# Patient Record
Sex: FEMALE | Race: WHITE | Employment: OTHER | URBAN - NONMETROPOLITAN AREA
[De-identification: names, ages, dates, MRNs, and addresses within clinical notes are randomized per-mention and may not be internally consistent; named-entity substitution may affect disease eponyms.]

---

## 2019-10-22 ENCOUNTER — OUTSIDE SERVICES (OUTPATIENT)
Dept: INTERNAL MEDICINE CLINIC | Age: 84
End: 2019-10-22
Payer: MEDICARE

## 2019-10-22 DIAGNOSIS — F03.90 DEMENTIA WITHOUT BEHAVIORAL DISTURBANCE, UNSPECIFIED DEMENTIA TYPE: ICD-10-CM

## 2019-10-22 DIAGNOSIS — R53.1 GENERALIZED WEAKNESS: ICD-10-CM

## 2019-10-22 DIAGNOSIS — I48.19 PERSISTENT ATRIAL FIBRILLATION (HCC): ICD-10-CM

## 2019-10-22 DIAGNOSIS — J18.9 PNEUMONIA OF LEFT LOWER LOBE DUE TO INFECTIOUS ORGANISM: ICD-10-CM

## 2019-10-22 DIAGNOSIS — R26.9 GAIT DISTURBANCE: ICD-10-CM

## 2019-10-22 PROCEDURE — 99306 1ST NF CARE HIGH MDM 50: CPT | Performed by: INTERNAL MEDICINE

## 2019-10-29 ENCOUNTER — OUTSIDE SERVICES (OUTPATIENT)
Dept: INTERNAL MEDICINE CLINIC | Age: 84
End: 2019-10-29
Payer: MEDICARE

## 2019-10-29 ENCOUNTER — HOSPITAL ENCOUNTER (OUTPATIENT)
Age: 84
Setting detail: SPECIMEN
Discharge: HOME | End: 2019-10-29
Payer: MEDICARE

## 2019-10-29 DIAGNOSIS — F03.90 DEMENTIA WITHOUT BEHAVIORAL DISTURBANCE, UNSPECIFIED DEMENTIA TYPE: ICD-10-CM

## 2019-10-29 DIAGNOSIS — I48.19 PERSISTENT ATRIAL FIBRILLATION (HCC): ICD-10-CM

## 2019-10-29 DIAGNOSIS — R26.9 GAIT DISTURBANCE: Primary | ICD-10-CM

## 2019-10-29 DIAGNOSIS — R53.1 GENERALIZED WEAKNESS: ICD-10-CM

## 2019-10-29 LAB
ALBUMIN SERPL-MCNC: 3.1 GM/DL (ref 3.4–5)
ALP BLD-CCNC: 86 IU/L (ref 40–129)
ALT SERPL-CCNC: 19 U/L (ref 10–40)
ANION GAP SERPL CALCULATED.3IONS-SCNC: 13 MMOL/L (ref 4–16)
AST SERPL-CCNC: 17 IU/L (ref 15–37)
BILIRUB SERPL-MCNC: 0.2 MG/DL (ref 0–1)
BUN BLDV-MCNC: 16 MG/DL (ref 6–23)
CALCIUM SERPL-MCNC: 9 MG/DL (ref 8.3–10.6)
CHLORIDE BLD-SCNC: 105 MMOL/L (ref 99–110)
CO2: 26 MMOL/L (ref 21–32)
CREAT SERPL-MCNC: 1.1 MG/DL (ref 0.6–1.1)
GFR AFRICAN AMERICAN: 58 ML/MIN/1.73M2
GFR NON-AFRICAN AMERICAN: 48 ML/MIN/1.73M2
GLUCOSE BLD-MCNC: 90 MG/DL (ref 70–99)
HCT VFR BLD CALC: 40.6 % (ref 37–47)
HEMOGLOBIN: 13 GM/DL (ref 12.5–16)
MCH RBC QN AUTO: 29.9 PG (ref 27–31)
MCHC RBC AUTO-ENTMCNC: 32 % (ref 32–36)
MCV RBC AUTO: 93.3 FL (ref 78–100)
PDW BLD-RTO: 13 % (ref 11.7–14.9)
PLATELET # BLD: 471 K/CU MM (ref 140–440)
PMV BLD AUTO: 9 FL (ref 7.5–11.1)
POTASSIUM SERPL-SCNC: 4.7 MMOL/L (ref 3.5–5.1)
RBC # BLD: 4.35 M/CU MM (ref 4.2–5.4)
SODIUM BLD-SCNC: 144 MMOL/L (ref 135–145)
TOTAL PROTEIN: 6.2 GM/DL (ref 6.4–8.2)
WBC # BLD: 9.3 K/CU MM (ref 4–10.5)

## 2019-10-29 PROCEDURE — 85027 COMPLETE CBC AUTOMATED: CPT

## 2019-10-29 PROCEDURE — 99308 SBSQ NF CARE LOW MDM 20: CPT | Performed by: INTERNAL MEDICINE

## 2019-10-29 PROCEDURE — 80053 COMPREHEN METABOLIC PANEL: CPT

## 2019-10-29 PROCEDURE — 36415 COLL VENOUS BLD VENIPUNCTURE: CPT

## 2019-11-07 ENCOUNTER — OUTSIDE SERVICES (OUTPATIENT)
Dept: INTERNAL MEDICINE CLINIC | Age: 84
End: 2019-11-07
Payer: MEDICARE

## 2019-11-07 DIAGNOSIS — J18.9 PNEUMONIA OF LEFT LOWER LOBE DUE TO INFECTIOUS ORGANISM: ICD-10-CM

## 2019-11-07 DIAGNOSIS — R53.1 GENERALIZED WEAKNESS: Primary | ICD-10-CM

## 2019-11-07 DIAGNOSIS — I48.19 PERSISTENT ATRIAL FIBRILLATION (HCC): ICD-10-CM

## 2019-11-07 DIAGNOSIS — R26.9 GAIT DISTURBANCE: ICD-10-CM

## 2019-11-07 DIAGNOSIS — F03.90 DEMENTIA WITHOUT BEHAVIORAL DISTURBANCE, UNSPECIFIED DEMENTIA TYPE: ICD-10-CM

## 2019-11-07 PROCEDURE — 99308 SBSQ NF CARE LOW MDM 20: CPT | Performed by: INTERNAL MEDICINE

## 2019-11-11 ENCOUNTER — HOSPITAL ENCOUNTER (OUTPATIENT)
Age: 84
Discharge: HOME | End: 2019-11-11
Payer: MEDICARE

## 2019-11-11 ENCOUNTER — HOSPITAL ENCOUNTER (OUTPATIENT)
Dept: GENERAL RADIOLOGY | Age: 84
Discharge: HOME | End: 2019-11-11
Payer: MEDICARE

## 2019-11-11 DIAGNOSIS — J90 PLEURAL EFFUSION: ICD-10-CM

## 2019-11-11 DIAGNOSIS — J18.9 PNEUMONIA DUE TO INFECTIOUS ORGANISM, UNSPECIFIED LATERALITY, UNSPECIFIED PART OF LUNG: ICD-10-CM

## 2019-11-11 PROCEDURE — 71046 X-RAY EXAM CHEST 2 VIEWS: CPT

## 2019-11-14 ENCOUNTER — TELEPHONE (OUTPATIENT)
Dept: INTERNAL MEDICINE CLINIC | Age: 84
End: 2019-11-14

## 2019-11-20 ENCOUNTER — OFFICE VISIT (OUTPATIENT)
Dept: INTERNAL MEDICINE CLINIC | Age: 84
End: 2019-11-20
Payer: MEDICARE

## 2019-11-20 VITALS
HEIGHT: 66 IN | WEIGHT: 193.6 LBS | RESPIRATION RATE: 16 BRPM | SYSTOLIC BLOOD PRESSURE: 112 MMHG | OXYGEN SATURATION: 95 % | BODY MASS INDEX: 31.12 KG/M2 | HEART RATE: 84 BPM | TEMPERATURE: 97.6 F | DIASTOLIC BLOOD PRESSURE: 72 MMHG

## 2019-11-20 DIAGNOSIS — F03.90 DEMENTIA WITHOUT BEHAVIORAL DISTURBANCE, UNSPECIFIED DEMENTIA TYPE: ICD-10-CM

## 2019-11-20 DIAGNOSIS — I48.19 PERSISTENT ATRIAL FIBRILLATION (HCC): ICD-10-CM

## 2019-11-20 DIAGNOSIS — J18.9 PNEUMONIA OF LEFT LOWER LOBE DUE TO INFECTIOUS ORGANISM: Primary | ICD-10-CM

## 2019-11-20 DIAGNOSIS — E78.2 MIXED HYPERLIPIDEMIA: ICD-10-CM

## 2019-11-20 DIAGNOSIS — F32.5 MAJOR DEPRESSIVE DISORDER WITH SINGLE EPISODE, IN FULL REMISSION (HCC): ICD-10-CM

## 2019-11-20 DIAGNOSIS — Q21.12 PFO (PATENT FORAMEN OVALE): ICD-10-CM

## 2019-11-20 DIAGNOSIS — M54.50 CHRONIC MIDLINE LOW BACK PAIN WITHOUT SCIATICA: ICD-10-CM

## 2019-11-20 DIAGNOSIS — R26.9 GAIT DISTURBANCE: ICD-10-CM

## 2019-11-20 DIAGNOSIS — Z86.73 HISTORY OF TIA (TRANSIENT ISCHEMIC ATTACK): ICD-10-CM

## 2019-11-20 DIAGNOSIS — E03.9 ACQUIRED HYPOTHYROIDISM: ICD-10-CM

## 2019-11-20 DIAGNOSIS — G89.29 CHRONIC MIDLINE LOW BACK PAIN WITHOUT SCIATICA: ICD-10-CM

## 2019-11-20 PROCEDURE — 1111F DSCHRG MED/CURRENT MED MERGE: CPT | Performed by: INTERNAL MEDICINE

## 2019-11-20 PROCEDURE — G0444 DEPRESSION SCREEN ANNUAL: HCPCS | Performed by: INTERNAL MEDICINE

## 2019-11-20 PROCEDURE — 99495 TRANSJ CARE MGMT MOD F2F 14D: CPT | Performed by: INTERNAL MEDICINE

## 2019-11-20 RX ORDER — SIMVASTATIN 40 MG
40 TABLET ORAL NIGHTLY
Qty: 30 TABLET | Refills: 3 | Status: SHIPPED | OUTPATIENT
Start: 2019-11-20 | End: 2019-12-23 | Stop reason: SDUPTHER

## 2019-11-20 RX ORDER — MEMANTINE HYDROCHLORIDE 10 MG/1
10 TABLET ORAL 2 TIMES DAILY
COMMUNITY
End: 2021-03-31 | Stop reason: SDUPTHER

## 2019-11-20 RX ORDER — MULTIVIT-MIN/IRON/FOLIC ACID/K 18-600-40
2000 CAPSULE ORAL DAILY
COMMUNITY
End: 2020-07-14 | Stop reason: SDUPTHER

## 2019-11-20 RX ORDER — LEVOTHYROXINE SODIUM 0.15 MG/1
150 TABLET ORAL DAILY
Qty: 30 TABLET | Refills: 3 | Status: SHIPPED | OUTPATIENT
Start: 2019-11-20 | End: 2019-12-23 | Stop reason: SDUPTHER

## 2019-11-20 RX ORDER — MECLIZINE HYDROCHLORIDE CHEWABLE TABLETS 25 MG/1
25 TABLET, CHEWABLE ORAL PRN
COMMUNITY
End: 2020-07-14 | Stop reason: SDUPTHER

## 2019-11-20 RX ORDER — ASPIRIN 81 MG/1
81 TABLET ORAL DAILY
Qty: 30 TABLET | Refills: 3 | Status: SHIPPED | OUTPATIENT
Start: 2019-11-20 | End: 2019-12-23 | Stop reason: SDUPTHER

## 2019-11-20 ASSESSMENT — PATIENT HEALTH QUESTIONNAIRE - PHQ9
1. LITTLE INTEREST OR PLEASURE IN DOING THINGS: 2
6. FEELING BAD ABOUT YOURSELF - OR THAT YOU ARE A FAILURE OR HAVE LET YOURSELF OR YOUR FAMILY DOWN: 1
7. TROUBLE CONCENTRATING ON THINGS, SUCH AS READING THE NEWSPAPER OR WATCHING TELEVISION: 1
10. IF YOU CHECKED OFF ANY PROBLEMS, HOW DIFFICULT HAVE THESE PROBLEMS MADE IT FOR YOU TO DO YOUR WORK, TAKE CARE OF THINGS AT HOME, OR GET ALONG WITH OTHER PEOPLE: 2
8. MOVING OR SPEAKING SO SLOWLY THAT OTHER PEOPLE COULD HAVE NOTICED. OR THE OPPOSITE, BEING SO FIGETY OR RESTLESS THAT YOU HAVE BEEN MOVING AROUND A LOT MORE THAN USUAL: 1
2. FEELING DOWN, DEPRESSED OR HOPELESS: 0
SUM OF ALL RESPONSES TO PHQ QUESTIONS 1-9: 9
4. FEELING TIRED OR HAVING LITTLE ENERGY: 3
9. THOUGHTS THAT YOU WOULD BE BETTER OFF DEAD, OR OF HURTING YOURSELF: 0
SUM OF ALL RESPONSES TO PHQ QUESTIONS 1-9: 9
3. TROUBLE FALLING OR STAYING ASLEEP: 0
SUM OF ALL RESPONSES TO PHQ9 QUESTIONS 1 & 2: 2
5. POOR APPETITE OR OVEREATING: 1

## 2019-11-22 ENCOUNTER — HOSPITAL ENCOUNTER (OUTPATIENT)
Dept: CT IMAGING | Age: 84
Discharge: HOME | End: 2019-11-22
Payer: MEDICARE

## 2019-11-22 DIAGNOSIS — J18.9 PNEUMONIA OF LEFT LOWER LOBE DUE TO INFECTIOUS ORGANISM: ICD-10-CM

## 2019-11-22 PROCEDURE — 71250 CT THORAX DX C-: CPT

## 2019-12-05 ENCOUNTER — HOSPITAL ENCOUNTER (OUTPATIENT)
Age: 84
Discharge: HOME | End: 2019-12-05
Payer: MEDICARE

## 2019-12-05 DIAGNOSIS — J18.9 PNEUMONIA OF LEFT LOWER LOBE DUE TO INFECTIOUS ORGANISM: ICD-10-CM

## 2019-12-05 LAB — PRO-BNP: 2809 PG/ML

## 2019-12-05 PROCEDURE — 83880 ASSAY OF NATRIURETIC PEPTIDE: CPT

## 2019-12-05 PROCEDURE — 36415 COLL VENOUS BLD VENIPUNCTURE: CPT

## 2019-12-09 PROBLEM — F32.5 MAJOR DEPRESSIVE DISORDER WITH SINGLE EPISODE, IN FULL REMISSION (HCC): Status: ACTIVE | Noted: 2019-12-09

## 2019-12-09 PROBLEM — Q21.12 PFO (PATENT FORAMEN OVALE): Status: ACTIVE | Noted: 2019-12-09

## 2019-12-09 PROBLEM — E03.9 ACQUIRED HYPOTHYROIDISM: Status: ACTIVE | Noted: 2019-12-09

## 2019-12-09 PROBLEM — E78.2 MIXED HYPERLIPIDEMIA: Status: ACTIVE | Noted: 2019-12-09

## 2019-12-09 PROBLEM — Z86.73 HISTORY OF TIA (TRANSIENT ISCHEMIC ATTACK): Status: ACTIVE | Noted: 2019-12-09

## 2019-12-13 ENCOUNTER — HOSPITAL ENCOUNTER (OUTPATIENT)
Dept: MRI IMAGING | Age: 84
Discharge: HOME | End: 2019-12-13
Payer: MEDICARE

## 2019-12-13 DIAGNOSIS — M54.17 L-S RADICULOPATHY: ICD-10-CM

## 2019-12-13 PROCEDURE — 72148 MRI LUMBAR SPINE W/O DYE: CPT

## 2019-12-23 ENCOUNTER — OFFICE VISIT (OUTPATIENT)
Dept: INTERNAL MEDICINE CLINIC | Age: 84
End: 2019-12-23
Payer: MEDICARE

## 2019-12-23 VITALS
DIASTOLIC BLOOD PRESSURE: 72 MMHG | WEIGHT: 193.2 LBS | HEART RATE: 76 BPM | TEMPERATURE: 97.8 F | RESPIRATION RATE: 20 BRPM | OXYGEN SATURATION: 98 % | BODY MASS INDEX: 31.18 KG/M2 | SYSTOLIC BLOOD PRESSURE: 104 MMHG

## 2019-12-23 DIAGNOSIS — F32.5 MAJOR DEPRESSIVE DISORDER WITH SINGLE EPISODE, IN FULL REMISSION (HCC): ICD-10-CM

## 2019-12-23 DIAGNOSIS — E78.2 MIXED HYPERLIPIDEMIA: ICD-10-CM

## 2019-12-23 DIAGNOSIS — R91.8 PULMONARY NODULES: ICD-10-CM

## 2019-12-23 DIAGNOSIS — G89.29 CHRONIC MIDLINE LOW BACK PAIN WITHOUT SCIATICA: ICD-10-CM

## 2019-12-23 DIAGNOSIS — I48.19 PERSISTENT ATRIAL FIBRILLATION (HCC): ICD-10-CM

## 2019-12-23 DIAGNOSIS — J18.9 PNEUMONIA OF LEFT LOWER LOBE DUE TO INFECTIOUS ORGANISM: ICD-10-CM

## 2019-12-23 DIAGNOSIS — F03.90 DEMENTIA WITHOUT BEHAVIORAL DISTURBANCE, UNSPECIFIED DEMENTIA TYPE: ICD-10-CM

## 2019-12-23 DIAGNOSIS — R26.9 GAIT DISTURBANCE: ICD-10-CM

## 2019-12-23 DIAGNOSIS — E03.9 ACQUIRED HYPOTHYROIDISM: ICD-10-CM

## 2019-12-23 DIAGNOSIS — M54.50 CHRONIC MIDLINE LOW BACK PAIN WITHOUT SCIATICA: ICD-10-CM

## 2019-12-23 DIAGNOSIS — Z86.73 HISTORY OF TIA (TRANSIENT ISCHEMIC ATTACK): ICD-10-CM

## 2019-12-23 PROCEDURE — 1036F TOBACCO NON-USER: CPT | Performed by: INTERNAL MEDICINE

## 2019-12-23 PROCEDURE — 1090F PRES/ABSN URINE INCON ASSESS: CPT | Performed by: INTERNAL MEDICINE

## 2019-12-23 PROCEDURE — G8484 FLU IMMUNIZE NO ADMIN: HCPCS | Performed by: INTERNAL MEDICINE

## 2019-12-23 PROCEDURE — G8427 DOCREV CUR MEDS BY ELIG CLIN: HCPCS | Performed by: INTERNAL MEDICINE

## 2019-12-23 PROCEDURE — 99214 OFFICE O/P EST MOD 30 MIN: CPT | Performed by: INTERNAL MEDICINE

## 2019-12-23 PROCEDURE — G8400 PT W/DXA NO RESULTS DOC: HCPCS | Performed by: INTERNAL MEDICINE

## 2019-12-23 PROCEDURE — G8417 CALC BMI ABV UP PARAM F/U: HCPCS | Performed by: INTERNAL MEDICINE

## 2019-12-23 PROCEDURE — 4040F PNEUMOC VAC/ADMIN/RCVD: CPT | Performed by: INTERNAL MEDICINE

## 2019-12-23 PROCEDURE — 1123F ACP DISCUSS/DSCN MKR DOCD: CPT | Performed by: INTERNAL MEDICINE

## 2019-12-23 RX ORDER — ACETAMINOPHEN 500 MG
500 TABLET ORAL 3 TIMES DAILY
Status: ON HOLD | COMMUNITY
End: 2022-04-12

## 2019-12-23 RX ORDER — LEVOTHYROXINE SODIUM 0.15 MG/1
150 TABLET ORAL DAILY
Qty: 90 TABLET | Refills: 1 | Status: SHIPPED | OUTPATIENT
Start: 2019-12-23 | End: 2020-05-05 | Stop reason: SDUPTHER

## 2019-12-23 RX ORDER — SIMVASTATIN 40 MG
40 TABLET ORAL NIGHTLY
Qty: 90 TABLET | Refills: 1 | Status: SHIPPED | OUTPATIENT
Start: 2019-12-23 | End: 2020-04-28 | Stop reason: SDUPTHER

## 2019-12-23 RX ORDER — LIDOCAINE 50 MG/G
1 PATCH TOPICAL DAILY
Qty: 30 PATCH | Refills: 0 | Status: SHIPPED | OUTPATIENT
Start: 2019-12-23 | End: 2020-01-22

## 2019-12-23 RX ORDER — CHOLESTYRAMINE LIGHT 4 G/5.7G
4 POWDER, FOR SUSPENSION ORAL DAILY PRN
COMMUNITY
End: 2020-07-14 | Stop reason: ALTCHOICE

## 2019-12-23 RX ORDER — ASPIRIN 81 MG/1
81 TABLET ORAL DAILY
Qty: 30 TABLET | Refills: 5 | Status: SHIPPED | OUTPATIENT
Start: 2019-12-23 | End: 2020-07-08 | Stop reason: SDUPTHER

## 2019-12-23 ASSESSMENT — ENCOUNTER SYMPTOMS
EYES NEGATIVE: 1
WHEEZING: 0
SHORTNESS OF BREATH: 0
GASTROINTESTINAL NEGATIVE: 1
ALLERGIC/IMMUNOLOGIC NEGATIVE: 1
RESPIRATORY NEGATIVE: 1
COUGH: 0

## 2020-01-03 ENCOUNTER — HOSPITAL ENCOUNTER (OUTPATIENT)
Age: 85
Discharge: HOME | End: 2020-01-03
Payer: MEDICARE

## 2020-01-03 LAB
CHOLESTEROL, FASTING: 161 MG/DL
HDLC SERPL-MCNC: 42 MG/DL
LDL CHOLESTEROL DIRECT: 100 MG/DL
TRIGLYCERIDE, FASTING: 162 MG/DL
TSH HIGH SENSITIVITY: 5.15 UIU/ML (ref 0.27–4.2)

## 2020-01-03 PROCEDURE — 84443 ASSAY THYROID STIM HORMONE: CPT

## 2020-01-03 PROCEDURE — 80061 LIPID PANEL: CPT

## 2020-01-03 PROCEDURE — 36415 COLL VENOUS BLD VENIPUNCTURE: CPT

## 2020-01-08 ENCOUNTER — TELEPHONE (OUTPATIENT)
Dept: INTERNAL MEDICINE CLINIC | Age: 85
End: 2020-01-08

## 2020-01-08 NOTE — TELEPHONE ENCOUNTER
Marco Antonio Nurse called in stating she would like a prescription for Ensure Plus for nutrition. Please advise.  She would like script faxed to [phone]

## 2020-01-10 ENCOUNTER — TELEPHONE (OUTPATIENT)
Dept: INTERNAL MEDICINE CLINIC | Age: 85
End: 2020-01-10

## 2020-01-14 NOTE — TELEPHONE ENCOUNTER
Spoke with Kirstie Romero the [employer] nurse she advised that the patient is requesting them for a snack 1 daily patient buys them and they were trying to offset the cost as well have patient using those as snacks instead of chips or junk food. Please advise.  Call back # [phone]

## 2020-03-10 ENCOUNTER — OFFICE VISIT (OUTPATIENT)
Dept: INTERNAL MEDICINE CLINIC | Age: 85
End: 2020-03-10
Payer: MEDICARE

## 2020-03-10 VITALS
OXYGEN SATURATION: 95 % | SYSTOLIC BLOOD PRESSURE: 110 MMHG | BODY MASS INDEX: 30.47 KG/M2 | TEMPERATURE: 97.9 F | WEIGHT: 188.8 LBS | HEART RATE: 76 BPM | DIASTOLIC BLOOD PRESSURE: 64 MMHG | RESPIRATION RATE: 16 BRPM

## 2020-03-10 PROBLEM — R53.1 GENERALIZED WEAKNESS: Status: RESOLVED | Noted: 2019-10-22 | Resolved: 2020-03-10

## 2020-03-10 PROBLEM — J84.9 INTERSTITIAL LUNG DISEASE (HCC): Status: ACTIVE | Noted: 2020-03-10

## 2020-03-10 PROCEDURE — 1123F ACP DISCUSS/DSCN MKR DOCD: CPT | Performed by: INTERNAL MEDICINE

## 2020-03-10 PROCEDURE — 1036F TOBACCO NON-USER: CPT | Performed by: INTERNAL MEDICINE

## 2020-03-10 PROCEDURE — G8484 FLU IMMUNIZE NO ADMIN: HCPCS | Performed by: INTERNAL MEDICINE

## 2020-03-10 PROCEDURE — 1090F PRES/ABSN URINE INCON ASSESS: CPT | Performed by: INTERNAL MEDICINE

## 2020-03-10 PROCEDURE — 4040F PNEUMOC VAC/ADMIN/RCVD: CPT | Performed by: INTERNAL MEDICINE

## 2020-03-10 PROCEDURE — G8427 DOCREV CUR MEDS BY ELIG CLIN: HCPCS | Performed by: INTERNAL MEDICINE

## 2020-03-10 PROCEDURE — G8400 PT W/DXA NO RESULTS DOC: HCPCS | Performed by: INTERNAL MEDICINE

## 2020-03-10 PROCEDURE — 99214 OFFICE O/P EST MOD 30 MIN: CPT | Performed by: INTERNAL MEDICINE

## 2020-03-10 PROCEDURE — G8417 CALC BMI ABV UP PARAM F/U: HCPCS | Performed by: INTERNAL MEDICINE

## 2020-03-10 RX ORDER — METOPROLOL TARTRATE 100 MG/1
100 TABLET ORAL 2 TIMES DAILY
Status: ON HOLD | COMMUNITY
Start: 2020-02-10 | End: 2020-05-16 | Stop reason: HOSPADM

## 2020-03-10 RX ORDER — HYDROCODONE BITARTRATE AND ACETAMINOPHEN 5; 325 MG/1; MG/1
1 TABLET ORAL 2 TIMES DAILY PRN
COMMUNITY
Start: 2020-02-12 | End: 2020-10-20 | Stop reason: ALTCHOICE

## 2020-03-10 RX ORDER — VITAMIN E 268 MG
400 CAPSULE ORAL DAILY
COMMUNITY
Start: 2020-02-10 | End: 2021-03-31 | Stop reason: SDUPTHER

## 2020-03-10 RX ORDER — LOPERAMIDE HYDROCHLORIDE 2 MG/1
2 CAPSULE ORAL 4 TIMES DAILY PRN
COMMUNITY
End: 2020-07-14 | Stop reason: ALTCHOICE

## 2020-03-10 NOTE — PROGRESS NOTES
has no neurological deficit. Interstitial lung disease ([facility] [address]  Pt sees Dr Cathy Calvin pulmonologist at [facility]. Patient states she does get short of breath on exertion. Major depressive disorder with single episode, in full remission ([facility] [address]  History of depression that is controlled with Zoloft. Continue the same    Mixed hyperlipidemia  Lipid profile in January 2020 was good. LDL was 100. Continue simvastatin. Follow low-cholesterol diet    Patient has hypothyroidism and is on medications    Leo Mahoney was evaluated today and a DME order was entered for a semi-electric hospital bed because she requires assistance for positioning needs not possible in an ordinary bed, requirement of elevation of head of bed more than 30 degrees for the diagnosis of back pain, spinal stenosis and interstial lung disease . Patient requires frequent and immediate positioning changes for relief of pain and care needs related to medical diagnoses. Patient needs variability of bed height requirements to perform patient transfers and for eating, bathing, toileting, personal cares, ambulating, grooming and hygiene. Current body Weight: 188 lb 12.8 oz (85.6 kg). The need for this equipment was discussed with the patient and she understands and is in agreement. Mediations reviewed with the patient. Continue current medications. Appropriate prescriptions are addressed. After visit summeryprovided. Follow up as directed sooner if needed. Questions answered and patient verbalizes understanding. Call for any problems, questions, or concerns.        No Known Allergies  Current Outpatient Medications   Medication Sig Dispense Refill    HYDROcodone-acetaminophen (NORCO) 5-325 MG per tablet       vitamin E 400 UNIT capsule       metoprolol (LOPRESSOR) 100 MG tablet Take 100 mg by mouth 2 times daily       loperamide (IMODIUM) 2 MG capsule Take 2 mg by mouth 4 times daily as needed for Diarrhea      acetaminophen (TYLENOL) 500 MG tablet Take 500 mg by mouth 3 times daily PRN      apixaban (ELIQUIS) 5 MG TABS tablet Take by mouth 2 times daily      cholestyramine light 4 g packet Take 4 g by mouth      simvastatin (ZOCOR) 40 MG tablet Take 1 tablet by mouth nightly 90 tablet 1    levothyroxine (SYNTHROID) 150 MCG tablet Take 1 tablet by mouth daily 90 tablet 1    aspirin EC 81 MG EC tablet Take 1 tablet by mouth daily 30 tablet 5    meclizine (TRAVEL SICKNESS) 25 MG CHEW Take 25 mg by mouth as needed      memantine (NAMENDA) 10 MG tablet Take 10 mg by mouth 2 times daily      sertraline (ZOLOFT) 50 MG tablet Take 1 tablet by mouth daily 30 tablet 5    Cholecalciferol (VITAMIN D) 50 MCG (2000 UT) CAPS capsule Take by mouth daily       No current facility-administered medications for this visit. Past Medical History:   Diagnosis Date    Acquired hypothyroidism 12/9/2019    Patient is taking Synthroid    Chronic midline low back pain without sciatica 11/20/2019    Dementia without behavioral disturbance ([facility] [address] 10/22/2019    Patient has dementia. She is taking Namenda Patient is seeing urologist Dr. Artur Lamb Gait disturbance 10/22/2019    Patient uses walker to ambulate    History of TIA (transient ischemic attack) 12/9/2019    Patient has a history of TIA and also recently may have had TIA. Patient is on Eliquis. Sees neurologist Dr. Artur Lamb Major depressive disorder with single episode, in full remission ([facility] [address] 12/9/2019    Patient is taking Zoloft and that is helping.  Mixed hyperlipidemia 12/9/2019    Patient is on simvastatin    Persistent atrial fibrillation 10/22/2019    A. fib on recently in October 2019 . Patient is on metoprolol 25 mg twice a day and Eliquis    PFO (patent foramen ovale) 12/9/2019    PFO seen on echocardiogram in 2016    Pneumonia due to infectious organism 10/22/2019    Patient had pneumonia and pleural effusion during admission in October 2019.  A repeat CT scan of the chest will be done Patient is also seen pulmonologist     Past Surgical History:   Procedure Laterality Date    HYSTERECTOMY       Social History     Tobacco Use    Smoking status: Never Smoker    Smokeless tobacco: Never Used   Substance Use Topics    Alcohol use: No       LAB REVIEW:  CBC:   Lab Results   Component Value Date    WBC 9.3 10/29/2019    HGB 13.0 10/29/2019    HCT 40.6 10/29/2019     10/29/2019     Lipids:   Lab Results   Component Value Date    CHOL 171 07/04/2014    TRIG 181 (H) 07/04/2014    HDL 42 01/03/2020    LDLCALC 91 10/04/2011    LDLDIRECT 100 (H) 01/03/2020    TRIGLYCFAST 162 (H) 01/03/2020     Renal:   Lab Results   Component Value Date    BUN 16 10/29/2019    CREATININE 1.1 10/29/2019     10/29/2019    K 4.7 10/29/2019    ALT 19 10/29/2019    AST 17 10/29/2019    GLUCOSE 90 10/29/2019     PT/INR:   Lab Results   Component Value Date    INR 1.02 07/04/2014     A1C: No results found for: Robert Davies MD, 3/10/2020 , 2:22 PM

## 2020-03-11 NOTE — ASSESSMENT & PLAN NOTE
Lipid profile in January 2020 was good. LDL was 100. Continue simvastatin.   Follow low-cholesterol diet

## 2020-03-11 NOTE — ASSESSMENT & PLAN NOTE
Patient has a history of TIA and also recently may have had TIA. Patient is on Eliquis. Sees neurologist Dr. Shantal Pugh. Patient has no neurological deficit.

## 2020-03-17 RX ORDER — APIXABAN 5 MG/1
TABLET, FILM COATED ORAL
Qty: 60 TABLET | Refills: 5 | Status: SHIPPED | OUTPATIENT
Start: 2020-03-17 | End: 2020-07-14 | Stop reason: SDUPTHER

## 2020-04-28 RX ORDER — SIMVASTATIN 40 MG
40 TABLET ORAL NIGHTLY
Qty: 90 TABLET | Refills: 1 | Status: SHIPPED | OUTPATIENT
Start: 2020-04-28 | End: 2020-07-14 | Stop reason: SDUPTHER

## 2020-04-28 NOTE — TELEPHONE ENCOUNTER
Patient is needing a refill of her Simvastatin. Confirmed appointment for 5/12/2020 but patient will be out of this medication by Friday. They are requesting that medication be sent to [facility] in [place].

## 2020-05-04 ENCOUNTER — TELEPHONE (OUTPATIENT)
Dept: INTERNAL MEDICINE CLINIC | Age: 85
End: 2020-05-04

## 2020-05-05 ENCOUNTER — VIRTUAL VISIT (OUTPATIENT)
Dept: INTERNAL MEDICINE CLINIC | Age: 85
End: 2020-05-05
Payer: MEDICARE

## 2020-05-05 PROBLEM — B37.2 CANDIDAL INTERTRIGO: Status: ACTIVE | Noted: 2020-05-05

## 2020-05-05 PROCEDURE — G8400 PT W/DXA NO RESULTS DOC: HCPCS | Performed by: INTERNAL MEDICINE

## 2020-05-05 PROCEDURE — 99214 OFFICE O/P EST MOD 30 MIN: CPT | Performed by: INTERNAL MEDICINE

## 2020-05-05 PROCEDURE — 1090F PRES/ABSN URINE INCON ASSESS: CPT | Performed by: INTERNAL MEDICINE

## 2020-05-05 PROCEDURE — 4040F PNEUMOC VAC/ADMIN/RCVD: CPT | Performed by: INTERNAL MEDICINE

## 2020-05-05 PROCEDURE — G8427 DOCREV CUR MEDS BY ELIG CLIN: HCPCS | Performed by: INTERNAL MEDICINE

## 2020-05-05 PROCEDURE — 1123F ACP DISCUSS/DSCN MKR DOCD: CPT | Performed by: INTERNAL MEDICINE

## 2020-05-05 RX ORDER — NYSTATIN 100000 U/G
CREAM TOPICAL
Qty: 30 G | Refills: 2 | Status: SHIPPED | OUTPATIENT
Start: 2020-05-05 | End: 2020-07-14

## 2020-05-05 RX ORDER — FLUCONAZOLE 150 MG/1
150 TABLET ORAL ONCE
Qty: 1 TABLET | Refills: 0 | Status: SHIPPED | OUTPATIENT
Start: 2020-05-05 | End: 2020-05-05

## 2020-05-05 RX ORDER — LEVOTHYROXINE SODIUM 0.15 MG/1
150 TABLET ORAL DAILY
Qty: 90 TABLET | Refills: 1 | Status: SHIPPED | OUTPATIENT
Start: 2020-05-05 | End: 2020-07-08 | Stop reason: SDUPTHER

## 2020-05-05 RX ORDER — NITROFURANTOIN 25; 75 MG/1; MG/1
100 CAPSULE ORAL 2 TIMES DAILY
Qty: 14 CAPSULE | Refills: 0 | Status: SHIPPED | OUTPATIENT
Start: 2020-05-05 | End: 2020-05-12

## 2020-05-05 NOTE — PROGRESS NOTES
2020    TELEHEALTH EVALUATION -- Audio/Visual (During VEDVR-34 public health emergency)    HPI:    Trell Manley (:  1935) has requested an audio/video evaluation for the following concern(s):    Rash in the groin and skin folds  Generalized weakness  Patient has fallen x2    Virtual visit was done by [email or web address]  Patient's 2 daughters and home health care nurse was also present    Patient is in bed  Patient's family states patient has been weak and has difficulty walking. Her knee is giving out when she tries to walk and she had fallen 2 times on the knees. No hard fall. One time they are to call the squad to pick her up. Patient has developed a rash under the breast, and groin and on the skin fold    Patient is daughter check patient's urine from over-the-counter dipsticks and it was positive for nitrites and leukoesterase    BP at home was running 96/58 pulse was 93 today. Patient is on metoprolol 100 mg twice a day  As per daughter patient's blood pressure was running low for the last few days. It was as low as 80 systolic    I talked to the patient. She denies any chest pain. No shortness of breath at rest.  No cough or sputum production  No nausea, vomiting or diarrhea    Review of Systems    Review of system normal except as in HPI  Prior to Visit Medications    Medication Sig Taking? Authorizing Provider   levothyroxine (SYNTHROID) 150 MCG tablet Take 1 tablet by mouth daily Yes Abdirashid Ureña MD   sertraline (ZOLOFT) 50 MG tablet Take 1 tablet by mouth daily Yes Abdirashid Ureña MD   nystatin (MYCOSTATIN) 219239 UNIT/GM cream Apply topically 2 times daily.  Yes Abdirashid Ureña MD   fluconazole (DIFLUCAN) 150 MG tablet Take 1 tablet by mouth once for 1 dose Yes Abdirashid Ureña MD   nitrofurantoin, macrocrystal-monohydrate, (MACROBID) 100 MG capsule Take 1 capsule by mouth 2 times daily for 7 days Yes Abdirashid Ureña MD   simvastatin (ZOCOR) 40 MG tablet Take 1 tablet by mouth nightly Yes Abdirashid Ureña MD

## 2020-05-06 ENCOUNTER — TELEPHONE (OUTPATIENT)
Dept: INTERNAL MEDICINE CLINIC | Age: 85
End: 2020-05-06

## 2020-05-11 ENCOUNTER — TELEPHONE (OUTPATIENT)
Dept: INTERNAL MEDICINE CLINIC | Age: 85
End: 2020-05-11

## 2020-05-14 ENCOUNTER — TELEPHONE (OUTPATIENT)
Dept: INTERNAL MEDICINE CLINIC | Age: 85
End: 2020-05-14

## 2020-05-14 NOTE — TELEPHONE ENCOUNTER
Patient called to report her BP reading as requested from her last visit since the Metoprolol was changed from 100mg twice daily to 50mg twice daily. This AM was she is reporting that her BP was 74/56 with a pulse of 90 and this afternoon her BP was 87/57. Please advise.

## 2020-05-15 ENCOUNTER — TELEPHONE (OUTPATIENT)
Dept: INTERNAL MEDICINE CLINIC | Age: 85
End: 2020-05-15

## 2020-05-15 ENCOUNTER — HOSPITAL ENCOUNTER (OUTPATIENT)
Age: 85
Setting detail: OBSERVATION
Discharge: HOME HEALTH | End: 2020-05-16
Attending: EMERGENCY MEDICINE | Admitting: INTERNAL MEDICINE
Payer: MEDICARE

## 2020-05-15 PROBLEM — I95.9 HYPOTENSION (ARTERIAL): Status: ACTIVE | Noted: 2020-05-15

## 2020-05-15 LAB
ALBUMIN SERPL-MCNC: 3.5 GM/DL (ref 3.4–5)
ALP BLD-CCNC: 60 IU/L (ref 40–129)
ALT SERPL-CCNC: 12 U/L (ref 10–40)
ANION GAP SERPL CALCULATED.3IONS-SCNC: 6 MMOL/L (ref 4–16)
AST SERPL-CCNC: 17 IU/L (ref 15–37)
BACTERIA: ABNORMAL /HPF
BASOPHILS ABSOLUTE: 0.1 K/CU MM
BASOPHILS RELATIVE PERCENT: 0.8 % (ref 0–1)
BILIRUB SERPL-MCNC: 0.4 MG/DL (ref 0–1)
BILIRUBIN URINE: NEGATIVE MG/DL
BLOOD, URINE: NEGATIVE
BUN BLDV-MCNC: 18 MG/DL (ref 6–23)
CALCIUM SERPL-MCNC: 9 MG/DL (ref 8.3–10.6)
CAST TYPE: NEGATIVE /HPF
CHLORIDE BLD-SCNC: 103 MMOL/L (ref 99–110)
CLARITY: ABNORMAL
CO2: 31 MMOL/L (ref 21–32)
COLOR: YELLOW
CREAT SERPL-MCNC: 0.9 MG/DL (ref 0.6–1.1)
CRYSTAL TYPE: NEGATIVE /HPF
DIFFERENTIAL TYPE: ABNORMAL
EOSINOPHILS ABSOLUTE: 0.3 K/CU MM
EOSINOPHILS RELATIVE PERCENT: 3.4 % (ref 0–3)
EPITHELIAL CELLS, UA: ABNORMAL /HPF
GFR AFRICAN AMERICAN: >60 ML/MIN/1.73M2
GFR NON-AFRICAN AMERICAN: 60 ML/MIN/1.73M2
GLUCOSE BLD-MCNC: 145 MG/DL (ref 70–99)
GLUCOSE, URINE: NEGATIVE MG/DL
HCT VFR BLD CALC: 42.9 % (ref 37–47)
HEMOCCULT STL QL: NEGATIVE
HEMOGLOBIN: 13.1 GM/DL (ref 12.5–16)
IMMATURE NEUTROPHIL %: 0.3 % (ref 0–0.43)
KETONES, URINE: NEGATIVE MG/DL
LEUKOCYTE ESTERASE, URINE: NEGATIVE
LYMPHOCYTES ABSOLUTE: 2.4 K/CU MM
LYMPHOCYTES RELATIVE PERCENT: 30.9 % (ref 24–44)
MCH RBC QN AUTO: 29.9 PG (ref 27–31)
MCHC RBC AUTO-ENTMCNC: 30.5 % (ref 32–36)
MCV RBC AUTO: 97.9 FL (ref 78–100)
MONOCYTES ABSOLUTE: 0.9 K/CU MM
MONOCYTES RELATIVE PERCENT: 11.8 % (ref 0–4)
NITRITE URINE, QUANTITATIVE: NEGATIVE
PDW BLD-RTO: 15.1 % (ref 11.7–14.9)
PH, URINE: 6 (ref 5–8)
PLATELET # BLD: 248 K/CU MM (ref 140–440)
PMV BLD AUTO: 9.9 FL (ref 7.5–11.1)
POTASSIUM SERPL-SCNC: 4 MMOL/L (ref 3.5–5.1)
PRO-BNP: 3203 PG/ML
PROTEIN UA: NEGATIVE MG/DL
RBC # BLD: 4.38 M/CU MM (ref 4.2–5.4)
RBC URINE: NEGATIVE /HPF (ref 0–6)
SEGMENTED NEUTROPHILS ABSOLUTE COUNT: 4.1 K/CU MM
SEGMENTED NEUTROPHILS RELATIVE PERCENT: 52.8 % (ref 36–66)
SODIUM BLD-SCNC: 140 MMOL/L (ref 135–145)
SPECIFIC GRAVITY UA: 1.01 (ref 1–1.03)
TOTAL IMMATURE NEUTOROPHIL: 0.02 K/CU MM
TOTAL PROTEIN: 6 GM/DL (ref 6.4–8.2)
TROPONIN T: <0.01 NG/ML
TROPONIN T: <0.01 NG/ML
TSH HIGH SENSITIVITY: 1.79 UIU/ML (ref 0.27–4.2)
UROBILINOGEN, URINE: 0.2 MG/DL (ref 0.2–1)
WBC # BLD: 7.7 K/CU MM (ref 4–10.5)
WBC UA: NEGATIVE /HPF (ref 0–5)

## 2020-05-15 PROCEDURE — G0378 HOSPITAL OBSERVATION PER HR: HCPCS

## 2020-05-15 PROCEDURE — 2580000003 HC RX 258: Performed by: EMERGENCY MEDICINE

## 2020-05-15 PROCEDURE — 87633 RESP VIRUS 12-25 TARGETS: CPT

## 2020-05-15 PROCEDURE — 2580000003 HC RX 258: Performed by: INTERNAL MEDICINE

## 2020-05-15 PROCEDURE — 82607 VITAMIN B-12: CPT

## 2020-05-15 PROCEDURE — 82746 ASSAY OF FOLIC ACID SERUM: CPT

## 2020-05-15 PROCEDURE — 84145 PROCALCITONIN (PCT): CPT

## 2020-05-15 PROCEDURE — 87086 URINE CULTURE/COLONY COUNT: CPT

## 2020-05-15 PROCEDURE — 87798 DETECT AGENT NOS DNA AMP: CPT

## 2020-05-15 PROCEDURE — 6370000000 HC RX 637 (ALT 250 FOR IP): Performed by: INTERNAL MEDICINE

## 2020-05-15 PROCEDURE — 82272 OCCULT BLD FECES 1-3 TESTS: CPT

## 2020-05-15 PROCEDURE — 87486 CHLMYD PNEUM DNA AMP PROBE: CPT

## 2020-05-15 PROCEDURE — 87581 M.PNEUMON DNA AMP PROBE: CPT

## 2020-05-15 PROCEDURE — 83036 HEMOGLOBIN GLYCOSYLATED A1C: CPT

## 2020-05-15 PROCEDURE — 85025 COMPLETE CBC W/AUTO DIFF WBC: CPT

## 2020-05-15 PROCEDURE — 83880 ASSAY OF NATRIURETIC PEPTIDE: CPT

## 2020-05-15 PROCEDURE — 84484 ASSAY OF TROPONIN QUANT: CPT

## 2020-05-15 PROCEDURE — 84443 ASSAY THYROID STIM HORMONE: CPT

## 2020-05-15 PROCEDURE — 81001 URINALYSIS AUTO W/SCOPE: CPT

## 2020-05-15 PROCEDURE — 93010 ELECTROCARDIOGRAM REPORT: CPT | Performed by: INTERNAL MEDICINE

## 2020-05-15 PROCEDURE — 36415 COLL VENOUS BLD VENIPUNCTURE: CPT

## 2020-05-15 PROCEDURE — 80053 COMPREHEN METABOLIC PANEL: CPT

## 2020-05-15 PROCEDURE — 93005 ELECTROCARDIOGRAM TRACING: CPT | Performed by: EMERGENCY MEDICINE

## 2020-05-15 PROCEDURE — 99285 EMERGENCY DEPT VISIT HI MDM: CPT

## 2020-05-15 RX ORDER — ONDANSETRON 2 MG/ML
4 INJECTION INTRAMUSCULAR; INTRAVENOUS EVERY 6 HOURS PRN
Status: DISCONTINUED | OUTPATIENT
Start: 2020-05-15 | End: 2020-05-15

## 2020-05-15 RX ORDER — LEVOTHYROXINE SODIUM 0.15 MG/1
150 TABLET ORAL DAILY
Status: DISCONTINUED | OUTPATIENT
Start: 2020-05-16 | End: 2020-05-16 | Stop reason: HOSPADM

## 2020-05-15 RX ORDER — ATORVASTATIN CALCIUM 20 MG/1
20 TABLET, FILM COATED ORAL DAILY
Status: DISCONTINUED | OUTPATIENT
Start: 2020-05-15 | End: 2020-05-16 | Stop reason: HOSPADM

## 2020-05-15 RX ORDER — SODIUM CHLORIDE 0.9 % (FLUSH) 0.9 %
10 SYRINGE (ML) INJECTION EVERY 12 HOURS SCHEDULED
Status: DISCONTINUED | OUTPATIENT
Start: 2020-05-15 | End: 2020-05-16 | Stop reason: HOSPADM

## 2020-05-15 RX ORDER — ACETAMINOPHEN 500 MG
500 TABLET ORAL 3 TIMES DAILY
Status: DISCONTINUED | OUTPATIENT
Start: 2020-05-15 | End: 2020-05-16 | Stop reason: HOSPADM

## 2020-05-15 RX ORDER — POLYETHYLENE GLYCOL 3350 17 G/17G
17 POWDER, FOR SOLUTION ORAL DAILY PRN
Status: DISCONTINUED | OUTPATIENT
Start: 2020-05-15 | End: 2020-05-16 | Stop reason: HOSPADM

## 2020-05-15 RX ORDER — ACETAMINOPHEN 325 MG/1
650 TABLET ORAL EVERY 6 HOURS PRN
Status: DISCONTINUED | OUTPATIENT
Start: 2020-05-15 | End: 2020-05-16 | Stop reason: HOSPADM

## 2020-05-15 RX ORDER — ACETAMINOPHEN 650 MG/1
650 SUPPOSITORY RECTAL EVERY 6 HOURS PRN
Status: DISCONTINUED | OUTPATIENT
Start: 2020-05-15 | End: 2020-05-16 | Stop reason: HOSPADM

## 2020-05-15 RX ORDER — 0.9 % SODIUM CHLORIDE 0.9 %
1000 INTRAVENOUS SOLUTION INTRAVENOUS ONCE
Status: COMPLETED | OUTPATIENT
Start: 2020-05-15 | End: 2020-05-15

## 2020-05-15 RX ORDER — MEMANTINE HYDROCHLORIDE 10 MG/1
10 TABLET ORAL 2 TIMES DAILY
Status: DISCONTINUED | OUTPATIENT
Start: 2020-05-15 | End: 2020-05-16 | Stop reason: HOSPADM

## 2020-05-15 RX ORDER — PROMETHAZINE HYDROCHLORIDE 12.5 MG/1
12.5 TABLET ORAL EVERY 6 HOURS PRN
Status: DISCONTINUED | OUTPATIENT
Start: 2020-05-15 | End: 2020-05-16 | Stop reason: HOSPADM

## 2020-05-15 RX ORDER — LEVOFLOXACIN 500 MG/1
500 TABLET, FILM COATED ORAL DAILY
Status: DISCONTINUED | OUTPATIENT
Start: 2020-05-15 | End: 2020-05-16 | Stop reason: HOSPADM

## 2020-05-15 RX ORDER — ASPIRIN 81 MG/1
81 TABLET ORAL DAILY
Status: DISCONTINUED | OUTPATIENT
Start: 2020-05-16 | End: 2020-05-16 | Stop reason: HOSPADM

## 2020-05-15 RX ORDER — SODIUM CHLORIDE 0.9 % (FLUSH) 0.9 %
10 SYRINGE (ML) INJECTION PRN
Status: DISCONTINUED | OUTPATIENT
Start: 2020-05-15 | End: 2020-05-16 | Stop reason: HOSPADM

## 2020-05-15 RX ADMIN — SODIUM CHLORIDE 1000 ML: 9 INJECTION, SOLUTION INTRAVENOUS at 14:02

## 2020-05-15 RX ADMIN — LEVOFLOXACIN 500 MG: 500 TABLET, FILM COATED ORAL at 19:33

## 2020-05-15 RX ADMIN — SODIUM CHLORIDE, PRESERVATIVE FREE 10 ML: 5 INJECTION INTRAVENOUS at 20:53

## 2020-05-15 RX ADMIN — ACETAMINOPHEN 500 MG: 500 TABLET ORAL at 20:53

## 2020-05-15 RX ADMIN — MEMANTINE 10 MG: 10 TABLET ORAL at 20:53

## 2020-05-15 RX ADMIN — APIXABAN 5 MG: 5 TABLET, FILM COATED ORAL at 20:53

## 2020-05-15 RX ADMIN — ATORVASTATIN CALCIUM 20 MG: 20 TABLET, FILM COATED ORAL at 20:53

## 2020-05-15 ASSESSMENT — PAIN SCALES - GENERAL
PAINLEVEL_OUTOF10: 1
PAINLEVEL_OUTOF10: 0
PAINLEVEL_OUTOF10: 0

## 2020-05-15 ASSESSMENT — ENCOUNTER SYMPTOMS
GASTROINTESTINAL NEGATIVE: 1
RESPIRATORY NEGATIVE: 1

## 2020-05-15 NOTE — ED PROVIDER NOTES
 [START ON 5/16/2020] levothyroxine (SYNTHROID) tablet 150 mcg  150 mcg Oral Daily Simone Muñoz MD        [facility]) tablet 10 mg  10 mg Oral BID Simone Muñoz MD       [facility] [START ON 5/16/2020] sertraline (ZOLOFT) tablet 50 mg  50 mg Oral Daily Simone Muñoz MD        atorvastatin (LIPITOR) tablet 20 mg  20 mg Oral Daily Simone Muñoz MD        sodium chloride flush 0.9 % injection 10 mL  10 mL Intravenous 2 times per day Simone Muñoz MD        sodium chloride flush 0.9 % injection 10 mL  10 mL Intravenous PRN Simone Muñoz MD        acetaminophen (TYLENOL) tablet 650 mg  650 mg Oral Q6H PRN Simoen Muñoz MD        Or    acetaminophen (TYLENOL) suppository 650 mg  650 mg Rectal Q6H PRN Simone Muñoz MD        polyethylene glycol (GLYCOLAX) packet 17 g  17 g Oral Daily PRN Simone Muñoz MD        promethazine (PHENERGAN) tablet 12.5 mg  12.5 mg Oral Q6H PRN Simone Muñoz MD        levoFLOXacin (LEVAQUIN) tablet 500 mg  500 mg Oral Daily Simone Muñoz MD         No Known Allergies      ROS:    Review of Systems   Constitutional: Positive for activity change. HENT: Negative. Respiratory: Negative. Cardiovascular: Negative. Gastrointestinal: Negative. Genitourinary: Negative. Neurological: Positive for dizziness. Negative for tremors, seizures, syncope, facial asymmetry, speech difficulty, weakness, light-headedness, numbness and headaches. All other systems reviewed and are negative. Nursing Notes Reviewed    Physical Exam:  ED Triage Vitals   Enc Vitals Group      BP       Pulse       Resp       Temp       Temp src       SpO2       Weight       Height       Head Circumference       Peak Flow       Pain Score       Pain Loc       Pain Edu? Excl. in [address]? Physical Exam  Vitals signs and nursing note reviewed. Exam conducted with a chaperone present. Constitutional:       Appearance: She is well-developed. She is obese.    HENT: Head: Normocephalic and atraumatic. Right Ear: External ear normal.      Left Ear: External ear normal.      Mouth/Throat:      Mouth: Mucous membranes are moist.   Eyes:      General: No scleral icterus. Right eye: No discharge. Left eye: No discharge. Conjunctiva/sclera: Conjunctivae normal.      Pupils: Pupils are equal, round, and reactive to light. Neck:      Musculoskeletal: Normal range of motion and neck supple. Thyroid: No thyromegaly. Vascular: No JVD. Trachea: No tracheal deviation. Cardiovascular:      Rate and Rhythm: Normal rate. Rhythm irregular. Heart sounds: Normal heart sounds. No murmur. No friction rub. No gallop. Pulmonary:      Effort: Pulmonary effort is normal. No respiratory distress. Breath sounds: Normal breath sounds. No stridor. No wheezing or rales. Chest:      Chest wall: No tenderness. Abdominal:      General: Bowel sounds are normal. There is no distension. Palpations: Abdomen is soft. There is no mass. Tenderness: There is no abdominal tenderness. There is no guarding or rebound. Hernia: No hernia is present. Genitourinary:     Pubic Area: No rash. Rectum: Normal. No tenderness or anal fissure. Musculoskeletal: Normal range of motion. General: No tenderness or deformity. Lymphadenopathy:      Cervical: No cervical adenopathy. Skin:     General: Skin is warm and dry. Capillary Refill: Capillary refill takes less than 2 seconds. Coloration: Skin is pale. Findings: No erythema or rash. Neurological:      General: No focal deficit present. Mental Status: She is alert. She is disoriented. Cranial Nerves: No cranial nerve deficit. Sensory: No sensory deficit. Motor: No weakness. Coordination: Coordination normal.      Deep Tendon Reflexes: Reflexes are normal and symmetric.  Reflexes normal.   Psychiatric:         Speech: Speech normal. Behavior: Behavior normal.         Thought Content:  Thought content normal.         Judgment: Judgment normal.         I have reviewed and interpreted all of the currently available lab results from this visit (ifapplicable):  Results for orders placed or performed during the hospital encounter of 05/15/20   CBC Auto Differential   Result Value Ref Range    WBC 7.7 4.0 - 10.5 K/CU MM    RBC 4.38 4.2 - 5.4 M/CU MM    Hemoglobin 13.1 12.5 - 16.0 GM/DL    Hematocrit 42.9 37 - 47 %    MCV 97.9 78 - 100 FL    MCH 29.9 27 - 31 PG    MCHC 30.5 (L) 32.0 - 36.0 %    RDW 15.1 (H) 11.7 - 14.9 %    Platelets 433 130 - 676 K/CU MM    MPV 9.9 7.5 - 11.1 FL    Differential Type AUTOMATED DIFFERENTIAL     Segs Relative 52.8 36 - 66 %    Lymphocytes % 30.9 24 - 44 %    Monocytes % 11.8 (H) 0 - 4 %    Eosinophils % 3.4 (H) 0 - 3 %    Basophils % 0.8 0 - 1 %    Segs Absolute 4.1 K/CU MM    Lymphocytes Absolute 2.4 K/CU MM    Monocytes Absolute 0.9 K/CU MM    Eosinophils Absolute 0.3 K/CU MM    Basophils Absolute 0.1 K/CU MM    Immature Neutrophil % 0.3 0 - 0.43 %    Total Immature Neutrophil 0.02 K/CU MM   Comprehensive Metabolic Panel w/ Reflex to MG   Result Value Ref Range    Sodium 140 135 - 145 MMOL/L    Potassium 4.0 3.5 - 5.1 MMOL/L    Chloride 103 99 - 110 mMol/L    CO2 31 21 - 32 MMOL/L    BUN 18 6 - 23 MG/DL    CREATININE 0.9 0.6 - 1.1 MG/DL    Glucose 145 (H) 70 - 99 MG/DL    Calcium 9.0 8.3 - 10.6 MG/DL    Alb 3.5 3.4 - 5.0 GM/DL    Total Protein 6.0 (L) 6.4 - 8.2 GM/DL    Total Bilirubin 0.4 0.0 - 1.0 MG/DL    ALT 12 10 - 40 U/L    AST 17 15 - 37 IU/L    Alkaline Phosphatase 60 40 - 129 IU/L    GFR Non-African American 60 (L) >60 mL/min/1.73m2    GFR African American >60 >60 mL/min/1.73m2    Anion Gap 6 4 - 16   Urinalysis, reflex to microscopic   Result Value Ref Range    Color, UA YELLOW YELLOW    Clarity, UA SL HAZY CLEAR    Glucose, Urine NEGATIVE NEGATIVE MG/DL    Bilirubin Urine NEGATIVE NEGATIVE MG/DL    Ketones, Urine NEGATIVE NEGATIVE MG/DL    Specific Gravity, UA 1.015 1.001 - 1.035    Blood, Urine NEGATIVE NEGATIVE    pH, Urine 6.0 5.0 - 8.0    Protein, UA NEGATIVE NEGATIVE MG/DL    Urobilinogen, Urine 0.2 0.2 - 1.0 MG/DL    Nitrite Urine, Quantitative NEGATIVE NEGATIVE    Leukocyte Esterase, Urine NEGATIVE NEGATIVE    RBC, UA NEGATIVE 0 - 6 /HPF    WBC, UA NEGATIVE 0 - 5 /HPF    Epithelial Cells, UA 0 TO 1 /HPF    Cast Type NEGATIVE (A) NO CAST FORMS SEEN /HPF    Bacteria, UA FEW NEGATIVE /HPF    Crystal Type NEGATIVE NEGATIVE /HPF   Blood occult stool #1   Result Value Ref Range    OCCULT BLOOD FECAL NEGATIVE NEGATIVE   Troponin   Result Value Ref Range    Troponin T <0.010 <0.01 NG/ML   EKG 12 Lead   Result Value Ref Range    Ventricular Rate 90 BPM    Atrial Rate 153 BPM    QRS Duration 124 ms    Q-T Interval 412 ms    QTc Calculation (Bazett) 504 ms    R Axis -5 degrees    T Axis -19 degrees    Diagnosis       sinus with pac  Right bundle branch block  Abnormal ECG  No previous ECGs available  Confirmed by MIR Stanton ([id number]) on 5/15/2020 5:56:08 PM        Radiographs (if obtained):  [] The following radiograph wasinterpreted by myself in the absence of a radiologist:   [] Radiologist's Report Reviewed:  No orders to display         EKG (if obtained): (All EKG's are interpreted by myself in the absence of a cardiologist)\    The 12 lead EKG was interpreted by me, and the interpretation is as follows:  atrial fibrillation, rate = 90. Intervals are not within the normal range. RBBB  QTc is not prolonged. ST elevations are not present. T wave inversions are not present. Non-specific T wave changes are not present. Delta waves, Brugada Syndrome, and Short CA are not present. There is no acute ischemia. Chart review shows recent radiographs:  No results found. MDM:      Patient presents ED with an episode of hypotension yesterday then again today on a well check by the medics.   Patient is hard of hearing awake alert lives by herself. Hemodynamically she had a pressure about 110 upon arrival however during observation in the ED on recheck she dropped her pressure back in the 80s. Rectal exam reveals yellowish-brown stool fortunately Hemoccult negative. She has no acid-base disturbance. The patient is on blood pressure meds which will need further adjustment and probably withdrawal.  I do not see any signs of sepsis. EKG is stable I did add a troponin but she does warrant acute hospitalization           Clinical Impression:  1. Hypotension, unspecified hypotension type      Disposition referral (if applicable):  Ely Bradford MD  [facility] [id number]  [phone]          Disposition medications (if applicable):  Current Discharge Medication List              Teja Drake DO, FACEP      Comment: Please note this report has been produced using speech recognition software and maycontain errors related to that system including errors in grammar, punctuation, and spelling, as well as words and phrases that may be inappropriate. If there are any questions or concerns please feel free to contact thedictating provider for clarification.         Vashti Goodpasture, DO  05/15/20 2235

## 2020-05-15 NOTE — H&P
History and Physical  Nona [place], MD    5/15/2020  Jim Marc  1935    PCP: Zamzam Cerna MD    ASSESSMENT AND PLAN:      1. Hypotension  2. afib with pfo on oac  3. Falls at home in setting of weakness  4. Protein malnutrition, mild  5. Elevated blood glucose on admission without diagnosis of dm2  6. Dementia  7. Hypothyroidism  8. Depression  9. Hyperlipidemia  10. Multifocal pna? Vs developing chf on cxr    - hold bb  - tsh  - b12 and folate  - ua reviewed: cxr reviwed, - possible infection on cxr, starting on levaquin; send for respiratory disease panel, procalcitonin  -bnp  - troponin  - check a1c  - home meds resumed    DVT prophy -  Is on oac    Expected LOS 24-48 hours      Cc: weakness, low blood pressure  HPI: Jim Marc is a 81 y.o.  female presented with weakness and low blood pressure. Was on a bb metoprolol for afib, was adjusted from 100 mg to 50 by PCP or cardiologist. Now still has low bp. Per chart review was evaluated by PCP fo this as well. Has not had fever, chills,cough, nausea or vomiting, no rash and no sob. But has been having falls at home. Presently denied chest pain. Er course  cxr in er showed opacities  ekg showing afib with rate <110        PMHX:   Past Medical History:   Diagnosis Date    Acquired hypothyroidism 12/9/2019    Patient is taking Synthroid    Chronic midline low back pain without sciatica 11/20/2019    Dementia without behavioral disturbance ([facility] [address] 10/22/2019    Patient has dementia. She is taking Namenda Patient is seeing urologist Dr. Sosa Aguirre Gait disturbance 10/22/2019    Patient uses walker to ambulate    History of TIA (transient ischemic attack) 12/9/2019    Patient has a history of TIA and also recently may have had TIA. Patient is on Eliquis. Sees neurologist Dr. Sosa Aguirre Major depressive disorder with single episode, in full remission ([facility] [address] 12/9/2019    Patient is taking Zoloft and that is helping.     Mixed

## 2020-05-16 VITALS
BODY MASS INDEX: 30.41 KG/M2 | RESPIRATION RATE: 24 BRPM | TEMPERATURE: 97.1 F | OXYGEN SATURATION: 96 % | WEIGHT: 189.2 LBS | DIASTOLIC BLOOD PRESSURE: 78 MMHG | HEART RATE: 114 BPM | SYSTOLIC BLOOD PRESSURE: 110 MMHG | HEIGHT: 66 IN

## 2020-05-16 LAB
ADENOVIRUS DETECTION BY PCR: NOT DETECTED
ANION GAP SERPL CALCULATED.3IONS-SCNC: 10 MMOL/L (ref 4–16)
BORDETELLA PARAPERTUSSIS BY PCR: NOT DETECTED
BORDETELLA PERTUSSIS PCR: NOT DETECTED
BUN BLDV-MCNC: 16 MG/DL (ref 6–23)
CALCIUM SERPL-MCNC: 8.7 MG/DL (ref 8.3–10.6)
CHLAMYDOPHILA PNEUMONIA PCR: NOT DETECTED
CHLORIDE BLD-SCNC: 110 MMOL/L (ref 99–110)
CO2: 25 MMOL/L (ref 21–32)
CORONAVIRUS 229E PCR: NOT DETECTED
CORONAVIRUS HKU1 PCR: NOT DETECTED
CORONAVIRUS NL63 PCR: NOT DETECTED
CORONAVIRUS OC43 PCR: NOT DETECTED
CREAT SERPL-MCNC: 1 MG/DL (ref 0.6–1.1)
ESTIMATED AVERAGE GLUCOSE: 117 MG/DL
FOLATE: 14.3 NG/ML (ref 3.1–17.5)
GFR AFRICAN AMERICAN: >60 ML/MIN/1.73M2
GFR NON-AFRICAN AMERICAN: 53 ML/MIN/1.73M2
GLUCOSE BLD-MCNC: 92 MG/DL (ref 70–99)
HBA1C MFR BLD: 5.7 % (ref 4.2–6.3)
HCT VFR BLD CALC: 40.5 % (ref 37–47)
HEMOGLOBIN: 12.2 GM/DL (ref 12.5–16)
HUMAN METAPNEUMOVIRUS PCR: NOT DETECTED
INFLUENZA A BY PCR: NOT DETECTED
INFLUENZA A H1 (2009) PCR: NOT DETECTED
INFLUENZA A H1 PANDEMIC PCR: NOT DETECTED
INFLUENZA A H3 PCR: NOT DETECTED
INFLUENZA B BY PCR: NOT DETECTED
MCH RBC QN AUTO: 30 PG (ref 27–31)
MCHC RBC AUTO-ENTMCNC: 30.1 % (ref 32–36)
MCV RBC AUTO: 99.5 FL (ref 78–100)
MYCOPLASMA PNEUMONIAE PCR: NOT DETECTED
PARAINFLUENZA 1 PCR: NOT DETECTED
PARAINFLUENZA 2 PCR: NOT DETECTED
PARAINFLUENZA 3 PCR: NOT DETECTED
PARAINFLUENZA 4 PCR: NOT DETECTED
PDW BLD-RTO: 15.4 % (ref 11.7–14.9)
PLATELET # BLD: 228 K/CU MM (ref 140–440)
PMV BLD AUTO: 9.6 FL (ref 7.5–11.1)
POTASSIUM SERPL-SCNC: 4.4 MMOL/L (ref 3.5–5.1)
PROCALCITONIN: 0.04
RBC # BLD: 4.07 M/CU MM (ref 4.2–5.4)
RHINOVIRUS ENTEROVIRUS PCR: NOT DETECTED
RSV PCR: NOT DETECTED
SODIUM BLD-SCNC: 145 MMOL/L (ref 135–145)
VITAMIN B-12: 344.8 PG/ML (ref 211–911)
WBC # BLD: 6.7 K/CU MM (ref 4–10.5)

## 2020-05-16 PROCEDURE — 84145 PROCALCITONIN (PCT): CPT

## 2020-05-16 PROCEDURE — 85027 COMPLETE CBC AUTOMATED: CPT

## 2020-05-16 PROCEDURE — 6370000000 HC RX 637 (ALT 250 FOR IP): Performed by: INTERNAL MEDICINE

## 2020-05-16 PROCEDURE — 2580000003 HC RX 258: Performed by: INTERNAL MEDICINE

## 2020-05-16 PROCEDURE — 36415 COLL VENOUS BLD VENIPUNCTURE: CPT

## 2020-05-16 PROCEDURE — G0378 HOSPITAL OBSERVATION PER HR: HCPCS

## 2020-05-16 PROCEDURE — 80048 BASIC METABOLIC PNL TOTAL CA: CPT

## 2020-05-16 RX ORDER — LEVOFLOXACIN 500 MG/1
500 TABLET, FILM COATED ORAL DAILY
Qty: 5 TABLET | Refills: 0 | Status: SHIPPED | OUTPATIENT
Start: 2020-05-17 | End: 2020-05-22

## 2020-05-16 RX ADMIN — MEMANTINE 10 MG: 10 TABLET ORAL at 09:04

## 2020-05-16 RX ADMIN — ASPIRIN 81 MG: 81 TABLET, COATED ORAL at 09:04

## 2020-05-16 RX ADMIN — SODIUM CHLORIDE, PRESERVATIVE FREE 10 ML: 5 INJECTION INTRAVENOUS at 09:04

## 2020-05-16 RX ADMIN — APIXABAN 5 MG: 5 TABLET, FILM COATED ORAL at 09:04

## 2020-05-16 RX ADMIN — ACETAMINOPHEN 500 MG: 500 TABLET ORAL at 09:03

## 2020-05-16 RX ADMIN — LEVOTHYROXINE SODIUM 150 MCG: 0.15 TABLET ORAL at 09:04

## 2020-05-16 RX ADMIN — METOPROLOL TARTRATE 12.5 MG: 25 TABLET, FILM COATED ORAL at 09:03

## 2020-05-16 RX ADMIN — LEVOFLOXACIN 500 MG: 500 TABLET, FILM COATED ORAL at 09:03

## 2020-05-16 RX ADMIN — SERTRALINE HYDROCHLORIDE 50 MG: 50 TABLET ORAL at 09:04

## 2020-05-16 RX ADMIN — ATORVASTATIN CALCIUM 20 MG: 20 TABLET, FILM COATED ORAL at 09:04

## 2020-05-16 ASSESSMENT — PAIN SCALES - GENERAL
PAINLEVEL_OUTOF10: 0

## 2020-05-16 NOTE — DISCHARGE SUMMARY
on: 05/16/20 1157   Medication Information                      acetaminophen (TYLENOL) 500 MG tablet  Take 500 mg by mouth 3 times daily PRN             aspirin EC 81 MG EC tablet  Take 1 tablet by mouth daily             Cholecalciferol (VITAMIN D) 50 MCG (2000 UT) CAPS capsule  Take 2,000 Units by mouth Daily              cholestyramine light 4 g packet  Take 4 g by mouth daily as needed              ELIQUIS 5 MG TABS tablet  TAKE 1 TABLET BY MOUTH 2 TIMES DAILY             HYDROcodone-acetaminophen (NORCO) 5-325 MG per tablet  Take 1 tablet by mouth 2 times daily as needed for Pain.              levoFLOXacin (LEVAQUIN) 500 MG tablet  Take 1 tablet by mouth daily for 5 days             levothyroxine (SYNTHROID) 150 MCG tablet  Take 1 tablet by mouth daily             loperamide (IMODIUM) 2 MG capsule  Take 2 mg by mouth 4 times daily as needed for Diarrhea             meclizine (TRAVEL SICKNESS) 25 MG CHEW  Take 25 mg by mouth as needed             memantine (NAMENDA) 10 MG tablet  Take 10 mg by mouth 2 times daily             metoprolol tartrate (LOPRESSOR) 25 MG tablet  Take 0.5 tablets by mouth 2 times daily             nystatin (MYCOSTATIN) 500398 UNIT/GM cream  Apply topically 2 times daily. sertraline (ZOLOFT) 50 MG tablet  Take 1 tablet by mouth daily             simvastatin (ZOCOR) 40 MG tablet  Take 1 tablet by mouth nightly             vitamin E 400 UNIT capsule  Take 400 Units by mouth daily                   Code Status: DNR-CCA     Consults: none  None    Diet: cardiac diet    Activity: activity as tolerated   Work:    Discharged Condition: fair    Prognosis: Fair    Disposition: home      Follow-up with   1. PCP within   5-7    Days           Discharge Physician Signed: Maximo Mena M.D. The patient was seen and examined on day of discharge and this discharge summary is in conjunction with any daily progress note from day of discharge.   Time spent on discharge in the examination,

## 2020-05-17 LAB
CULTURE: NORMAL
Lab: NORMAL
SPECIMEN: NORMAL

## 2020-05-18 LAB
EKG ATRIAL RATE: 153 BPM
EKG DIAGNOSIS: NORMAL
EKG Q-T INTERVAL: 412 MS
EKG QRS DURATION: 124 MS
EKG QTC CALCULATION (BAZETT): 504 MS
EKG R AXIS: -5 DEGREES
EKG T AXIS: -19 DEGREES
EKG VENTRICULAR RATE: 90 BPM

## 2020-05-26 ENCOUNTER — TELEPHONE (OUTPATIENT)
Dept: INTERNAL MEDICINE CLINIC | Age: 85
End: 2020-05-26

## 2020-05-26 NOTE — TELEPHONE ENCOUNTER
Pt fell on Saturday. Pt has bruse on back. Pt denies any pain. Pt is having trouble using rt knee. Home health would like to get xray of knee, Please review and advise.       Call Mikey Tolentino at [phone]

## 2020-06-04 ENCOUNTER — TELEPHONE (OUTPATIENT)
Dept: INTERNAL MEDICINE CLINIC | Age: 85
End: 2020-06-04

## 2020-06-04 ENCOUNTER — VIRTUAL VISIT (OUTPATIENT)
Dept: INTERNAL MEDICINE CLINIC | Age: 85
End: 2020-06-04
Payer: MEDICARE

## 2020-06-04 PROCEDURE — 4040F PNEUMOC VAC/ADMIN/RCVD: CPT | Performed by: INTERNAL MEDICINE

## 2020-06-04 PROCEDURE — G8427 DOCREV CUR MEDS BY ELIG CLIN: HCPCS | Performed by: INTERNAL MEDICINE

## 2020-06-04 PROCEDURE — 1123F ACP DISCUSS/DSCN MKR DOCD: CPT | Performed by: INTERNAL MEDICINE

## 2020-06-04 PROCEDURE — 99214 OFFICE O/P EST MOD 30 MIN: CPT | Performed by: INTERNAL MEDICINE

## 2020-06-04 PROCEDURE — 1090F PRES/ABSN URINE INCON ASSESS: CPT | Performed by: INTERNAL MEDICINE

## 2020-06-04 PROCEDURE — G8400 PT W/DXA NO RESULTS DOC: HCPCS | Performed by: INTERNAL MEDICINE

## 2020-06-04 RX ORDER — DIGOXIN 125 MCG
125 TABLET ORAL DAILY
COMMUNITY
Start: 2020-06-04 | End: 2021-03-03 | Stop reason: SDUPTHER

## 2020-06-04 ASSESSMENT — ENCOUNTER SYMPTOMS
COUGH: 0
SHORTNESS OF BREATH: 1
WHEEZING: 0
GASTROINTESTINAL NEGATIVE: 1
EYES NEGATIVE: 1
ALLERGIC/IMMUNOLOGIC NEGATIVE: 1

## 2020-06-04 NOTE — PROGRESS NOTES
Medication Sig Taking? Authorizing Provider   digoxin (LANOXIN) 125 MCG tablet Take 125 mcg by mouth daily Yes Historical Provider, MD   metoprolol tartrate (LOPRESSOR) 25 MG tablet Take 0.5 tablets by mouth 2 times daily Yes Halley Donis MD   levothyroxine (SYNTHROID) 150 MCG tablet Take 1 tablet by mouth daily Yes Jessica Wilkes MD   sertraline (ZOLOFT) 50 MG tablet Take 1 tablet by mouth daily Yes Jessica Wilkes MD   nystatin (MYCOSTATIN) 051738 UNIT/GM cream Apply topically 2 times daily. Patient taking differently: Apply topically 2 times daily Indications: GROIN AND ABDOMEN Apply topically 2 times daily. Yes Jessica Wilkes MD   simvastatin (ZOCOR) 40 MG tablet Take 1 tablet by mouth nightly Yes Jessica Wilkes MD   ELIQUIS 5 MG TABS tablet TAKE 1 TABLET BY MOUTH 2 TIMES DAILY Yes Jessica Wilkes MD   HYDROcodone-acetaminophen (NORCO) 5-325 MG per tablet Take 1 tablet by mouth 2 times daily as needed for Pain.   Yes Historical Provider, MD   vitamin E 400 UNIT capsule Take 400 Units by mouth daily  Yes Historical Provider, MD   loperamide (IMODIUM) 2 MG capsule Take 2 mg by mouth 4 times daily as needed for Diarrhea Yes Historical Provider, MD   acetaminophen (TYLENOL) 500 MG tablet Take 500 mg by mouth 3 times daily PRN Yes Historical Provider, MD   cholestyramine light 4 g packet Take 4 g by mouth daily as needed  Yes Historical Provider, MD   aspirin EC 81 MG EC tablet Take 1 tablet by mouth daily Yes Jessica Wilkes MD   meclizine (TRAVEL SICKNESS) 25 MG CHEW Take 25 mg by mouth as needed Yes Historical Provider, MD   memantine (NAMENDA) 10 MG tablet Take 10 mg by mouth 2 times daily Yes Historical Provider, MD   Cholecalciferol (VITAMIN D) 50 MCG (2000 UT) CAPS capsule Take 2,000 Units by mouth Daily  Yes Historical Provider, MD       Social History     Tobacco Use    Smoking status: Never Smoker    Smokeless tobacco: Never Used   Substance Use Topics    Alcohol use: No    Drug use: No        No Known Allergies,   Past Medical History:   Diagnosis Date    Acquired hypothyroidism 12/9/2019    Patient is taking Synthroid    Chronic midline low back pain without sciatica 11/20/2019    Dementia without behavioral disturbance ([facility] [address] 10/22/2019    Patient has dementia. She is taking Namenda Patient is seeing urologist Dr. Moira Crump Gait disturbance 10/22/2019    Patient uses walker to ambulate    History of TIA (transient ischemic attack) 12/9/2019    Patient has a history of TIA and also recently may have had TIA. Patient is on Eliquis. Sees neurologist Dr. Moira Crump Major depressive disorder with single episode, in full remission ([facility] [address] 12/9/2019    Patient is taking Zoloft and that is helping.  Mixed hyperlipidemia 12/9/2019    Patient is on simvastatin    Persistent atrial fibrillation 10/22/2019    A. fib on recently in October 2019 . Patient is on metoprolol 25 mg twice a day and Eliquis    PFO (patent foramen ovale) 12/9/2019    PFO seen on echocardiogram in 2016    Pneumonia due to infectious organism 10/22/2019    Patient had pneumonia and pleural effusion during admission in October 2019.  A repeat CT scan of the chest will be done Patient is also seen pulmonologist   ,   Past Surgical History:   Procedure Laterality Date    HYSTERECTOMY     ,   Social History     Tobacco Use    Smoking status: Never Smoker    Smokeless tobacco: Never Used   Substance Use Topics    Alcohol use: No    Drug use: No       PHYSICAL EXAMINATION:  [ INSTRUCTIONS:  \"[x]\" Indicates a positive item  \"[]\" Indicates a negative item  -- DELETE ALL ITEMS NOT EXAMINED]  Vital Signs: (As obtained by patient/caregiver or practitioner observation)    Blood pressure-  Heart rate-100   respiratory rate-    Temperature-  Pulse oximetry-     Constitutional: [x] Appears well-developed and well-nourished [x] No apparent distress      [] Abnormal-   Mental status  [x] Alert and awake  [x] Oriented to person/place/time []Able to follow commands      Eyes:  EOM    [x]  Normal  [] Abnormal-  Sclera  [x]  Normal  [] Abnormal -         Discharge []  None visible  [] Abnormal -    HENT:   [x] Normocephalic, atraumatic. [] Abnormal   [] Mouth/Throat: Mucous membranes are moist.     External Ears [x] Normal  [] Abnormal-     Neck: [x] No visualized mass     Pulmonary/Chest: [x] Respiratory effort normal.  [x] No visualized signs of difficulty breathing or respiratory distress        [] Abnormal-      Musculoskeletal:   [] Normal gait with no signs of ataxia         [x] Normal range of motion of neck        [] Abnormal-       Neurological:        [x] No Facial Asymmetry (Cranial nerve 7 motor function) (limited exam to video visit)          [x] No gaze palsy        [] Abnormal-         Skin:        [x] No significant exanthematous lesions or discoloration noted on facial skin         [] Abnormal-            Psychiatric:       [x] Normal Affect [x] No Hallucinations        [] Abnormal-     Other pertinent observable physical exam findings-     ASSESSMENT/PLAN:  Acquired hypothyroidism  Patient has hypothyroidism and is taking Synthroid 150 mcg daily. Last TSH was normal in May 2020    Candidal intertrigo  Rash under the breast has improved. Chronic midline low back pain without sciatica  Patient has chronic lower back pain. Having difficulty walking also. Does not take any pain medicines now. Dementia without behavioral disturbance [facility])  Patient has dementia and is on Namenda. She sees neurologist Dr. Calhoun Kussmaul    Gait disturbance  Patient uses walker to ambulate. Patient is able to walk only 10-12 steps has to sit down    History of TIA (transient ischemic attack)  Patient has a history of TIA and also recently may have had TIA. Patient is on Eliquis. Sees neurologist Dr. Calhoun Kussmaul    Interstitial lung disease [facility])  Pt sees Dr Mone Al pulmonologist at [facility]. Gets short of breath sometimes.   O2 saturation remains in the normal Cole Jay MD on 6/4/2020 at 4:12 PM    An electronic signature was used to authenticate this note.

## 2020-06-04 NOTE — ASSESSMENT & PLAN NOTE
Patient has chronic lower back pain. Having difficulty walking also. Does not take any pain medicines now.

## 2020-06-05 ENCOUNTER — HOSPITAL ENCOUNTER (OUTPATIENT)
Age: 85
Discharge: HOME | End: 2020-06-05
Payer: MEDICARE

## 2020-06-05 ENCOUNTER — HOSPITAL ENCOUNTER (OUTPATIENT)
Dept: GENERAL RADIOLOGY | Age: 85
Discharge: HOME | End: 2020-06-05
Payer: MEDICARE

## 2020-06-05 PROCEDURE — 73562 X-RAY EXAM OF KNEE 3: CPT

## 2020-06-15 ENCOUNTER — TELEPHONE (OUTPATIENT)
Dept: INTERNAL MEDICINE CLINIC | Age: 85
End: 2020-06-15

## 2020-06-17 NOTE — TELEPHONE ENCOUNTER
Informed daughter Clara Salamanca of Maru Lopez results and she states that patient is still c/o pain off and on and is wondering what to do and if an MRI is warrented?

## 2020-06-18 ENCOUNTER — TELEPHONE (OUTPATIENT)
Dept: INTERNAL MEDICINE CLINIC | Age: 85
End: 2020-06-18

## 2020-07-08 ENCOUNTER — OFFICE VISIT (OUTPATIENT)
Dept: ORTHOPEDIC SURGERY | Age: 85
End: 2020-07-08
Payer: MEDICARE

## 2020-07-08 VITALS — HEIGHT: 66 IN | RESPIRATION RATE: 16 BRPM | WEIGHT: 185 LBS | BODY MASS INDEX: 29.73 KG/M2

## 2020-07-08 PROCEDURE — 1123F ACP DISCUSS/DSCN MKR DOCD: CPT | Performed by: ORTHOPAEDIC SURGERY

## 2020-07-08 PROCEDURE — 1090F PRES/ABSN URINE INCON ASSESS: CPT | Performed by: ORTHOPAEDIC SURGERY

## 2020-07-08 PROCEDURE — G8417 CALC BMI ABV UP PARAM F/U: HCPCS | Performed by: ORTHOPAEDIC SURGERY

## 2020-07-08 PROCEDURE — 4040F PNEUMOC VAC/ADMIN/RCVD: CPT | Performed by: ORTHOPAEDIC SURGERY

## 2020-07-08 PROCEDURE — 20610 DRAIN/INJ JOINT/BURSA W/O US: CPT | Performed by: ORTHOPAEDIC SURGERY

## 2020-07-08 PROCEDURE — G8400 PT W/DXA NO RESULTS DOC: HCPCS | Performed by: ORTHOPAEDIC SURGERY

## 2020-07-08 PROCEDURE — G8427 DOCREV CUR MEDS BY ELIG CLIN: HCPCS | Performed by: ORTHOPAEDIC SURGERY

## 2020-07-08 PROCEDURE — 1036F TOBACCO NON-USER: CPT | Performed by: ORTHOPAEDIC SURGERY

## 2020-07-08 PROCEDURE — 99203 OFFICE O/P NEW LOW 30 MIN: CPT | Performed by: ORTHOPAEDIC SURGERY

## 2020-07-08 RX ORDER — LEVOTHYROXINE SODIUM 0.15 MG/1
150 TABLET ORAL DAILY
Qty: 90 TABLET | Refills: 1 | Status: SHIPPED | OUTPATIENT
Start: 2020-07-08 | End: 2020-10-20 | Stop reason: SDUPTHER

## 2020-07-08 RX ORDER — ASPIRIN 81 MG/1
81 TABLET ORAL DAILY
Qty: 30 TABLET | Refills: 5 | Status: SHIPPED | OUTPATIENT
Start: 2020-07-08 | End: 2020-10-05

## 2020-07-08 ASSESSMENT — ENCOUNTER SYMPTOMS
BACK PAIN: 0
CHEST TIGHTNESS: 0
COLOR CHANGE: 0

## 2020-07-08 NOTE — PROGRESS NOTES
Subjective:      Patient ID: Beny Arriola is a 81 y.o. female. Patient here for a new patient consult Siobhan June MD visit for evaluation of right knee pain. She states onset was about 1-2 months ago after a fall. She has had pain and instability since. She rates her pain a 6/10 but is intermittently. She is wearing an OTC brace which does provide some relief. She reports no prior history of surgeries or injuries to this knee. Patient's daughter is present helping give HPI. Provider needs to conduct a hands on physical today due to patients injury    RIGHT KNEE XR 6/5/2020  Impression  Small joint effusion without fracture or other acute abnormality.       She comes in today for her first visit with me in regards to her right knee pain. She states that since she fell she has continued to have a dull, aching pain globally in her right knee. She states that the pain is worse primarily along the medial aspect of her right knee. Patient denies any new injury to the involved extremity/ joint, denies numbness or tingling in the involved extremity and denies fever or chills. She is seen today with her daughter present. Review of Systems   Constitutional: Negative for activity change, chills and fever. Respiratory: Negative for chest tightness. Cardiovascular: Negative for chest pain. Musculoskeletal: Positive for arthralgias, gait problem, joint swelling and myalgias. Negative for back pain. Skin: Negative for color change, pallor, rash and wound. Neurological: Negative for weakness and numbness. Past Medical History:   Diagnosis Date    Acquired hypothyroidism 12/9/2019    Patient is taking Synthroid    Chronic midline low back pain without sciatica 11/20/2019    Dementia without behavioral disturbance ([facility] [address] 10/22/2019    Patient has dementia.   She is taking Namenda Patient is seeing urologist Dr. Jay Silva Gait disturbance 10/22/2019    Patient uses walker to ambulate    History of TIA (transient ischemic attack) 12/9/2019    Patient has a history of TIA and also recently may have had TIA. Patient is on Eliquis. Sees neurologist Dr. Tonny Oconnor Major depressive disorder with single episode, in full remission ([facility] [address] 12/9/2019    Patient is taking Zoloft and that is helping.  Mixed hyperlipidemia 12/9/2019    Patient is on simvastatin    Persistent atrial fibrillation 10/22/2019    A. fib on recently in October 2019 . Patient is on metoprolol 25 mg twice a day and Eliquis    PFO (patent foramen ovale) 12/9/2019    PFO seen on echocardiogram in 2016    Pneumonia due to infectious organism 10/22/2019    Patient had pneumonia and pleural effusion during admission in October 2019. A repeat CT scan of the chest will be done Patient is also seen pulmonologist       Objective:   Physical Exam  Constitutional:       Appearance: She is well-developed. HENT:      Head: Normocephalic. Eyes:      Pupils: Pupils are equal, round, and reactive to light. Neck:      Musculoskeletal: Normal range of motion. Pulmonary:      Effort: Pulmonary effort is normal.   Musculoskeletal: Normal range of motion. General: Swelling and tenderness present. No deformity. Right hip: Normal.      Left hip: Normal.      Right knee: She exhibits swelling and bony tenderness. She exhibits normal range of motion, no effusion, no ecchymosis, no deformity, no laceration, no erythema, normal alignment, no LCL laxity, normal patellar mobility and no MCL laxity. Tenderness found. Medial joint line tenderness noted. No lateral joint line, no MCL, no LCL and no patellar tendon tenderness noted. Left knee: Normal. She exhibits normal range of motion, no swelling, no effusion, no ecchymosis, no deformity, no laceration, no erythema, normal alignment, no LCL laxity, normal patellar mobility, no bony tenderness and no MCL laxity. No tenderness found.  No medial joint line, no lateral joint line, no MCL, no LCL and no patellar tendon tenderness noted. Skin:     General: Skin is warm and dry. Capillary Refill: Capillary refill takes less than 2 seconds. Coloration: Skin is not pale. Findings: No erythema or rash. Neurological:      Mental Status: She is alert and oriented to person, place, and time. Right knee-Skin intact with no erythema, ecchymosis or lacerations present. 0-130    XRAY  X-ray 3 views of the right knee from June 5, 2020 reviewed by me today in the office demonstrates age appropriate bone density throughout with moderate medial joint space narrowing, mild lateral patellofemoral joint space narrowing, moderate diffuse osteopenia, normal tracking of the patella, no acute osseous abnormalities. Assessment:      Right knee DJD, moderate      Plan:      I discussed with her and her daughter today her x-ray findings. I explained to her that she does have moderate arthritis in the right knee which she has exacerbated during her fall. At this point I recommend that we begin with conservative treatment. I discussed performing a cortisone injection with the patient today. The patient agrees and would like to proceed with the cortisone injection. I explained to them that we can repeat these injections every 3 months if needed. Continue weight-bearing as tolerated. Continue range of motion exercises as instructed. Ice and elevate as needed. Tylenol or Motrin for pain. Follow up in 6 weeks for recheck and if no better will consider Orthovisc injections. Knee Injection Procedure Note    Pre-operative Diagnosis: Right knee DJD    Post-operative Diagnosis: same    Indications: Symptom relief from osteoarthritis    Anesthesia: Lidocaine 1% and marcaine 0.5% without epinephrine without added sodium bicarbonate    Procedure Details     Verbal consent was obtained for the procedure. The joint was prepped with alcohol.  A 22 gauge needle was inserted into the superior aspect of the joint from a lateral approach. 1 ml 1% lidocaine and 1 ml 0.5% marcaine and 1 ml of triamcinolone (KENALOG) 40mg/ml was then injected into the joint through the same needle. The needle was removed and the area cleansed and dressed. Complications:  None; patient tolerated the procedure well. Pain improved immediately after injection.           [address], DO

## 2020-07-14 ENCOUNTER — OFFICE VISIT (OUTPATIENT)
Dept: INTERNAL MEDICINE CLINIC | Age: 85
End: 2020-07-14
Payer: MEDICARE

## 2020-07-14 VITALS
OXYGEN SATURATION: 98 % | WEIGHT: 198 LBS | BODY MASS INDEX: 31.96 KG/M2 | TEMPERATURE: 97.3 F | SYSTOLIC BLOOD PRESSURE: 110 MMHG | DIASTOLIC BLOOD PRESSURE: 58 MMHG | HEART RATE: 86 BPM

## 2020-07-14 PROCEDURE — 99214 OFFICE O/P EST MOD 30 MIN: CPT | Performed by: INTERNAL MEDICINE

## 2020-07-14 PROCEDURE — 1123F ACP DISCUSS/DSCN MKR DOCD: CPT | Performed by: INTERNAL MEDICINE

## 2020-07-14 PROCEDURE — G8417 CALC BMI ABV UP PARAM F/U: HCPCS | Performed by: INTERNAL MEDICINE

## 2020-07-14 PROCEDURE — G8427 DOCREV CUR MEDS BY ELIG CLIN: HCPCS | Performed by: INTERNAL MEDICINE

## 2020-07-14 PROCEDURE — G8400 PT W/DXA NO RESULTS DOC: HCPCS | Performed by: INTERNAL MEDICINE

## 2020-07-14 PROCEDURE — 4040F PNEUMOC VAC/ADMIN/RCVD: CPT | Performed by: INTERNAL MEDICINE

## 2020-07-14 PROCEDURE — 1036F TOBACCO NON-USER: CPT | Performed by: INTERNAL MEDICINE

## 2020-07-14 PROCEDURE — 1090F PRES/ABSN URINE INCON ASSESS: CPT | Performed by: INTERNAL MEDICINE

## 2020-07-14 RX ORDER — MECLIZINE HYDROCHLORIDE CHEWABLE TABLETS 25 MG/1
25 TABLET, CHEWABLE ORAL PRN
Qty: 30 TABLET | Refills: 0 | Status: SHIPPED | OUTPATIENT
Start: 2020-07-14 | End: 2020-10-20 | Stop reason: SDUPTHER

## 2020-07-14 RX ORDER — SIMVASTATIN 40 MG
40 TABLET ORAL NIGHTLY
Qty: 90 TABLET | Refills: 1 | Status: SHIPPED | OUTPATIENT
Start: 2020-07-14 | End: 2020-10-20 | Stop reason: SDUPTHER

## 2020-07-14 RX ORDER — MULTIVIT-MIN/IRON/FOLIC ACID/K 18-600-40
2000 CAPSULE ORAL DAILY
Qty: 30 CAPSULE | Refills: 3 | Status: SHIPPED | OUTPATIENT
Start: 2020-07-14 | End: 2020-10-20 | Stop reason: SDUPTHER

## 2020-07-14 RX ORDER — NYSTATIN 100000 [USP'U]/G
POWDER TOPICAL
Qty: 1 BOTTLE | Refills: 2 | Status: SHIPPED | OUTPATIENT
Start: 2020-07-14 | End: 2021-05-27 | Stop reason: SDUPTHER

## 2020-07-14 ASSESSMENT — ENCOUNTER SYMPTOMS
BACK PAIN: 1
COUGH: 0
RESPIRATORY NEGATIVE: 1
EYES NEGATIVE: 1
WHEEZING: 0
GASTROINTESTINAL NEGATIVE: 1
SHORTNESS OF BREATH: 0
ALLERGIC/IMMUNOLOGIC NEGATIVE: 1

## 2020-07-14 NOTE — ASSESSMENT & PLAN NOTE
Patient has a history of TIA and also recently may have had TIA. Patient is on Eliquis.   Sees neurologist Dr. Gisel Gilmore

## 2020-07-14 NOTE — ASSESSMENT & PLAN NOTE
Hyperlipidemia is stable. Follow low cholesterol diet. Continue current treatment. Continue simvastatin.

## 2020-07-14 NOTE — ASSESSMENT & PLAN NOTE
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    Patient has norco from Dr Deidre Collazo

## 2020-07-14 NOTE — PROGRESS NOTES
sees Dr Qing Aleman pulmonologist at [address] [place] [address]    Major depressive disorder with single episode, in full remission [facility])  Patient is taking Zoloft and that is helping. Mixed hyperlipidemia  Hyperlipidemia is stable. Follow low cholesterol diet. Continue current treatment. Continue simvastatin. Persistent atrial fibrillation  A. fib  inxin was added by Dr. Young Poster. October 2019 . Patient is on metoprolol 12.5 mg twice a day and Eliquis, patient is on digoxin also. Pneumonia due to infectious organism  Patient had pneumonia and pleural effusion during admission in October 2019. A repeat CT scan of the chest will be done  Patient is also seen pulmonologist   Dr Qing Aleman : f/u CT chest in 1/2020 had improved. Pulmonary nodules  CT chest : pulmonary nodules  Seen DR Qing Aleman. Has f/u for High resolution CT chest in 6-8 weeks   Repeat CT chest in 1/2020    Return to office in 3 months. Mediations reviewed with the patient. Continue current medications. Appropriate prescriptions are addressed. After visit summeryprovided. Follow up as directed sooner if needed. Questions answered and patient verbalizes understanding. Call for any problems, questions, or concerns. No Known Allergies  Current Outpatient Medications   Medication Sig Dispense Refill    levothyroxine (SYNTHROID) 150 MCG tablet Take 1 tablet by mouth daily 90 tablet 1    aspirin EC 81 MG EC tablet Take 1 tablet by mouth daily 30 tablet 5    digoxin (LANOXIN) 125 MCG tablet Take 125 mcg by mouth daily      metoprolol tartrate (LOPRESSOR) 25 MG tablet Take 0.5 tablets by mouth 2 times daily 60 tablet 3    sertraline (ZOLOFT) 50 MG tablet Take 1 tablet by mouth daily 90 tablet 1    nystatin (MYCOSTATIN) 602140 UNIT/GM cream Apply topically 2 times daily.  (Patient taking differently: Apply topically 2 times daily Indications: GROIN AND ABDOMEN Apply topically 2 times daily.) 30 g 2    simvastatin (ZOCOR) 40 MG tablet Take 1 tablet by mouth nightly 90 tablet 1    ELIQUIS 5 MG TABS tablet TAKE 1 TABLET BY MOUTH 2 TIMES DAILY 60 tablet 5    HYDROcodone-acetaminophen (NORCO) 5-325 MG per tablet Take 1 tablet by mouth 2 times daily as needed for Pain.  vitamin E 400 UNIT capsule Take 400 Units by mouth daily       loperamide (IMODIUM) 2 MG capsule Take 2 mg by mouth 4 times daily as needed for Diarrhea      acetaminophen (TYLENOL) 500 MG tablet Take 500 mg by mouth 3 times daily PRN      cholestyramine light 4 g packet Take 4 g by mouth daily as needed       meclizine (TRAVEL SICKNESS) 25 MG CHEW Take 25 mg by mouth as needed      memantine (NAMENDA) 10 MG tablet Take 10 mg by mouth 2 times daily      Cholecalciferol (VITAMIN D) 50 MCG (2000 UT) CAPS capsule Take 2,000 Units by mouth Daily        No current facility-administered medications for this visit. Past Medical History:   Diagnosis Date    Acquired hypothyroidism 12/9/2019    Patient is taking Synthroid    Chronic midline low back pain without sciatica 11/20/2019    Dementia without behavioral disturbance ([facility] [address] 10/22/2019    Patient has dementia. She is taking Namenda Patient is seeing urologist Dr. Nicky Small Gait disturbance 10/22/2019    Patient uses walker to ambulate    History of TIA (transient ischemic attack) 12/9/2019    Patient has a history of TIA and also recently may have had TIA. Patient is on Eliquis. Sees neurologist Dr. Nicky Small Major depressive disorder with single episode, in full remission ([facility] [address] 12/9/2019    Patient is taking Zoloft and that is helping.  Mixed hyperlipidemia 12/9/2019    Patient is on simvastatin    Persistent atrial fibrillation 10/22/2019    A. fib on recently in October 2019 .   Patient is on metoprolol 25 mg twice a day and Eliquis    PFO (patent foramen ovale) 12/9/2019    PFO seen on echocardiogram in 2016    Pneumonia due to infectious organism 10/22/2019    Patient had pneumonia and pleural effusion during admission in October 2019.  A repeat CT scan of the chest will be done Patient is also seen pulmonologist     Past Surgical History:   Procedure Laterality Date    HYSTERECTOMY       Social History     Tobacco Use    Smoking status: Never Smoker    Smokeless tobacco: Never Used   Substance Use Topics    Alcohol use: No       LAB REVIEW:  CBC:   Lab Results   Component Value Date    WBC 6.7 05/16/2020    HGB 12.2 05/16/2020    HCT 40.5 05/16/2020     05/16/2020     Lipids:   Lab Results   Component Value Date    CHOL 171 07/04/2014    TRIG 181 (H) 07/04/2014    HDL 42 01/03/2020    LDLCALC 91 10/04/2011    LDLDIRECT 100 (H) 01/03/2020    TRIGLYCFAST 162 (H) 01/03/2020     Renal:   Lab Results   Component Value Date    BUN 16 05/16/2020    CREATININE 1.0 05/16/2020     05/16/2020    K 4.4 05/16/2020    ALT 12 05/15/2020    AST 17 05/15/2020    GLUCOSE 92 05/16/2020     PT/INR:   Lab Results   Component Value Date    INR 1.02 07/04/2014     A1C:   Lab Results   Component Value Date    LABA1C 5.7 05/15/2020           Forrest Tello MD, 7/14/2020 , 1:25 PM

## 2020-07-14 NOTE — ASSESSMENT & PLAN NOTE
CT chest : pulmonary nodules  Seen DR Kim Calvillo.  Has f/u for High resolution CT chest in 6-8 weeks   Repeat CT chest in 1/2020

## 2020-07-14 NOTE — ASSESSMENT & PLAN NOTE
Patient had pneumonia and pleural effusion during admission in October 2019. A repeat CT scan of the chest will be done  Patient is also seen pulmonologist   Dr Manny Mendiola : f/u CT chest in 1/2020 had improved.

## 2020-07-27 ENCOUNTER — TELEPHONE (OUTPATIENT)
Dept: INTERNAL MEDICINE CLINIC | Age: 85
End: 2020-07-27

## 2020-10-01 ENCOUNTER — TELEPHONE (OUTPATIENT)
Dept: INTERNAL MEDICINE CLINIC | Age: 85
End: 2020-10-01

## 2020-10-01 NOTE — TELEPHONE ENCOUNTER
Sophie with [facility] called in to requested skilled care for patient received verbal ok from Dr. Thom Chappell advised Connor Antunez she stated understanding. Skilled care is for medication distribute to patient as she is not taking it daily and is forgetful.

## 2020-10-05 RX ORDER — ASPIRIN 81 MG/1
TABLET, COATED ORAL
Qty: 30 TABLET | Refills: 3 | Status: SHIPPED | OUTPATIENT
Start: 2020-10-05 | End: 2020-10-20 | Stop reason: SDUPTHER

## 2020-10-16 ENCOUNTER — TELEPHONE (OUTPATIENT)
Dept: INTERNAL MEDICINE CLINIC | Age: 85
End: 2020-10-16

## 2020-10-19 ENCOUNTER — TELEPHONE (OUTPATIENT)
Dept: INTERNAL MEDICINE CLINIC | Age: 85
End: 2020-10-19

## 2020-10-20 ENCOUNTER — VIRTUAL VISIT (OUTPATIENT)
Dept: INTERNAL MEDICINE CLINIC | Age: 85
End: 2020-10-20
Payer: MEDICARE

## 2020-10-20 PROBLEM — I95.9 HYPOTENSION (ARTERIAL): Status: RESOLVED | Noted: 2020-05-15 | Resolved: 2020-10-20

## 2020-10-20 PROBLEM — B37.2 CANDIDAL INTERTRIGO: Status: RESOLVED | Noted: 2020-05-05 | Resolved: 2020-10-20

## 2020-10-20 PROCEDURE — 1123F ACP DISCUSS/DSCN MKR DOCD: CPT | Performed by: INTERNAL MEDICINE

## 2020-10-20 PROCEDURE — G8417 CALC BMI ABV UP PARAM F/U: HCPCS | Performed by: INTERNAL MEDICINE

## 2020-10-20 PROCEDURE — 99214 OFFICE O/P EST MOD 30 MIN: CPT | Performed by: INTERNAL MEDICINE

## 2020-10-20 PROCEDURE — 4040F PNEUMOC VAC/ADMIN/RCVD: CPT | Performed by: INTERNAL MEDICINE

## 2020-10-20 PROCEDURE — G8400 PT W/DXA NO RESULTS DOC: HCPCS | Performed by: INTERNAL MEDICINE

## 2020-10-20 PROCEDURE — G8484 FLU IMMUNIZE NO ADMIN: HCPCS | Performed by: INTERNAL MEDICINE

## 2020-10-20 PROCEDURE — 1090F PRES/ABSN URINE INCON ASSESS: CPT | Performed by: INTERNAL MEDICINE

## 2020-10-20 PROCEDURE — G8427 DOCREV CUR MEDS BY ELIG CLIN: HCPCS | Performed by: INTERNAL MEDICINE

## 2020-10-20 PROCEDURE — 1036F TOBACCO NON-USER: CPT | Performed by: INTERNAL MEDICINE

## 2020-10-20 RX ORDER — LEVOTHYROXINE SODIUM 0.15 MG/1
150 TABLET ORAL DAILY
Qty: 90 TABLET | Refills: 1 | Status: SHIPPED | OUTPATIENT
Start: 2020-10-20 | End: 2021-03-03 | Stop reason: SDUPTHER

## 2020-10-20 RX ORDER — MECLIZINE HYDROCHLORIDE CHEWABLE TABLETS 25 MG/1
25 TABLET, CHEWABLE ORAL PRN
Qty: 30 TABLET | Refills: 0 | Status: ON HOLD | OUTPATIENT
Start: 2020-10-20 | End: 2022-04-12

## 2020-10-20 RX ORDER — MULTIVIT-MIN/IRON/FOLIC ACID/K 18-600-40
2000 CAPSULE ORAL DAILY
Qty: 30 CAPSULE | Refills: 3 | Status: SHIPPED | OUTPATIENT
Start: 2020-10-20 | End: 2021-02-10 | Stop reason: SDUPTHER

## 2020-10-20 RX ORDER — ASPIRIN 81 MG/1
TABLET ORAL
Qty: 30 TABLET | Refills: 3 | Status: SHIPPED | OUTPATIENT
Start: 2020-10-20 | End: 2021-02-10 | Stop reason: SDUPTHER

## 2020-10-20 RX ORDER — SIMVASTATIN 40 MG
40 TABLET ORAL NIGHTLY
Qty: 90 TABLET | Refills: 1 | Status: SHIPPED | OUTPATIENT
Start: 2020-10-20 | End: 2021-03-03 | Stop reason: SDUPTHER

## 2020-10-20 ASSESSMENT — ENCOUNTER SYMPTOMS
COUGH: 0
SHORTNESS OF BREATH: 0
EYES NEGATIVE: 1
BACK PAIN: 1
RESPIRATORY NEGATIVE: 1
GASTROINTESTINAL NEGATIVE: 1
WHEEZING: 0

## 2020-10-20 NOTE — ASSESSMENT & PLAN NOTE
CT chest : pulmonary nodules  Seen DR Rcik Vergraa. Has f/u for High resolution CT chest in 6-8 weeks   Repeat CT chest in 1/2020: Resolution.   No nodules

## 2020-10-20 NOTE — ASSESSMENT & PLAN NOTE
Patient has lower back pain for some time. She has severe neural foraminal stenosis. She is not taking any pain medications.   She has not seen Dr. Dee Dee Mendoza

## 2020-10-20 NOTE — ASSESSMENT & PLAN NOTE
Patient states she is still having difficulty ambulating. Uses walker to ambulate. She is requesting some more physical therapy through home health care. She is homebound  She has chronic back pain.

## 2020-10-20 NOTE — PROGRESS NOTES
10/20/2020    TELEHEALTH EVALUATION -- Audio/Visual (During UZDOZ-90 public health emergency)    HPI:    Hortensia Jiang (:  1935) has requested an audio/video evaluation for the following concern(s):    Hyperlipidemia, depression, hypothyroidism gait disturbance and A. Fib  Patient had virtual video visit through [employer]. me  Patient's daughter Faheem Cleveland help the patient  Chief Complaint   Patient presents with    Follow-up     [facility] visit    Other     face to face for [place] [address] services     Patient states she is feeling fairly well. She still has difficulty ambulating and uses walker to ambulate short distances. She denies any falls or injuries. No headaches. No dizziness. No syncope or near syncope. She has not seen any subspecialist recently. Patient has hyperlipidemia and is taking medication statin. Denies any myalgias. Patient has hypothyroidism and is on Synthroid. Patient has atrial fibrillation and is on Eliquis digoxin and metoprolol. Patient denies any headaches. No blurred vision or double vision. She has a history of TIA  Patient has dementia and is on Namenda. Review of Systems   Constitutional: Negative. Negative for fatigue and fever. HENT: Negative. Eyes: Negative. Respiratory: Negative. Negative for cough, shortness of breath and wheezing. Cardiovascular: Negative. Negative for chest pain, palpitations and leg swelling. Gastrointestinal: Negative. Endocrine: Negative. Genitourinary: Negative. Musculoskeletal: Positive for back pain and gait problem. Skin: Negative. Psychiatric/Behavioral: Negative. Prior to Visit Medications    Medication Sig Taking?  Authorizing Provider   ASPIRIN LOW DOSE 81 MG EC tablet TAKE 1 TABLET BY MOUTH DAILY Yes Colin Driscoll MD   metoprolol tartrate (LOPRESSOR) 25 MG tablet Take 0.5 tablets by mouth 2 times daily Yes Colin Driscoll MD   simvastatin (ZOCOR) 40 MG tablet Take 1 tablet by mouth nightly Yes Colin Driscoll MD apixaban (ELIQUIS) 5 MG TABS tablet TAKE 1 TABLET BY MOUTH 2 TIMES DAILY Yes Abdirashid Nation MD   Cholecalciferol (VITAMIN D) 50 MCG (2000 UT) CAPS capsule Take 2,000 Units by mouth Daily Yes Abdirashid Nation MD   meclizine (TRAVEL SICKNESS) 25 MG CHEW Take 1 tablet by mouth as needed (dizziness) Yes Abdirashid Nation MD   nystatin (MYCOSTATIN) 805690 UNIT/GM powder Apply 3 times daily. Yes Abdirashid Nation MD   levothyroxine (SYNTHROID) 150 MCG tablet Take 1 tablet by mouth daily Yes Abdirashid Nation MD   digoxin (LANOXIN) 125 MCG tablet Take 125 mcg by mouth daily Yes Historical Provider, MD   sertraline (ZOLOFT) 50 MG tablet Take 1 tablet by mouth daily Yes Abdirashid Nation MD   vitamin E 400 UNIT capsule Take 400 Units by mouth daily  Yes Historical Provider, MD   acetaminophen (TYLENOL) 500 MG tablet Take 500 mg by mouth 3 times daily PRN Yes Historical Provider, MD   memantine (NAMENDA) 10 MG tablet Take 10 mg by mouth 2 times daily Yes Historical Provider, MD   HYDROcodone-acetaminophen (NORCO) 5-325 MG per tablet Take 1 tablet by mouth 2 times daily as needed for Pain. Historical Provider, MD       Social History     Tobacco Use    Smoking status: Never Smoker    Smokeless tobacco: Never Used   Substance Use Topics    Alcohol use: No    Drug use: No        No Known Allergies,   Past Medical History:   Diagnosis Date    Acquired hypothyroidism 12/9/2019    Patient is taking Synthroid    Chronic midline low back pain without sciatica 11/20/2019    Dementia without behavioral disturbance ([facility] 10/22/2019    Patient has dementia. She is taking Namenda Patient is seeing urologist Dr. Sumi Reynoso Gait disturbance 10/22/2019    Patient uses walker to ambulate    History of TIA (transient ischemic attack) 12/9/2019    Patient has a history of TIA and also recently may have had TIA. Patient is on Eliquis.  Sees neurologist Dr. Sumi Reynoso Major depressive disorder with single episode, in full remission ([facility] 12/9/2019 Patient is taking Zoloft and that is helping.  Mixed hyperlipidemia 12/9/2019    Patient is on simvastatin    Persistent atrial fibrillation 10/22/2019    A. fib on recently in October 2019 . Patient is on metoprolol 25 mg twice a day and Eliquis    PFO (patent foramen ovale) 12/9/2019    PFO seen on echocardiogram in 2016    Pneumonia due to infectious organism 10/22/2019    Patient had pneumonia and pleural effusion during admission in October 2019. A repeat CT scan of the chest will be done Patient is also seen pulmonologist   ,   Past Surgical History:   Procedure Laterality Date    HYSTERECTOMY     ,   Social History     Tobacco Use    Smoking status: Never Smoker    Smokeless tobacco: Never Used   Substance Use Topics    Alcohol use: No    Drug use: No   , No family history on file. PHYSICAL EXAMINATION:  [ INSTRUCTIONS:  \"[x]\" Indicates a positive item  \"[]\" Indicates a negative item  -- DELETE ALL ITEMS NOT EXAMINED]  Vital Signs: (As obtained by patient/caregiver or practitioner observation)    Blood pressure-116/66 heart rate-74   respiratory rate  temperature-97.3 pulse oximetry-O2 sat 96%    Constitutional: [x] Appears well-developed and well-nourished [x] No apparent distress      [] Abnormal-   Mental status  [x] Alert and awake  [x] Oriented to person/place/time []Able to follow commands      Eyes:  EOM    [x]  Normal  [] Abnormal-  Sclera  [x]  Normal  [] Abnormal -         Discharge []  None visible  [] Abnormal -    HENT:   [x] Normocephalic, atraumatic.   [] Abnormal   [x] Mouth/Throat: Mucous membranes are moist.     External Ears [x] Normal  [] Abnormal-     Neck: [x] No visualized mass     Pulmonary/Chest: [x] Respiratory effort normal.  [x] No visualized signs of difficulty breathing or respiratory distress        [] Abnormal-      Musculoskeletal:   [] Normal gait with no signs of ataxia         [x] Normal range of motion of neck        [] Abnormal-       Neurological: [x] No Facial Asymmetry (Cranial nerve 7 motor function) (limited exam to video visit)          [x] No gaze palsy        [] Abnormal-         Skin:        [] No significant exanthematous lesions or discoloration noted on facial skin         [] Abnormal-            Psychiatric:       [x] Normal Affect [x] No Hallucinations        [] Abnormal-     Other pertinent observable physical exam findings-     ASSESSMENT/PLAN:  Gait disturbance  Patient states she is still having difficulty ambulating. Uses walker to ambulate. She is requesting some more physical therapy through home health care. She is homebound  She has chronic back pain. Persistent atrial fibrillation  Patient has atrial fibrillation and is on medications including Eliquis. Dementia without behavioral disturbance [facility])  Patient has mild dementia and is taking Namenda. She was seeing neurologist Dr. Martinez Said    Chronic midline low back pain without sciatica  Patient has lower back pain for some time. She has severe neural foraminal stenosis. She is not taking any pain medications. She has not seen Dr. Redd Ke    Acquired hypothyroidism  She has hypothyroidism and is on Synthroid. Mixed hyperlipidemia  Patient has hyperlipidemia and is on simvastatin. Recheck lipid profile    History of TIA (transient ischemic attack)  History of TIA. Patient is on Eliquis. Major depressive disorder with single episode, in full remission ([address]  History of depression and is on Zoloft and that is helping her. No suicidal ideations. Pulmonary nodules  CT chest : pulmonary nodules  Seen DR Mallika Centeno. Has f/u for High resolution CT chest in 6-8 weeks   Repeat CT chest in 1/2020: Resolution. No nodules    Interstitial lung disease ([address]  Pt sees Dr Mallika Centeno pulmonologist at [facility]    Return to office in 3 months    No follow-ups on file.     Gay Howard is a 81 y.o. female being evaluated by a Virtual Visit (video visit) encounter to address concerns as mentioned above. A caregiver was present when appropriate. Due to this being a TeleHealth encounter (During LTFLK-03 public health emergency), evaluation of the following organ systems was limited: Vitals/Constitutional/EENT/Resp/CV/GI//MS/Neuro/Skin/Heme-Lymph-Imm. Pursuant to the emergency declaration under the [address] [place] [address], [address] [place] [address] authority and the [employer] Act, this Virtual Visit was conducted with patient's (and/or legal guardian's) consent, to reduce the patient's risk of exposure to COVID-19 and provide necessary medical care. The patient (and/or legal guardian) has also been advised to contact this office for worsening conditions or problems, and seek emergency medical treatment and/or call 911 if deemed necessary. Patient identification was verified at the start of the visit: Yes    Total time spent on this encounter: Not billed by time    Services were provided through a video synchronous discussion virtually to substitute for in-person clinic visit. Patient and provider were located at their individual homes. --Alvaro Browning MD on 10/20/2020 at 1:17 PM    An electronic signature was used to authenticate this note.

## 2020-10-29 ENCOUNTER — VIRTUAL VISIT (OUTPATIENT)
Dept: INTERNAL MEDICINE CLINIC | Age: 85
End: 2020-10-29
Payer: MEDICARE

## 2020-10-29 VITALS
WEIGHT: 198 LBS | SYSTOLIC BLOOD PRESSURE: 146 MMHG | HEART RATE: 81 BPM | HEIGHT: 66 IN | BODY MASS INDEX: 31.82 KG/M2 | DIASTOLIC BLOOD PRESSURE: 81 MMHG

## 2020-10-29 PROCEDURE — 1123F ACP DISCUSS/DSCN MKR DOCD: CPT | Performed by: INTERNAL MEDICINE

## 2020-10-29 PROCEDURE — G0438 PPPS, INITIAL VISIT: HCPCS | Performed by: INTERNAL MEDICINE

## 2020-10-29 PROCEDURE — 4040F PNEUMOC VAC/ADMIN/RCVD: CPT | Performed by: INTERNAL MEDICINE

## 2020-10-29 ASSESSMENT — PATIENT HEALTH QUESTIONNAIRE - PHQ9
SUM OF ALL RESPONSES TO PHQ QUESTIONS 1-9: 0
SUM OF ALL RESPONSES TO PHQ9 QUESTIONS 1 & 2: 0
1. LITTLE INTEREST OR PLEASURE IN DOING THINGS: 0
SUM OF ALL RESPONSES TO PHQ QUESTIONS 1-9: 0
2. FEELING DOWN, DEPRESSED OR HOPELESS: 0
SUM OF ALL RESPONSES TO PHQ QUESTIONS 1-9: 0

## 2020-10-29 ASSESSMENT — LIFESTYLE VARIABLES: HOW OFTEN DO YOU HAVE A DRINK CONTAINING ALCOHOL: 0

## 2020-10-29 NOTE — PROGRESS NOTES
Medicare Annual Wellness Visit  Name: Paty Khan Date: 10/29/2020   MRN: [id number] Sex: Female   Age: 81 y.o. Ethnicity: Non-/Non    : 1935 Race: Jessica Cohn is here for Medicare AWV    Screenings for behavioral, psychosocial and functional/safety risks, and cognitive dysfunction are all negative except as indicated below. These results, as well as other patient data from the [address] form, are documented in Flowsheets linked to this Encounter. No Known Allergies    Prior to Visit Medications    Medication Sig Taking? Authorizing Provider   apixaban (ELIQUIS) 5 MG TABS tablet TAKE 1 TABLET BY MOUTH 2 TIMES DAILY Yes Dinorah Morillo MD   aspirin (ASPIRIN LOW DOSE) 81 MG EC tablet TAKE 1 TABLET BY MOUTH DAILY Yes Dinorah Morillo MD   Cholecalciferol (VITAMIN D) 50 MCG ( UT) CAPS capsule Take 2,000 Units by mouth Daily Yes Dinorah Morillo MD   levothyroxine (SYNTHROID) 150 MCG tablet Take 1 tablet by mouth daily Yes Dinorah Morillo MD   meclizine (TRAVEL SICKNESS) 25 MG CHEW Take 1 tablet by mouth as needed (dizziness) Yes Dinorah Morillo MD   metoprolol tartrate (LOPRESSOR) 25 MG tablet Take 0.5 tablets by mouth 2 times daily Yes Dinorah Morillo MD   simvastatin (ZOCOR) 40 MG tablet Take 1 tablet by mouth nightly Yes Dinorah Morillo MD   nystatin (MYCOSTATIN) 819233 UNIT/GM powder Apply 3 times daily.  Yes Dinorah Morillo MD   digoxin (LANOXIN) 125 MCG tablet Take 125 mcg by mouth daily Yes Historical Provider, MD   sertraline (ZOLOFT) 50 MG tablet Take 1 tablet by mouth daily Yes Dinorah Morillo MD   vitamin E 400 UNIT capsule Take 400 Units by mouth daily  Yes Historical Provider, MD   acetaminophen (TYLENOL) 500 MG tablet Take 500 mg by mouth 3 times daily PRN Yes Historical Provider, MD   memantine (NAMENDA) 10 MG tablet Take 10 mg by mouth 2 times daily Yes Historical Provider, MD       Past Medical History:   Diagnosis Date    Acquired hypothyroidism 2019 Patient is taking Synthroid    Chronic midline low back pain without sciatica 11/20/2019    Dementia without behavioral disturbance ([facility] 10/22/2019    Patient has dementia. She is taking Namenda Patient is seeing urologist Dr. Kaelyn Christiansen Gait disturbance 10/22/2019    Patient uses walker to ambulate    History of TIA (transient ischemic attack) 12/9/2019    Patient has a history of TIA and also recently may have had TIA. Patient is on Eliquis. Sees neurologist Dr. Kaelyn Christiansen Major depressive disorder with single episode, in full remission ([facility] 12/9/2019    Patient is taking Zoloft and that is helping.  Mixed hyperlipidemia 12/9/2019    Patient is on simvastatin    Persistent atrial fibrillation ([facility] 10/22/2019    A. fib on recently in October 2019 . Patient is on metoprolol 25 mg twice a day and Eliquis    PFO (patent foramen ovale) 12/9/2019    PFO seen on echocardiogram in 2016    Pneumonia due to infectious organism 10/22/2019    Patient had pneumonia and pleural effusion during admission in October 2019. A repeat CT scan of the chest will be done Patient is also seen pulmonologist       Past Surgical History:   Procedure Laterality Date    HYSTERECTOMY         No family history on file. CareTeam (Including outside providers/suppliers regularly involved in providing care):   Patient Care Team:  Maribel Chiu MD as PCP - General (Internal Medicine)  Maribel Chiu MD as PCP - REHABILITATION [facility] Provider    Wt Readings from Last 3 Encounters:   10/29/20 198 lb (89.8 kg)   07/14/20 198 lb (89.8 kg)   07/08/20 185 lb (83.9 kg)     Vitals:    10/29/20 1036   BP: (!) 146/81   Pulse: 81   Weight: 198 lb (89.8 kg)   Height: 5' 6\" (1.676 m)     Body mass index is 31.96 kg/m². Based upon direct observation of the patient, evaluation of cognition reveals recent and remote memory intact. Patient's complete Health Risk Assessment and screening values have been reviewed and are found in Flowsheets.  The following problems were reviewed today and where indicated follow up appointments were made and/or referrals ordered. Positive Risk Factor Screenings with Interventions:     Fall Risk:  2 or more falls in past year?: (!) yes  Fall with injury in past year?: no  Fall Risk Interventions:    · Patient declines any further evaluation/treatment for this issue    General Health and ACP:  General  In general, how would you say your health is?: Fair  In the past 7 days, have you experienced any of the following?  New or Increased Pain, New or Increased Fatigue, Loneliness, Social Isolation, Stress or Anger?: None of These  Do you get the social and emotional support that you need?: Yes  Do you have a Living Will?: Yes  Advance Directives     Power of [facility] Will ACP-Advance Directive ACP-Power of     Not on File Not on File Filed [address] [facility] Risk Interventions:  · No Living Will: ACP documents already completed- patient asked to provide copy to the office    Health Habits/Nutrition:  Health Habits/Nutrition  Do you exercise for at least 20 minutes 2-3 times per week?: Yes  Have you lost any weight without trying in the past 3 months?: No  Do you eat fewer than 2 meals per day?: No  Have you seen a dentist within the past year?: (!) No  Body mass index: (!) 31.95  Health Habits/Nutrition Interventions:  · Nutritional issues:  patient is not ready to address his/her nutritional/weight issues at this time  · Dental exam overdue:  patient encouraged to make appointment with his/her dentist     Hearing/Vision:  No exam data present  Hearing/Vision  Do you or your family notice any trouble with your hearing?: (!) Yes  Do you have difficulty driving, watching TV, or doing any of your daily activities because of your eyesight?: No  Have you had an eye exam within the past year?: (!) No  Hearing/Vision Interventions:  · Hearing concerns:  patient declines any further evaluation/treatment for hearing

## 2020-10-29 NOTE — PATIENT INSTRUCTIONS
Personalized Preventive Plan for Jenise Bill - 10/29/2020  Medicare offers a range of preventive health benefits. Some of the tests and screenings are paid in full while other may be subject to a deductible, co-insurance, and/or copay. Some of these benefits include a comprehensive review of your medical history including lifestyle, illnesses that may run in your family, and various assessments and screenings as appropriate. After reviewing your medical record and screening and assessments performed today your provider may have ordered immunizations, labs, imaging, and/or referrals for you. A list of these orders (if applicable) as well as your Preventive Care list are included within your After Visit Summary for your review. Other Preventive Recommendations:    · A preventive eye exam performed by an eye specialist is recommended every 1-2 years to screen for glaucoma; cataracts, macular degeneration, and other eye disorders. · A preventive dental visit is recommended every 6 months. · Try to get at least 150 minutes of exercise per week or 10,000 steps per day on a pedometer . · Order or download the FREE \"Exercise & Physical Activity: Your Everyday Guide\" from [employer]. Call 1-981.963.9149 or search [employer] online. · You need 6622-9565 mg of calcium and 9069-8897 IU of vitamin D per day. It is possible to meet your calcium requirement with diet alone, but a vitamin D supplement is usually necessary to meet this goal.  · When exposed to the sun, use a sunscreen that protects against both UVA and UVB radiation with an SPF of 30 or greater. Reapply every 2 to 3 hours or after sweating, drying off with a towel, or swimming. · Always wear a seat belt when traveling in a car. Always wear a helmet when riding a bicycle or motorcycle.

## 2021-01-27 ENCOUNTER — VIRTUAL VISIT (OUTPATIENT)
Dept: INTERNAL MEDICINE CLINIC | Age: 86
End: 2021-01-27
Payer: MEDICARE

## 2021-01-27 ENCOUNTER — TELEPHONE (OUTPATIENT)
Dept: INTERNAL MEDICINE CLINIC | Age: 86
End: 2021-01-27

## 2021-01-27 DIAGNOSIS — F03.90 DEMENTIA WITHOUT BEHAVIORAL DISTURBANCE, UNSPECIFIED DEMENTIA TYPE: ICD-10-CM

## 2021-01-27 DIAGNOSIS — Z91.81 AT HIGH RISK FOR FALLS: Primary | ICD-10-CM

## 2021-01-27 DIAGNOSIS — E03.9 ACQUIRED HYPOTHYROIDISM: ICD-10-CM

## 2021-01-27 DIAGNOSIS — F32.5 MAJOR DEPRESSIVE DISORDER WITH SINGLE EPISODE, IN FULL REMISSION (HCC): ICD-10-CM

## 2021-01-27 DIAGNOSIS — I48.19 PERSISTENT ATRIAL FIBRILLATION (HCC): ICD-10-CM

## 2021-01-27 DIAGNOSIS — G89.29 CHRONIC MIDLINE LOW BACK PAIN WITHOUT SCIATICA: ICD-10-CM

## 2021-01-27 DIAGNOSIS — M54.50 CHRONIC MIDLINE LOW BACK PAIN WITHOUT SCIATICA: ICD-10-CM

## 2021-01-27 DIAGNOSIS — R91.8 PULMONARY NODULES: ICD-10-CM

## 2021-01-27 DIAGNOSIS — E78.2 MIXED HYPERLIPIDEMIA: ICD-10-CM

## 2021-01-27 DIAGNOSIS — Z86.73 HISTORY OF TIA (TRANSIENT ISCHEMIC ATTACK): ICD-10-CM

## 2021-01-27 DIAGNOSIS — J84.9 INTERSTITIAL LUNG DISEASE (HCC): ICD-10-CM

## 2021-01-27 DIAGNOSIS — J18.9 PNEUMONIA OF LEFT LOWER LOBE DUE TO INFECTIOUS ORGANISM: ICD-10-CM

## 2021-01-27 DIAGNOSIS — R26.9 GAIT DISTURBANCE: ICD-10-CM

## 2021-01-27 PROCEDURE — 1123F ACP DISCUSS/DSCN MKR DOCD: CPT | Performed by: INTERNAL MEDICINE

## 2021-01-27 PROCEDURE — 4040F PNEUMOC VAC/ADMIN/RCVD: CPT | Performed by: INTERNAL MEDICINE

## 2021-01-27 PROCEDURE — G8400 PT W/DXA NO RESULTS DOC: HCPCS | Performed by: INTERNAL MEDICINE

## 2021-01-27 PROCEDURE — 1090F PRES/ABSN URINE INCON ASSESS: CPT | Performed by: INTERNAL MEDICINE

## 2021-01-27 PROCEDURE — 99214 OFFICE O/P EST MOD 30 MIN: CPT | Performed by: INTERNAL MEDICINE

## 2021-01-27 PROCEDURE — G8427 DOCREV CUR MEDS BY ELIG CLIN: HCPCS | Performed by: INTERNAL MEDICINE

## 2021-01-27 RX ORDER — RIVASTIGMINE TARTRATE 3 MG/1
3 CAPSULE ORAL DAILY
COMMUNITY
Start: 2020-12-30 | End: 2021-03-31 | Stop reason: SDUPTHER

## 2021-01-27 NOTE — TELEPHONE ENCOUNTER
Patient states she will need a referral to ENT, she would like to see  Dr. Luz Hanson or Dr. Ryan Rodas in [facility].

## 2021-01-27 NOTE — PROGRESS NOTES
[ INSTRUCTIONS:  \"[x]\" Indicates a positive item  \"[]\" Indicates a negative item  -- DELETE ALL ITEMS NOT EXAMINED]  Vital Signs: (As obtained by patient/caregiver or practitioner observation)    Blood pressure-  Heart rate-    Respiratory rate-    Temperature-  Pulse oximetry-     Constitutional: [x] Appears well-developed and well-nourished [x] No apparent distress      [] Abnormal-   Mental status  [x] Alert and awake  [x] Oriented to person/place/time [x]Able to follow commands      Eyes:  EOM    [x]  Normal  [] Abnormal-  Sclera  [x]  Normal  [] Abnormal -         Discharge []  None visible  [] Abnormal -    HENT:   [x] Normocephalic, atraumatic. [] Abnormal   [x] Mouth/Throat: Mucous membranes are moist.     External Ears [x] Normal  [] Abnormal-     Neck: [x] No visualized mass     Pulmonary/Chest: [x] Respiratory effort normal.  [x] No visualized signs of difficulty breathing or respiratory distress        [] Abnormal-      Musculoskeletal:   [] Normal gait with no signs of ataxia         [x] Normal range of motion of neck        [] Abnormal-       Neurological:        [x] No Facial Asymmetry (Cranial nerve 7 motor function) (limited exam to video visit)          [x] No gaze palsy        [] Abnormal-         Skin:        [x] No significant exanthematous lesions or discoloration noted on facial skin         [] Abnormal-            Psychiatric:       [x] Normal Affect [] No Hallucinations        [] Abnormal-     Other pertinent observable physical exam findings-     ASSESSMENT/PLAN:  Acquired hypothyroidism  Patient has hypothyroidism and is on Synthroid. Continue the same    Chronic midline low back pain without sciatica  Back pain is stable. Using walker to ambulate    Dementia without behavioral disturbance [facility])  Patient has mild dementia. She sees neurologist Dr. Maddie Kelly.   She is on Exelon and Namenda    Gait disturbance Patient is able to ambulate with the help of walker but is still gets short of breath when she has to walk more. History of TIA (transient ischemic attack)  Patient has a history of TIA and also recently may have had TIA. Patient is on Eliquis. Sees neurologist Dr. Dirk Miner    Interstitial lung disease [facility])  Pt sees Dr Natan Garcia pulmonologist at [facility]    Major depressive disorder with single episode, in full remission [facility])  Patient is taking Zoloft and that is helping. Mixed hyperlipidemia  Hyperlipidemia is stable. Follow low cholesterol diet. Continue current treatment. On simvastatin    Persistent atrial fibrillation (HCC)   Dr. Roberts Graft. October 2019 . Patient is on metoprolol 12.5 mg twice a day and Eliquis, patient is on digoxin also. Pneumonia due to infectious organism  Patient had pneumonia and pleural effusion during admission in October 2019. A repeat CT scan of the chest will be done  Patient is also seen pulmonologist   Dr Natan Garcia : f/u CT chest in 1/2020 had improved. Pulmonary nodules  CT chest : pulmonary nodules  Seen DR Natan Garcia. Has f/u for High resolution CT chest in 6-8 weeks   Repeat CT chest in 1/2020: Resolution. No nodules    Fall precautions  Pt has hearing loss. Thinks she has cerumen impaction  Advised patient to see ENT    Return to office in 3 months. Dejuan Nair is a 81 y.o. female being evaluated by a Virtual Visit (video visit) encounter to address concerns as mentioned above. A caregiver was present when appropriate. Due to this being a TeleHealth encounter (During EZIPT-01 public health emergency), evaluation of the following organ systems was limited: Vitals/Constitutional/EENT/Resp/CV/GI//MS/Neuro/Skin/Heme-Lymph-Imm. Pursuant to the emergency declaration under the [address] [place] [address] and the [employer] Act, this Virtual Visit was conducted with patient's (and/or legal guardian's) consent, to reduce the patient's risk of exposure to COVID-19 and provide necessary medical care. The patient (and/or legal guardian) has also been advised to contact this office for worsening conditions or problems, and seek emergency medical treatment and/or call 911 if deemed necessary. Patient identification was verified at the start of the visit: Yes    Total time spent on this encounter: Not billed by time    Services were provided through a video synchronous discussion virtually to substitute for in-person clinic visit. Patient and provider were located at their individual homes. --Julito Blankenship MD on 1/27/2021 at 12:11 PM    An electronic signature was used to authenticate this note. On the basis of positive falls risk screening, assessment and plan is as follows: home safety tips provided.

## 2021-02-02 DIAGNOSIS — H91.93 DECREASED HEARING OF BOTH EARS: Primary | ICD-10-CM

## 2021-02-03 NOTE — ASSESSMENT & PLAN NOTE
CT chest : pulmonary nodules  Seen DR Flavio Ordonez. Has f/u for High resolution CT chest in 6-8 weeks   Repeat CT chest in 1/2020: Resolution.   No nodules

## 2021-02-03 NOTE — ASSESSMENT & PLAN NOTE
Patient had pneumonia and pleural effusion during admission in October 2019. A repeat CT scan of the chest will be done  Patient is also seen pulmonologist   Dr Chanel Desai : f/u CT chest in 1/2020 had improved.

## 2021-02-03 NOTE — ASSESSMENT & PLAN NOTE
Patient has a history of TIA and also recently may have had TIA. Patient is on Eliquis.   Sees neurologist Dr. Elena Valente

## 2021-02-03 NOTE — ASSESSMENT & PLAN NOTE
Dr. Holland Gonzalez. October 2019 . Patient is on metoprolol 12.5 mg twice a day and Eliquis, patient is on digoxin also.

## 2021-02-03 NOTE — ASSESSMENT & PLAN NOTE
Patient has mild dementia. She sees neurologist Dr. Octaviano Wright.   She is on Exelon and [address]

## 2021-02-03 NOTE — ASSESSMENT & PLAN NOTE
Patient is able to ambulate with the help of walker but is still gets short of breath when she has to walk more.

## 2021-02-04 ENCOUNTER — TELEPHONE (OUTPATIENT)
Dept: INTERNAL MEDICINE CLINIC | Age: 86
End: 2021-02-04

## 2021-02-04 NOTE — TELEPHONE ENCOUNTER
Faxed referral to Dr. Louise Boswell office, they will call the patient with an appointment. Patient informed.     Confirmation of fax received

## 2021-02-08 ENCOUNTER — TELEPHONE (OUTPATIENT)
Dept: INTERNAL MEDICINE CLINIC | Age: 86
End: 2021-02-08

## 2021-02-08 NOTE — TELEPHONE ENCOUNTER
Byron Peacock called on behalf of the patient stating that [facility] was coming into the patient home and assisting the patient with medications. Per Byron Peacock they are no longer coming and she states the help is needed. Left message for patient to schedule a follow up to discuss continuation of Home Care Services.

## 2021-02-10 ENCOUNTER — VIRTUAL VISIT (OUTPATIENT)
Dept: INTERNAL MEDICINE CLINIC | Age: 86
End: 2021-02-10
Payer: MEDICARE

## 2021-02-10 ENCOUNTER — TELEPHONE (OUTPATIENT)
Dept: INTERNAL MEDICINE CLINIC | Age: 86
End: 2021-02-10

## 2021-02-10 DIAGNOSIS — E78.2 MIXED HYPERLIPIDEMIA: ICD-10-CM

## 2021-02-10 DIAGNOSIS — Z79.899 MEDICATION MANAGEMENT: ICD-10-CM

## 2021-02-10 DIAGNOSIS — R26.9 GAIT DISTURBANCE: ICD-10-CM

## 2021-02-10 DIAGNOSIS — I48.19 PERSISTENT ATRIAL FIBRILLATION (HCC): ICD-10-CM

## 2021-02-10 DIAGNOSIS — F32.5 MAJOR DEPRESSIVE DISORDER WITH SINGLE EPISODE, IN FULL REMISSION (HCC): ICD-10-CM

## 2021-02-10 DIAGNOSIS — J84.9 INTERSTITIAL LUNG DISEASE (HCC): ICD-10-CM

## 2021-02-10 DIAGNOSIS — E03.9 ACQUIRED HYPOTHYROIDISM: ICD-10-CM

## 2021-02-10 DIAGNOSIS — F03.90 DEMENTIA WITHOUT BEHAVIORAL DISTURBANCE, UNSPECIFIED DEMENTIA TYPE: Primary | ICD-10-CM

## 2021-02-10 PROCEDURE — G8427 DOCREV CUR MEDS BY ELIG CLIN: HCPCS | Performed by: INTERNAL MEDICINE

## 2021-02-10 PROCEDURE — 1123F ACP DISCUSS/DSCN MKR DOCD: CPT | Performed by: INTERNAL MEDICINE

## 2021-02-10 PROCEDURE — 1090F PRES/ABSN URINE INCON ASSESS: CPT | Performed by: INTERNAL MEDICINE

## 2021-02-10 PROCEDURE — G8400 PT W/DXA NO RESULTS DOC: HCPCS | Performed by: INTERNAL MEDICINE

## 2021-02-10 PROCEDURE — 4040F PNEUMOC VAC/ADMIN/RCVD: CPT | Performed by: INTERNAL MEDICINE

## 2021-02-10 PROCEDURE — 99213 OFFICE O/P EST LOW 20 MIN: CPT | Performed by: INTERNAL MEDICINE

## 2021-02-10 RX ORDER — MULTIVIT-MIN/IRON/FOLIC ACID/K 18-600-40
2000 CAPSULE ORAL DAILY
Qty: 90 CAPSULE | Refills: 1 | Status: SHIPPED | OUTPATIENT
Start: 2021-02-10 | End: 2021-08-18

## 2021-02-10 RX ORDER — ASPIRIN 81 MG/1
TABLET ORAL
Qty: 90 TABLET | Refills: 1 | Status: SHIPPED | OUTPATIENT
Start: 2021-02-10 | End: 2022-02-10 | Stop reason: ALTCHOICE

## 2021-02-10 NOTE — PROGRESS NOTES
2/10/2021    TELEHEALTH EVALUATION -- Audio/Visual (During IUUAP-10 public health emergency)    HPI:    Tiki Alanis (:  1935) has requested an audio/video evaluation for the following concern(s):    Chief Complaint   Patient presents with    Other     needs new referral for [facility] for medication    Medication Refill     Patient states she is having difficulty managing the medications  She has a home health care and the nurse was coming once a week but that stopped last week. She wants extension of that. Patient is 80-year-old and is requesting medication management from home health care. Patient is taking multiple medications. Denies any chest pain. No shortness of breath no cough or sputum production. Her breathing is good. Patient states her hearing is poor. The hearing aid people felt patient may need a new one but she has cerumen impaction in the ear. She was referred to ENT but has not seen them yet. Patient daughter states she is going to arrange the appointment. Taking medications regularly. No side effects noted. Review of Systems    Prior to Visit Medications    Medication Sig Taking?  Authorizing Provider   rivastigmine (EXELON) 3 MG capsule Take 3 mg by mouth daily  Yes Historical Provider, MD   sertraline (ZOLOFT) 50 MG tablet TAKE ONE TABLET BY MOUTH EVERY DAY Yes Jazlyn Loya MD   apixaban (ELIQUIS) 5 MG TABS tablet TAKE 1 TABLET BY MOUTH 2 TIMES DAILY Yes Jazlyn Loya MD   aspirin (ASPIRIN LOW DOSE) 81 MG EC tablet TAKE 1 TABLET BY MOUTH DAILY Yes Jazlyn Loya MD   Cholecalciferol (VITAMIN D) 50 MCG ( UT) CAPS capsule Take 2,000 Units by mouth Daily Yes Jazlyn Loya MD   levothyroxine (SYNTHROID) 150 MCG tablet Take 1 tablet by mouth daily Yes Jazlyn Loya MD   meclizine (TRAVEL SICKNESS) 25 MG CHEW Take 1 tablet by mouth as needed (dizziness) Yes Jazlyn Loya MD metoprolol tartrate (LOPRESSOR) 25 MG tablet Take 0.5 tablets by mouth 2 times daily Yes Nahid Guardado MD   simvastatin (ZOCOR) 40 MG tablet Take 1 tablet by mouth nightly Yes Nahid Guardado MD   nystatin (MYCOSTATIN) 466753 UNIT/GM powder Apply 3 times daily. Yes Nahid Guardado MD   digoxin (LANOXIN) 125 MCG tablet Take 125 mcg by mouth daily Yes Historical Provider, MD   vitamin E 400 UNIT capsule Take 400 Units by mouth daily  Yes Historical Provider, MD   acetaminophen (TYLENOL) 500 MG tablet Take 500 mg by mouth 3 times daily PRN Yes Historical Provider, MD   memantine (NAMENDA) 10 MG tablet Take 10 mg by mouth 2 times daily Yes Historical Provider, MD       Social History     Tobacco Use    Smoking status: Never Smoker    Smokeless tobacco: Never Used   Substance Use Topics    Alcohol use: No    Drug use: No        No Known Allergies,   Past Medical History:   Diagnosis Date    Acquired hypothyroidism 12/9/2019    Patient is taking Synthroid    Chronic midline low back pain without sciatica 11/20/2019    D[facility] without [facility] disturbance [facility] 10/22/2019    Patient has dementia. She is taking Namenda Patient is seeing urologist Dr. Castrejon Courser Gait disturbance 10/22/2019    Patient uses walker to ambulate    History of TIA (transient ischemic attack) 12/9/2019    Patient has a history of TIA and also recently may have had TIA. Patient is on Eliquis. Sees neurologist Dr. Castrejon Courser Major depressive disorder with single episode, in full remission ([facility] 12/9/2019    Patient is taking Zoloft and that is helping.  Mixed hyperlipidemia 12/9/2019    Patient is on simvastatin    Persistent atrial fibrillation ([facility] 10/22/2019    A. fib on recently in October 2019 .   Patient is on metoprolol 25 mg twice a day and Eliquis    PFO (patent foramen ovale) 12/9/2019    PFO seen on echocardiogram in 2016    Pneumonia due to infectious organism 10/22/2019 Patient had pneumonia and pleural effusion during admission in October 2019. A repeat CT scan of the chest will be done Patient is also seen pulmonologist   ,   Past Surgical History:   Procedure Laterality Date    HYSTERECTOMY     ,   Social History     Tobacco Use    Smoking status: Never Smoker    Smokeless tobacco: Never Used   Substance Use Topics    Alcohol use: No    Drug use: No       PHYSICAL EXAMINATION:  [ INSTRUCTIONS:  \"[x]\" Indicates a positive item  \"[]\" Indicates a negative item  -- DELETE ALL ITEMS NOT EXAMINED]  Vital Signs: (As obtained by patient/caregiver or practitioner observation)    Blood pressure-104/62 heart rate-79   respiratory rate-    Temperature-  Pulse oximetry-     Constitutional: [] Appears well-developed and well-nourished [x] No apparent distress      [] Abnormal-   Mental status  [x] Alert and awake  [] Oriented to person/place/time [x]Able to follow commands      Eyes:  EOM    [x]  Normal  [] Abnormal-  Sclera  [x]  Normal  [] Abnormal -         Discharge []  None visible  [] Abnormal -    HENT:   [x] Normocephalic, atraumatic.   [] Abnormal   [x] Mouth/Throat: Mucous membranes are moist.     External Ears [x] Normal  [] Abnormal-     Neck: [x] No visualized mass     Pulmonary/Chest: [x] Respiratory effort normal.  [x] No visualized signs of difficulty breathing or respiratory distress        [] Abnormal-      Musculoskeletal:   [] Normal gait with no signs of ataxia         [x] Normal range of motion of neck        [] Abnormal-       Neurological:        [x] No Facial Asymmetry (Cranial nerve 7 motor function) (limited exam to video visit)          [x] No gaze palsy        [] Abnormal-         Skin:        [x] No significant exanthematous lesions or discoloration noted on facial skin         [] Abnormal-            Psychiatric:       [x] Normal Affect [] No Hallucinations        [] Abnormal- Other pertinent observable physical exam findings-patient is sitting in the chair    ASSESSMENT/PLAN:  Encounter Diagnoses   Name Primary?  Dementia without behavioral disturbance, unspecified dementia type ([facility] Yes    Gait disturbance     Acquired hypothyroidism     Interstitial lung disease ([facility]     Major depressive disorder with single episode, in full remission ([facility]     Mixed hyperlipidemia     Persistent atrial fibrillation ([facility]      Plan patient is taking multiple medications and she cannot handle on her own  She had cognitive impairment. She is also on blood thinners Eliquis  I feel she needs home health care and the nurse to manage the medications. She also needs to check her vital signs. No follow-ups on file. Erwin Beltran is a 81 y.o. female being evaluated by a Virtual Visit (video visit) encounter to address concerns as mentioned above. A caregiver was present when appropriate. Due to this being a TeleHealth encounter (During [facility] public health emergency), evaluation of the following organ systems was limited: Vitals/Constitutional/EENT/Resp/CV/GI//MS/Neuro/Skin/Heme-Lymph-Imm. Pursuant to the emergency declaration under the [address] [place] [address] authority and the [employer] Act, this Virtual Visit was conducted with patient's (and/or legal guardian's) consent, to reduce the patient's risk of exposure to COVID-19 and provide necessary medical care. The patient (and/or legal guardian) has also been advised to contact this office for worsening conditions or problems, and seek emergency medical treatment and/or call 911 if deemed necessary.      Patient identification was verified at the start of the visit: Yes    Total time spent on this encounter: Not billed by time Services were provided through a video synchronous discussion virtually to substitute for in-person clinic visit. Patient and provider were located at their individual homes. --Nish Macedo MD on 2/10/2021 at 10:55 AM    An electronic signature was used to authenticate this note.

## 2021-02-10 NOTE — TELEPHONE ENCOUNTER
Faxed referral, H&P, medication list, progress notes and insurance information to [facility]. They will call the patient and set up appointment.     Confirmation of fax received

## 2021-03-03 NOTE — TELEPHONE ENCOUNTER
Liv nurse with [facility] called asking for 90 day supply for patient's meds since  they will be doing her med boxes for her and seeing her twice a month.

## 2021-03-04 RX ORDER — LEVOTHYROXINE SODIUM 0.15 MG/1
150 TABLET ORAL DAILY
Qty: 90 TABLET | Refills: 1 | Status: SHIPPED | OUTPATIENT
Start: 2021-03-04 | End: 2021-09-15

## 2021-03-04 RX ORDER — DIGOXIN 125 MCG
125 TABLET ORAL DAILY
Qty: 90 TABLET | Refills: 1 | Status: SHIPPED | OUTPATIENT
Start: 2021-03-04 | End: 2021-09-28 | Stop reason: SDUPTHER

## 2021-03-04 RX ORDER — SIMVASTATIN 40 MG
40 TABLET ORAL NIGHTLY
Qty: 90 TABLET | Refills: 1 | Status: SHIPPED | OUTPATIENT
Start: 2021-03-04 | End: 2021-09-28 | Stop reason: SDUPTHER

## 2021-03-10 ENCOUNTER — TELEPHONE (OUTPATIENT)
Dept: INTERNAL MEDICINE CLINIC | Age: 86
End: 2021-03-10

## 2021-03-10 DIAGNOSIS — H91.93 DECREASED HEARING OF BOTH EARS: Primary | ICD-10-CM

## 2021-03-31 RX ORDER — MEMANTINE HYDROCHLORIDE 10 MG/1
10 TABLET ORAL 2 TIMES DAILY
Qty: 180 TABLET | Refills: 1 | Status: SHIPPED | OUTPATIENT
Start: 2021-03-31 | End: 2021-09-28 | Stop reason: SDUPTHER

## 2021-03-31 RX ORDER — VITAMIN E 268 MG
400 CAPSULE ORAL DAILY
Qty: 90 CAPSULE | Refills: 1 | Status: SHIPPED | OUTPATIENT
Start: 2021-03-31 | End: 2022-02-10 | Stop reason: SDUPTHER

## 2021-03-31 RX ORDER — RIVASTIGMINE TARTRATE 3 MG/1
3 CAPSULE ORAL DAILY
Qty: 90 CAPSULE | Refills: 1 | Status: SHIPPED | OUTPATIENT
Start: 2021-03-31 | End: 2021-09-28 | Stop reason: SDUPTHER

## 2021-03-31 NOTE — TELEPHONE ENCOUNTER
Sayra Lloyd LPN with [facility] called stating that they are doing qow visits for patient for med minder and need a 90 day supply of medication. These are the only ones that they do not have a 90 day supply on.

## 2021-04-07 ENCOUNTER — TELEPHONE (OUTPATIENT)
Dept: INTERNAL MEDICINE CLINIC | Age: 86
End: 2021-04-07

## 2021-04-08 ENCOUNTER — TELEPHONE (OUTPATIENT)
Dept: INTERNAL MEDICINE CLINIC | Age: 86
End: 2021-04-08

## 2021-05-12 ENCOUNTER — HOSPITAL ENCOUNTER (OUTPATIENT)
Age: 86
Setting detail: SPECIMEN
Discharge: HOME | End: 2021-05-12
Payer: MEDICARE

## 2021-05-12 LAB
ALBUMIN SERPL-MCNC: 3.9 GM/DL (ref 3.4–5)
ALP BLD-CCNC: 67 IU/L (ref 40–129)
ALT SERPL-CCNC: 16 U/L (ref 10–40)
ANION GAP SERPL CALCULATED.3IONS-SCNC: 12 MMOL/L (ref 4–16)
AST SERPL-CCNC: 23 IU/L (ref 15–37)
BASOPHILS ABSOLUTE: 0.1 K/CU MM
BASOPHILS RELATIVE PERCENT: 0.8 % (ref 0–1)
BILIRUB SERPL-MCNC: 0.4 MG/DL (ref 0–1)
BUN BLDV-MCNC: 17 MG/DL (ref 6–23)
CALCIUM SERPL-MCNC: 9.7 MG/DL (ref 8.3–10.6)
CHLORIDE BLD-SCNC: 106 MMOL/L (ref 99–110)
CHOLESTEROL: 156 MG/DL
CO2: 25 MMOL/L (ref 21–32)
CREAT SERPL-MCNC: 1.1 MG/DL (ref 0.6–1.1)
DIFFERENTIAL TYPE: ABNORMAL
EOSINOPHILS ABSOLUTE: 0.2 K/CU MM
EOSINOPHILS RELATIVE PERCENT: 2.9 % (ref 0–3)
GFR AFRICAN AMERICAN: 57 ML/MIN/1.73M2
GFR NON-AFRICAN AMERICAN: 47 ML/MIN/1.73M2
GLUCOSE BLD-MCNC: 115 MG/DL (ref 70–99)
HCT VFR BLD CALC: 44.1 % (ref 37–47)
HDLC SERPL-MCNC: 39 MG/DL
HEMOGLOBIN: 13.5 GM/DL (ref 12.5–16)
IMMATURE NEUTROPHIL %: 0.1 % (ref 0–0.43)
LDL CHOLESTEROL CALCULATED: 74 MG/DL
LYMPHOCYTES ABSOLUTE: 3.7 K/CU MM
LYMPHOCYTES RELATIVE PERCENT: 50.2 % (ref 24–44)
MCH RBC QN AUTO: 28 PG (ref 27–31)
MCHC RBC AUTO-ENTMCNC: 30.6 % (ref 32–36)
MCV RBC AUTO: 91.3 FL (ref 78–100)
MONOCYTES ABSOLUTE: 0.9 K/CU MM
MONOCYTES RELATIVE PERCENT: 11.7 % (ref 0–4)
PDW BLD-RTO: 15 % (ref 11.7–14.9)
PLATELET # BLD: 278 K/CU MM (ref 140–440)
PMV BLD AUTO: 10 FL (ref 7.5–11.1)
POTASSIUM SERPL-SCNC: 4.4 MMOL/L (ref 3.5–5.1)
RBC # BLD: 4.83 M/CU MM (ref 4.2–5.4)
SEGMENTED NEUTROPHILS ABSOLUTE COUNT: 2.5 K/CU MM
SEGMENTED NEUTROPHILS RELATIVE PERCENT: 34.3 % (ref 36–66)
SODIUM BLD-SCNC: 143 MMOL/L (ref 135–145)
TOTAL IMMATURE NEUTOROPHIL: 0.01 K/CU MM
TOTAL PROTEIN: 6.3 GM/DL (ref 6.4–8.2)
TRIGL SERPL-MCNC: 214 MG/DL
TSH HIGH SENSITIVITY: 0.94 UIU/ML (ref 0.27–4.2)
WBC # BLD: 7.3 K/CU MM (ref 4–10.5)

## 2021-05-12 PROCEDURE — 80053 COMPREHEN METABOLIC PANEL: CPT

## 2021-05-12 PROCEDURE — 85025 COMPLETE CBC W/AUTO DIFF WBC: CPT

## 2021-05-12 PROCEDURE — 84443 ASSAY THYROID STIM HORMONE: CPT

## 2021-05-12 PROCEDURE — 80061 LIPID PANEL: CPT

## 2021-05-26 ENCOUNTER — TELEPHONE (OUTPATIENT)
Dept: INTERNAL MEDICINE CLINIC | Age: 86
End: 2021-05-26

## 2021-05-27 ENCOUNTER — VIRTUAL VISIT (OUTPATIENT)
Dept: INTERNAL MEDICINE CLINIC | Age: 86
End: 2021-05-27
Payer: MEDICARE

## 2021-05-27 DIAGNOSIS — E03.9 ACQUIRED HYPOTHYROIDISM: Primary | ICD-10-CM

## 2021-05-27 DIAGNOSIS — E78.2 MIXED HYPERLIPIDEMIA: ICD-10-CM

## 2021-05-27 DIAGNOSIS — D72.820 LYMPHOCYTOSIS: ICD-10-CM

## 2021-05-27 DIAGNOSIS — F32.5 MAJOR DEPRESSIVE DISORDER WITH SINGLE EPISODE, IN FULL REMISSION (HCC): ICD-10-CM

## 2021-05-27 DIAGNOSIS — F03.90 DEMENTIA WITHOUT BEHAVIORAL DISTURBANCE, UNSPECIFIED DEMENTIA TYPE: ICD-10-CM

## 2021-05-27 PROCEDURE — 1036F TOBACCO NON-USER: CPT | Performed by: INTERNAL MEDICINE

## 2021-05-27 PROCEDURE — 1123F ACP DISCUSS/DSCN MKR DOCD: CPT | Performed by: INTERNAL MEDICINE

## 2021-05-27 PROCEDURE — 4040F PNEUMOC VAC/ADMIN/RCVD: CPT | Performed by: INTERNAL MEDICINE

## 2021-05-27 PROCEDURE — G8417 CALC BMI ABV UP PARAM F/U: HCPCS | Performed by: INTERNAL MEDICINE

## 2021-05-27 PROCEDURE — 99214 OFFICE O/P EST MOD 30 MIN: CPT | Performed by: INTERNAL MEDICINE

## 2021-05-27 PROCEDURE — 1090F PRES/ABSN URINE INCON ASSESS: CPT | Performed by: INTERNAL MEDICINE

## 2021-05-27 PROCEDURE — G8427 DOCREV CUR MEDS BY ELIG CLIN: HCPCS | Performed by: INTERNAL MEDICINE

## 2021-05-27 PROCEDURE — G8400 PT W/DXA NO RESULTS DOC: HCPCS | Performed by: INTERNAL MEDICINE

## 2021-05-27 RX ORDER — NYSTATIN 100000 [USP'U]/G
POWDER TOPICAL
Qty: 1 BOTTLE | Refills: 2 | Status: ON HOLD | OUTPATIENT
Start: 2021-05-27 | End: 2022-04-12

## 2021-05-27 ASSESSMENT — PATIENT HEALTH QUESTIONNAIRE - PHQ9
SUM OF ALL RESPONSES TO PHQ9 QUESTIONS 1 & 2: 0
SUM OF ALL RESPONSES TO PHQ QUESTIONS 1-9: 0
SUM OF ALL RESPONSES TO PHQ QUESTIONS 1-9: 0
2. FEELING DOWN, DEPRESSED OR HOPELESS: 0
1. LITTLE INTEREST OR PLEASURE IN DOING THINGS: 0
SUM OF ALL RESPONSES TO PHQ QUESTIONS 1-9: 0

## 2021-05-27 NOTE — PROGRESS NOTES
Robert Figueroa  Patient's  is 1935  Seen in office on 2021      SUBJECTIVE:  Zuri Police arely 81 y. o.year old female presents today   Chief Complaint   Patient presents with    Other     paperwork for [facility]    Medication Refill    Other     had appt with Dr. Penaloza [place] [address]     Patient is having virtual video visit through [employer]  Patient is at home  Provider in office  Patient's daughter is assisted to call  Patient has history of atrial fibrillation, hypothyroidism, hypertension, TIA interstitial lung disease and depression  Patient states she is feeling well  She does get up briefly to go to the bathroom  Her back pain is tolerable  She is using walker to ambulate. Patient states she saw Dr. Erling Sicard and had no changes. Patient is getting home health care every 2 weeks   Patient denies any chest pain. Has generalized weakness. No cough or sputum production. No abdominal pain. No nausea vomiting or diarrhea    Patient had COVID-19 vaccine    Taking medications regularly. No side effects noted. Review of Systems  Review of system as mentioned in HPI  OBJECTIVE: There were no vitals taken for this visit. Vital signs Per patient's daughter  /68. Pulse 70. Temp 97.9    Wt Readings from Last 3 Encounters:   10/29/20 198 lb (89.8 kg)   20 198 lb (89.8 kg)   20 185 lb (83.9 kg)      Limited examination  Examination patient sitting in the chair  Patient looks comfortable  Breathing normal.  No facial droop  No edema  Patient is alert and oriented      IMPRESSION:    Encounter Diagnoses   Name Primary?  Acquired hypothyroidism Yes    Dementia without behavioral disturbance, unspecified dementia type ([address]     Major depressive disorder with single episode, in full remission ([address]     Mixed hyperlipidemia     Lymphocytosis        ASSESSMENT/PLAN:    . Hypothyroidism continue same Synthroid  . Dementia stable. Cognition fluctuates  .   Depression in control continue Zoloft  . Rash under the breast: We will give nystatin cream  .  Gait disturbance. Patient uses walker to ambulate  . Atrial fibrillation stable continue metoprolol and Eliquis plus digoxin  . Lower back pain. Patient states able to tolerate it. .  Hyperlipidemia. Continue simvastatin  . History of TIA stable patient is on Eliquis. .  Home health care to follow patient, monitor vital signs, manage medications  .  through home health. Orders Placed This Encounter   Procedures    CBC Auto Differential           Mediations reviewed with the patient. Continue current medications. Appropriate prescriptions are addressed. After visit summeryprovided. Follow up as directed sooner if needed. Questions answered and patient verbalizes understanding. Call for any problems, questions, or concerns. No Known Allergies  Current Outpatient Medications   Medication Sig Dispense Refill    nystatin (MYCOSTATIN) 680295 UNIT/GM powder Apply 3 times daily.  1 Bottle 2    vitamin E 400 UNIT capsule Take 1 capsule by mouth daily 90 capsule 1    memantine (NAMENDA) 10 MG tablet Take 1 tablet by mouth 2 times daily 180 tablet 1    rivastigmine (EXELON) 3 MG capsule Take 1 capsule by mouth daily 90 capsule 1    apixaban (ELIQUIS) 5 MG TABS tablet TAKE 1 TABLET BY MOUTH 2 TIMES DAILY 180 tablet 1    levothyroxine (SYNTHROID) 150 MCG tablet Take 1 tablet by mouth daily 90 tablet 1    simvastatin (ZOCOR) 40 MG tablet Take 1 tablet by mouth nightly 90 tablet 1    digoxin (LANOXIN) 125 MCG tablet Take 1 tablet by mouth daily 90 tablet 1    sertraline (ZOLOFT) 50 MG tablet Take 1 tablet by mouth daily 90 tablet 1    metoprolol tartrate (LOPRESSOR) 25 MG tablet Take 0.5 tablets by mouth 2 times daily 180 tablet 1    aspirin (ASPIRIN LOW DOSE) 81 MG EC tablet TAKE 1 TABLET BY MOUTH DAILY 90 tablet 1    Cholecalciferol (VITAMIN D) 50 MCG (2000 UT) CAPS capsule Take 2,000 Units by mouth Daily 90 capsule 1  acetaminophen (TYLENOL) 500 MG tablet Take 500 mg by mouth 3 times daily PRN      meclizine (TRAVEL SICKNESS) 25 MG CHEW Take 1 tablet by mouth as needed (dizziness) (Patient not taking: Reported on 5/27/2021) 30 tablet 0     No current facility-administered medications for this visit. Past Medical History:   Diagnosis Date    Acquired hypothyroidism 12/9/2019    Patient is taking Synthroid    Chronic midline low back pain without sciatica 11/20/2019    Dementia without behavioral disturbance ([address] 10/22/2019    Patient has dementia. She is taking Namenda Patient is seeing urologist Dr. Neil Haro Gait disturbance 10/22/2019    Patient uses walker to ambulate    History of TIA (transient ischemic attack) 12/9/2019    Patient has a history of TIA and also recently may have had TIA. Patient is on Eliquis. Sees neurologist Dr. Neil Haro Major depressive disorder with single episode, in full remission ([facility] 12/9/2019    Patient is taking Zoloft and that is helping.  Mixed hyperlipidemia 12/9/2019    Patient is on simvastatin    Persistent atrial fibrillation ([address] 10/22/2019    A. fib on recently in October 2019 . Patient is on metoprolol 25 mg twice a day and Eliquis    PFO (patent foramen ovale) 12/9/2019    PFO seen on echocardiogram in 2016    Pneumonia due to infectious organism 10/22/2019    Patient had pneumonia and pleural effusion during admission in October 2019.  A repeat CT scan of the chest will be done Patient is also seen pulmonologist     Past Surgical History:   Procedure Laterality Date    HYSTERECTOMY       Social History     Tobacco Use    Smoking status: Never Smoker    Smokeless tobacco: Never Used   Substance Use Topics    Alcohol use: No       LAB REVIEW:  CBC:   Lab Results   Component Value Date    WBC 7.3 05/12/2021    HGB 13.5 05/12/2021    HCT 44.1 05/12/2021     05/12/2021     Lipids:   Lab Results   Component Value Date    HDL 39 (L) 05/12/2021    [address] [employer] 74 05/12/2021    LDLDIRECT 100 (H) 01/03/2020    TRIGLYCFAST 162 (H) 01/03/2020    CHOLFAST 161 01/03/2020     Renal:   Lab Results   Component Value Date    BUN 17 05/12/2021    CREATININE 1.1 05/12/2021     05/12/2021    K 4.4 05/12/2021    ALT 16 05/12/2021    AST 23 05/12/2021    GLUCOSE 115 05/12/2021    GLUF 134 07/03/2014     PT/INR:   Lab Results   Component Value Date    INR 1.02 07/04/2014     A1C:   Lab Results   Component Value Date    LABA1C 5.7 05/15/2020           Bebe Haskins MD, 5/27/2021 , 4:47 PM

## 2021-06-02 ENCOUNTER — HOSPITAL ENCOUNTER (OUTPATIENT)
Age: 86
Setting detail: SPECIMEN
Discharge: HOME | End: 2021-06-02
Payer: MEDICARE

## 2021-06-02 LAB
BASOPHILS ABSOLUTE: 0.1 K/CU MM
BASOPHILS RELATIVE PERCENT: 0.9 % (ref 0–1)
DIFFERENTIAL TYPE: ABNORMAL
EOSINOPHILS ABSOLUTE: 0.3 K/CU MM
EOSINOPHILS RELATIVE PERCENT: 3.7 % (ref 0–3)
HCT VFR BLD CALC: 42.4 % (ref 37–47)
HEMOGLOBIN: 13.2 GM/DL (ref 12.5–16)
IMMATURE NEUTROPHIL %: 0.1 % (ref 0–0.43)
LYMPHOCYTES ABSOLUTE: 3.5 K/CU MM
LYMPHOCYTES RELATIVE PERCENT: 52.5 % (ref 24–44)
MCH RBC QN AUTO: 28.1 PG (ref 27–31)
MCHC RBC AUTO-ENTMCNC: 31.1 % (ref 32–36)
MCV RBC AUTO: 90.2 FL (ref 78–100)
MONOCYTES ABSOLUTE: 0.7 K/CU MM
MONOCYTES RELATIVE PERCENT: 10.6 % (ref 0–4)
PDW BLD-RTO: 15 % (ref 11.7–14.9)
PLATELET # BLD: 273 K/CU MM (ref 140–440)
PMV BLD AUTO: 10 FL (ref 7.5–11.1)
RBC # BLD: 4.7 M/CU MM (ref 4.2–5.4)
SEGMENTED NEUTROPHILS ABSOLUTE COUNT: 2.2 K/CU MM
SEGMENTED NEUTROPHILS RELATIVE PERCENT: 32.2 % (ref 36–66)
TOTAL IMMATURE NEUTOROPHIL: 0.01 K/CU MM
WBC # BLD: 6.7 K/CU MM (ref 4–10.5)

## 2021-06-02 PROCEDURE — 85025 COMPLETE CBC W/AUTO DIFF WBC: CPT

## 2021-06-03 PROBLEM — D72.820 LYMPHOCYTOSIS: Status: ACTIVE | Noted: 2021-06-03

## 2021-06-18 ENCOUNTER — TELEPHONE (OUTPATIENT)
Dept: INTERNAL MEDICINE CLINIC | Age: 86
End: 2021-06-18

## 2021-06-18 DIAGNOSIS — D72.820 LYMPHOCYTOSIS: Primary | ICD-10-CM

## 2021-06-23 ENCOUNTER — HOSPITAL ENCOUNTER (OUTPATIENT)
Age: 86
Setting detail: SPECIMEN
Discharge: HOME | End: 2021-06-23
Payer: MEDICARE

## 2021-06-23 LAB
BASOPHILS ABSOLUTE: 0.1 K/CU MM
BASOPHILS RELATIVE PERCENT: 0.9 % (ref 0–1)
DIFFERENTIAL TYPE: ABNORMAL
EOSINOPHILS ABSOLUTE: 0.3 K/CU MM
EOSINOPHILS RELATIVE PERCENT: 3.3 % (ref 0–3)
HCT VFR BLD CALC: 43.1 % (ref 37–47)
HEMOGLOBIN: 13.7 GM/DL (ref 12.5–16)
IMMATURE NEUTROPHIL %: 0.3 % (ref 0–0.43)
LYMPHOCYTES ABSOLUTE: 2.9 K/CU MM
LYMPHOCYTES RELATIVE PERCENT: 38.9 % (ref 24–44)
MCH RBC QN AUTO: 28.5 PG (ref 27–31)
MCHC RBC AUTO-ENTMCNC: 31.8 % (ref 32–36)
MCV RBC AUTO: 89.8 FL (ref 78–100)
MONOCYTES ABSOLUTE: 0.8 K/CU MM
MONOCYTES RELATIVE PERCENT: 10.3 % (ref 0–4)
PDW BLD-RTO: 15.1 % (ref 11.7–14.9)
PLATELET # BLD: 244 K/CU MM (ref 140–440)
PMV BLD AUTO: 9.8 FL (ref 7.5–11.1)
RBC # BLD: 4.8 M/CU MM (ref 4.2–5.4)
SEGMENTED NEUTROPHILS ABSOLUTE COUNT: 3.5 K/CU MM
SEGMENTED NEUTROPHILS RELATIVE PERCENT: 46.3 % (ref 36–66)
TOTAL IMMATURE NEUTOROPHIL: 0.02 K/CU MM
WBC # BLD: 7.5 K/CU MM (ref 4–10.5)

## 2021-06-23 PROCEDURE — 85025 COMPLETE CBC W/AUTO DIFF WBC: CPT

## 2021-07-01 ENCOUNTER — OFFICE VISIT (OUTPATIENT)
Dept: INTERNAL MEDICINE CLINIC | Age: 86
End: 2021-07-01
Payer: MEDICARE

## 2021-07-01 VITALS
TEMPERATURE: 97.6 F | HEART RATE: 64 BPM | WEIGHT: 184.2 LBS | SYSTOLIC BLOOD PRESSURE: 118 MMHG | OXYGEN SATURATION: 98 % | BODY MASS INDEX: 29.73 KG/M2 | DIASTOLIC BLOOD PRESSURE: 72 MMHG | RESPIRATION RATE: 16 BRPM

## 2021-07-01 DIAGNOSIS — E03.9 ACQUIRED HYPOTHYROIDISM: ICD-10-CM

## 2021-07-01 DIAGNOSIS — E78.2 MIXED HYPERLIPIDEMIA: ICD-10-CM

## 2021-07-01 DIAGNOSIS — D72.820 LYMPHOCYTOSIS: ICD-10-CM

## 2021-07-01 DIAGNOSIS — J84.9 INTERSTITIAL LUNG DISEASE (HCC): ICD-10-CM

## 2021-07-01 DIAGNOSIS — I48.19 PERSISTENT ATRIAL FIBRILLATION (HCC): Primary | ICD-10-CM

## 2021-07-01 DIAGNOSIS — Z86.73 HISTORY OF TIA (TRANSIENT ISCHEMIC ATTACK): ICD-10-CM

## 2021-07-01 DIAGNOSIS — R91.8 PULMONARY NODULES: ICD-10-CM

## 2021-07-01 DIAGNOSIS — G89.29 CHRONIC MIDLINE LOW BACK PAIN WITHOUT SCIATICA: ICD-10-CM

## 2021-07-01 DIAGNOSIS — R26.9 GAIT DISTURBANCE: ICD-10-CM

## 2021-07-01 DIAGNOSIS — F03.90 DEMENTIA WITHOUT BEHAVIORAL DISTURBANCE, UNSPECIFIED DEMENTIA TYPE: ICD-10-CM

## 2021-07-01 DIAGNOSIS — M54.50 CHRONIC MIDLINE LOW BACK PAIN WITHOUT SCIATICA: ICD-10-CM

## 2021-07-01 DIAGNOSIS — F32.5 MAJOR DEPRESSIVE DISORDER WITH SINGLE EPISODE, IN FULL REMISSION (HCC): ICD-10-CM

## 2021-07-01 PROCEDURE — 4040F PNEUMOC VAC/ADMIN/RCVD: CPT | Performed by: INTERNAL MEDICINE

## 2021-07-01 PROCEDURE — 1123F ACP DISCUSS/DSCN MKR DOCD: CPT | Performed by: INTERNAL MEDICINE

## 2021-07-01 PROCEDURE — G8417 CALC BMI ABV UP PARAM F/U: HCPCS | Performed by: INTERNAL MEDICINE

## 2021-07-01 PROCEDURE — 1090F PRES/ABSN URINE INCON ASSESS: CPT | Performed by: INTERNAL MEDICINE

## 2021-07-01 PROCEDURE — G8400 PT W/DXA NO RESULTS DOC: HCPCS | Performed by: INTERNAL MEDICINE

## 2021-07-01 PROCEDURE — 99214 OFFICE O/P EST MOD 30 MIN: CPT | Performed by: INTERNAL MEDICINE

## 2021-07-01 PROCEDURE — 1036F TOBACCO NON-USER: CPT | Performed by: INTERNAL MEDICINE

## 2021-07-01 PROCEDURE — G8427 DOCREV CUR MEDS BY ELIG CLIN: HCPCS | Performed by: INTERNAL MEDICINE

## 2021-07-01 ASSESSMENT — PATIENT HEALTH QUESTIONNAIRE - PHQ9
SUM OF ALL RESPONSES TO PHQ QUESTIONS 1-9: 1
1. LITTLE INTEREST OR PLEASURE IN DOING THINGS: 0
SUM OF ALL RESPONSES TO PHQ9 QUESTIONS 1 & 2: 1
SUM OF ALL RESPONSES TO PHQ QUESTIONS 1-9: 1
2. FEELING DOWN, DEPRESSED OR HOPELESS: 1
SUM OF ALL RESPONSES TO PHQ QUESTIONS 1-9: 1

## 2021-07-01 ASSESSMENT — ENCOUNTER SYMPTOMS
EYES NEGATIVE: 1
ALLERGIC/IMMUNOLOGIC NEGATIVE: 1
GASTROINTESTINAL NEGATIVE: 1
RESPIRATORY NEGATIVE: 1

## 2021-07-01 NOTE — ASSESSMENT & PLAN NOTE
Patient has a history of TIA and also recently may have had TIA. Patient is on Eliquis.   Sees neurologist Dr. Mignon Murray

## 2021-07-01 NOTE — PROGRESS NOTES
oz (83.6 kg)   10/29/20 198 lb (89.8 kg)   07/14/20 198 lb (89.8 kg)        Patient was seen taking COVID-19 precautions. Face mask, gloves were used. Patient also wore facemask. GENERAL:  Alert, oriented, pleasant, in no apparent distress. HEENT:  Conjunctiva pink, no scleral icterus. ENT clear. NECK: Supple. No jugular venous distention noted. No masses felt,  CARDIOVASCULAR:  Normal S1 and S2    PULMONARY:  No respiratory distress. No wheezes or rales. ABDOMEN:  Soft and non-tender,no masses  ororganomegaly. EXTREMITIES:  No cyanosis, clubbing, or significant edema. SKIN: Skin is warm and dry. NEUROLOGICAL:  Cranial nerves II through XII are grossly intact. IMPRESSION:    Encounter Diagnoses   Name Primary?  Persistent atrial fibrillation (HCC) Yes    Mixed hyperlipidemia     Major depressive disorder with single episode, in full remission [facility]     Interstitial lung disease ([facility]     History of TIA (transient ischemic attack)     Gait disturbance     Dementia without behavioral disturbance, unspecified dementia type ([address]     Chronic midline low back pain without sciatica     Acquired hypothyroidism        ASSESSMENT/PLAN:      Pulmonary nodules   The pulmonary nodules seem to be benign and has resolved. Pneumonia due to infectious organism       Persistent atrial fibrillation [facility])   Patient has atrial fibrillation and is on digoxin, metoprolol and Eliquis. Denies any bleeding or bruising. Mixed hyperlipidemia   Patient has a history of hyperlipidemia and is on simvastatin. Last lipid profile was acceptable    Major depressive disorder with single episode, in full remission [facility])   Patient has depression and is on Zoloft. Patient states she is doing well with that. No suicidal thoughts.     Lymphocytosis   Patient has lymphocytosis but his repeat CBC showed the white count is normal.    Interstitial lung disease ([address]   Pt sees Dr Belem Dinero pulmonologist at [facility]  Patient states he is following her and he wants another CT scan done in October 2021    History of TIA (transient ischemic attack)   Patient has a history of TIA and also recently may have had TIA. Patient is on Eliquis. Sees neurologist Dr. Pham Woodall    Gait disturbance   Patient uses walker to ambulate. She is walking more nowadays. No falls recently    Dementia without behavioral disturbance [facility])   Patient has dementia but she is able to recall most of the stuff now. She is taking Namenda and Exelon. She is neurologist Dr. Pham Woodall    Chronic midline low back pain without sciatica   Patient has chronic lower back pain for which she takes Tylenol. Acquired hypothyroidism   Will check TSH. Meantime continue Synthroid    Return to office in 3 months. Mediations reviewed with the patient. Continue current medications. Appropriate prescriptions are addressed. After visit summeryprovided. Follow up as directed sooner if needed. Questions answered and patient verbalizes understanding. Call for any problems, questions, or concerns. No Known Allergies  Current Outpatient Medications   Medication Sig Dispense Refill    nystatin (MYCOSTATIN) 835367 UNIT/GM powder Apply 3 times daily.  1 Bottle 2    vitamin E 400 UNIT capsule Take 1 capsule by mouth daily 90 capsule 1    memantine (NAMENDA) 10 MG tablet Take 1 tablet by mouth 2 times daily 180 tablet 1    apixaban (ELIQUIS) 5 MG TABS tablet TAKE 1 TABLET BY MOUTH 2 TIMES DAILY 180 tablet 1    levothyroxine (SYNTHROID) 150 MCG tablet Take 1 tablet by mouth daily 90 tablet 1    simvastatin (ZOCOR) 40 MG tablet Take 1 tablet by mouth nightly 90 tablet 1    digoxin (LANOXIN) 125 MCG tablet Take 1 tablet by mouth daily 90 tablet 1    sertraline (ZOLOFT) 50 MG tablet Take 1 tablet by mouth daily 90 tablet 1    metoprolol tartrate (LOPRESSOR) 25 MG tablet Take 0.5 tablets by mouth 2 times daily 180 tablet 1    aspirin (ASPIRIN LOW DOSE) 81 MG EC tablet TAKE 1 TABLET BY MOUTH DAILY 90 tablet 1    Cholecalciferol (VITAMIN D) 50 MCG (2000 UT) CAPS capsule Take 2,000 Units by mouth Daily 90 capsule 1    acetaminophen (TYLENOL) 500 MG tablet Take 500 mg by mouth 3 times daily PRN      rivastigmine (EXELON) 3 MG capsule Take 1 capsule by mouth daily (Patient not taking: Reported on 7/1/2021) 90 capsule 1    meclizine (TRAVEL SICKNESS) 25 MG CHEW Take 1 tablet by mouth as needed (dizziness) (Patient not taking: Reported on 5/27/2021) 30 tablet 0     No current facility-administered medications for this visit. Past Medical History:   Diagnosis Date    Acquired hypothyroidism 12/9/2019    Patient is taking Synthroid    Chronic midline low back pain without sciatica 11/20/2019    [facility] 10/22/2019    Patient has dementia. She is taking Namenda Patient is seeing urologist Dr. Elvis Pelayo Gait disturbance 10/22/2019    Patient uses walker to ambulate    History of TIA (transient ischemic attack) 12/9/2019    Patient has a history of TIA and also recently may have had TIA. Patient is on Eliquis. Sees neurologist Dr. Elvis Pelayo Major depressive disorder with single episode, in full remission [facility] 12/9/2019    Patient is taking Zoloft and that is helping.  Mixed hyperlipidemia 12/9/2019    Patient is on simvastatin    Persistent atrial fibrillation ([facility] 10/22/2019    A. fib on recently in October 2019 . Patient is on metoprolol 25 mg twice a day and Eliquis    PFO (patent foramen ovale) 12/9/2019    PFO seen on echocardiogram in 2016    Pneumonia due to infectious organism 10/22/2019    Patient had pneumonia and pleural effusion during admission in October 2019.  A repeat CT scan of the chest will be done Patient is also seen pulmonologist     Past Surgical History:   Procedure Laterality Date    HYSTERECTOMY       Social History     Tobacco Use    Smoking status: Never Smoker    Smokeless tobacco: Never Used   Substance Use Topics    Alcohol use: No       LAB REVIEW:  CBC:   Lab Results   Component Value Date    WBC 7.5 06/23/2021    HGB 13.7 06/23/2021    HCT 43.1 06/23/2021     06/23/2021     Lipids:   Lab Results   Component Value Date    HDL 39 (L) 05/12/2021    LDLCALC 74 05/12/2021    LDLDIRECT 100 (H) 01/03/2020    TRIGLYCFAST 162 (H) 01/03/2020    CHOLFAST 161 01/03/2020     Renal:   Lab Results   Component Value Date    BUN 17 05/12/2021    CREATININE 1.1 05/12/2021     05/12/2021    K 4.4 05/12/2021    ALT 16 05/12/2021    AST 23 05/12/2021    GLUCOSE 115 05/12/2021    GLUF 134 07/03/2014     PT/INR:   Lab Results   Component Value Date    INR 1.02 07/04/2014     A1C:   Lab Results   Component Value Date    LABA1C 5.7 05/15/2020           Kasis Aguila MD, 7/1/2021 , 1:54 PM

## 2021-07-01 NOTE — ASSESSMENT & PLAN NOTE
Patient has depression and is on Zoloft. Patient states she is doing well with that. No suicidal thoughts.

## 2021-07-01 NOTE — ASSESSMENT & PLAN NOTE
Patient has atrial fibrillation and is on digoxin, metoprolol and Eliquis. Denies any bleeding or bruising.

## 2021-07-01 NOTE — ASSESSMENT & PLAN NOTE
Patient has dementia but she is able to recall most of the stuff now. She is taking Namenda and Exelon.   She is neurologist Dr. Jinx Bosworth

## 2021-07-01 NOTE — ASSESSMENT & PLAN NOTE
Pt sees Dr Chele Sorensen pulmonologist at [facility]  Patient states he is following her and he wants another CT scan done in October 2021

## 2021-08-18 RX ORDER — CHOLECALCIFEROL (VITAMIN D3) 125 MCG
CAPSULE ORAL
Qty: 30 TABLET | Refills: 1 | Status: SHIPPED | OUTPATIENT
Start: 2021-08-18 | End: 2021-10-14

## 2021-09-15 RX ORDER — LEVOTHYROXINE SODIUM 0.15 MG/1
150 TABLET ORAL DAILY
Qty: 90 TABLET | Refills: 1 | Status: SHIPPED | OUTPATIENT
Start: 2021-09-15 | End: 2022-02-10 | Stop reason: SDUPTHER

## 2021-09-23 ENCOUNTER — TELEPHONE (OUTPATIENT)
Dept: INTERNAL MEDICINE CLINIC | Age: 86
End: 2021-09-23

## 2021-09-23 NOTE — TELEPHONE ENCOUNTER
Gerry Ventura ([phone])  is needing a verbal in order to continue seeing patient to assist patient with medications.   Please advise

## 2021-09-28 ENCOUNTER — VIRTUAL VISIT (OUTPATIENT)
Dept: INTERNAL MEDICINE CLINIC | Age: 86
End: 2021-09-28
Payer: MEDICARE

## 2021-09-28 DIAGNOSIS — F03.90 DEMENTIA WITHOUT BEHAVIORAL DISTURBANCE, UNSPECIFIED DEMENTIA TYPE: ICD-10-CM

## 2021-09-28 DIAGNOSIS — Z86.73 HISTORY OF TIA (TRANSIENT ISCHEMIC ATTACK): ICD-10-CM

## 2021-09-28 DIAGNOSIS — R26.9 GAIT DISTURBANCE: ICD-10-CM

## 2021-09-28 DIAGNOSIS — F32.5 MAJOR DEPRESSIVE DISORDER WITH SINGLE EPISODE, IN FULL REMISSION (HCC): ICD-10-CM

## 2021-09-28 DIAGNOSIS — M54.50 CHRONIC MIDLINE LOW BACK PAIN WITHOUT SCIATICA: ICD-10-CM

## 2021-09-28 DIAGNOSIS — E78.2 MIXED HYPERLIPIDEMIA: ICD-10-CM

## 2021-09-28 DIAGNOSIS — G89.29 CHRONIC MIDLINE LOW BACK PAIN WITHOUT SCIATICA: ICD-10-CM

## 2021-09-28 DIAGNOSIS — I48.19 PERSISTENT ATRIAL FIBRILLATION (HCC): Primary | ICD-10-CM

## 2021-09-28 DIAGNOSIS — J84.9 INTERSTITIAL LUNG DISEASE (HCC): ICD-10-CM

## 2021-09-28 DIAGNOSIS — E03.9 ACQUIRED HYPOTHYROIDISM: ICD-10-CM

## 2021-09-28 PROCEDURE — 1090F PRES/ABSN URINE INCON ASSESS: CPT | Performed by: INTERNAL MEDICINE

## 2021-09-28 PROCEDURE — 4040F PNEUMOC VAC/ADMIN/RCVD: CPT | Performed by: INTERNAL MEDICINE

## 2021-09-28 PROCEDURE — G8427 DOCREV CUR MEDS BY ELIG CLIN: HCPCS | Performed by: INTERNAL MEDICINE

## 2021-09-28 PROCEDURE — 99213 OFFICE O/P EST LOW 20 MIN: CPT | Performed by: INTERNAL MEDICINE

## 2021-09-28 PROCEDURE — G8400 PT W/DXA NO RESULTS DOC: HCPCS | Performed by: INTERNAL MEDICINE

## 2021-09-28 PROCEDURE — 1123F ACP DISCUSS/DSCN MKR DOCD: CPT | Performed by: INTERNAL MEDICINE

## 2021-09-28 RX ORDER — SIMVASTATIN 40 MG
40 TABLET ORAL NIGHTLY
Qty: 90 TABLET | Refills: 1 | Status: SHIPPED | OUTPATIENT
Start: 2021-09-28 | End: 2022-02-10 | Stop reason: SDUPTHER

## 2021-09-28 RX ORDER — DIGOXIN 125 MCG
125 TABLET ORAL DAILY
Qty: 90 TABLET | Refills: 1 | Status: SHIPPED | OUTPATIENT
Start: 2021-09-28 | End: 2022-02-10 | Stop reason: SDUPTHER

## 2021-09-28 RX ORDER — RIVASTIGMINE TARTRATE 3 MG/1
3 CAPSULE ORAL DAILY
Qty: 90 CAPSULE | Refills: 1 | Status: SHIPPED | OUTPATIENT
Start: 2021-09-28 | End: 2022-02-10 | Stop reason: SDUPTHER

## 2021-09-28 RX ORDER — MEMANTINE HYDROCHLORIDE 10 MG/1
10 TABLET ORAL 2 TIMES DAILY
Qty: 180 TABLET | Refills: 1 | Status: SHIPPED | OUTPATIENT
Start: 2021-09-28 | End: 2022-02-10 | Stop reason: SDUPTHER

## 2021-09-28 NOTE — PROGRESS NOTES
2021    TELEHEALTH EVALUATION -- Audio/Visual (During HQGPM-18 public health emergency)    HPI:    Oscar Cobian (:  1935) has requested an audio/video evaluation for the following concern(s):    Chief Complaint   Patient presents with    3 Month Follow-Up    Medication Refill     Patient has telehealth visit through virtual video for following reasons  Atrial fibrillation  Hypothyroidism  Dementia  Hyperlipidemia  Depression    Patient states she is feeling well. Had no falls. She is ambulating with the help of walker  She denies any chest pain. No shortness of breath. No cough or sputum production  No abdominal pain. No nausea, vomiting or diarrhea  No palpitations and no dizziness  Cognitive impairment stable  Denies any depression. No suicidal ideations  Patient has hyperlipidemia. Taking medications. No abdominal pain, no nausea or vomiting. No myalgias. Review of Systems    Prior to Visit Medications    Medication Sig Taking? Authorizing Provider   levothyroxine (SYNTHROID) 150 MCG tablet TAKE 1 TABLET BY MOUTH DAILY Yes Nohemy Cleveland MD   sertraline (ZOLOFT) 50 MG tablet TAKE 1 TABLET BY MOUTH DAILY Yes Nohemy Cleveland MD   Cholecalciferol (VITAMIN D3) 50 MCG ( UT) TABS TAKE ONE TABLET BY MOUTH EVERY DAY Yes Nohemy Cleveland MD   nystatin (MYCOSTATIN) 310770 UNIT/GM powder Apply 3 times daily.  Yes Nohemy Cleveland MD   vitamin E 400 UNIT capsule Take 1 capsule by mouth daily Yes  NGOC Hernandez CNP   memantine (NAMENDA) 10 MG tablet Take 1 tablet by mouth 2 times daily Yes  NGOC Hernandez CNP   rivastigmine (EXELON) 3 MG capsule Take 1 capsule by mouth daily Yes  NGOC Hernandez CNP   apixaban (ELIQUIS) 5 MG TABS tablet TAKE 1 TABLET BY MOUTH 2 TIMES DAILY Yes Nohemy Cleveland MD   simvastatin (ZOCOR) 40 MG tablet Take 1 tablet by mouth nightly Yes Nohemy Cleveland MD   digoxin (LANOXIN) 125 MCG tablet Take 1 tablet by mouth daily Yes Nohemy Cleveland MD   metoprolol tartrate (LOPRESSOR) 25 MG tablet Take 0.5 tablets by mouth 2 times daily Yes Sam Roldan MD   aspirin (ASPIRIN LOW DOSE) 81 MG EC tablet TAKE 1 TABLET BY MOUTH DAILY Yes Sam Roldan MD   meclizine (TRAVEL SICKNESS) 25 MG CHEW Take 1 tablet by mouth as needed (dizziness) Yes Sam Roldan MD   acetaminophen (TYLENOL) 500 MG tablet Take 500 mg by mouth 3 times daily PRN Yes Historical Provider, MD       Social History     Tobacco Use    Smoking status: Never Smoker    Smokeless tobacco: Never Used   Vaping Use    Vaping Use: Never used   Substance Use Topics    Alcohol use: No    Drug use: No        No Known Allergies    PHYSICAL EXAMINATION:  [ INSTRUCTIONS:  \"[x]\" Indicates a positive item  \"[]\" Indicates a negative item  -- DELETE ALL ITEMS NOT EXAMINED]  Vital Signs: (As obtained by patient/caregiver or practitioner observation)    Blood pressure-  Heart rate-    Respiratory rate-    Temperature-  Pulse oximetry-     Constitutional: [x] Appears well-developed and well-nourished [x] No apparent distress      [] Abnormal-   Mental status  [x] Alert and awake  [x] Oriented to person/place/time [x]Able to follow commands      Eyes:  EOM    [x]  Normal  [] Abnormal-  Sclera  [x]  Normal  [] Abnormal -         Discharge []  None visible  [] Abnormal -    HENT:   [x] Normocephalic, atraumatic.   [] Abnormal   [] Mouth/Throat: Mucous membranes are moist.     External Ears [x] Normal  [] Abnormal-     Neck: [x] No visualized mass     Pulmonary/Chest: [x] Respiratory effort normal.  [x] No visualized signs of difficulty breathing or respiratory distress        [] Abnormal-      Musculoskeletal:   [x] Normal gait with no signs of ataxia         [x] Normal range of motion of neck        [] Abnormal-       Neurological:        [x] No Facial Asymmetry (Cranial nerve 7 motor function) (limited exam to video visit)          [x] No gaze palsy        [] Abnormal-         Skin:        [x] No significant exanthematous lesions or discoloration noted on facial skin         [] Abnormal-            Psychiatric:       [x] Normal Affect [x] No Hallucinations        [] Abnormal-     Other pertinent observable physical exam findings-     ASSESSMENT/PLAN:  1. Persistent atrial fibrillation ([address]  Patient is on Eliquis and metoprolol. Stable    2. Gait disturbance  No falls recently. Advised patient to use assistive device    3. Dementia without behavioral disturbance, unspecified dementia type ([address]  Dementia stable continue current medications    4. Chronic midline low back pain without sciatica  Back pain is also stable. 5. Acquired hypothyroidism  Patient has hypothyroidism continue Synthroid    6. Mixed hyperlipidemia  Continue Zocor. Follow low-cholesterol diet    7. History of TIA (transient ischemic attack)  History of TIA stable    8. Major depressive disorder with single episode, in full remission ([facility] [address]  Depression in control. Continue Zoloft    9. Interstitial lung disease [facility])  Patient sees pulmonologist in [place]. Return in about 3 months (around 12/28/2021) for hypertension follow up, hypothyroidism dementia. Kategisel Wilkins, was evaluated through a synchronous (real-time) audio-video encounter. The patient (or guardian if applicable) is aware that this is a billable service. Verbal consent to proceed has been obtained within the past 12 months. The visit was conducted pursuant to the emergency declaration under the [facility], [address] [place] [address] authority and the [employer] General Act. Patient identification was verified, and a caregiver was present when appropriate. The patient was located in a state where the provider was credentialed to provide care. Total time spent on this encounter: Not billed by time    --Crow Cummings MD on 9/28/2021 at 2:07 PM    An electronic signature was used to authenticate this note.

## 2021-10-14 RX ORDER — CHOLECALCIFEROL (VITAMIN D3) 125 MCG
CAPSULE ORAL
Qty: 60 TABLET | Refills: 1 | Status: SHIPPED | OUTPATIENT
Start: 2021-10-14 | End: 2022-02-10 | Stop reason: SDUPTHER

## 2022-02-10 ENCOUNTER — HOSPITAL ENCOUNTER (OUTPATIENT)
Age: 87
Discharge: HOME | End: 2022-02-10
Payer: MEDICARE

## 2022-02-10 ENCOUNTER — OFFICE VISIT (OUTPATIENT)
Dept: INTERNAL MEDICINE CLINIC | Age: 87
End: 2022-02-10
Payer: MEDICARE

## 2022-02-10 VITALS
OXYGEN SATURATION: 97 % | HEART RATE: 74 BPM | TEMPERATURE: 97.3 F | RESPIRATION RATE: 16 BRPM | DIASTOLIC BLOOD PRESSURE: 62 MMHG | BODY MASS INDEX: 29.5 KG/M2 | SYSTOLIC BLOOD PRESSURE: 124 MMHG | WEIGHT: 182.8 LBS

## 2022-02-10 DIAGNOSIS — E03.9 ACQUIRED HYPOTHYROIDISM: ICD-10-CM

## 2022-02-10 DIAGNOSIS — I48.19 PERSISTENT ATRIAL FIBRILLATION (HCC): ICD-10-CM

## 2022-02-10 DIAGNOSIS — E78.2 MIXED HYPERLIPIDEMIA: Primary | ICD-10-CM

## 2022-02-10 DIAGNOSIS — J84.9 INTERSTITIAL LUNG DISEASE (HCC): ICD-10-CM

## 2022-02-10 DIAGNOSIS — Z86.73 HISTORY OF TIA (TRANSIENT ISCHEMIC ATTACK): ICD-10-CM

## 2022-02-10 DIAGNOSIS — F32.5 MAJOR DEPRESSIVE DISORDER WITH SINGLE EPISODE, IN FULL REMISSION (HCC): ICD-10-CM

## 2022-02-10 DIAGNOSIS — D72.820 LYMPHOCYTOSIS: ICD-10-CM

## 2022-02-10 DIAGNOSIS — F03.90 DEMENTIA WITHOUT BEHAVIORAL DISTURBANCE, UNSPECIFIED DEMENTIA TYPE: ICD-10-CM

## 2022-02-10 DIAGNOSIS — R26.9 GAIT DISTURBANCE: ICD-10-CM

## 2022-02-10 LAB
ALBUMIN SERPL-MCNC: 3.9 GM/DL (ref 3.4–5)
ALP BLD-CCNC: 61 IU/L (ref 40–129)
ALT SERPL-CCNC: 17 U/L (ref 10–40)
ANION GAP SERPL CALCULATED.3IONS-SCNC: 11 MMOL/L (ref 4–16)
AST SERPL-CCNC: 21 IU/L (ref 15–37)
BASOPHILS ABSOLUTE: 0.1 K/CU MM
BASOPHILS RELATIVE PERCENT: 0.9 % (ref 0–1)
BILIRUB SERPL-MCNC: 0.3 MG/DL (ref 0–1)
BUN BLDV-MCNC: 26 MG/DL (ref 6–23)
CALCIUM SERPL-MCNC: 8.6 MG/DL (ref 8.3–10.6)
CHLORIDE BLD-SCNC: 104 MMOL/L (ref 99–110)
CO2: 23 MMOL/L (ref 21–32)
CREAT SERPL-MCNC: 0.9 MG/DL (ref 0.6–1.1)
DIFFERENTIAL TYPE: ABNORMAL
EOSINOPHILS ABSOLUTE: 0.2 K/CU MM
EOSINOPHILS RELATIVE PERCENT: 2.7 % (ref 0–3)
GFR AFRICAN AMERICAN: >60 ML/MIN/1.73M2
GFR NON-AFRICAN AMERICAN: 60 ML/MIN/1.73M2
GLUCOSE BLD-MCNC: 149 MG/DL (ref 70–99)
HCT VFR BLD CALC: 45.9 % (ref 37–47)
HEMOGLOBIN: 15.2 GM/DL (ref 12.5–16)
IMMATURE NEUTROPHIL %: 0.3 % (ref 0–0.43)
LYMPHOCYTES ABSOLUTE: 2.8 K/CU MM
LYMPHOCYTES RELATIVE PERCENT: 43 % (ref 24–44)
MCH RBC QN AUTO: 31.8 PG (ref 27–31)
MCHC RBC AUTO-ENTMCNC: 33.1 % (ref 32–36)
MCV RBC AUTO: 96 FL (ref 78–100)
MONOCYTES ABSOLUTE: 0.8 K/CU MM
MONOCYTES RELATIVE PERCENT: 12 % (ref 0–4)
PDW BLD-RTO: 12.8 % (ref 11.7–14.9)
PLATELET # BLD: 222 K/CU MM (ref 140–440)
PMV BLD AUTO: 9.5 FL (ref 7.5–11.1)
POTASSIUM SERPL-SCNC: 4.1 MMOL/L (ref 3.5–5.1)
RBC # BLD: 4.78 M/CU MM (ref 4.2–5.4)
SEGMENTED NEUTROPHILS ABSOLUTE COUNT: 2.7 K/CU MM
SEGMENTED NEUTROPHILS RELATIVE PERCENT: 41.1 % (ref 36–66)
SODIUM BLD-SCNC: 138 MMOL/L (ref 135–145)
TOTAL IMMATURE NEUTOROPHIL: 0.02 K/CU MM
TOTAL PROTEIN: 6.8 GM/DL (ref 6.4–8.2)
TSH HIGH SENSITIVITY: 0.49 UIU/ML (ref 0.27–4.2)
WBC # BLD: 6.6 K/CU MM (ref 4–10.5)

## 2022-02-10 PROCEDURE — 84443 ASSAY THYROID STIM HORMONE: CPT

## 2022-02-10 PROCEDURE — 85025 COMPLETE CBC W/AUTO DIFF WBC: CPT

## 2022-02-10 PROCEDURE — 1036F TOBACCO NON-USER: CPT | Performed by: INTERNAL MEDICINE

## 2022-02-10 PROCEDURE — G8417 CALC BMI ABV UP PARAM F/U: HCPCS | Performed by: INTERNAL MEDICINE

## 2022-02-10 PROCEDURE — 80162 ASSAY OF DIGOXIN TOTAL: CPT

## 2022-02-10 PROCEDURE — G8400 PT W/DXA NO RESULTS DOC: HCPCS | Performed by: INTERNAL MEDICINE

## 2022-02-10 PROCEDURE — 4040F PNEUMOC VAC/ADMIN/RCVD: CPT | Performed by: INTERNAL MEDICINE

## 2022-02-10 PROCEDURE — G8427 DOCREV CUR MEDS BY ELIG CLIN: HCPCS | Performed by: INTERNAL MEDICINE

## 2022-02-10 PROCEDURE — 80053 COMPREHEN METABOLIC PANEL: CPT

## 2022-02-10 PROCEDURE — 1123F ACP DISCUSS/DSCN MKR DOCD: CPT | Performed by: INTERNAL MEDICINE

## 2022-02-10 PROCEDURE — 36415 COLL VENOUS BLD VENIPUNCTURE: CPT

## 2022-02-10 PROCEDURE — 99214 OFFICE O/P EST MOD 30 MIN: CPT | Performed by: INTERNAL MEDICINE

## 2022-02-10 PROCEDURE — G8484 FLU IMMUNIZE NO ADMIN: HCPCS | Performed by: INTERNAL MEDICINE

## 2022-02-10 PROCEDURE — 1090F PRES/ABSN URINE INCON ASSESS: CPT | Performed by: INTERNAL MEDICINE

## 2022-02-10 RX ORDER — RIVASTIGMINE TARTRATE 3 MG/1
3 CAPSULE ORAL DAILY
Qty: 90 CAPSULE | Refills: 1 | Status: SHIPPED | OUTPATIENT
Start: 2022-02-10

## 2022-02-10 RX ORDER — LEVOTHYROXINE SODIUM 0.15 MG/1
150 TABLET ORAL DAILY
Qty: 90 TABLET | Refills: 1 | Status: SHIPPED | OUTPATIENT
Start: 2022-02-10 | End: 2022-10-12

## 2022-02-10 RX ORDER — DIGOXIN 125 MCG
125 TABLET ORAL DAILY
Qty: 90 TABLET | Refills: 1 | Status: SHIPPED | OUTPATIENT
Start: 2022-02-10 | End: 2022-10-12 | Stop reason: SDUPTHER

## 2022-02-10 RX ORDER — ASPIRIN 81 MG/1
TABLET ORAL
Qty: 90 TABLET | Refills: 1 | Status: CANCELLED | OUTPATIENT
Start: 2022-02-10

## 2022-02-10 RX ORDER — SIMVASTATIN 40 MG
40 TABLET ORAL NIGHTLY
Qty: 90 TABLET | Refills: 1 | Status: SHIPPED | OUTPATIENT
Start: 2022-02-10 | End: 2022-10-26 | Stop reason: SDUPTHER

## 2022-02-10 RX ORDER — CHOLECALCIFEROL (VITAMIN D3) 125 MCG
2000 CAPSULE ORAL DAILY
Qty: 90 TABLET | Refills: 1 | Status: SHIPPED | OUTPATIENT
Start: 2022-02-10 | End: 2022-08-31

## 2022-02-10 RX ORDER — VITAMIN E 268 MG
400 CAPSULE ORAL DAILY
Qty: 90 CAPSULE | Refills: 1 | Status: SHIPPED | OUTPATIENT
Start: 2022-02-10

## 2022-02-10 RX ORDER — MEMANTINE HYDROCHLORIDE 10 MG/1
10 TABLET ORAL 2 TIMES DAILY
Qty: 180 TABLET | Refills: 1 | Status: SHIPPED | OUTPATIENT
Start: 2022-02-10 | End: 2022-10-12 | Stop reason: SDUPTHER

## 2022-02-10 ASSESSMENT — PATIENT HEALTH QUESTIONNAIRE - PHQ9
SUM OF ALL RESPONSES TO PHQ QUESTIONS 1-9: 0
SUM OF ALL RESPONSES TO PHQ9 QUESTIONS 1 & 2: 0
1. LITTLE INTEREST OR PLEASURE IN DOING THINGS: 0
SUM OF ALL RESPONSES TO PHQ QUESTIONS 1-9: 0
2. FEELING DOWN, DEPRESSED OR HOPELESS: 0

## 2022-02-10 NOTE — PROGRESS NOTES
Alan Love  Patient's  is 1935  Seen in office on 2/10/2022      SUBJECTIVE:  Jessica Johnson arely 81 y. o.year old female presents today   Chief Complaint   Patient presents with    Follow-up    Other     has [facility] coming in to do medications q 2 weeks    Medication Refill     Patient is here with her daughter  Patient states she has home health care and the nurses come every 2 weeks and advance the medications. Patient states she is feeling well and has no complaints. Patient is ambulatory. Trying to walk without the walker. Advised patient to use assistive device  Patient states A. fib is stable. Denies any palpitations. She is taking Eliquis. No falls or injuries. Patient has hypothyroidism and takes Synthroid. Patient has hyperlipidemia. Taking medications. No abdominal pain, no nausea or vomiting. No myalgias. Taking medications regularly. No side effects noted. Review of Systems   Constitutional: Negative. Negative for chills, diaphoresis and fever. HENT: Negative. Eyes: Negative. Respiratory: Negative. Cardiovascular: Negative. Negative for chest pain. Gastrointestinal: Negative. Endocrine: Negative. Genitourinary: Negative. Musculoskeletal: Negative. Allergic/Immunologic: Negative. Neurological: Negative for dizziness, facial asymmetry and headaches. Hematological: Negative. Psychiatric/Behavioral: Positive for confusion. Negative for suicidal ideas. The patient is not nervous/anxious. OBJECTIVE: /62   Pulse 74   Temp 97.3 °F (36.3 °C) (Temporal)   Resp 16   Wt 182 lb 12.8 oz (82.9 kg)   SpO2 97%   BMI 29.50 kg/m²     Wt Readings from Last 3 Encounters:   02/10/22 182 lb 12.8 oz (82.9 kg)   21 184 lb 3.2 oz (83.6 kg)   10/29/20 198 lb (89.8 kg)      Patient was seen taking COVID-19 precautions. Face mask, gloves were used. Patient also wore facemask. GENERAL: - Alert, oriented, pleasant, in no apparent distress.     HEENT: - Conjunctiva pink, no scleral icterus. ENT clear. NECK: -Supple. No jugular venous distention noted. No masses felt,  CARDIOVASCULAR: - Normal S1 and S2    PULMONARY: - No respiratory distress. No wheezes or rales. ABDOMEN: - Soft and non-tender,no masses  ororganomegaly. EXTREMITIES: - No cyanosis, clubbing, or significant edema. SKIN: Skin is warm and dry. NEUROLOGICAL: - Cranial nerves II through XII are grossly intact. Ambulate with the help of walker    IMPRESSION:    Encounter Diagnoses   Name Primary?  Mixed hyperlipidemia Yes    Dementia without behavioral disturbance, unspecified dementia type ([address]     Major depressive disorder with single episode, in full remission ([address]     Persistent atrial fibrillation [address] [address]     Interstitial lung disease ([facility] [address]     History of TIA (transient ischemic attack)     Gait disturbance     Acquired hypothyroidism     Lymphocytosis        ASSESSMENT/PLAN:    1. Hyperlipidemia continue atorvastatin 40 mg daily. Will order fasting lipid profile  2. Dementia stable. Seen neurologist.  Patient is on Namenda and Exelon  3. Depression patient is on Zoloft. Tolerating it well  4. Atrial fibrillation. Heart rate in control. Patient is on Eliquis  5. Interstitial lung disease. Patient sees pulmonologist at [facility]  6. History of TIA without any residual effect. 7.  Gait disturbance. Patient is using walker  8. Hypothyroidism. Recheck TSH. Continue Synthroid  9. Lymphocytosis. Will check CBC with differential  10. Stop ASA  Patient and daughter will discuss with Dr Maida Luna if he wants her on both ASA and eliquis     Orders Placed This Encounter   Procedures    CBC Auto Differential    Comprehensive Metabolic Panel    Lipid, Fasting    TSH without Reflex    DIGOXIN LEVEL     Return to office in 3 months  Answered patient's questions patient's daughter is also with her. Mediations reviewed with the patient. Continue current medications. Appropriate prescriptions are addressed. After visit summeryprovided. Follow up as directed sooner if needed. Questions answered and patient verbalizes understanding. Call for any problems, questions, or concerns. No Known Allergies  Current Outpatient Medications   Medication Sig Dispense Refill    Handicap Placard MISC by Does not apply route Please dispense one placard, duration 5 years      Cholecalciferol (VITAMIN D3) 50 MCG (2000 UT) TABS TAKE ONE TABLET BY MOUTH EVERY DAY 60 tablet 1    memantine (NAMENDA) 10 MG tablet Take 1 tablet by mouth 2 times daily 180 tablet 1    rivastigmine (EXELON) 3 MG capsule Take 1 capsule by mouth daily 90 capsule 1    apixaban (ELIQUIS) 5 MG TABS tablet TAKE 1 TABLET BY MOUTH 2 TIMES DAILY 180 tablet 1    simvastatin (ZOCOR) 40 MG tablet Take 1 tablet by mouth nightly 90 tablet 1    digoxin (LANOXIN) 125 MCG tablet Take 1 tablet by mouth daily 90 tablet 1    metoprolol tartrate (LOPRESSOR) 25 MG tablet Take 0.5 tablets by mouth 2 times daily 90 tablet 1    levothyroxine (SYNTHROID) 150 MCG tablet TAKE 1 TABLET BY MOUTH DAILY 90 tablet 1    sertraline (ZOLOFT) 50 MG tablet TAKE 1 TABLET BY MOUTH DAILY 90 tablet 1    nystatin (MYCOSTATIN) 701626 UNIT/GM powder Apply 3 times daily. 1 Bottle 2    vitamin E 400 UNIT capsule Take 1 capsule by mouth daily 90 capsule 1    aspirin (ASPIRIN LOW DOSE) 81 MG EC tablet TAKE 1 TABLET BY MOUTH DAILY 90 tablet 1    acetaminophen (TYLENOL) 500 MG tablet Take 500 mg by mouth 3 times daily PRN      meclizine (TRAVEL SICKNESS) 25 MG CHEW Take 1 tablet by mouth as needed (dizziness) (Patient not taking: Reported on 2/10/2022) 30 tablet 0     No current facility-administered medications for this visit.      Past Medical History:   Diagnosis Date    Acquired hypothyroidism 12/9/2019    Patient is taking Synthroid    Chronic midline low back pain without sciatica 11/20/2019    Dementia without behavioral disturbance ([facility] [address] 10/22/2019    Patient has dementia. She is taking Namenda Patient is seeing urologist Dr. Mindy Kerr Gait disturbance 10/22/2019    Patient uses walker to ambulate    History of TIA (transient ischemic attack) 12/9/2019    Patient has a history of TIA and also recently may have had TIA. Patient is on Eliquis. Sees neurologist Dr. Mindy Kerr Major depressive disorder with single episode, in full remission ([facility] [address] 12/9/2019    Patient is taking Zoloft and that is helping.  Mixed hyperlipidemia 12/9/2019    Patient is on simvastatin    Persistent atrial fibrillation ([address] 10/22/2019    A. fib on recently in October 2019 . Patient is on metoprolol 25 mg twice a day and Eliquis    PFO (patent foramen ovale) 12/9/2019    PFO seen on echocardiogram in 2016    Pneumonia due to infectious organism 10/22/2019    Patient had pneumonia and pleural effusion during admission in October 2019.  A repeat CT scan of the chest will be done Patient is also seen pulmonologist     Past Surgical History:   Procedure Laterality Date    HYSTERECTOMY       Social History     Tobacco Use    Smoking status: Never Smoker    Smokeless tobacco: Never Used   Substance Use Topics    Alcohol use: No       LAB REVIEW:  CBC:   Lab Results   Component Value Date    WBC 7.5 06/23/2021    HGB 13.7 06/23/2021    HCT 43.1 06/23/2021     06/23/2021     Lipids:   Lab Results   Component Value Date    HDL 39 (L) 05/12/2021    LDLCALC 74 05/12/2021    LDLDIRECT 100 (H) 01/03/2020    TRIGLYCFAST 162 (H) 01/03/2020    CHOLFAST 161 01/03/2020     Renal:   Lab Results   Component Value Date    BUN 17 05/12/2021    CREATININE 1.1 05/12/2021     05/12/2021    K 4.4 05/12/2021    ALT 16 05/12/2021    AST 23 05/12/2021    GLUCOSE 115 05/12/2021    GLUF 134 07/03/2014     PT/INR:   Lab Results   Component Value Date    INR 1.02 07/04/2014     A1C:   Lab Results   Component Value Date    LABA1C 5.7 05/15/2020           Junior Punetes MD, 2/10/2022 , 5:19 PM

## 2022-02-11 LAB
DIGOXIN LEVEL: 1 NG/ML (ref 0.8–2)
DOSE AMOUNT: NORMAL
DOSE TIME: NORMAL

## 2022-02-16 ASSESSMENT — ENCOUNTER SYMPTOMS
ALLERGIC/IMMUNOLOGIC NEGATIVE: 1
GASTROINTESTINAL NEGATIVE: 1
EYES NEGATIVE: 1
RESPIRATORY NEGATIVE: 1

## 2022-03-02 ENCOUNTER — HOSPITAL ENCOUNTER (OUTPATIENT)
Age: 87
Setting detail: SPECIMEN
Discharge: HOME | End: 2022-03-02
Payer: MEDICARE

## 2022-03-02 LAB
CHOLESTEROL, FASTING: 146 MG/DL
HDLC SERPL-MCNC: 35 MG/DL
LDL CHOLESTEROL CALCULATED: 71 MG/DL
TRIGLYCERIDE, FASTING: 199 MG/DL

## 2022-03-02 PROCEDURE — 80061 LIPID PANEL: CPT

## 2022-03-12 ENCOUNTER — APPOINTMENT (OUTPATIENT)
Dept: GENERAL RADIOLOGY | Age: 87
End: 2022-03-12
Payer: MEDICARE

## 2022-03-12 ENCOUNTER — HOSPITAL ENCOUNTER (EMERGENCY)
Age: 87
Discharge: HOME | End: 2022-03-12
Attending: EMERGENCY MEDICINE
Payer: MEDICARE

## 2022-03-12 VITALS
TEMPERATURE: 98 F | DIASTOLIC BLOOD PRESSURE: 68 MMHG | SYSTOLIC BLOOD PRESSURE: 102 MMHG | OXYGEN SATURATION: 95 % | HEIGHT: 69 IN | WEIGHT: 180 LBS | RESPIRATION RATE: 16 BRPM | BODY MASS INDEX: 26.66 KG/M2 | HEART RATE: 68 BPM

## 2022-03-12 DIAGNOSIS — M25.461 KNEE EFFUSION, RIGHT: ICD-10-CM

## 2022-03-12 DIAGNOSIS — S83.91XA SPRAIN OF RIGHT KNEE, UNSPECIFIED LIGAMENT, INITIAL ENCOUNTER: ICD-10-CM

## 2022-03-12 DIAGNOSIS — M25.561 ACUTE PAIN OF RIGHT KNEE: Primary | ICD-10-CM

## 2022-03-12 PROCEDURE — 73562 X-RAY EXAM OF KNEE 3: CPT

## 2022-03-12 PROCEDURE — 99283 EMERGENCY DEPT VISIT LOW MDM: CPT

## 2022-03-12 PROCEDURE — 73502 X-RAY EXAM HIP UNI 2-3 VIEWS: CPT

## 2022-03-12 RX ORDER — PREDNISONE 20 MG/1
40 TABLET ORAL DAILY
Qty: 10 TABLET | Refills: 0 | Status: SHIPPED | OUTPATIENT
Start: 2022-03-12 | End: 2022-03-17

## 2022-03-12 NOTE — ED PROVIDER NOTES
Emergency [facility] DEPARTMENT    Patient: Christian Rosario  MRN: [id number]  : 1935  Date of Evaluation: 3/12/2022  ED Provider: Jadyn Arteaga MD    Chief Complaint       Chief Complaint   Patient presents with    Knee Pain     since thursday pt reports it \" giving out 2 otr 3 times. \"      Vanessa Marx is a 81 y.o. female who presents to the emergency department for evaluation of right knee pain. Patient reports been usual state of health until Wednesday. She says that she normally walks with a walker. She said her right knee gave out at that time and she is gone down on it 3 times since that time. Patient says it is now swollen. Patient denies chest pain abdominal pain back pain or other complaints. Patient denies no numbness tingling or coldness in her right foot. She is on Eliquis and says that her right knee is more swollen than her left knee. Patient denies any ankle pain or other complaints. ROS:     At least 10 systems reviewed and otherwise acutely negative except as in the [address] Past History     Past Medical History:   Diagnosis Date    Acquired hypothyroidism 2019    Patient is taking Synthroid    Chronic midline low back pain without sciatica 2019    Dementia without behavioral disturbance ([facility] [address] 10/22/2019    Patient has dementia. She is taking Namenda Patient is seeing urologist Dr. Zacarias Newsome Gait disturbance 10/22/2019    Patient uses walker to ambulate    History of TIA (transient ischemic attack) 2019    Patient has a history of TIA and also recently may have had TIA. Patient is on Eliquis. Sees neurologist Dr. Zacarias Newsome Major depressive disorder with single episode, in full remission ([facility] [address] 2019    Patient is taking Zoloft and that is helping.     Mixed hyperlipidemia 2019    Patient is on simvastatin    Persistent atrial fibrillation ([address] 10/22/2019    A. fib on recently in October 2019 . Patient is on metoprolol 25 mg twice a day and Eliquis    PFO (patent foramen ovale) 12/9/2019    PFO seen on echocardiogram in 2016    Pneumonia due to infectious organism 10/22/2019    Patient had pneumonia and pleural effusion during admission in October 2019. A repeat CT scan of the chest will be done Patient is also seen pulmonologist     Past Surgical History:   Procedure Laterality Date    HYSTERECTOMY       Social History     Socioeconomic History    Marital status:      Spouse name: None    Number of children: None    Years of education: None    Highest education level: None   Occupational History    None   Tobacco Use    Smoking status: Never Smoker    Smokeless tobacco: Never Used   Vaping Use    Vaping Use: Never used   Substance and Sexual Activity    Alcohol use: No    Drug use: No    Sexual activity: None   Other Topics Concern    None   Social History Narrative    None     Social Determinants of Health     Financial Resource Strain:     Difficulty of Paying Living Expenses: Not on file   Food Insecurity:     Worried About Running Out of Food in the Last Year: Not on file    Douglas of Food in the Last Year: Not on file   Transportation Needs:     Lack of Transportation (Medical): Not on file    Lack of Transportation (Non-Medical):  Not on file   Physical Activity:     Days of Exercise per Week: Not on file    Minutes of Exercise per Session: Not on file   Stress:     Feeling of Stress : Not on file   Social Connections:     Frequency of Communication with Friends and Family: Not on file    Frequency of Social Gatherings with Friends and Family: Not on file    Attends Taoism Services: Not on file    Active Member of Clubs or Organizations: Not on file    Attends Club or Organization Meetings: Not on file    Marital Status: Not on file   Intimate Partner Violence:     Fear of Current or Ex-Partner: Not on file    Emotionally Abused: Not on file  Physically Abused: Not on file    Sexually Abused: Not on file   Housing Stability:     Unable to Pay for Housing in the Last Year: Not on file    Number of Places Lived in the Last Year: Not on file    Unstable Housing in the Last Year: Not on file       Medications/Allergies     Previous Medications    ACETAMINOPHEN (TYLENOL) 500 MG TABLET    Take 500 mg by mouth 3 times daily PRN    APIXABAN (ELIQUIS) 5 MG TABS TABLET    TAKE 1 TABLET BY MOUTH 2 TIMES DAILY    CHOLECALCIFEROL (VITAMIN D3) 50 MCG (2000 UT) TABS    Take 1 tablet by mouth daily    DIGOXIN (LANOXIN) 125 MCG TABLET    Take 1 tablet by mouth daily    HANDICAP PLACARD MISC    by Does not apply route Please dispense one placard, duration 5 years    LEVOTHYROXINE (SYNTHROID) 150 MCG TABLET    Take 1 tablet by mouth daily    MECLIZINE (TRAVEL SICKNESS) 25 MG CHEW    Take 1 tablet by mouth as needed (dizziness)    MEMANTINE (NAMENDA) 10 MG TABLET    Take 1 tablet by mouth 2 times daily    METOPROLOL TARTRATE (LOPRESSOR) 25 MG TABLET    Take 0.5 tablets by mouth 2 times daily    NYSTATIN (MYCOSTATIN) 807634 UNIT/GM POWDER    Apply 3 times daily. RIVASTIGMINE (EXELON) 3 MG CAPSULE    Take 1 capsule by mouth daily    SERTRALINE (ZOLOFT) 50 MG TABLET    Take 1 tablet by mouth daily    SIMVASTATIN (ZOCOR) 40 MG TABLET    Take 1 tablet by mouth nightly    VITAMIN E 400 UNIT CAPSULE    Take 1 capsule by mouth daily     No Known Allergies     Physical Exam       ED Triage Vitals [03/12/22 1226]   BP Temp Temp Source Pulse Resp SpO2 Height Weight   (!) 98/54 96.2 °F (35.7 °C) Oral 69 18 92 % 5' 9\" (1.753 m) 180 lb (81.6 kg)     GENERAL APPEARANCE: Awake and alert. Cooperative. No acute distress. HEAD: Normocephalic. Atraumatic. EYES: Sclera anicteric. Pupils equal round reactive to light extraocular movements are intact  ENT: Tolerates saliva. No trismus. Moist mucous membranes  NECK: Supple. Trachea midline.   No meningismus no cervical thoracic or lumbosacral bony tenderness or step-offs noted. CARDIO: RRR. Radial pulse 2+. No murmurs rubs or gallops appreciated palpable DP and PT pulses bilaterally. Capillary refills less than 3 seconds. LUNGS: Respirations unlabored. CTAB. No accessory muscle usage noted. No wheezes rales rhonchi or stridor. No posterior rib tenderness or crepitus. ABDOMEN: Soft. Non-distended. Non-tender. No tenderness in right upper quadrant or right lower quadrant to deep palpation no pulsatile masses noted. EXTREMITIES: No acute deformities. No unilateral leg swelling or tenderness behind either one of calves in particular examination patient's bilateral lower legs she is full flexion-extension rotation of her ankles. Both Achilles tendon intact during flexion and extension. No tenderness over the fibular head or tibial plateau. Patient does have mild edema noted over the right knee but no joint laxity during varus and valgus stressing. No pain on movement of the patient's patella on the right. Mild amount of discomfort during abduction abduction of patient's right hip. No obvious deformities or dislocations noted. SKIN: Warm and dry. No erythema edema or rashes appreciated  NEUROLOGICAL:  Cranial nerves II through XII grossly intact. No gross facial drooping. Moves all 4 extremities spontaneously. PSYCHIATRIC: Normal mood. Alert and oriented x3. No reported active suicidality or homicidality. Diagnostics   Labs:  No results found for this visit on 03/12/22.   Radiographs:  XR KNEE RIGHT (3 VIEWS)    Result Date: 3/12/2022  EXAMINATION: THREE XRAY VIEWS OF THE RIGHT KNEE 3/12/2022 12:59 pm COMPARISON: June 5, 2020 HISTORY: ORDERING SYSTEM PROVIDED HISTORY: fall,l swelling TECHNOLOGIST PROVIDED HISTORY: Reason for exam:->fall,l swelling Reason for Exam: fall,l swelling Additional signs and symptoms: fall,l swelling Relevant Medical/Surgical History: fall,l swelling FINDINGS: No acute fracture or dislocation is identified. Mild tricompartmental degenerative changes are present along with a small joint effusion. No evidence of erosion. There is faint meniscal chondrocalcinosis. 1. No acute osseous abnormality. 2. 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 XR HIP 2-3 VW W PELVIS RIGHT    Result Date: 3/12/2022  EXAMINATION: ONE XRAY VIEW OF THE PELVIS AND TWO XRAY VIEWS RIGHT HIP 3/12/2022 12:59 pm COMPARISON: None. HISTORY: ORDERING SYSTEM PROVIDED HISTORY: fall, hip pain TECHNOLOGIST PROVIDED HISTORY: Reason for exam:->fall, hip pain Reason for Exam: fall, hip pain Additional signs and symptoms: fall, hip pain Relevant Medical/Surgical History: fall, hip pain FINDINGS: No acute fracture or malalignment. No significant degenerative changes. Soft tissues are unremarkable. 1. No acute fracture or malalignment. Procedures/EKG:       ED Course and MDM   In brief, Gregory Rich is a 81 y.o. female who presented to the emergency department for evaluation of right knee pain and swelling. Based on patient's history physical would be concern for possible right knee sprain or meniscal injury. She is neurovascular intact distal point of injury no obvious deformities or dislocated on exam.  No pain in her back during palpation no pain when palpating throughout the patient's pelvis. 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 I discussed the findings with patient and family at bedside. Family does report that she has had cortisone shots in her right knee in the past for due to arthritis. Advised him that she may have torn any ligaments or injured the meniscus as well which may have caused the joint effusion but no acute fractures were identified. Recommend nonweightbearing exercises such as biking swimming or yoga in addition to close follow-up with orthopedics next 48 to 72 hours.   Return precautions were discussed with him include but not limited to increasing pain, numbness tingling weakness in upper or lower extremities, any other concerning symptoms. Patient be discharged with prescription for 40 mg of prednisone for 5 days. I attempted to answer all the question best my ability patient is less feel comfortable to be discharged home    ED Medication Orders (From admission, onward)    None          Final Impression      1. Acute pain of right knee    2. Knee effusion, right    3. Sprain of right knee, unspecified ligament, initial encounter      DISPOSITION           This patient was cared for in the setting of the COVID-19 pandemic, with nationwide stress on resources and staffing.     (Please note that portions of this note may have been completed with a voice recognition program. Efforts were made to edit the dictations but occasionally words are mis-transcribed.)    Lorna Waggoner MD  [address], [place]  03/12/22 1820

## 2022-03-14 ENCOUNTER — TELEPHONE (OUTPATIENT)
Dept: INTERNAL MEDICINE CLINIC | Age: 87
End: 2022-03-14

## 2022-03-18 ENCOUNTER — OFFICE VISIT (OUTPATIENT)
Dept: INTERNAL MEDICINE CLINIC | Age: 87
End: 2022-03-18
Payer: MEDICARE

## 2022-03-18 VITALS
WEIGHT: 178 LBS | HEART RATE: 82 BPM | SYSTOLIC BLOOD PRESSURE: 110 MMHG | DIASTOLIC BLOOD PRESSURE: 62 MMHG | OXYGEN SATURATION: 97 % | HEIGHT: 69 IN | TEMPERATURE: 97.1 F | BODY MASS INDEX: 26.36 KG/M2

## 2022-03-18 VITALS
RESPIRATION RATE: 16 BRPM | SYSTOLIC BLOOD PRESSURE: 110 MMHG | HEART RATE: 82 BPM | DIASTOLIC BLOOD PRESSURE: 62 MMHG | WEIGHT: 178.8 LBS | TEMPERATURE: 97.1 F | OXYGEN SATURATION: 97 % | BODY MASS INDEX: 26.4 KG/M2

## 2022-03-18 DIAGNOSIS — M25.461 EFFUSION OF RIGHT KNEE: ICD-10-CM

## 2022-03-18 DIAGNOSIS — Z00.00 MEDICARE ANNUAL WELLNESS VISIT, SUBSEQUENT: Primary | ICD-10-CM

## 2022-03-18 DIAGNOSIS — Z91.81 AT HIGH RISK FOR FALLS: ICD-10-CM

## 2022-03-18 DIAGNOSIS — S83.91XA SPRAIN OF RIGHT KNEE, UNSPECIFIED LIGAMENT, INITIAL ENCOUNTER: Primary | ICD-10-CM

## 2022-03-18 PROCEDURE — G8400 PT W/DXA NO RESULTS DOC: HCPCS | Performed by: INTERNAL MEDICINE

## 2022-03-18 PROCEDURE — G8484 FLU IMMUNIZE NO ADMIN: HCPCS | Performed by: INTERNAL MEDICINE

## 2022-03-18 PROCEDURE — 1090F PRES/ABSN URINE INCON ASSESS: CPT | Performed by: INTERNAL MEDICINE

## 2022-03-18 PROCEDURE — G0439 PPPS, SUBSEQ VISIT: HCPCS | Performed by: INTERNAL MEDICINE

## 2022-03-18 PROCEDURE — G8417 CALC BMI ABV UP PARAM F/U: HCPCS | Performed by: INTERNAL MEDICINE

## 2022-03-18 PROCEDURE — 1111F DSCHRG MED/CURRENT MED MERGE: CPT | Performed by: INTERNAL MEDICINE

## 2022-03-18 PROCEDURE — 1036F TOBACCO NON-USER: CPT | Performed by: INTERNAL MEDICINE

## 2022-03-18 PROCEDURE — G8427 DOCREV CUR MEDS BY ELIG CLIN: HCPCS | Performed by: INTERNAL MEDICINE

## 2022-03-18 PROCEDURE — 1123F ACP DISCUSS/DSCN MKR DOCD: CPT | Performed by: INTERNAL MEDICINE

## 2022-03-18 PROCEDURE — 99213 OFFICE O/P EST LOW 20 MIN: CPT | Performed by: INTERNAL MEDICINE

## 2022-03-18 PROCEDURE — 4040F PNEUMOC VAC/ADMIN/RCVD: CPT | Performed by: INTERNAL MEDICINE

## 2022-03-18 SDOH — ECONOMIC STABILITY: FOOD INSECURITY: WITHIN THE PAST 12 MONTHS, THE FOOD YOU BOUGHT JUST DIDN'T LAST AND YOU DIDN'T HAVE MONEY TO GET MORE.: NEVER TRUE

## 2022-03-18 SDOH — ECONOMIC STABILITY: FOOD INSECURITY: WITHIN THE PAST 12 MONTHS, YOU WORRIED THAT YOUR FOOD WOULD RUN OUT BEFORE YOU GOT MONEY TO BUY MORE.: NEVER TRUE

## 2022-03-18 ASSESSMENT — PATIENT HEALTH QUESTIONNAIRE - PHQ9
SUM OF ALL RESPONSES TO PHQ QUESTIONS 1-9: 2
2. FEELING DOWN, DEPRESSED OR HOPELESS: 1
SUM OF ALL RESPONSES TO PHQ QUESTIONS 1-9: 2
SUM OF ALL RESPONSES TO PHQ9 QUESTIONS 1 & 2: 2
1. LITTLE INTEREST OR PLEASURE IN DOING THINGS: 1

## 2022-03-18 ASSESSMENT — SOCIAL DETERMINANTS OF HEALTH (SDOH): HOW HARD IS IT FOR YOU TO PAY FOR THE VERY BASICS LIKE FOOD, HOUSING, MEDICAL CARE, AND HEATING?: NOT HARD AT ALL

## 2022-03-18 ASSESSMENT — LIFESTYLE VARIABLES: HOW OFTEN DO YOU HAVE A DRINK CONTAINING ALCOHOL: NEVER

## 2022-03-18 NOTE — PATIENT INSTRUCTIONS
Personalized Preventive Plan for [place] - 3/18/2022  Medicare offers a range of preventive health benefits. Some of the tests and screenings are paid in full while other may be subject to a deductible, co-insurance, and/or copay. Some of these benefits include a comprehensive review of your medical history including lifestyle, illnesses that may run in your family, and various assessments and screenings as appropriate. After reviewing your medical record and screening and assessments performed today your provider may have ordered immunizations, labs, imaging, and/or referrals for you. A list of these orders (if applicable) as well as your Preventive Care list are included within your After Visit Summary for your review. Other Preventive Recommendations:    · A preventive eye exam performed by an eye specialist is recommended every 1-2 years to screen for glaucoma; cataracts, macular degeneration, and other eye disorders. · A preventive dental visit is recommended every 6 months. · Try to get at least 150 minutes of exercise per week or 10,000 steps per day on a pedometer . · Order or download the FREE \"Exercise & Physical Activity: Your Everyday Guide\" from [employer]. Call 6-887.859.3258 or search [employer] online. · You need 2303-9419 mg of calcium and 1274-4375 IU of vitamin D per day. It is possible to meet your calcium requirement with diet alone, but a vitamin D supplement is usually necessary to meet this goal.  · When exposed to the sun, use a sunscreen that protects against both UVA and UVB radiation with an SPF of 30 or greater. Reapply every 2 to 3 hours or after sweating, drying off with a towel, or swimming. · Always wear a seat belt when traveling in a car. Always wear a helmet when riding a bicycle or motorcycle. Heart-Healthy Diet   Sodium, Fat, and Cholesterol Controlled Diet       What Is a Heart Healthy Diet?    A heart-healthy diet is one that limits sodium , certain types of fat , and cholesterol . This type of diet is recommended for:   People with any form of cardiovascular disease (eg, coronary heart disease , peripheral vascular disease , previous heart attack , previous stroke )   People with risk factors for cardiovascular disease, such as high blood pressure , high cholesterol , or diabetes   Anyone who wants to lower their risk of developing cardiovascular disease   Sodium    Sodium is a mineral found in many foods. In general, most people consume much more sodium than they need. Diets high in sodium can increase blood pressure and lead to edema (water retention). On a heart-healthy diet, you should consume no more than 2,300 mg (milligrams) of sodium per dayabout the amount in one teaspoon of table salt. The foods highest in sodium include table salt (about 50% sodium), processed foods, convenience foods, and preserved foods. Cholesterol    Cholesterol is a fat-like, waxy substance in your blood. Our bodies make some cholesterol. It is also found in animal products, with the highest amounts in fatty meat, egg yolks, whole milk, cheese, shellfish, and organ meats. On a heart-healthy diet, you should limit your cholesterol intake to less than 200 mg per day. It is normal and important to have some cholesterol in your bloodstream. But too much cholesterol can cause plaque to build up within your arteries, which can eventually lead to a heart attack or stroke. The two types of cholesterol that are most commonly referred to are:   Low-density lipoprotein (LDL) cholesterol  Also known as bad cholesterol, this is the cholesterol that tends to build up along your arteries. Bad cholesterol levels are increased by eating fats that are saturated or hydrogenated. Optimal level of this cholesterol is less than 100. Over 130 starts to get risky for heart disease.    High-density lipoprotein (HDL) cholesterol  Also known as good cholesterol, this type of cholesterol actually carries cholesterol away from your arteries and may, therefore, help lower your risk of having a heart attack. You want this level to be high (ideally greater than 60). It is a risk to have a level less than 40. You can raise this good cholesterol by eating olive oil, canola oil, avocados, or nuts. Exercise raises this level, too. Fat    Fat is calorie dense and packs a lot of calories into a small amount of food. Even though fats should be limited due to their high calorie content, not all fats are bad. In fact, some fats are quite healthful. Fat can be broken down into four main types. The good-for-you fats are:   Monounsaturated fat  found in oils such as olive and canola, avocados, and nuts and natural nut butters; can decrease cholesterol levels, while keeping levels of HDL cholesterol high   Polyunsaturated fat  found in oils such as safflower, sunflower, soybean, corn, and sesame; can decrease total cholesterol and LDL cholesterol   Omega-3 fatty acids  particularly those found in fatty fish (such as salmon, trout, tuna, mackerel, herring, and sardines); can decrease risk of arrhythmias, decrease triglyceride levels, and slightly lower blood pressure   The fats that you want to limit are:   Saturated fat  found in animal products, many fast foods, and a few vegetables; increases total blood cholesterol, including LDL levels   Animal fats that are saturated include: butter, lard, whole-milk dairy products, meat fat, and poultry skin   Vegetable fats that are saturated include: hydrogenated shortening, palm oil, coconut oil, cocoa butter   Hydrogenated or trans fat  found in margarine and vegetable shortening, most shelf stable snack foods, and fried foods; increases LDL and decreases HDL     It is generally recommended that you limit your total fat for the day to less than 30% of your total calories.  If you follow an 1800-calorie heart healthy diet, for example, this would mean 60 grams of fat or less per day. Saturated fat and trans fat in your diet raises your blood cholesterol the most, much more than dietary cholesterol does. For this reason, on a heart-healthy diet, less than 7% of your calories should come from saturated fat and ideally 0% from trans fat. On an 1800-calorie diet, this translates into less than 14 grams of saturated fat per day, leaving 46 grams of fat to come from mono- and polyunsaturated fats.    Food Choices on a Heart Healthy Diet   Food Category   Foods Recommended   Foods to Avoid   Grains   Breads and rolls without salted tops Most dry and cooked cereals Unsalted crackers and breadsticks Low-sodium or homemade breadcrumbs or stuffing All rice and pastas   Breads, rolls, and crackers with salted tops High-fat baked goods (eg, muffins, donuts, pastries) Quick breads, self-rising flour, and biscuit mixes Regular bread crumbs Instant hot cereals Commercially prepared rice, pasta, or stuffing mixes   Vegetables   Most fresh, frozen, and low-sodium canned vegetables Low-sodium and salt-free vegetable juices Canned vegetables if unsalted or rinsed   Regular canned vegetables and juices, including sauerkraut and pickled vegetables Frozen vegetables with sauces Commercially prepared potato and vegetable mixes   Fruits   Most fresh, frozen, and canned fruits All fruit juices   Fruits processed with salt or sodium   Milk   Nonfat or low-fat (1%) milk Nonfat or low-fat yogurt Cottage cheese, low-fat ricotta, cheeses labeled as low-fat and low-sodium   Whole milk Reduced-fat (2%) milk Malted and chocolate milk Full fat yogurt Most cheeses (unless low-fat and low salt) Buttermilk (no more than 1 cup per week)   Meats and Beans   Lean cuts of fresh or frozen beef, veal, lamb, or pork (look for the word loin) Fresh or frozen poultry without the skin Fresh or frozen fish and some shellfish Egg whites and egg substitutes (Limit whole eggs to three per week) Tofu Nuts or seeds (unsalted, dry-roasted), low-sodium peanut butter Dried peas, beans, and lentils   Any smoked, cured, salted, or canned meat, fish, or poultry (including terrazas, chipped beef, cold cuts, hot dogs, sausages, sardines, and anchovies) Poultry skins Breaded and/or fried fish or meats Canned peas, beans, and lentils Salted nuts   Fats and Oils   Olive oil and canola oil Low-sodium, low-fat salad dressings and mayonnaise   Butter, margarine, coconut and palm oils, terrazas fat   Snacks, Sweets, and Condiments   Low-sodium or unsalted versions of broths, soups, soy sauce, and condiments Pepper, herbs, and spices; vinegar, lemon, or lime juice Low-fat frozen desserts (yogurt, sherbet, fruit bars) Sugar, cocoa powder, honey, syrup, jam, and preserves Low-fat, trans-fat free cookies, cakes, and pies Maicol and animal crackers, fig bars, nicole snaps   High-fat desserts Broth, soups, gravies, and sauces, made from instant mixes or other high-sodium ingredients Salted snack foods Canned olives Meat tenderizers, seasoning salt, and most flavored vinegars   Beverages   Low-sodium carbonated beverages Tea and coffee in moderation Soy milk   Commercially softened water   Suggestions   Make whole grains, fruits, and vegetables the base of your diet. Choose heart-healthy fats such as canola, olive, and flaxseed oil, and foods high in heart-healthy fats, such as nuts, seeds, soybeans, tofu, and fish. Eat fish at least twice per week; the fish highest in omega-3 fatty acids and lowest in mercury include salmon, herring, mackerel, sardines, and canned chunk light tuna. If you eat fish less than twice per week or have high triglycerides, talk to your doctor about taking fish oil supplements. Read food labels.    For products low in fat and cholesterol, look for fat free, low-fat, cholesterol free, saturated fat free, and trans fat freeAlso scan the Nutrition Facts Label, which lists saturated fat, trans fat, and cholesterol amounts. For products low in sodium, look for sodium free, very low sodium, low sodium, no added salt, and unsalted   Skip the salt when cooking or at the table; if food needs more flavor, get creative and try out different herbs and spices. Garlic and onion also add substantial flavor to foods. Trim any visible fat off meat and poultry before cooking, and drain the fat off after bhat. Use cooking methods that require little or no added fat, such as grilling, boiling, baking, poaching, broiling, roasting, steaming, stir-frying, and sauting. Avoid fast food and convenience food. They tend to be high in saturated and trans fat and have a lot of added salt. Talk to a registered dietitian for individualized diet advice. Last Reviewed: March 2011 Abdirizak Mathew MS, MPH, RD   Updated: 3/29/2011   ·     Heart-Healthy Diet   Sodium, Fat, and Cholesterol Controlled Diet       What Is a Heart Healthy Diet? A heart-healthy diet is one that limits sodium , certain types of fat , and cholesterol . This type of diet is recommended for:   People with any form of cardiovascular disease (eg, coronary heart disease , peripheral vascular disease , previous heart attack , previous stroke )   People with risk factors for cardiovascular disease, such as high blood pressure , high cholesterol , or diabetes   Anyone who wants to lower their risk of developing cardiovascular disease   Sodium    Sodium is a mineral found in many foods. In general, most people consume much more sodium than they need. Diets high in sodium can increase blood pressure and lead to edema (water retention). On a heart-healthy diet, you should consume no more than 2,300 mg (milligrams) of sodium per dayabout the amount in one teaspoon of table salt. The foods highest in sodium include table salt (about 50% sodium), processed foods, convenience foods, and preserved foods.    Cholesterol    Cholesterol is a fat-like, waxy substance in your blood. Our bodies make some cholesterol. It is also found in animal products, with the highest amounts in fatty meat, egg yolks, whole milk, cheese, shellfish, and organ meats. On a heart-healthy diet, you should limit your cholesterol intake to less than 200 mg per day. It is normal and important to have some cholesterol in your bloodstream. But too much cholesterol can cause plaque to build up within your arteries, which can eventually lead to a heart attack or stroke. The two types of cholesterol that are most commonly referred to are:   Low-density lipoprotein (LDL) cholesterol  Also known as bad cholesterol, this is the cholesterol that tends to build up along your arteries. Bad cholesterol levels are increased by eating fats that are saturated or hydrogenated. Optimal level of this cholesterol is less than 100. Over 130 starts to get risky for heart disease. High-density lipoprotein (HDL) cholesterol  Also known as good cholesterol, this type of cholesterol actually carries cholesterol away from your arteries and may, therefore, help lower your risk of having a heart attack. You want this level to be high (ideally greater than 60). It is a risk to have a level less than 40. You can raise this good cholesterol by eating olive oil, canola oil, avocados, or nuts. Exercise raises this level, too. Fat    Fat is calorie dense and packs a lot of calories into a small amount of food. Even though fats should be limited due to their high calorie content, not all fats are bad. In fact, some fats are quite healthful. Fat can be broken down into four main types.    The good-for-you fats are:   Monounsaturated fat  found in oils such as olive and canola, avocados, and nuts and natural nut butters; can decrease cholesterol levels, while keeping levels of HDL cholesterol high   Polyunsaturated fat  found in oils such as safflower, sunflower, soybean, corn, and sesame; can decrease total cholesterol and LDL cholesterol   Omega-3 fatty acids  particularly those found in fatty fish (such as salmon, trout, tuna, mackerel, herring, and sardines); can decrease risk of arrhythmias, decrease triglyceride levels, and slightly lower blood pressure   The fats that you want to limit are:   Saturated fat  found in animal products, many fast foods, and a few vegetables; increases total blood cholesterol, including LDL levels   Animal fats that are saturated include: butter, lard, whole-milk dairy products, meat fat, and poultry skin   Vegetable fats that are saturated include: hydrogenated shortening, palm oil, coconut oil, cocoa butter   Hydrogenated or trans fat  found in margarine and vegetable shortening, most shelf stable snack foods, and fried foods; increases LDL and decreases HDL     It is generally recommended that you limit your total fat for the day to less than 30% of your total calories. If you follow an 1800-calorie heart healthy diet, for example, this would mean 60 grams of fat or less per day. Saturated fat and trans fat in your diet raises your blood cholesterol the most, much more than dietary cholesterol does. For this reason, on a heart-healthy diet, less than 7% of your calories should come from saturated fat and ideally 0% from trans fat. On an 1800-calorie diet, this translates into less than 14 grams of saturated fat per day, leaving 46 grams of fat to come from mono- and polyunsaturated fats.    Food Choices on a Heart Healthy Diet   Food Category   Foods Recommended   Foods to Avoid   Grains   Breads and rolls without salted tops Most dry and cooked cereals Unsalted crackers and breadsticks Low-sodium or homemade breadcrumbs or stuffing All rice and pastas   Breads, rolls, and crackers with salted tops High-fat baked goods (eg, muffins, donuts, pastries) Quick breads, self-rising flour, and biscuit mixes Regular bread crumbs Instant hot cereals Commercially prepared rice, pasta, or stuffing mixes Vegetables   Most fresh, frozen, and low-sodium canned vegetables Low-sodium and salt-free vegetable juices Canned vegetables if unsalted or rinsed   Regular canned vegetables and juices, including sauerkraut and pickled vegetables Frozen vegetables with sauces Commercially prepared potato and vegetable mixes   Fruits   Most fresh, frozen, and canned fruits All fruit juices   Fruits processed with salt or sodium   Milk   Nonfat or low-fat (1%) milk Nonfat or low-fat yogurt Cottage cheese, low-fat ricotta, cheeses labeled as low-fat and low-sodium   Whole milk Reduced-fat (2%) milk Malted and chocolate milk Full fat yogurt Most cheeses (unless low-fat and low salt) Buttermilk (no more than 1 cup per week)   Meats and Beans   Lean cuts of fresh or frozen beef, veal, lamb, or pork (look for the word loin) Fresh or frozen poultry without the skin Fresh or frozen fish and some shellfish Egg whites and egg substitutes (Limit whole eggs to three per week) Tofu Nuts or seeds (unsalted, dry-roasted), low-sodium peanut butter Dried peas, beans, and lentils   Any smoked, cured, salted, or canned meat, fish, or poultry (including terrazas, chipped beef, cold cuts, hot dogs, sausages, sardines, and anchovies) Poultry skins Breaded and/or fried fish or meats Canned peas, beans, and lentils Salted nuts   Fats and Oils   Olive oil and canola oil Low-sodium, low-fat salad dressings and mayonnaise   Butter, margarine, coconut and palm oils, terrazas fat   Snacks, Sweets, and Condiments   Low-sodium or unsalted versions of broths, soups, soy sauce, and condiments Pepper, herbs, and spices; vinegar, lemon, or lime juice Low-fat frozen desserts (yogurt, sherbet, fruit bars) Sugar, cocoa powder, honey, syrup, jam, and preserves Low-fat, trans-fat free cookies, cakes, and pies Maicol and animal crackers, fig bars, nicole snaps   High-fat desserts Broth, soups, gravies, and sauces, made from instant mixes or other high-sodium ingredients Salted snack foods Canned olives Meat tenderizers, seasoning salt, and most flavored vinegars   Beverages   Low-sodium carbonated beverages Tea and coffee in moderation Soy milk   Commercially softened water   Suggestions   Make whole grains, fruits, and vegetables the base of your diet. Choose heart-healthy fats such as canola, olive, and flaxseed oil, and foods high in heart-healthy fats, such as nuts, seeds, soybeans, tofu, and fish. Eat fish at least twice per week; the fish highest in omega-3 fatty acids and lowest in mercury include salmon, herring, mackerel, sardines, and canned chunk light tuna. If you eat fish less than twice per week or have high triglycerides, talk to your doctor about taking fish oil supplements. Read food labels. For products low in fat and cholesterol, look for fat free, low-fat, cholesterol free, saturated fat free, and trans fat freeAlso scan the Nutrition Facts Label, which lists saturated fat, trans fat, and cholesterol amounts. For products low in sodium, look for sodium free, very low sodium, low sodium, no added salt, and unsalted   Skip the salt when cooking or at the table; if food needs more flavor, get creative and try out different herbs and spices. Garlic and onion also add substantial flavor to foods. Trim any visible fat off meat and poultry before cooking, and drain the fat off after bhat. Use cooking methods that require little or no added fat, such as grilling, boiling, baking, poaching, broiling, roasting, steaming, stir-frying, and sauting. Avoid fast food and convenience food. They tend to be high in saturated and trans fat and have a lot of added salt. Talk to a registered dietitian for individualized diet advice.       Last Reviewed: March 2011 Flavio Reyes MS, MPH, RD   Updated: 3/29/2011   ·   Patient information: Weight loss treatments    INTRODUCTION  Obesity is a major international problem, and Americans are among the heaviest people in the world. The percentage of obese people in the [place] has risen steadily. Many people find that although they initially lose weight by dieting, they quickly regain the weight after the diet ends. Because it so hard to keep weight off over time, it is important to have as much information and support as possible before starting a diet. You are most likely to be successful in losing weight and keeping it off when you believe that your body weight can be controlled. STARTING A WEIGHT LOSS PROGRAM  Some people like to talk to their doctor or nurse to get help choosing the best plan, monitoring progress, and getting advice and support along the way. To know what treatment (or combination of treatments) will work best, determine your body mass index (BMI) and waist circumference (measurement). The BMI is calculated from your height and weight. A person with a BMI between 25 and 29.9 is considered overweight   A person with a BMI of 30 or greater is considered to be obese  A waist circumference greater than 35 inches (88 cm) in women and 40 inches (102 cm) in men increases the risk of obesity-related complications, such as heart disease and diabetes. People who are obese and who have a larger waist size may need more aggressive weight loss treatment than others. Talk to your doctor or nurse for advice. Types of treatment  Based on your measurements and your medical history, your doctor or nurse can determine what combination of weight loss treatments would work best for you. Treatments may include changes in lifestyle, exercise, dieting, and, in some cases, weight loss medicines or weight loss surgery. Weight loss surgery, also called bariatric surgery, is reserved for people with severe obesity who have not responded to other weight loss treatments.    SETTING A WEIGHT LOSS GOAL  It is important to set a realistic weight loss goal. Your first goal should be to avoid gaining more weight and staying at your current weight (or within 5 percent). Many people have a \"dream\" weight that is difficult or impossible to achieve. People at high risk of developing diabetes who are able to lose 5 percent of their body weight and maintain this weight will reduce their risk of developing diabetes by about 50 percent and reduce their blood pressure. This is a success. Losing more than 15 percent of your body weight and staying at this weight is an extremely good result, even if you never reach your \"dream\" or \"ideal\" weight. LIFESTYLE CHANGES  Programs that help you to change your lifestyle are usually run by psychologists or other professionals. The goals of lifestyle changes are to help you change your eating habits, become more active, and be more aware of how much you eat and exercise, helping you to make healthier choices. This type of treatment can be broken down into three steps: The triggers that make you want to eat   Eating   What happens after you eat  Triggers to eat  Determining what triggers you to eat involves figuring out what foods you eat and where and when you eat. To figure out what triggers you to eat, keep a record for a few days of everything you eat, the places where you eat, how often you eat, and the emotions you were feeling when you ate. For some people, the trigger is related to a certain time of day or night. For others, the trigger is related to a certain place, like sitting at a desk working. Eating  You can change your eating habits by breaking the chain of events between the trigger for eating and eating itself. There are many ways to do this.  For instance, you can:  Limit where you eat to a few places (eg, dining room)   Restrict the number of utensils (eg, only a fork) used for eating   Drink a sip of water between each bite   Chew your food a certain number of times   Get up and stop eating every few minutes  What happens after you eat  Rewarding yourself for good eating behaviors can help you to develop better habits. This is not a reward for weight loss; instead, it is a reward for changing unhealthy behaviors. Do not use food as a reward. Some people find money, clothing, or personal care (eg, a hair cut, manicure, or massage) to be effective rewards. Treat yourself immediately after making better eating choices to reinforce the value of the good behavior. You need to have clear behavior goals, and you must have a time frame for reaching your goals. Reward small changes along the way to your final goal.  Other factors that contribute to successful weight loss  Changing your behavior involves more than just changing unhealthy eating habits; it also involves finding people around you to support your weight loss, reducing stress, and learning to be strong when tempted by food. Establish a \"peter\" system  Having a friend or family member available to provide support and reinforce good behavior is very helpful. The support person needs to understand your goals. Learn to be strong  Learning to be strong when tempted by food is an important part of losing weight. As an example, you will need to learn how to say \"no\" and continue to say no when urged to eat at parties and social gatherings. Develop strategies for events before you go, such as eating before you go or taking low-calorie snacks and drinks with you. Develop a support system  Having a support system is helpful when losing weight. This is why many [employer] groups are successful. Family support is also essential; if your family does not support your efforts to lose weight, this can slow your progress or even keep you from losing weight. Positive thinking  People often have conversations with themselves in their head; these conversations can be positive or negative. If you eat a piece of cake that was not planned, you may respond by thinking, \"Oh, you stupid idiot, you've blown your diet! \" and as a result, you may eat more cake. A positive thought for the same event could be, \"Well, I ate cake when it was not on my plan. Now I should do something to get back on track. \" A positive approach is much more likely to be successful than a negative one. Reduce stress  Although stress is a part of everyday life, it can trigger uncontrolled eating in some people. It is important to find a way to get through these difficult times without eating or by eating low-calorie food, like raw vegetables. It may be helpful to imagine a relaxing place that allows you to temporarily escape from stress. With deep breaths and closed eyes, you can imagine this relaxing place for a few minutes. Self-help programs  Self-help programs like [employer] Watchers®, Overeaters Anonymous®, and Take Off [employer]© work for some people. As with all weight loss programs, you are most likely to be successful with these plans if you make long-term changes in how you eat. CHOOSING A DIET  A calorie is a unit of energy found in food. Your body needs calories to function. The goal of any diet is to burn up more calories than you eat. How quickly you lose weight depends upon several factors, such as your age, gender, and starting weight. Older people have a slower metabolism than young people, so they lose weight more slowly. Men lose more weight than women of similar height and weight when dieting because they use more energy. People who are extremely overweight lose weight more quickly than those who are only mildly overweight. Try not to drink alcohol or drinks with added sugar, and most sweets (candy, cakes, cookies), since they rarely contain important nutrients. Portion-controlled diets  One simple way to diet is to buy packaged foods, like frozen low-calorie meals or meal-replacement canned drinks.  A typical meal plan for one day may include:  A meal-replacement drink or breakfast bar for breakfast   A meal-replacement drink or a frozen low-calorie (250 to 350 calories) meal for lunch   A frozen low-calorie meal or other prepackaged, calorie-controlled meal, along with extra vegetables for dinner  This would give you 1000 to 1500 calories per day. Low-fat diet  To reduce the amount of fat in your diet, you can:  Eat low-fat foods. Low-fat foods are those that contain less than 30 percent of calories from fat. Fat is listed on the food facts label (figure 1). Count fat grams. For a 1500 calorie diet, this would mean about 45 g or fewer of fat per day. Low-carbohydrate diet  Low- and very-low-carbohydrate diets (eg, Atkins diet, [address] [place] [address]) have become popular ways to lose weight quickly. With a very-low-carbohydrate diet, you eat between 0 and 60 grams of carbohydrates per day (a standard diet contains 200 to 300 grams of carbohydrates)   With a low-carbohydrate diet, you eat between 60 and 130 grams of carbohydrates per day  Carbohydrates are found in fruits, vegetables, and grains (including breads, rice, pasta, and cereal), alcoholic beverages, and in dairy products. Meat and fish do not contain carbohydrates. Side effects of very-low-carbohydrate diets can include constipation, headache, bad breath, muscle cramps, diarrhea, and weakness. Mediterranean diet  The term \"Mediterranean diet\" refers to a way of eating that is common in [place] regions around the [facility]. Although there is some variation in Mediterranean diets, there are some similarities. Most Mediterranean diets include:  A high level of monounsaturated fats (from olive or canola oil, walnuts, pecans, almonds) and a low level of saturated fats (from butter)   A high amount of vegetables, fruits, legumes, and grains (7 to 10 servings of fruits and vegetables per day)   A moderate amount of milk and dairy products, mostly in the form of cheese. Use low-fat dairy products (skim milk, fat-free yogurt, low-fat cheese).    A relatively low amount of red meat and meat products. Substitute fish or poultry for red meat. For those who drink alcohol, a modest amount (mainly as red wine) may help to protect against cardiovascular disease. A modest amount is up to one (4 ounce) glass per day for women and up to two glasses per day for men. Which diet is best?  Studies have compared different diets, including:  Very-low-carbohydrate (Atkins)   Macronutrient balance controlling glycemic load (Zone®)   Reduced-calorie (Weight Watchers®)   Very-low-fat (Ornish)  No one diet is \"best\" for weight loss. Any diet will help you to lose weight if you stick with the diet. Therefore, it is important to choose a diet that includes foods you like. Fad diets  Fad diets often promise quick weight loss (more than 1 to 2 pounds per week) and may claim that you do not need to exercise or give up favorite foods. Some fad diets cost a lot of money, because you have to pay for seminars or pills. Fad diets generally lack any scientific evidence that they are safe and effective, but instead rely on \"before\" and \"after\" photos or testimonials. Diets that sound too good to be true usually are. These plans are a waste of time and money and are not recommended. A doctor, nurse, or nutritionist can help you find a safe and effective way to lose weight and keep it off. WEIGHT LOSS [facility] a weight loss medicine may be helpful when used in combination with diet, exercise, and lifestyle changes. However, it is important to understand the risks and benefits of these medicines. It is also important to be realistic about your goal weight using a weight loss medicine; you may not reach your \"dream\" weight, but you may be able to reduce your risk of diabetes or heart disease.    Weight loss medicines may be recommended for people who have not been able to lose weight with diet and exercise who have a:  BMI of 30 or more    BMI between 27 and 29.9 and have other medical problems, such as diabetes, high cholesterol, or high blood pressure  Two weight loss medicines are approved in the [place] for long-term use. These are sibutramine and orlistat. Other weight loss medicines (phentermine, diethylpropion) are available but are only approved for short-term use (up to 12 weeks). Sibutramine  Sibutramine (Meridia®, Reductil®) is a medicine that reduces your appetite. In people who take the medicine for one year, the average weight loss is 10 percent of the initial body weight (5 percent more than those who took a placebo treatment). Side effects of sibutramine include insomnia, dry mouth, and constipation. Increases in blood pressure can occur. Therefore, blood pressure is usually monitored during treatment. There is no evidence that sibutramine causes heart or lung problems (like dexfenfluramine and fenfluramine (Phen/Fen)). However, experts agree that sibutramine should not used by people with coronary heart disease, heart failure, uncontrolled hypertension, stroke, irregular heart rhythms, or peripheral vascular disease (poor circulation in the legs). Orlistat  Orlistat (Xenical® 120 mg capsules) is a medicine that reduces the amount of fat your body absorbs from the foods you eat. A lower-dose version is now available without a prescription (Edwardo® 60 mg capsules) in many countries, including the [place]. The medicine is recommended three times per day, taken with a meal; you can skip a dose if you skip a meal or if the meal contains no fat. After one year of treatment with orlistat, the average weight loss is approximately 8 to 10 percent of initial body weight (4 percent more than in those who took a placebo). Cholesterol levels often improve, and blood pressure sometimes falls. In people with diabetes, orlistat may help control blood sugar levels. Side effects occur in 15 to 10 percent of people and may include stomach cramps, gas, diarrhea, leakage of stool, or oily stools. These problems are more likely when you take orlistat with a high-fat meal (if more than 30 percent of calories in the meal are from fat). Side effects usually improve as you learn to avoid high-fat foods. Severe liver injury has been reported rarely in patients taking orlistat, but it is not known if orlistat caused the liver problems. Diet supplements  Diet supplements are widely used by people who are trying to lose weight, although the safety and efficacy of these supplements are often unproven. A few of the more common diet supplements are discussed below; none of these are recommended because they have not been studied carefully, and there is no proof they are safe or effective. Chitosan and wheat dextrin are ineffective for weight loss, and their use is not recommended. Ephedra, a compound related to ephedrine, is no longer available in the [place] due to safety concerns. Many nonprescription diet pills previously contained ephedra. Although some studies have shown that ephedra helps with weight loss, there can be serious side effects (psychiatric symptoms, palpitations, and stomach upset), including death. There are not enough data about the safety and efficacy of chromium, ginseng, glucomannan, green tea, hydroxycitric acid, L carnitine, psyllium, pyruvate supplements, [place] wort, and conjugated linoleic acid. Two supplements from [facility], [address] [place] (also known as the Joseph Lopez 15 pill) and Herbathin dietary supplement, have been shown to contain prescription drugs. Hoodia gordonii is a dietary supplement derived from a plant in [place]. It is not recommended because there is no proof that it is safe or effective. Bitter orange (Citrus aurantium) can increase your heart rate and blood pressure and is not recommended. SHOULD I HAVE SURGERY TO LOSE WEIGHT?   Weight loss surgery is recommended ONLY for people with one of the following:  Severe obesity (body mass index above 40) (calculator 1 and calculator 2) who have not responded to diet, exercise, or weight loss medicines   Body mass index between 35 and 40, along with a serious medical problem (including diabetes, severe joint pain, or sleep apnea) that would improve with weight loss  You should be sure that you understand the potential risks and benefits of weight loss surgery. You must be motivated and willing to make lifelong changes in how you eat to reach and maintain a healthier weight after surgery. You must also be realistic about weight loss after surgery (see 'Effectiveness of weight loss surgery' below). PREPARING FOR WEIGHT LOSS SURGERY  Most people who have weight loss surgery will meet with several specialists before surgery is scheduled. This often includes a dietitian, mental health counselor, a doctor who specializes in care of obese people, and a surgeon who performs weight loss surgery (bariatric surgeon). You may need to work with these providers for several weeks or months before surgery. The nutritionist will explain what and how much you will be able to eat after surgery. You may also need to lose a small amount of weight before surgery. The mental health specialist will help you to cope with stress and other factors that can make it harder to lose weight or trigger you to eat   The medical doctor will determine whether you need other tests, counseling, or treatment before surgery. He or she might also help you begin a medical weight loss program so that you can lose some weight before surgery. The bariatric surgeon will meet with you to discuss the surgeries available to treat obesity. He or she will also make sure you are a good candidate for surgery. TYPES OF WEIGHT LOSS SURGERY  There are several types of weight loss surgeries, the most common being lap banding, gastric bypass, and gastric sleeve.   Lap banding  Laparoscopic adjustable gastric banding (LAGB), or lap banding, is a surgery that uses an adjustable band around the opening to the stomach (figure 1). This reduces the amount of food that you can eat at one time. Lap banding is done through small incisions, with a laparoscope. The band can be adjusted after surgery, allowing you to eat more or less food. Adjustments to the size and tightness of the band are made by using a needle to add or remove fluid from a port (a small container under the skin that is connected to the band). Adding fluid to the band makes it tighter which restricts the amount of food you can eat and may help you to lose more weight. Lap banding is a popular choice because it is relatively simple to perform, can be adjusted or removed, and has a low risk of serious complications immediately after surgery. However, weight loss with the lap band depends on your ability to follow the program closely. You will need to prepare nutritious meals that [facility] with\" the band, not against it. For example, the lap band will not work well if you eat or drink a large amount of liquid calories (like ice cream). The band will not help you to feel full when you eat/drink liquid calories. Weight loss ranges from 45 to 75 percent after two years. As an example, a person who is 120 pounds overweight could expect to lose approximately 54 to 90 pounds in the two years after lap banding. Gastric bypass  Bibiana-en-Y gastric bypass, also called gastric bypass, helps you to lose weight by reducing the amount of food you can eat and reducing the number of calories and nutrients you absorb from the food you eat. To perform gastric bypass, a surgeon creates a small stomach pouch by dividing the stomach and attaching it to the small intestine. This helps you to lose weight in two ways: The smaller stomach can hold less food than before surgery. This causes you to feel full after eating a very small amount of food or liquid. Over time, the pouch might stretch, allowing you to eat more food.    The body absorbs fewer calories, since food bypasses most of the stomach as well as the upper small intestine. This new arrangement seems to decrease your appetite and change how you break down foods by changing the release of various hormones. Gastric bypass can be performed as open surgery (through an incision on the abdomen) or laparoscopically, which uses smaller incisions and smaller instruments. Both the laparoscopic and open techniques have risks and benefits. You and your surgeon should work together to decide which surgery, if any, is right for you. Gastric bypass has a high success rate, and people lose an average of 62 to 68 percent of their excess body weight in the first year. Weight loss typically levels off after one to two years, with an overall excess weight loss between 50 and 75 percent. For a person who is 120 pounds overweight, an average of 60 to 90 pounds of weight loss would be expected. Gastric sleeve  Gastric sleeve, also known as sleeve gastrectomy, is a surgery that reduces the size of the stomach and makes it into a narrow tube (figure 3). The new stomach is much smaller and produces less of the hormone (ghrelin) that causes hunger, helping you feel satisfied with less food. Sleeve gastrectomy is safer than gastric bypass because the intestines are not rearranged, and there is less chance of malnutrition. It also appears to control hunger better than lap banding. It might be safer than the lap banding because no foreign materials are used. The gastric sleeve has a good success rate, and people lose an average of 33 percent of their excess body weight in the first year. For a person who is 120 pounds overweight, this would mean losing about 40 pounds in the first year. WEIGHT LOSS SURGERY COMPLICATIONS  A variety of complications can occur with weight loss surgery. The risks of surgery depend upon which surgery you have and any medical problems you had before surgery.  Some of the more common early surgical complications (one to six weeks after surgery) include:  Bleeding   Infection   Blockage or tear in the bowels   Need for further surgery  Important medical complications after surgery can include blood clots in the legs or lungs, heart attack, pneumonia, and urinary tract infection. Complications are less likely when surgery is performed in centers that are experienced in weight loss surgery. In general, centers with experience in weight loss surgery have:  Board-certified doctors and surgeons   A team of support staff (dietitians, counselors, nurses)   Long-term follow-up after surgery   Hospital staff experienced with the care of weight loss patients. This includes nurses who are trained in the care of patients immediately after surgery and anesthesiologists who are experienced in caring for the morbidly obese. EFFECTIVENESS OF WEIGHT LOSS SURGERY  The goal of weight loss surgery is to reduce the risk of illness or death associated with obesity. Weight loss surgery can also help you to feel and look better, reduce the amount of money you spend on medicines, and cut down on sick days. As an example, weight loss surgery can improve health problems related to obesity (diabetes, high blood pressure, high cholesterol, sleep apnea) to the point that you need less or no medicine. Finally, weight loss surgery might reduce your risk of developing heart disease, cancer, and certain infections. AFTER WEIGHT LOSS SURGERY  You will need to stay in the hospital until your team feels that it is safe for you to leave (on average, one to three days). Do not drive if you are taking prescription pain medicine. Begin exercising as soon as possible once you have healed; most weight loss centers will design an exercise program for you. Once you are home, it is important to eat and drink exactly what your doctor and dietitian recommend.  You will see your doctor, nurse, and dietitian on a regular basis after surgery to monitor your health, diet, and weight loss. You will be able to slowly increase how much you eat over time, although it will always be important to:  Eat small, frequent meals and not skip meals   Chew your food slowly and completely   Avoid eating while \"distracted\" (such as eating while watching TV)   Stop eating when you feel full   Drink liquids at least 30 minutes before or after eating   Avoid foods high in fat or sugar   Take vitamin supplements, as recommended  It can take several months to learn to listen to your body so that you know when you are hungry and when you are full. You may dislike foods you previously loved, and you may begin to prefer new foods. This can be a frustrating process for some people, so talk to your dietitian if you are having trouble. It usually takes between one and two years to lose weight after surgery. After reaching their goal weight, some people have plastic surgery (called \"body contouring\") to remove excess skin from the body, particularly in the abdominal area. Before you decide to have weight loss surgery, you must commit to staying healthy for life. This includes following up with your healthcare team, exercising most days of the week, and eating a sensible diet every day. It can be difficult to develop new eating and exercise habits after weight loss surgery, and you will have to work hard to stick to your goals. Recovering from surgery and losing weight can be stressful and emotional, and it is important to have the support of family and friends. Working with a , therapist, or support group can help you through the ups and downs. WHERE TO GET MORE INFORMATION  Your healthcare provider is the best source of information for questions and concerns related to your medical problem. This article will be updated as needed every four months on our Web site ([email or web address])  ·     High-Fiber Diet     What Is Fiber?    Dietary fiber is a form of carbohydrate found in plants that cannot be digested by humans. All plants contain fiber, including fruits, vegetables, grains, and legumes. Fiber is often classified into two categories: soluble and insoluble. Soluble fiber draws water into the bowel and can help slow digestion. Examples of foods that are high in soluble fiber include oatmeal, oat bran, barley, legumes (eg, beans and peas), apples, and strawberries. Insoluble fiber speeds digestion and can add bulk to the stool. Examples of foods that are high in insoluble fiber include whole-wheat products, wheat bran, cauliflower, green beans, and potatoes. Why Follow a High-Fiber Diet? A high-fiber diet is often recommended to prevent and treat constipation , hemorrhoids , diverticulitis , and irritable bowel syndrome . Eating a high-fiber diet can also help improve your cholesterol levels, lower your risk of coronary heart disease , reduce your risk of type 2 diabetes , and lower your weight. For people with type 1 or 2 diabetes, a high-fiber diet can also help stabilize blood sugar levels. How Much Fiber Should I Eat? A high-fiber diet should contain  20-35 grams  of fiber a day. This is actually the amount recommended for the general adult population; however, most Americans eat only 15 grams of fiber per day. Digestion of Fiber   Eating a higher fiber diet than usual can take some getting used to by your body's digestive system. To avoid the side effects of sudden increases in dietary fiber (eg, gas, cramping, bloating, and diarrhea), increase fiber gradually and be sure to drink plenty of fluids every day. Tips for Increasing Fiber Intake   Whenever possible, choose whole grains over refined grains (eg, brown rice instead of white rice, whole-wheat bread instead of white bread). Include a variety of grains in your diet, such as wheat, rye, barley, oats, quinoa, and bulgur. Eat more vegetarian-based meals.  Here are some ideas: black bean burgers, eggplant lasagna, and veggie tofu stir-parham. Choose high-fiber snacks, such as fruits, popcorn, whole-grain crackers, and nuts. Make whole-grain cereal or whole-grain toast part of your daily breakfast regime. When eating out, whether ordering a sandwich or dinner, ask for extra vegetables. When baking, replace part of the white flour with whole-wheat flour. Whole-wheat flour is particularly easy to incorporate into a recipe. High-Fiber Diet Eating Guide   Food Category   Foods Recommended   Notes   Grains   Whole-grain breads, muffins, bagels, or mario bread Rye bread Whole-wheat crackers or crisp breads Whole-grain or bran cereals Oatmeal, oat bran, or grits Wheat germ Whole-wheat pasta and brown rice   Read the ingredients list on food labels. Look for products that list \"whole\" as the first ingredient (eg, whole-wheat, whole oats). Choose cereals with at least 2 grams of fiber per serving. Vegetables   All vegetables, especially asparagus, bean sprouts, broccoli, [place] sprouts, cabbage, carrots, cauliflower, celery, corn, greens, green beans, green pepper, onions, peas, potatoes (with skin), snow peas, spinach, squash, sweet potatoes, tomatoes, zucchini   For maximum fiber intake, eat the peels of fruits and vegetablesjust be sure to wash them well first.   Fruits   All fruits, especially apples, berries, grapefruits, mangoes, nectarines, oranges, peaches, pears, dried fruits (figs, dates, prunes, raisins)   Choose raw fruits and vegetables over juice, cooked, or cannedraw fruit has more fiber. Dried fruit is also a good source of fiber. Milk   With the exception of yogurt containing inulin (a type of fiber), dairy foods provide little fiber. Add more fiber by topping your yogurt or cottage cheese with fresh fruit, whole grain or bran cereals, nuts, or seeds.    Meats and Beans   All beans and peas, especially Garbanzo beans, kidney beans, lentils, lima beans, split peas, and medina beans All nuts and seeds, especially almonds, peanuts, [place] nuts, cashews, peanut butter, walnuts, sesame and sunflower seeds All meat, poultry, fish, and eggs   Increase fiber in meat dishes by adding medina beans, kidney beans, black-eyed peas, bran, or oatmeal. If you are following a low-fat diet, use nuts and seeds only in moderation. Fats and Oils   All in moderation   Fats and oils do not provide fiber   Snacks, Sweets, and Condiments   Fruit Nuts Popcorn, whole-wheat pretzels, or trail mix made with dried fruits, nuts, and seeds Cakes, breads, and cookies made with oatmeal or whole-wheat flour   Most snack foods do not provide much fiber. Choose snacks with at least 2 grams of fiber per serving. Last Reviewed: March 2011 Corinne Herrlich, MS, MPH, RD   Updated: 3/29/2011   ·     Keep Your Memory [employer]       Many factors can affect your ability to remembera hectic lifestyle, aging, stress, chronic disease, and certain medicines. But, there are steps you can take to sharpen your mind and help preserve your memory. Challenge Your Brain   Regularly challenging your mind may help keeps it in top shape. Good mental exercises include:   Crossword puzzlesUse a dictionary if you need it; you will learn more that way. Brainteasers Try some! Crafts, such as wood working and sewing   Hobbies, such as gardening and building model airplanes   SocializingVisit old friends or join groups to meet new ones. Reading   Learning a new language   Taking a class, whether it be art history or praneeth chi   TravelingExperience the food, history, and culture of your destination   Learning to use a computer   Going to museums, the theater, or thought-provoking movies   Changing things in your daily life, such as reversing your pattern in the grocery store or brushing your teeth using your nondominant hand   Use Memory Aids   There is no need to remember every detail on your own.  These memory aids can help: Calendars and day planners   Electronic organizers to store all sorts of helpful informationThese devices can \"beep\" to remind you of appointments. A book of days to record birthdays, anniversaries, and other occasions that occur on the same date every year   Detailed \"to-do\" lists and strategically placed sticky notes   Quick \"study\" sessionsBefore a gathering, review who will be there so their names will be fresh in your mind. Establish routinesFor example, keep your keys, wallet, and umbrella in the same place all the time or take medicine with your 8:00 AM glass of juice   Live a Healthy Life   Many actions that will keep your body strong will do the same for your mind. For example:   Talk to Your Doctor About Herbs and Supplements    Malnutrition and vitamin deficiencies can impair your mental function. For example, vitamin B12 deficiency can cause a range of symptoms, including confusion. But, what if your nutritional needs are being met? Can herbs and supplements still offer a benefit? Researchers have investigated a range of natural remedies, such as ginkgo , ginseng , and the supplement phosphatidylserine (PS). So far, though, the evidence is inconsistent as to whether these products can improve memory or thinking. If you are interested in taking herbs and supplements, talk to your doctor first because they may interact with other medicines that you are taking. Exercise Regularly    Among the many benefits of regular exercise are increased blood flow to the brain and decreased risk of certain diseases that can interfere with memory function. One study found that even moderate exercise has a beneficial effect. Examples of \"moderate\" exercise include:   Playing 18 holes of golf once a week, without a cart   Playing tennis twice a week   Walking one mile per day   Manage Stress    It can be tough to remember what is important when your mind is cluttered. Make time for relaxation.  Choose activities that calm you down, and make it routine. Manage Chronic Conditions    Side effects of high blood pressure , diabetes, and heart disease can interfere with mental function. Many of the lifestyle steps discussed here can help manage these conditions. Strive to eat a healthy diet, exercise regularly, get stress under control, and follow your doctor's advice for your condition. Minimize Medications    Talk to your doctor about the medicines that you take. Some may be unnecessary. Also, healthy lifestyle habits may lower the need for certain drugs. Last Reviewed: April 2010 Isra Corona MD   Updated: 4/13/2010   ·     3 [address]       As we get older, changes in balance, gait, strength, vision, hearing, and cognition make even the most youthful senior more prone to accidents. Falls are one of the leading health risks for older people. This increased risk of falling is related to:   Aging process (eg, decreased muscle strength, slowed reflexes)   Higher incidence of chronic health problems (eg, arthritis, diabetes) that may limit mobility, agility or sensory awareness   Side effects of medicine (eg, dizziness, blurred vision)especially medicines like prescription pain medicines and drugs used to treat mental health conditions   Depending on the brittleness of your bones, the consequences of a fall can be serious and long lasting. Home Life   Research by the Association of Aging [facility]) shows that some home accidents among older adults can be prevented by making simple lifestyle changes and basic modifications and repairs to the home environment. Here are some lifestyle changes that experts recommend:   Have your hearing and vision checked regularly. Be sure to wear prescription glasses that are right for you. Speak to your doctor or pharmacist about the possible side effects of your medicines. A number of medicines can cause dizziness. If you have problems with sleep, talk to your doctor. Limit your intake of alcohol. If necessary, use a cane or walker to help maintain your balance. Wear supportive, rubber-soled shoes, even at home. If you live in a region that gets wintry weather, you may want to put special cleats on your shoes to prevent you from slipping on the snow and ice. Exercise regularly to help maintain muscle tone, agility, and balance. Always hold the banister when going up or down stairs. Also, use  bars when getting in or out of the bath or shower, or using the toilet. To avoid dizziness, get up slowly from a lying down position. Sit up first, dangling your legs for a minute or two before rising to a standing position. Overall Home Safety Check   According to the Consumer Product Safety Commision's \"Older Consumer Home Safety Checklist,\" it is important to check for potential hazards in each room. And remember, proper lighting is an essential factor in home safety. If you cannot see clearly, you are more likely to fall. Important questions to ask yourself include:   Are lamp, electric, extension, and telephone cords placed out of the flow of traffic and maintained in good condition? Have frayed cords been replaced? Are all small rugs and runners slip resistant? If not, you can secure them to the floor with a special double-sided carpet tape. Are smoke detectors properly locatedone on every floor of your home and one outside of every sleeping area? Are they in good working order? Are batteries replaced at least once a year? Do you have a well-maintained carbon monoxide detector outside every sleeping are in your home? Does your furniture layout leave plenty of space to maneuver between and around chairs, tables, beds, and sofas? Are hallways, stairs and passages between rooms well lit? Can you reach a lamp without getting out of bed? Are floor surfaces well maintained?  Shag rugs, high-pile carpeting, tile floors, and polished wood floors can be particularly slippery. Stairs should always have handrails and be carpeted or fitted with a non-skid tread. Is your telephone easily reachable. Is the cord safely tucked away? Room by Room   According to the Association of Aging, bathrooms and alcides are the two most potentially hazardous rooms in your home. In the Kitchen    Be sure your stove is in proper working order and always make sure burners and the oven are off before you go out or go to sleep. Keep pots on the back burners, turn handles away from the front of the stove, and keep stove clean and free of grease build-up. Kitchen ventilation systems and range exhausts should be working properly. Keep flammable objects such as towels and pot holders away from the cooking area except when in use. Make sure kitchen curtains are tied back. Move cords and appliances away from the sink and hot surfaces. If extension cords are needed, install wiring guides so they do not hang over the sink, range, or working areas. Look for coffee pots, kettles and toaster ovens with automatic shut-offs. Keep a mop handy in the kitchen so you can wipe up spills instantly. You should also have a small fire extinguisher. Arrange your kitchen with frequently used items on lower shelves to avoid the need to stand on a stepstool to reach them. Make sure countertops are well-lit to avoid injuries while cutting and preparing food. In the Bathroom    Use a non-slip mat or decals in the tub and shower, since wet, soapy tile or porcelain surfaces are extremely slippery. Make sure bathroom rugs are non-skid or tape them firmly to the floor. Bathtubs should have at least one, preferably two, grab bars, firmly attached to structural supports in the wall. (Do not use built-in soap holders or glass shower doors as grab bars.)    Tub seats fitted with non-slip material on the legs allow you to wash sitting down.  For people with limited mobility, bathtub transfer benches allow you to slide safely into the tub. Raised toilet seats and toilet safety rails are helpful for those with knee or hip problems. In the [facility]    Make sure you use a nightlight and that the area around your bed is clear of potential obstacles. Be careful with electric blankets and never go to sleep with a heating pad, which can cause serious burns even if on a low setting. Use fire-resistant mattress covers and pillows, and NEVER smoke in bed. Keep a phone next to the bed that is programmed to dial 911 at the push of a button. If you have a chronic condition, you may want to sign on with an automatic call-in service. Typically the system includes a small pendant that connects directly to an emergency medical voice-response system. You should also make arrangements to stay in contact with someonefriend, neighbor, family memberon a regular schedule. Fire Prevention   According to the [employer]. (Smoke Alarms for Every) [address] [place] [address], senior citizens are one of the two highest risk groups for death and serious injuries due to residential fires. When cooking, wear short-sleeved items, never a bulky long-sleeved robe. The [facility]'s Safety Checklist for Older Consumers emphasizes the importance of checking basements, garages, workshops and storage areas for fire hazards, such as volatile liquids, piles of old rags or clothing and overloaded circuits. Never smoke in bed or when lying down on a couch or recliner chair. Small portable electric or kerosene heaters are responsible for many home fires and should be used cautiously if at all. If you do use one, be sure to keep them away from flammable materials. In case of fire, make sure you have a pre-established emergency exit plan. Have a professional check your fireplace and other fuel-burning appliances yearly.     Helping Hands   Baby boomers entering the baker years will continue to see the development of new products to help older adults live safely and independently in spite of age-related changes. Making Life More Livable  , by Avelino Peng, lists over 1,000 products for \"living well in the mature years,\" such as bathing and mobility aids, household security devices, ergonomically designed knives and peelers, and faucet valves and knobs for temperature control. Medical supply stores and organizations are good sources of information about products that improve your quality of life and insure your safety.      Last Reviewed: November 2009 Shahid Govea MD   Updated: 3/7/2011     ·

## 2022-03-18 NOTE — PROGRESS NOTES
Medicare Annual Wellness Visit    Khoi Tipton is here for Medicare AWV    Assessment & Plan   Medicare annual wellness visit, subsequent      Recommendations for Preventive Services Due: see orders and patient instructions/AVS.  Recommended screening schedule for the next 5-10 years is provided to the patient in written form: see Patient Instructions/AVS.     No follow-ups on file. Subjective       Patient's complete Health Risk Assessment and screening values have been reviewed and are found in Flowsheets. The following problems were reviewed today and where indicated follow up appointments were made and/or referrals ordered. Positive Risk Factor Screenings with Interventions:    Fall Risk:  Do you feel unsteady or are you worried about falling? : (!) yes (uses walker)  2 or more falls in past year?: (!) yes  Fall with injury in past year?: (!) yes     Fall Risk Interventions:    · Patient saw PCP after AWV today.  Uses walker for stability            General Health and ACP:  General  In general, how would you say your health is?: Good  In the past 7 days, have you experienced any of the following: New or Increased Pain, New or Increased Fatigue, Loneliness, Social Isolation, Stress or Anger?: (!) Yes  Select all that apply: (!) New or Increased Pain,Stress (knee pain/arthritis, pain causing her stress)  Do you get the social and emotional support that you need?: Yes  Do you have a Living Will?: Yes    Advance Directives     Power of  Living Will ACP-Advance Directive ACP-Power of     Not on File Not on File Not on File Not on File      General Health Risk Interventions:  · Pain issues: PCP today for knee pain (arthritis)  · Stress: patient's comments regarding reasons for stress and/or anger: states knee pain causes her to get stressed  · No Living Will: ACP documents already completed- patient asked to provide copy to the office    Health Habits/Nutrition:     Physical Activity: Insufficiently Active    Days of Exercise per Week: 7 days    Minutes of Exercise per Session: 20 min     Have you lost any weight without trying in the past 3 months?: No  Body mass index: (!) 26.28  Have you seen the dentist within the past year?: (!) No    Health Habits/Nutrition Interventions:  · Inadequate physical activity:  patient is not ready to increase his/her physical activity level at this time  · Nutritional issues:  patient is not ready to address his/her nutritional/weight issues at this time  · Dental exam overdue:  patient encouraged to make appointment with his/her dentist    Hearing/Vision:  Do you or your family notice any trouble with your hearing that hasn't been managed with hearing aids?: (!) Yes (wears hearing aids, but still has trouble hearing)  Do you have difficulty driving, watching TV, or doing any of your daily activities because of your eyesight?: No  Have you had an eye exam within the past year?: (!) No  No exam data present    Hearing/Vision Interventions:  · Hearing concerns:  patient declines any further evaluation/treatment for hearing issues  · Vision concerns:  patient encouraged to make appointment with his/her eye specialist     ADLs:  In the past 7 days, did you need help from others to perform any of the following everyday activities: Eating, dressing, grooming, bathing, toileting, or walking/balance?: No  In the past 7 days, did you need help from others to take care of any of the following: Laundry, housekeeping, banking/finances, shopping, telephone use, food preparation, transportation, or taking medications?: (!) Yes  Select all that apply: (!) Laundry,Housekeeping,Food Preparation,Shopping,Transportation,Taking Medications,Banking/Finances (Pt has someone come in to do her laundry/housekeeping; shopping/banking/transportation daughters; has a nurse who does her meds/receives meals on wheels)    ADL Interventions:  · Patient declines any further evaluation/treatment for this issue          Objective   Vitals:    03/18/22 0902   BP: 110/62   Pulse: 82   Temp: 97.1 °F (36.2 °C)   SpO2: 97%   Weight: 178 lb (80.7 kg)   Height: 5' 9\" (1.753 m)      Body mass index is 26.29 kg/m². No Known Allergies  Prior to Visit Medications    Medication Sig Taking? Authorizing Provider   Cholecalciferol (VITAMIN D3) 50 MCG (2000 UT) TABS Take 1 tablet by mouth daily Yes Espinoza Julian MD   memantine (NAMENDA) 10 MG tablet Take 1 tablet by mouth 2 times daily Yes Espinoza Julian MD   rivastigmine (EXELON) 3 MG capsule Take 1 capsule by mouth daily Yes Espinoza Julian MD   apixaban (ELIQUIS) 5 MG TABS tablet TAKE 1 TABLET BY MOUTH 2 TIMES DAILY Yes Espinoza Julian MD   simvastatin (ZOCOR) 40 MG tablet Take 1 tablet by mouth nightly Yes Espinoza Julian MD   digoxin (LANOXIN) 125 MCG tablet Take 1 tablet by mouth daily Yes Espinoza Julian MD   metoprolol tartrate (LOPRESSOR) 25 MG tablet Take 0.5 tablets by mouth 2 times daily Yes Espinoza Julian MD   levothyroxine (SYNTHROID) 150 MCG tablet Take 1 tablet by mouth daily Yes Espinoza Julian MD   sertraline (ZOLOFT) 50 MG tablet Take 1 tablet by mouth daily Yes Espinoza Julian MD   vitamin E 400 UNIT capsule Take 1 capsule by mouth daily Yes Espinoza Julian MD   Handicap Placard MISC by Does not apply route Please dispense one placard, duration 5 years Yes Espinoza Julian MD   nystatin (MYCOSTATIN) 600379 UNIT/GM powder Apply 3 times daily.  Yes Espinoza Julian MD   acetaminophen (TYLENOL) 500 MG tablet Take 500 mg by mouth 3 times daily PRN Yes Historical Provider, MD   meclizine (TRAVEL SICKNESS) 25 MG CHEW Take 1 tablet by mouth as needed (dizziness)  Patient not taking: Reported on 2/10/2022  Espinoza Julian MD       [facility] (Including outside providers/suppliers regularly involved in providing care):   Patient Care Team:  Espinoza Julian MD as PCP - General (Internal Medicine)  Espinoza Julian MD as PCP - REHABILITATION [facility] Empaneled Provider    Reviewed and updated this visit:  Tobacco  Allergies  Meds  Med Hx  Surg Hx  Soc Hx  Fam Hx              I, Yareli Munoz LPN, 3/90/[id number], performed the documented evaluation under the direct supervision of the attending physician.

## 2022-03-18 NOTE — PROGRESS NOTES
Vince Drilling  Patient's  is 1935  Seen in office on 3/18/2022      SUBJECTIVE:  Eugenio mcgee 81 y. o.year old female presents today   Chief Complaint   Patient presents with    Follow-Up from Hospital     ed visit 3-, right knee pain, fall     Patient twisted her knee and fell  few times and developed swelling of the right knee  She went to the emergency room on 3/12/2022 x-ray showed no acute injury. Patient had swelling of the right knee. Pt is using walker. She is feeling better. Pt has a ace bandage on   No chest pain. No SOB. No cough  Denies any head injury. X ray of the knee showed no fracture. Taking medications regularly. No side effects noted. OBJECTIVE: /62   Pulse 82   Temp 97.1 °F (36.2 °C) (Temporal)   Resp 16   Wt 178 lb 12.8 oz (81.1 kg)   SpO2 97%   BMI 26.40 kg/m²     Wt Readings from Last 3 Encounters:   22 178 lb (80.7 kg)   22 178 lb 12.8 oz (81.1 kg)   22 180 lb (81.6 kg)      GENERAL: - Alert, oriented, pleasant, in no apparent distress. HEENT: - Conjunctiva pink, no scleral icterus. ENT clear. NECK: -Supple. No jugular venous distention noted. No masses felt,  CARDIOVASCULAR: - Normal S1 and S2    PULMONARY: - No respiratory distress. No wheezes or rales. ABDOMEN: - Soft and non-tender,no masses  ororganomegaly. EXTREMITIES: - No cyanosis, clubbing, or significant edema. SKIN: Skin is warm and dry. NEUROLOGICAL: - Cranial nerves II through XII are grossly intact. Right knee mild effusion. No tenderness. Pain on weight bearing. No calf tenderness. Neuro intact. IMPRESSION:    Encounter Diagnoses   Name Primary?  Sprain of right knee, unspecified ligament, initial encounter Yes    At high risk for falls     Effusion of right knee        ASSESSMENT/PLAN:  Pain in the right knee : X-ray showed no fracture. Will refer patient to orthopedic surgeon.   Take Tylenol for pain control  Fall precautions discussed    Orders Placed This Encounter   Procedures   [facility], DO Julius, Orthopedic Surgery, [address]/ CURRENT OUTPATIENT MED LIST         Mediations reviewed with the patient. Continue current medications. Appropriate prescriptions are addressed. After visit summeryprovided. Follow up as directed sooner if needed. Questions answered and patient verbalizes understanding. Call for any problems, questions, or concerns. No Known Allergies  Current Outpatient Medications   Medication Sig Dispense Refill    Cholecalciferol (VITAMIN D3) 50 MCG (2000 UT) TABS Take 1 tablet by mouth daily 90 tablet 1    memantine (NAMENDA) 10 MG tablet Take 1 tablet by mouth 2 times daily 180 tablet 1    rivastigmine (EXELON) 3 MG capsule Take 1 capsule by mouth daily 90 capsule 1    apixaban (ELIQUIS) 5 MG TABS tablet TAKE 1 TABLET BY MOUTH 2 TIMES DAILY 180 tablet 1    simvastatin (ZOCOR) 40 MG tablet Take 1 tablet by mouth nightly 90 tablet 1    digoxin (LANOXIN) 125 MCG tablet Take 1 tablet by mouth daily 90 tablet 1    metoprolol tartrate (LOPRESSOR) 25 MG tablet Take 0.5 tablets by mouth 2 times daily 90 tablet 1    levothyroxine (SYNTHROID) 150 MCG tablet Take 1 tablet by mouth daily 90 tablet 1    sertraline (ZOLOFT) 50 MG tablet Take 1 tablet by mouth daily 90 tablet 1    vitamin E 400 UNIT capsule Take 1 capsule by mouth daily 90 capsule 1    nystatin (MYCOSTATIN) 862555 UNIT/GM powder Apply 3 times daily. 1 Bottle 2    acetaminophen (TYLENOL) 500 MG tablet Take 500 mg by mouth 3 times daily PRN      Handicap Placard MISC by Does not apply route Please dispense one placard, duration 5 years 1 each 0    meclizine (TRAVEL SICKNESS) 25 MG CHEW Take 1 tablet by mouth as needed (dizziness) (Patient not taking: Reported on 2/10/2022) 30 tablet 0     No current facility-administered medications for this visit.      Past Medical History:   Diagnosis Date    Acquired hypothyroidism 12/9/2019    Patient is taking Synthroid    Chronic midline low back pain without sciatica 11/20/2019    Dementia without behavioral disturbance ([facility] [address] 10/22/2019    Patient has dementia. She is taking Namenda Patient is seeing urologist Dr. Maria Del Carmen Rice Gait disturbance 10/22/2019    Patient uses walker to ambulate    History of TIA (transient ischemic attack) 12/9/2019    Patient has a history of TIA and also recently may have had TIA. Patient is on Eliquis. Sees neurologist Dr. Maria Del Carmen Rice Major depressive disorder with single episode, in full remission ([facility] [address] 12/9/2019    Patient is taking Zoloft and that is helping.  Mixed hyperlipidemia 12/9/2019    Patient is on simvastatin    Persistent atrial fibrillation ([address] 10/22/2019    A. fib on recently in October 2019 . Patient is on metoprolol 25 mg twice a day and Eliquis    PFO (patent foramen ovale) 12/9/2019    PFO seen on echocardiogram in 2016    Pneumonia due to infectious organism 10/22/2019    Patient had pneumonia and pleural effusion during admission in October 2019.  A repeat CT scan of the chest will be done Patient is also seen pulmonologist     Past Surgical History:   Procedure Laterality Date    HYSTERECTOMY       Social History     Tobacco Use    Smoking status: Never Smoker    Smokeless tobacco: Never Used   Substance Use Topics    Alcohol use: No       LAB REVIEW:  CBC:   Lab Results   Component Value Date    WBC 6.6 02/10/2022    HGB 15.2 02/10/2022    HCT 45.9 02/10/2022     02/10/2022     Lipids:   Lab Results   Component Value Date    HDL 35 (L) 03/02/2022    LDLCALC 71 03/02/2022    LDLDIRECT 100 (H) 01/03/2020    TRIGLYCFAST 199 (H) 03/02/2022    CHOLFAST 146 03/02/2022     Renal:   Lab Results   Component Value Date    BUN 26 02/10/2022    CREATININE 0.9 02/10/2022     02/10/2022    K 4.1 02/10/2022    ALT 17 02/10/2022    AST 21 02/10/2022    GLUCOSE 149 02/10/2022    GLUF 134 07/03/2014 PT/INR:   Lab Results   Component Value Date    INR 1.02 07/04/2014     A1C:   Lab Results   Component Value Date    LABA1C 5.7 05/15/2020           Den Collins MD, 3/18/2022 , 9:17 AM   On the basis of positive falls risk screening, assessment and plan is as follows: home safety tips provided.

## 2022-03-29 ENCOUNTER — APPOINTMENT (OUTPATIENT)
Dept: GENERAL RADIOLOGY | Age: 87
End: 2022-03-29
Payer: MEDICARE

## 2022-03-29 ENCOUNTER — HOSPITAL ENCOUNTER (EMERGENCY)
Age: 87
Discharge: HOME | End: 2022-03-29
Attending: EMERGENCY MEDICINE
Payer: MEDICARE

## 2022-03-29 VITALS
HEART RATE: 74 BPM | OXYGEN SATURATION: 95 % | TEMPERATURE: 96.7 F | SYSTOLIC BLOOD PRESSURE: 118 MMHG | RESPIRATION RATE: 18 BRPM | DIASTOLIC BLOOD PRESSURE: 68 MMHG

## 2022-03-29 DIAGNOSIS — S82.61XA CLOSED DISPLACED FRACTURE OF LATERAL MALLEOLUS OF RIGHT FIBULA, INITIAL ENCOUNTER: ICD-10-CM

## 2022-03-29 DIAGNOSIS — S92.301A CLOSED NONDISPLACED FRACTURE OF METATARSAL BONE OF RIGHT FOOT, UNSPECIFIED METATARSAL, INITIAL ENCOUNTER: Primary | ICD-10-CM

## 2022-03-29 PROCEDURE — 6370000000 HC RX 637 (ALT 250 FOR IP): Performed by: EMERGENCY MEDICINE

## 2022-03-29 PROCEDURE — 99285 EMERGENCY DEPT VISIT HI MDM: CPT

## 2022-03-29 PROCEDURE — 73610 X-RAY EXAM OF ANKLE: CPT

## 2022-03-29 PROCEDURE — 73630 X-RAY EXAM OF FOOT: CPT

## 2022-03-29 RX ORDER — ACETAMINOPHEN 500 MG
500 TABLET ORAL ONCE
Status: COMPLETED | OUTPATIENT
Start: 2022-03-29 | End: 2022-03-29

## 2022-03-29 RX ADMIN — ACETAMINOPHEN 500 MG: 500 TABLET ORAL at 13:41

## 2022-03-29 ASSESSMENT — PAIN SCALES - GENERAL
PAINLEVEL_OUTOF10: 10
PAINLEVEL_OUTOF10: 8
PAINLEVEL_OUTOF10: 10

## 2022-03-29 ASSESSMENT — PAIN - FUNCTIONAL ASSESSMENT: PAIN_FUNCTIONAL_ASSESSMENT: 0-10

## 2022-03-29 ASSESSMENT — PAIN DESCRIPTION - ORIENTATION: ORIENTATION: RIGHT

## 2022-03-29 ASSESSMENT — PAIN DESCRIPTION - LOCATION
LOCATION: ANKLE
LOCATION: FOOT;ANKLE

## 2022-03-29 ASSESSMENT — PAIN DESCRIPTION - PAIN TYPE: TYPE: ACUTE PAIN

## 2022-03-29 NOTE — ED NOTES
Pt discharge in wheelchair with family at side. Pt was placed in walker boot and education provided on boot and pcp follow up, pt denies any questions.       Dayana Ortiz RN  03/29/22 2625

## 2022-03-29 NOTE — ED TRIAGE NOTES
Pt fell getting dressed this morning. Pt c/o of R ankle pain. Pt denies hitting head and denies LOC.

## 2022-03-29 NOTE — ED PROVIDER NOTES
Triage Chief Complaint:   Patricia Rob Aiyana Ordonez getting dressed this morning. R ankle pain and swelling. )    Hoh:  Khoi Tipton is a 81 y.o. female that presents to the ED with acute pain in the right foot and ankle after she fell from home. She misjudges up follow-up with her pants in the restroom and fell. She does have chronic A. fib denies any rapid irregular heart rate no chest pain cough no shortness of breath. No head strike. Pain is moderate that she is requesting Tylenol. No other acute symptoms        Past Medical History:   Diagnosis Date    Acquired hypothyroidism 12/9/2019    Patient is taking Synthroid    Chronic midline low back pain without sciatica 11/20/2019    Dementia without behavioral disturbance ([address] 10/22/2019    Patient has dementia. She is taking Namenda Patient is seeing urologist Dr. Geremias Hutton Gait disturbance 10/22/2019    Patient uses walker to ambulate    History of TIA (transient ischemic attack) 12/9/2019    Patient has a history of TIA and also recently may have had TIA. Patient is on Eliquis. Sees neurologist Dr. Geremias Hutotn Major depressive disorder with single episode, in full remission ([address] 12/9/2019    Patient is taking Zoloft and that is helping.  Mixed hyperlipidemia 12/9/2019    Patient is on simvastatin    Persistent atrial fibrillation ([address] 10/22/2019    A. fib on recently in October 2019 . Patient is on metoprolol 25 mg twice a day and Eliquis    PFO (patent foramen ovale) 12/9/2019    PFO seen on echocardiogram in 2016    Pneumonia due to infectious organism 10/22/2019    Patient had pneumonia and pleural effusion during admission in October 2019.  A repeat CT scan of the chest will be done Patient is also seen pulmonologist     Past Surgical History:   Procedure Laterality Date    HYSTERECTOMY       Family History   Problem Relation Age of Onset    Alcohol Abuse Mother     Obesity Mother     No Known Problems Father      Social History Socioeconomic History    Marital status:      Spouse name: Not on file    Number of children: Not on file    Years of education: Not on file    Highest education level: Not on file   Occupational History    Not on file   Tobacco Use    Smoking status: Never Smoker    Smokeless tobacco: Never Used   Vaping Use    Vaping Use: Never used   Substance and Sexual Activity    Alcohol use: No    Drug use: No    Sexual activity: Not on file   Other Topics Concern    Not on file   Social History Narrative    Not on file     Social Determinants of Health     Financial Resource Strain: Low Risk     Difficulty of Paying Living Expenses: Not hard at all   Food Insecurity: No Food Insecurity    Worried About [address] in the Last Year: Never true    [address] [employer] in the Last Year: Never true   Transportation Needs:     Lack of Transportation (Medical): Not on file    Lack of Transportation (Non-Medical): Not on file   Physical Activity: Insufficiently Active    Days of Exercise per Week: 7 days    Minutes of Exercise per Session: 20 min   Stress:     Feeling of Stress : Not on file   Social Connections:     Frequency of Communication with Friends and Family: Not on file    Frequency of Social Gatherings with Friends and Family: Not on file    Attends Scientologist Services: Not on file    Active Member of Clubs or Organizations: Not on file    Attends Club or Organization Meetings: Not on file    Marital Status: Not on file   Intimate Partner Violence:     Fear of Current or Ex-Partner: Not on file    Emotionally Abused: Not on file    Physically Abused: Not on file    Sexually Abused: Not on file   Housing Stability:     Unable to Pay for Housing in the Last Year: Not on file    Number of [place] in the Last Year: Not on file    Unstable Housing in the Last Year: Not on file     No current facility-administered medications for this encounter.      Current Outpatient Medications   Medication Sig Dispense Refill    diclofenac sodium (VOLTAREN) 1 % GEL Apply 4 g topically 4 times daily 100 g 1    Cholecalciferol (VITAMIN D3) 50 MCG (2000 UT) TABS Take 1 tablet by mouth daily 90 tablet 1    memantine (NAMENDA) 10 MG tablet Take 1 tablet by mouth 2 times daily 180 tablet 1    rivastigmine (EXELON) 3 MG capsule Take 1 capsule by mouth daily 90 capsule 1    apixaban (ELIQUIS) 5 MG TABS tablet TAKE 1 TABLET BY MOUTH 2 TIMES DAILY 180 tablet 1    simvastatin (ZOCOR) 40 MG tablet Take 1 tablet by mouth nightly 90 tablet 1    digoxin (LANOXIN) 125 MCG tablet Take 1 tablet by mouth daily 90 tablet 1    metoprolol tartrate (LOPRESSOR) 25 MG tablet Take 0.5 tablets by mouth 2 times daily 90 tablet 1    levothyroxine (SYNTHROID) 150 MCG tablet Take 1 tablet by mouth daily 90 tablet 1    sertraline (ZOLOFT) 50 MG tablet Take 1 tablet by mouth daily 90 tablet 1    vitamin E 400 UNIT capsule Take 1 capsule by mouth daily 90 capsule 1    Handicap Placard MISC by Does not apply route Please dispense one placard, duration 5 years 1 each 0    nystatin (MYCOSTATIN) 799190 UNIT/GM powder Apply 3 times daily. 1 Bottle 2    meclizine (TRAVEL SICKNESS) 25 MG CHEW Take 1 tablet by mouth as needed (dizziness) (Patient not taking: Reported on 2/10/2022) 30 tablet 0    acetaminophen (TYLENOL) 500 MG tablet Take 500 mg by mouth 3 times daily PRN       No Known Allergies      ROS:    Review of Systems   Musculoskeletal: Positive for gait problem and joint swelling. All other systems reviewed and are negative. Nursing Notes Reviewed    Physical Exam:      ED Triage Vitals [03/29/22 1321]   Enc Vitals Group      /66      Pulse 77      Resp 18      Temp 96.7 °F (35.9 °C)      Temp Source Oral      SpO2 95 %      Weight       Height       Head Circumference       Peak Flow       Pain Score       Pain Loc       Pain Edu? Excl. in [address]?         Physical Exam  Vitals and nursing note reviewed. Constitutional:       Appearance: She is well-developed. HENT:      Head: Normocephalic and atraumatic. Eyes:      Pupils: Pupils are equal, round, and reactive to light. Musculoskeletal:      Cervical back: Normal range of motion and neck supple. Right ankle: Tenderness present over the lateral malleolus. Decreased range of motion. Right Achilles Tendon: Normal.      Right foot: Decreased range of motion. Swelling, tenderness and bony tenderness present. No crepitus. Normal pulse. Skin:     General: Skin is warm and dry. Neurological:      General: No focal deficit present. Mental Status: She is alert and oriented to person, place, and time. Mental status is at baseline. Cranial Nerves: No cranial nerve deficit. Sensory: No sensory deficit. Motor: No weakness. Coordination: Coordination normal.   Psychiatric:         Mood and Affect: Mood normal.         Behavior: Behavior normal.         I have reviewed and interpreted all of the currently available lab results from this visit (ifapplicable):  No results found for this visit on 03/29/22.    Radiographs (if obtained):  [] The following radiograph wasinterpreted by myself in the absence of a radiologist:   [] Radiologist's Report Reviewed:  XR FOOT RIGHT (MIN 3 VIEWS)    (Results Pending)   XR ANKLE RIGHT (MIN 3 VIEWS)    (Results Pending)         EKG (if obtained): (All EKG's are interpreted by myself in the absence of a cardiologist)    Chart review shows recent radiographs:  XR KNEE RIGHT (3 VIEWS)    Result Date: 3/12/2022  EXAMINATION: THREE XRAY VIEWS OF THE RIGHT KNEE 3/12/2022 12:59 pm COMPARISON: June 5, 2020 HISTORY: ORDERING SYSTEM PROVIDED HISTORY: fall,l swelling TECHNOLOGIST PROVIDED HISTORY: Reason for exam:->fall,l swelling Reason for Exam: fall,l swelling Additional signs and symptoms: fall,l swelling Relevant Medical/Surgical History: fall,l swelling FINDINGS: No acute fracture or dislocation patient. The patient was advised that persistent or worsening symptoms would require further evaluation.                Clinical Impression:  1. Closed nondisplaced fracture of metatarsal bone of right foot, unspecified metatarsal, initial encounter    2. Closed displaced fracture of lateral malleolus of right fibula, initial encounter      Disposition referral (if applicable):  Natividad Rebolledo DO  [address] [facility] [address] [facility] [address] [place] [address] [place] [address]  [phone]    Schedule an appointment as soon as possible for a visit in 2 days      Disposition medications (if applicable):  New Prescriptions    No medications on file           Jaden Drake DO, FACEP      Comment: Please note this report has been produced using speech recognition software and maycontain errors related to that system including errors in grammar, punctuation, and spelling, as well as words and phrases that may be inappropriate. If there are any questions or concerns please feel free to contact thedictating provider for clarification.         Inocencio Perez DO  03/29/22 1173

## 2022-03-29 NOTE — ED NOTES
Elizabet applied bibiana pt right foot with instructions of care, pt denies any questions.       Ifrah Pena, RN  03/29/22 7636

## 2022-03-31 ENCOUNTER — OFFICE VISIT (OUTPATIENT)
Dept: ORTHOPEDIC SURGERY | Age: 87
End: 2022-03-31
Payer: MEDICARE

## 2022-03-31 VITALS
WEIGHT: 178 LBS | OXYGEN SATURATION: 97 % | RESPIRATION RATE: 16 BRPM | BODY MASS INDEX: 26.36 KG/M2 | HEART RATE: 76 BPM | HEIGHT: 69 IN

## 2022-03-31 DIAGNOSIS — S92.334A CLOSED NONDISPLACED FRACTURE OF THIRD METATARSAL BONE OF RIGHT FOOT, INITIAL ENCOUNTER: Primary | ICD-10-CM

## 2022-03-31 DIAGNOSIS — S92.344A CLOSED NONDISPLACED FRACTURE OF FOURTH METATARSAL BONE OF RIGHT FOOT, INITIAL ENCOUNTER: ICD-10-CM

## 2022-03-31 DIAGNOSIS — S92.324A CLOSED NONDISPLACED FRACTURE OF SECOND METATARSAL BONE OF RIGHT FOOT, INITIAL ENCOUNTER: ICD-10-CM

## 2022-03-31 PROCEDURE — 1036F TOBACCO NON-USER: CPT | Performed by: PHYSICIAN ASSISTANT

## 2022-03-31 PROCEDURE — 99213 OFFICE O/P EST LOW 20 MIN: CPT | Performed by: PHYSICIAN ASSISTANT

## 2022-03-31 PROCEDURE — 1090F PRES/ABSN URINE INCON ASSESS: CPT | Performed by: PHYSICIAN ASSISTANT

## 2022-03-31 PROCEDURE — G8427 DOCREV CUR MEDS BY ELIG CLIN: HCPCS | Performed by: PHYSICIAN ASSISTANT

## 2022-03-31 PROCEDURE — G8484 FLU IMMUNIZE NO ADMIN: HCPCS | Performed by: PHYSICIAN ASSISTANT

## 2022-03-31 PROCEDURE — 4040F PNEUMOC VAC/ADMIN/RCVD: CPT | Performed by: PHYSICIAN ASSISTANT

## 2022-03-31 PROCEDURE — 1123F ACP DISCUSS/DSCN MKR DOCD: CPT | Performed by: PHYSICIAN ASSISTANT

## 2022-03-31 PROCEDURE — G8417 CALC BMI ABV UP PARAM F/U: HCPCS | Performed by: PHYSICIAN ASSISTANT

## 2022-03-31 PROCEDURE — G8400 PT W/DXA NO RESULTS DOC: HCPCS | Performed by: PHYSICIAN ASSISTANT

## 2022-03-31 ASSESSMENT — ENCOUNTER SYMPTOMS
EYES NEGATIVE: 1
RESPIRATORY NEGATIVE: 1
GASTROINTESTINAL NEGATIVE: 1

## 2022-03-31 NOTE — PATIENT INSTRUCTIONS
Discontinue the came boot and transition into the post op shoe  Weightbearing as tolerated in the cam boot  Continue range of motion as tolerated with right knee  Ice and elevate as needed  Tylenol or Motrin for pain  Follow up in 2 weeks for the right foot - repeat x-rays

## 2022-03-31 NOTE — PROGRESS NOTES
Review of Systems   Constitutional: Negative. HENT: Negative. Eyes: Negative. Respiratory: Negative. Cardiovascular: Negative. Gastrointestinal: Negative. Genitourinary: Negative. Musculoskeletal: Positive for arthralgias and myalgias. Skin: Negative. Neurological: Negative. Psychiatric/Behavioral: Negative. Eva De La Garza is a 81 y.o. female that presents the office today with complaints of right foot pain that happened after she was standing and her knee gave out and she rolled the ankle and foot. She initially had an appointment made for her knee which she had an injection in about 2 years ago but she is not really complaining of knee pain at this point and its more her foot that is the problem. Past Medical History:   Diagnosis Date    Acquired hypothyroidism 12/9/2019    Patient is taking Synthroid    Chronic midline low back pain without sciatica 11/20/2019    Dementia without behavioral disturbance ([address] 10/22/2019    Patient has dementia. She is taking Namenda Patient is seeing urologist Dr. Mely Delarosa Gait disturbance 10/22/2019    Patient uses walker to ambulate    History of TIA (transient ischemic attack) 12/9/2019    Patient has a history of TIA and also recently may have had TIA. Patient is on Eliquis. Sees neurologist Dr. Mely Delarosa Major depressive disorder with single episode, in full remission ([address] 12/9/2019    Patient is taking Zoloft and that is helping.  Mixed hyperlipidemia 12/9/2019    Patient is on simvastatin    Persistent atrial fibrillation ([address] 10/22/2019    A. fib on recently in October 2019 . Patient is on metoprolol 25 mg twice a day and Eliquis    PFO (patent foramen ovale) 12/9/2019    PFO seen on echocardiogram in 2016    Pneumonia due to infectious organism 10/22/2019    Patient had pneumonia and pleural effusion during admission in October 2019.  A repeat CT scan of the chest will be done Patient is also seen pulmonologist       Past Surgical History:   Procedure Laterality Date    HYSTERECTOMY         Family History   Problem Relation Age of Onset    Alcohol Abuse Mother     Obesity Mother     No Known Problems Father        Social History     Socioeconomic History    Marital status:      Spouse name: None    Number of children: None    Years of education: None    Highest education level: None   Occupational History    None   Tobacco Use    Smoking status: Never Smoker    Smokeless tobacco: Never Used   Vaping Use    Vaping Use: Never used   Substance and Sexual Activity    Alcohol use: No    Drug use: No    Sexual activity: None   Other Topics Concern    None   Social History Narrative    None     Social Determinants of Health     Financial Resource Strain: Low Risk     Difficulty of Paying Living Expenses: Not hard at all   Food Insecurity: No Food Insecurity    Worried About Running Out of Food in the Last Year: Never true    [place] of Food in the Last Year: Never true   Transportation Needs:     Lack of Transportation (Medical): Not on file    Lack of Transportation (Non-Medical):  Not on file   Physical Activity: Insufficiently Active    Days of Exercise per Week: 7 days    Minutes of Exercise per Session: 20 min   Stress:     Feeling of Stress : Not on file   Social Connections:     Frequency of Communication with Friends and Family: Not on file    Frequency of Social Gatherings with Friends and Family: Not on file    Attends Orthodoxy Services: Not on file    Active Member of Clubs or Organizations: Not on file    Attends Club or Organization Meetings: Not on file    Marital Status: Not on file   Intimate Partner Violence:     Fear of Current or Ex-Partner: Not on file    Emotionally Abused: Not on file    Physically Abused: Not on file    Sexually Abused: Not on file   Housing Stability:     Unable to Pay for Housing in the Last Year: Not on file    Number of Places nourished. Body habitus is normal     Lymph:  no lymphedema in bilateral lower extremities     Skin intact in bilateral lower extremities with no ulcerations, lesions, rash, erythema. Vascular: There are mild to moderate varicosities in bilateral lower extremities, sensation intact to light touch over bilateral lower extremities. Right foot  Inspection: Moderate to severe ecchymosis throughout the dorsum of the mid and forefoot. Moderate edema in the forefoot. Palpation: Moderate tenderness palpation over the forefoot and midfoot. Mild tenderness palpation over the lateral malleolus. Range of motion: Not tested due to fracture  Strength: Not tested due to fracture      Outsiderecord review: ER note and x-rays reviewed    Imaging studies:  X-rays of the right foot reviewed show fractures at the base of the second, third, fourth metatarsals with minimal displacement. Patient does have impaired mobility due to the fractures in her right foot. She will require a wheelchair to help her get around due to the fractures in her foot. Impression: Right foot second metatarsal fracture, third metatarsal fracture, fourth metatarsal fracture      Plan:    I explained that she likely does need a wheelchair to help with ambulation as I do want her to try to limit her weightbearing on this right foot. I did give her a postoperative shoe versus the large cam boot that she was then because she was not compliant with the boot. She needs to wear the shoe if she is trying to ambulate  Will follow up in 2 weeks for x-rays of the right foot. A steroid injection was not given in her knee today but should her right knee bother her in the future and she wants a steroid injection we could consider that.

## 2022-04-04 ENCOUNTER — TELEMEDICINE (OUTPATIENT)
Dept: INTERNAL MEDICINE CLINIC | Age: 87
End: 2022-04-04
Payer: MEDICARE

## 2022-04-04 DIAGNOSIS — R39.9 UTI SYMPTOMS: Primary | ICD-10-CM

## 2022-04-04 PROCEDURE — 99442 PR PHYS/QHP TELEPHONE EVALUATION 11-20 MIN: CPT | Performed by: PHYSICIAN ASSISTANT

## 2022-04-04 PROCEDURE — 81002 URINALYSIS NONAUTO W/O SCOPE: CPT | Performed by: PHYSICIAN ASSISTANT

## 2022-04-04 RX ORDER — NITROFURANTOIN 25; 75 MG/1; MG/1
100 CAPSULE ORAL 2 TIMES DAILY
Qty: 14 CAPSULE | Refills: 0 | Status: SHIPPED | OUTPATIENT
Start: 2022-04-04 | End: 2022-04-11

## 2022-04-04 NOTE — Clinical Note
Sukh Mccann;    Pt's family is inquiring about long term  care / rehab stay options as they are somewhat unable to care for patient due to chronic issues as well as recent orthopedic injury etc; pt currently has home health however daughter reports this is not enough; would appreciated any assistance or recommendations. Please let me know if you have any other questions or concerns.   Thanks,  Geoff Julian

## 2022-04-04 NOTE — PROGRESS NOTES
Gavnio Richardson (:  1935) is a Established patient, here for evaluation of the following:       TELEHEALTH EVALUATION -- Audio/Visual (During [id number]-[id number] public health emergency)   -Patient unable to be visualized during virtual visit; visit was conducted through patient's daughter who relayed information about patient's current condition; daughter reports patient is unable to provide medical history therefore she provides current information. Assessment & Plan   Below is the assessment and plan developed based on review of pertinent history, physical exam, labs, studies, and medications. 1. UTI symptoms   -Patient has been successfully treated with Manual Punches in the past; advised daughter to obtain urine sample from patient at home and drop off at clinic so we can perform urinalysis and send off for culture. Daughter states understanding; reviewed proper use, monitoring, side effects with Macrobid; recommend probiotic, take with food etc.   -Advised daughter that patient's symptoms may be be from other issue and if patient does not improve within the next several days, patient will need to be seen in person either in emergency department or walk-in clinic for further evaluation/consideration of further work-up etc.  Daughter states understanding.  -     nitrofurantoin, macrocrystal-monohydrate, (MACROBID) 100 MG capsule; Take 1 capsule by mouth 2 times daily for 7 days, Disp-14 capsule, R-0Normal  -     POCT Urinalysis no Micro  -     Culture, Urine    Return in about 1 week (around 2022), or if symptoms worsen or fail to improve, for Follow Up. Subjective   HPI   Gavino Richardson is a pleasant 81 y.o. female presenting to clinic today for UTI concern.   Daughter reports that patient has been displaying abnormal behavior, anger, agitation, mild disorientation over the past several weeks; daughter reports that patient is mostly bedbound due to chronic debility as well as recent fracture and therefore wears depends diapers regularly and is unable to care for self; patient does receive care from multiple family members however this has been more difficult recently due to orthopedic injury. Daughter reports patient was displaying these abnormal behaviors during recent ER visit however they forgot to inquire about urine sample; daughter reports performing at home urine test which test for nitrites and leukocytes which both came back positive. Daughter reports patient has acted similarly during prior UTIs.    3/29/22 XR Foot Right:  Impression   1. Suspect acute chip fracture of the right lateral malleolus with adjacent   soft tissue edema. 2. Acute fractures of the right 2nd, 3rd, and 4th metatarsal bases. Review of Systems       Objective   Patient-Reported Vitals  No data recorded     Physical Exam             Jen Bailon, was evaluated through a synchronous (real-time) audio-video encounter. The patient (or guardian if applicable) is aware that this is a billable service, which includes applicable co-pays. This Virtual Visit was conducted with patient's (and/or legal guardian's) consent. The visit was conducted pursuant to the emergency declaration under the [address] [place] [address] authority and the [employer] and [employer] General Act. Patient identification was verified, and a caregiver was present when appropriate. The patient was located at home in a state where the provider was licensed to provide care.        --ESEQUIEL Jiang

## 2022-04-05 ENCOUNTER — NURSE ONLY (OUTPATIENT)
Dept: INTERNAL MEDICINE CLINIC | Age: 87
End: 2022-04-05
Payer: MEDICARE

## 2022-04-05 ENCOUNTER — CARE COORDINATION (OUTPATIENT)
Dept: CARE COORDINATION | Age: 87
End: 2022-04-05

## 2022-04-05 DIAGNOSIS — R39.9 UTI SYMPTOMS: Primary | ICD-10-CM

## 2022-04-05 LAB
BILIRUBIN, POC: ABNORMAL
BLOOD URINE, POC: ABNORMAL
CLARITY, POC: ABNORMAL
COLOR, POC: YELLOW
GLUCOSE URINE, POC: ABNORMAL
KETONES, POC: ABNORMAL
LEUKOCYTE EST, POC: ABNORMAL
NITRITE, POC: ABNORMAL
PH, POC: 6
PROTEIN, POC: ABNORMAL
SPECIFIC GRAVITY, POC: >=1.03
UROBILINOGEN, POC: 0.2

## 2022-04-05 PROCEDURE — 81002 URINALYSIS NONAUTO W/O SCOPE: CPT | Performed by: PHYSICIAN ASSISTANT

## 2022-04-05 NOTE — CARE COORDINATION
Ambulatory Care Coordination Note  4/5/2022   Risk Score: 4  Charlson 10 Year Mortality Risk Score: 79%     ACC: Judie Lizarraga, KING    Summary Note: Call to pt//daughter for Tessa Reddy. Reports they Picked up atb script and are dropping off urine to walk in clinic today for testing. Advised daughter Kip Crews of reason for call and she repots pt Has aide that provides meal and housework, but pt needs more help that that. Lives alone and needs someone with her as has f ankle and toes. They are now afraid of falls. Pt has [facility]  [employer] . Daughter expresses possible interest in rehab//short term stay at [facility] daughter pt agreeable. Reports pt really cant do anything, cant clean herself. Is in wheelchair past few days. Difficulty with toileting. Nurse fills med box, family puts in cup am and pm and watches pt take meds. . Reports foot black and hot yesterday. PT looked at it yesterday, will take to ER if pt gets worse. Declines scheduling visit with walk in at this time. Plan: will forward to pcp office and contact Marco Antonio Downey for addl pt assistance. Call to Jean-Pierre Downey and advised of above info/need for additional help. Reports She will call udayt and reach out. Will continue to follow. Ambulatory Care Coordination Assessment    Care Coordination Protocol  Program Enrollment: Complex Care  Referral from Primary Care Provider: No  Week 1 - Initial Assessment     Do you have all of your prescriptions and are they filled?: Yes  Barriers to medication adherence: None  Are you able to afford your medications?: Yes  How often do you have trouble taking your medications the way you have been told to take them?: I always take them as prescribed.      Do you have Home O2 Therapy?: No      Ability to seek help/take action for Emergent Urgent situations i.e. fire, crime, inclement weather or health crisis.: Needs Assistance  Ability to ambulate to restroom: Dependent  Ability handle personal hygeine needs (bathing/dressing/grooming): Dependent  Ability to manage Medications: Dependent  Ability to prepare Food Preparation: Needs Assistance  Ability to maintain home (clean home, laundry): Needs Assistance  Ability to drive and/or has transportation: Dependent  Ability to do shopping: Dependent  Ability to manage finances: Needs Assistance  Is patient able to live independently?: No     Current Housing: Private Residence        Per the Fall Risk Screening, did the patient have 2 or more falls or 1 fall with injury in the past year?: Yes  How often do you think you are about to fall and you do NOT fall? For example, you grab something to stabilize yourself or hold onto a wall/furniture?: Occasionally  Use of a Mobility Aid: Yes  Difficulty walking/impaired gait: Yes  Issues with feet or shoes like numbness, edema, shoes not fitting: Yes  Changes in vision, poor vision or poor lighting in environment: No  Dizziness: No  Other Fall Risk: No     Frequent urination at night?: No        Suggested Interventions and Community Resources                  Prior to Admission medications    Medication Sig Start Date End Date Taking?  Authorizing Provider   nitrofurantoin, macrocrystal-monohydrate, (MACROBID) 100 MG capsule Take 1 capsule by mouth 2 times daily for 7 days 4/4/22 4/11/22  ESEQUIEL Baker   diclofenac sodium (VOLTAREN) 1 % GEL Apply 4 g topically 4 times daily 3/18/22   Meeta Ackerman MD   Cholecalciferol (VITAMIN D3) 50 MCG (2000 UT) TABS Take 1 tablet by mouth daily 2/10/22   Meeta Ackerman MD   memantine [facility]) 10 MG tablet Take 1 tablet by mouth 2 times daily 2/10/22   Meeta Ackerman MD   rivastigmine (EXELON) 3 MG capsule Take 1 capsule by mouth daily 2/10/22   Meeta Ackerman MD   apixaban (ELIQUIS) 5 MG TABS tablet TAKE 1 TABLET BY MOUTH 2 TIMES DAILY 2/10/22   Meeta Ackerman MD   simvastatin (ZOCOR) 40 MG tablet Take 1 tablet by mouth nightly 2/10/22   MD brad Saunders) 125 MCG tablet Take 1 tablet by mouth daily 2/10/22   Shai Munroe MD   metoprolol tartrate (LOPRESSOR) 25 MG tablet Take 0.5 tablets by mouth 2 times daily 2/10/22   Shai Munroe MD   levothyroxine (SYNTHROID) 150 MCG tablet Take 1 tablet by mouth daily 2/10/22   Shai Munroe MD   sertraline (ZOLOFT) 50 MG tablet Take 1 tablet by mouth daily 2/10/22   Shai Munroe MD   vitamin E 400 UNIT capsule Take 1 capsule by mouth daily 2/10/22   Shai Munroe MD   Handicap Placard MISC by Does not apply route Please dispense one placard, duration 5 years 2/10/22   Shai Munroe MD   nystatin (MYCOSTATIN) 200426 UNIT/GM powder Apply 3 times daily.  5/27/21   Shai Munroe MD   meclizine (TRAVEL SICKNESS) 25 MG CHEW Take 1 tablet by mouth as needed (dizziness) 10/20/20   Shai Munroe MD   acetaminophen (TYLENOL) 500 MG tablet Take 500 mg by mouth 3 times daily PRN    Historical Provider, MD       Future Appointments   Date Time Provider Cleo Philip   4/13/2022  1:30 PM Liliam Rush PA-C [facility]   5/26/2022  4:30 PM Shai Munroe MD [facility]   3/24/2023  9:00 AM SCHEDULE, [facility] LPN [facility]

## 2022-04-07 ENCOUNTER — CARE COORDINATION (OUTPATIENT)
Dept: CARE COORDINATION | Age: 87
End: 2022-04-07

## 2022-04-07 ENCOUNTER — TELEPHONE (OUTPATIENT)
Dept: INTERNAL MEDICINE CLINIC | Age: 87
End: 2022-04-07

## 2022-04-07 LAB
ORGANISM: ABNORMAL
URINE CULTURE, ROUTINE: ABNORMAL

## 2022-04-07 NOTE — RESULT ENCOUNTER NOTE
Please call patient to inform of urine culture results;     urine culture does show positive E. coli UTI; does show sensitivity to antibiotic which was initiated Macrobid so pt's symptoms should improve; complete entire course of antibiotic and return to clinic or reach out if symptoms persist, change, or worsen.

## 2022-04-07 NOTE — TELEPHONE ENCOUNTER
Olivia from [employer] called requesting an order for a toilet seat risor on behalf of patient- Patient was recently discharged from hospital and requires additional assistance.  Please advise      Please Fax order to [employer]:    Fax- [phone]  Alt Fax- [phone]

## 2022-04-08 ENCOUNTER — TELEPHONE (OUTPATIENT)
Dept: INTERNAL MEDICINE CLINIC | Age: 87
End: 2022-04-08

## 2022-04-11 ENCOUNTER — HOSPITAL ENCOUNTER (INPATIENT)
Age: 87
LOS: 3 days | Discharge: NURSING, REHAB OR LONG-TERM CARE | DRG: 871 | End: 2022-04-14
Attending: EMERGENCY MEDICINE | Admitting: STUDENT IN AN ORGANIZED HEALTH CARE EDUCATION/TRAINING PROGRAM
Payer: MEDICARE

## 2022-04-11 ENCOUNTER — APPOINTMENT (OUTPATIENT)
Dept: GENERAL RADIOLOGY | Age: 87
DRG: 871 | End: 2022-04-11
Payer: MEDICARE

## 2022-04-11 ENCOUNTER — CARE COORDINATION (OUTPATIENT)
Dept: CARE COORDINATION | Age: 87
End: 2022-04-11

## 2022-04-11 DIAGNOSIS — J18.9 PNEUMONIA OF BOTH LOWER LOBES DUE TO INFECTIOUS ORGANISM: ICD-10-CM

## 2022-04-11 DIAGNOSIS — N39.0 URINARY TRACT INFECTION WITHOUT HEMATURIA, SITE UNSPECIFIED: Primary | ICD-10-CM

## 2022-04-11 DIAGNOSIS — A41.9 SEPTICEMIA (HCC): ICD-10-CM

## 2022-04-11 PROBLEM — F03.90 DEMENTIA (HCC): Status: ACTIVE | Noted: 2022-04-11

## 2022-04-11 PROBLEM — E03.9 HYPOTHYROIDISM: Status: ACTIVE | Noted: 2022-04-11

## 2022-04-11 LAB
ALBUMIN SERPL-MCNC: 3.7 GM/DL (ref 3.4–5)
ALP BLD-CCNC: 106 IU/L (ref 40–129)
ALT SERPL-CCNC: 15 U/L (ref 10–40)
ANION GAP SERPL CALCULATED.3IONS-SCNC: 16 MMOL/L (ref 4–16)
AST SERPL-CCNC: 20 IU/L (ref 15–37)
BACTERIA: ABNORMAL /HPF
BASOPHILS ABSOLUTE: 0.1 K/CU MM
BASOPHILS RELATIVE PERCENT: 0.4 % (ref 0–1)
BILIRUB SERPL-MCNC: 0.6 MG/DL (ref 0–1)
BILIRUBIN URINE: NEGATIVE MG/DL
BLOOD, URINE: NEGATIVE
BUN BLDV-MCNC: 13 MG/DL (ref 6–23)
CALCIUM SERPL-MCNC: 9.6 MG/DL (ref 8.3–10.6)
CAST TYPE: NEGATIVE /HPF
CHLORIDE BLD-SCNC: 96 MMOL/L (ref 99–110)
CLARITY: ABNORMAL
CO2: 21 MMOL/L (ref 21–32)
COLOR: YELLOW
CREAT SERPL-MCNC: 0.9 MG/DL (ref 0.6–1.1)
CRYSTAL TYPE: NEGATIVE /HPF
DIFFERENTIAL TYPE: ABNORMAL
EOSINOPHILS ABSOLUTE: 0.5 K/CU MM
EOSINOPHILS RELATIVE PERCENT: 2.7 % (ref 0–3)
EPITHELIAL CELLS, UA: ABNORMAL /HPF
GFR AFRICAN AMERICAN: >60 ML/MIN/1.73M2
GFR NON-AFRICAN AMERICAN: 60 ML/MIN/1.73M2
GLUCOSE BLD-MCNC: 149 MG/DL (ref 70–99)
GLUCOSE, URINE: NEGATIVE MG/DL
HCT VFR BLD CALC: 52.9 % (ref 37–47)
HEMOGLOBIN: 16.6 GM/DL (ref 12.5–16)
IMMATURE NEUTROPHIL %: 0.5 % (ref 0–0.43)
KETONES, URINE: NEGATIVE MG/DL
LACTIC ACID, SEPSIS: NORMAL MMOL/L (ref 0.5–1.9)
LEUKOCYTE ESTERASE, URINE: NEGATIVE
LYMPHOCYTES ABSOLUTE: 3.1 K/CU MM
LYMPHOCYTES RELATIVE PERCENT: 18.5 % (ref 24–44)
MCH RBC QN AUTO: 31.6 PG (ref 27–31)
MCHC RBC AUTO-ENTMCNC: 31.4 % (ref 32–36)
MCV RBC AUTO: 100.8 FL (ref 78–100)
MONOCYTES ABSOLUTE: 1.1 K/CU MM
MONOCYTES RELATIVE PERCENT: 6.7 % (ref 0–4)
NITRITE URINE, QUANTITATIVE: NEGATIVE
PDW BLD-RTO: 13.7 % (ref 11.7–14.9)
PH, URINE: 6 (ref 5–8)
PLATELET # BLD: 321 K/CU MM (ref 140–440)
PMV BLD AUTO: 9.5 FL (ref 7.5–11.1)
POTASSIUM SERPL-SCNC: 4.6 MMOL/L (ref 3.5–5.1)
PROTEIN UA: NEGATIVE MG/DL
RBC # BLD: 5.25 M/CU MM (ref 4.2–5.4)
RBC URINE: NEGATIVE /HPF (ref 0–6)
SARS-COV-2, NAAT: NOT DETECTED
SEGMENTED NEUTROPHILS ABSOLUTE COUNT: 12.1 K/CU MM
SEGMENTED NEUTROPHILS RELATIVE PERCENT: 71.2 % (ref 36–66)
SODIUM BLD-SCNC: 133 MMOL/L (ref 135–145)
SOURCE: NORMAL
SPECIFIC GRAVITY UA: 1.02 (ref 1–1.03)
TOTAL IMMATURE NEUTOROPHIL: 0.08 K/CU MM
TOTAL PROTEIN: 6.5 GM/DL (ref 6.4–8.2)
UROBILINOGEN, URINE: 0.2 MG/DL (ref 0.2–1)
WBC # BLD: 17 K/CU MM (ref 4–10.5)
WBC UA: NEGATIVE /HPF (ref 0–5)

## 2022-04-11 PROCEDURE — 2580000003 HC RX 258: Performed by: EMERGENCY MEDICINE

## 2022-04-11 PROCEDURE — 87040 BLOOD CULTURE FOR BACTERIA: CPT

## 2022-04-11 PROCEDURE — 96375 TX/PRO/DX INJ NEW DRUG ADDON: CPT

## 2022-04-11 PROCEDURE — 6370000000 HC RX 637 (ALT 250 FOR IP): Performed by: EMERGENCY MEDICINE

## 2022-04-11 PROCEDURE — 6370000000 HC RX 637 (ALT 250 FOR IP): Performed by: NURSE PRACTITIONER

## 2022-04-11 PROCEDURE — 80053 COMPREHEN METABOLIC PANEL: CPT

## 2022-04-11 PROCEDURE — 87635 SARS-COV-2 COVID-19 AMP PRB: CPT

## 2022-04-11 PROCEDURE — 96365 THER/PROPH/DIAG IV INF INIT: CPT

## 2022-04-11 PROCEDURE — 86141 C-REACTIVE PROTEIN HS: CPT

## 2022-04-11 PROCEDURE — 99284 EMERGENCY DEPT VISIT MOD MDM: CPT

## 2022-04-11 PROCEDURE — 2140000000 HC CCU INTERMEDIATE R&B

## 2022-04-11 PROCEDURE — 85025 COMPLETE CBC W/AUTO DIFF WBC: CPT

## 2022-04-11 PROCEDURE — 81001 URINALYSIS AUTO W/SCOPE: CPT

## 2022-04-11 PROCEDURE — 6360000002 HC RX W HCPCS: Performed by: EMERGENCY MEDICINE

## 2022-04-11 PROCEDURE — 93005 ELECTROCARDIOGRAM TRACING: CPT | Performed by: EMERGENCY MEDICINE

## 2022-04-11 PROCEDURE — 83605 ASSAY OF LACTIC ACID: CPT

## 2022-04-11 PROCEDURE — 51701 INSERT BLADDER CATHETER: CPT

## 2022-04-11 PROCEDURE — 71045 X-RAY EXAM CHEST 1 VIEW: CPT

## 2022-04-11 PROCEDURE — 84145 PROCALCITONIN (PCT): CPT

## 2022-04-11 PROCEDURE — 87086 URINE CULTURE/COLONY COUNT: CPT

## 2022-04-11 RX ORDER — SODIUM CHLORIDE 9 MG/ML
1000 INJECTION, SOLUTION INTRAVENOUS CONTINUOUS
Status: DISCONTINUED | OUTPATIENT
Start: 2022-04-11 | End: 2022-04-12

## 2022-04-11 RX ORDER — ONDANSETRON 2 MG/ML
4 INJECTION INTRAMUSCULAR; INTRAVENOUS ONCE
Status: COMPLETED | OUTPATIENT
Start: 2022-04-11 | End: 2022-04-11

## 2022-04-11 RX ORDER — POLYETHYLENE GLYCOL 3350 17 G/17G
17 POWDER, FOR SOLUTION ORAL DAILY PRN
Status: DISCONTINUED | OUTPATIENT
Start: 2022-04-11 | End: 2022-04-14 | Stop reason: HOSPADM

## 2022-04-11 RX ORDER — ACETAMINOPHEN 500 MG
500 TABLET ORAL 3 TIMES DAILY
Status: DISCONTINUED | OUTPATIENT
Start: 2022-04-11 | End: 2022-04-14 | Stop reason: HOSPADM

## 2022-04-11 RX ORDER — ACETAMINOPHEN 325 MG/1
650 TABLET ORAL EVERY 6 HOURS PRN
Status: DISCONTINUED | OUTPATIENT
Start: 2022-04-11 | End: 2022-04-14 | Stop reason: HOSPADM

## 2022-04-11 RX ORDER — DIGOXIN 125 MCG
125 TABLET ORAL DAILY
Status: DISCONTINUED | OUTPATIENT
Start: 2022-04-12 | End: 2022-04-14 | Stop reason: HOSPADM

## 2022-04-11 RX ORDER — SODIUM CHLORIDE 0.9 % (FLUSH) 0.9 %
5-40 SYRINGE (ML) INJECTION EVERY 12 HOURS SCHEDULED
Status: DISCONTINUED | OUTPATIENT
Start: 2022-04-11 | End: 2022-04-14 | Stop reason: HOSPADM

## 2022-04-11 RX ORDER — RIVASTIGMINE TARTRATE 1.5 MG/1
3 CAPSULE ORAL DAILY
Status: DISCONTINUED | OUTPATIENT
Start: 2022-04-12 | End: 2022-04-14 | Stop reason: HOSPADM

## 2022-04-11 RX ORDER — SODIUM CHLORIDE 0.9 % (FLUSH) 0.9 %
5-40 SYRINGE (ML) INJECTION PRN
Status: DISCONTINUED | OUTPATIENT
Start: 2022-04-11 | End: 2022-04-14 | Stop reason: HOSPADM

## 2022-04-11 RX ORDER — ONDANSETRON 4 MG/1
4 TABLET, ORALLY DISINTEGRATING ORAL EVERY 8 HOURS PRN
Status: DISCONTINUED | OUTPATIENT
Start: 2022-04-11 | End: 2022-04-14 | Stop reason: HOSPADM

## 2022-04-11 RX ORDER — MEMANTINE HYDROCHLORIDE 10 MG/1
10 TABLET ORAL 2 TIMES DAILY
Status: DISCONTINUED | OUTPATIENT
Start: 2022-04-11 | End: 2022-04-14 | Stop reason: HOSPADM

## 2022-04-11 RX ORDER — ACETAMINOPHEN 650 MG/1
650 SUPPOSITORY RECTAL ONCE
Status: COMPLETED | OUTPATIENT
Start: 2022-04-11 | End: 2022-04-11

## 2022-04-11 RX ORDER — ATORVASTATIN CALCIUM 20 MG/1
20 TABLET, FILM COATED ORAL DAILY
Status: DISCONTINUED | OUTPATIENT
Start: 2022-04-12 | End: 2022-04-13

## 2022-04-11 RX ORDER — VITAMIN E 268 MG
400 CAPSULE ORAL DAILY
Status: DISCONTINUED | OUTPATIENT
Start: 2022-04-12 | End: 2022-04-14 | Stop reason: HOSPADM

## 2022-04-11 RX ORDER — LEVOTHYROXINE SODIUM 0.15 MG/1
150 TABLET ORAL DAILY
Status: DISCONTINUED | OUTPATIENT
Start: 2022-04-12 | End: 2022-04-14 | Stop reason: HOSPADM

## 2022-04-11 RX ORDER — SODIUM CHLORIDE 9 MG/ML
INJECTION, SOLUTION INTRAVENOUS PRN
Status: DISCONTINUED | OUTPATIENT
Start: 2022-04-11 | End: 2022-04-14 | Stop reason: HOSPADM

## 2022-04-11 RX ORDER — ONDANSETRON 2 MG/ML
4 INJECTION INTRAMUSCULAR; INTRAVENOUS EVERY 6 HOURS PRN
Status: DISCONTINUED | OUTPATIENT
Start: 2022-04-11 | End: 2022-04-14 | Stop reason: HOSPADM

## 2022-04-11 RX ORDER — VITAMIN B COMPLEX
2000 TABLET ORAL DAILY
Status: DISCONTINUED | OUTPATIENT
Start: 2022-04-12 | End: 2022-04-14 | Stop reason: HOSPADM

## 2022-04-11 RX ORDER — 0.9 % SODIUM CHLORIDE 0.9 %
1000 INTRAVENOUS SOLUTION INTRAVENOUS ONCE
Status: COMPLETED | OUTPATIENT
Start: 2022-04-11 | End: 2022-04-11

## 2022-04-11 RX ORDER — ACETAMINOPHEN 650 MG/1
650 SUPPOSITORY RECTAL EVERY 6 HOURS PRN
Status: DISCONTINUED | OUTPATIENT
Start: 2022-04-11 | End: 2022-04-14 | Stop reason: HOSPADM

## 2022-04-11 RX ADMIN — MEMANTINE 10 MG: 10 TABLET ORAL at 23:53

## 2022-04-11 RX ADMIN — SODIUM CHLORIDE 1000 ML: 9 INJECTION, SOLUTION INTRAVENOUS at 20:30

## 2022-04-11 RX ADMIN — SODIUM CHLORIDE 1000 ML: 9 INJECTION, SOLUTION INTRAVENOUS at 18:56

## 2022-04-11 RX ADMIN — METOPROLOL TARTRATE 12.5 MG: 25 TABLET, FILM COATED ORAL at 23:53

## 2022-04-11 RX ADMIN — AZITHROMYCIN MONOHYDRATE 500 MG: 500 INJECTION, POWDER, LYOPHILIZED, FOR SOLUTION INTRAVENOUS at 18:51

## 2022-04-11 RX ADMIN — ACETAMINOPHEN 650 MG: 650 SUPPOSITORY RECTAL at 18:45

## 2022-04-11 RX ADMIN — ONDANSETRON 4 MG: 2 INJECTION INTRAMUSCULAR; INTRAVENOUS at 18:44

## 2022-04-11 RX ADMIN — CEFTRIAXONE SODIUM 1000 MG: 1 INJECTION, POWDER, FOR SOLUTION INTRAMUSCULAR; INTRAVENOUS at 18:52

## 2022-04-11 RX ADMIN — APIXABAN 5 MG: 5 TABLET, FILM COATED ORAL at 23:53

## 2022-04-11 ASSESSMENT — ENCOUNTER SYMPTOMS
RESPIRATORY NEGATIVE: 1
NAUSEA: 1
VOMITING: 1

## 2022-04-11 ASSESSMENT — PAIN SCALES - GENERAL
PAINLEVEL_OUTOF10: 0
PAINLEVEL_OUTOF10: 0

## 2022-04-11 NOTE — CARE COORDINATION
Spoke to [employer] cm who reports pt is making Slow but steady progress and that at this time she was going to try to stay at home (not go to SNF). [employer] needs order for toilet seat riser for pt faxed. Reports she spoke with Nehal Lazo at pcp office and she plans to stop by office tmw and hopefully  order. ACM contact info provided if can be of any assistance with obtaining, she will call if needs assistance. Will continue to follow.

## 2022-04-11 NOTE — ED PROVIDER NOTES
Triage Chief Complaint:   Altered Mental Status (EMS reports she lives alone at independant living. Family states has been having N/V since last night and acting confused today. )    [place]:  Bronwyn Walden is a 81 y.o. female that presents to the ED by EMS. I was informed by the medics that the patient had E. coli in the urine on Friday started on Macrobid. In fact I did confirm this she is altered beyond her baseline. Patient does have dementia lives in independent living she knows her name she tells me she is 73 when she is in fact 81. She thought she was at [facility] but they were the med unit that brought her in the patient is nauseous actively vomiting bilious material.  Denies any pain. She has a temp of 99 per the medics. Sat of 80 to 90%. This was on room air        Past Medical History:   Diagnosis Date    Acquired hypothyroidism 12/9/2019    Patient is taking Synthroid    Chronic midline low back pain without sciatica 11/20/2019    Dementia without behavioral disturbance ([address] 10/22/2019    Patient has dementia. She is taking Namenda Patient is seeing urologist Dr. Elvis Pelayo Gait disturbance 10/22/2019    Patient uses walker to ambulate    History of TIA (transient ischemic attack) 12/9/2019    Patient has a history of TIA and also recently may have had TIA. Patient is on Eliquis. Sees neurologist Dr. Elvis Pelayo Major depressive disorder with single episode, in full remission ([address] 12/9/2019    Patient is taking Zoloft and that is helping.  Mixed hyperlipidemia 12/9/2019    Patient is on simvastatin    Persistent atrial fibrillation ([address] 10/22/2019    A. fib on recently in October 2019 . Patient is on metoprolol 25 mg twice a day and Eliquis    PFO (patent foramen ovale) 12/9/2019    PFO seen on echocardiogram in 2016    Pneumonia due to infectious organism 10/22/2019    Patient had pneumonia and pleural effusion during admission in October 2019.  A repeat CT scan of the chest will be done Patient is also seen pulmonologist     Past Surgical History:   Procedure Laterality Date    HYSTERECTOMY       Family History   Problem Relation Age of Onset    Alcohol Abuse Mother     Obesity Mother     No Known Problems Father      Social History     Socioeconomic History    Marital status:      Spouse name: Not on file    Number of children: Not on file    Years of education: Not on file    Highest education level: Not on file   Occupational History    Not on file   Tobacco Use    Smoking status: Never Smoker    Smokeless tobacco: Never Used   Vaping Use    Vaping Use: Never used   Substance and Sexual Activity    Alcohol use: No    Drug use: No    Sexual activity: Not on file   Other Topics Concern    Not on file   Social History Narrative    Not on file     Social Determinants of Health     Financial Resource Strain: Low Risk     Difficulty of Paying Living Expenses: Not hard at all   Food Insecurity: No Food Insecurity    Worried About [employer] in the Last Year: Never true    [facility] [employer] in the Last Year: Never true   Transportation Needs:     Lack of Transportation (Medical): Not on file    Lack of Transportation (Non-Medical):  Not on file   Physical Activity: Insufficiently Active    Days of Exercise per Week: 7 days    Minutes of Exercise per Session: 20 min   Stress:     Feeling of Stress : Not on file   Social Connections:     Frequency of Communication with Friends and Family: Not on file    Frequency of Social Gatherings with Friends and Family: Not on file    Attends Restoration Services: Not on file    Active Member of Clubs or Organizations: Not on file    Attends Club or Organization Meetings: Not on file    Marital Status: Not on file   Intimate Partner Violence:     Fear of Current or Ex-Partner: Not on file    Emotionally Abused: Not on file    Physically Abused: Not on file    Sexually Abused: Not on file   Housing Stability:  Unable to Pay for Housing in the Last Year: Not on file    Number of Places Lived in the Last Year: Not on file    Unstable Housing in the Last Year: Not on file     Current Facility-Administered Medications   Medication Dose Route Frequency Provider Last Rate Last Admin    0.9 % sodium chloride infusion  1,000 mL IntraVENous Continuous Shweta Moulds, DO        ondansetron [facility] injection 4 mg  4 mg IntraVENous Once Shweta Moulds, DO        cefTRIAXone (ROCEPHIN) 1000 mg IVPB in 50 mL D5W minibag  1,000 mg IntraVENous Once Shweta Moulds, DO        acetaminophen (TYLENOL) suppository 650 mg  650 mg Rectal Once Shweta Moulds, DO        azithromycin (ZITHROMAX) 500 mg in dextrose 5 % 250 mL IVPB  500 mg IntraVENous Once Shweta Moulds, DO         Current Outpatient Medications   Medication Sig Dispense Refill    nitrofurantoin, macrocrystal-monohydrate, (MACROBID) 100 MG capsule Take 1 capsule by mouth 2 times daily for 7 days 14 capsule 0    diclofenac sodium (VOLTAREN) 1 % GEL Apply 4 g topically 4 times daily 100 g 1    Cholecalciferol (VITAMIN D3) 50 MCG (2000 UT) TABS Take 1 tablet by mouth daily 90 tablet 1    memantine (NAMENDA) 10 MG tablet Take 1 tablet by mouth 2 times daily 180 tablet 1    rivastigmine (EXELON) 3 MG capsule Take 1 capsule by mouth daily 90 capsule 1    apixaban (ELIQUIS) 5 MG TABS tablet TAKE 1 TABLET BY MOUTH 2 TIMES DAILY 180 tablet 1    simvastatin (ZOCOR) 40 MG tablet Take 1 tablet by mouth nightly 90 tablet 1    digoxin (LANOXIN) 125 MCG tablet Take 1 tablet by mouth daily 90 tablet 1    metoprolol tartrate (LOPRESSOR) 25 MG tablet Take 0.5 tablets by mouth 2 times daily 90 tablet 1    levothyroxine (SYNTHROID) 150 MCG tablet Take 1 tablet by mouth daily 90 tablet 1    sertraline (ZOLOFT) 50 MG tablet Take 1 tablet by mouth daily 90 tablet 1    vitamin E 400 UNIT capsule Take 1 capsule by mouth daily 90 capsule 1    Handicap Placard MISC by Does not apply route Please dispense one placard, duration 5 years 1 each 0    nystatin (MYCOSTATIN) 997660 UNIT/GM powder Apply 3 times daily. 1 Bottle 2    meclizine (TRAVEL SICKNESS) 25 MG CHEW Take 1 tablet by mouth as needed (dizziness) 30 tablet 0    acetaminophen (TYLENOL) 500 MG tablet Take 500 mg by mouth 3 times daily PRN       No Known Allergies      ROS:    Review of Systems   Constitutional: Positive for fatigue and fever. Respiratory: Negative. Gastrointestinal: Positive for nausea and vomiting. Genitourinary: Positive for dysuria. Negative for flank pain. All other systems reviewed and are negative. Nursing Notes Reviewed    Physical Exam:      ED Triage Vitals   Enc Vitals Group      BP       Pulse       Resp       Temp       Temp src       SpO2       Weight       Height       Head Circumference       Peak Flow       Pain Score       Pain Loc       Pain Edu? Excl. in [address]? Physical Exam  Vitals and nursing note reviewed. Constitutional:       General: She is in acute distress. Appearance: She is well-developed. She is ill-appearing. HENT:      Head: Normocephalic and atraumatic. Right Ear: External ear normal.      Left Ear: External ear normal.   Eyes:      General: No scleral icterus. Right eye: No discharge. Left eye: No discharge. Conjunctiva/sclera: Conjunctivae normal.      Pupils: Pupils are equal, round, and reactive to light. Neck:      Thyroid: No thyromegaly. Vascular: No JVD. Trachea: No tracheal deviation. Cardiovascular:      Rate and Rhythm: Tachycardia present. Rhythm regularly irregular. Heart sounds: Normal heart sounds. No murmur heard. No friction rub. No gallop. Pulmonary:      Effort: Pulmonary effort is normal. Tachypnea present. No respiratory distress. Breath sounds: No stridor. Examination of the right-lower field reveals rales. Rhonchi and rales present. No wheezing.    Chest:      Chest wall: No tenderness. Abdominal:      General: Bowel sounds are normal. There is no distension. Palpations: Abdomen is soft. There is no mass. Tenderness: There is no abdominal tenderness. There is no guarding or rebound. Hernia: No hernia is present. Musculoskeletal:         General: No tenderness or deformity. Normal range of motion. Cervical back: Normal range of motion and neck supple. Lymphadenopathy:      Cervical: No cervical adenopathy. Skin:     General: Skin is warm and dry. Coloration: Skin is not pale. Findings: No erythema or rash. Neurological:      Mental Status: Mental status is at baseline. She is disoriented. GCS: GCS eye subscore is 4. GCS verbal subscore is 5. GCS motor subscore is 6. Cranial Nerves: No cranial nerve deficit. Sensory: No sensory deficit. Deep Tendon Reflexes: Reflexes are normal and symmetric. Reflexes normal.   Psychiatric:         Mood and Affect: Affect is flat. Speech: Speech normal.         Behavior: Behavior normal.         Thought Content: Thought content normal.         Cognition and Memory: Cognition is impaired.          Judgment: Judgment normal.         I have reviewed and interpreted all of the currently available lab results from this visit (ifapplicable):  Results for orders placed or performed during the hospital encounter of 04/11/22   CBC with Auto Differential   Result Value Ref Range    WBC 17.0 (H) 4.0 - 10.5 K/CU MM    RBC 5.25 4.2 - 5.4 M/CU MM    Hemoglobin 16.6 (H) 12.5 - 16.0 GM/DL    Hematocrit 52.9 (H) 37 - 47 %    .8 (H) 78 - 100 FL    MCH 31.6 (H) 27 - 31 PG    MCHC 31.4 (L) 32.0 - 36.0 %    RDW 13.7 11.7 - 14.9 %    Platelets 108 572 - 235 K/CU MM    MPV 9.5 7.5 - 11.1 FL    Differential Type AUTOMATED DIFFERENTIAL     Segs Relative 71.2 (H) 36 - 66 %    Lymphocytes % 18.5 (L) 24 - 44 %    Monocytes % 6.7 (H) 0 - 4 %    Eosinophils % 2.7 0 - 3 %    Basophils % 0.4 0 - 1 %    Segs Absolute 12.1 K/CU MM    Lymphocytes Absolute 3.1 K/CU MM    Monocytes Absolute 1.1 K/CU MM    Eosinophils Absolute 0.5 K/CU MM    Basophils Absolute 0.1 K/CU MM    Immature Neutrophil % 0.5 (H) 0 - 0.43 %    Total Immature Neutrophil 0.08 K/CU MM   EKG 12 Lead   Result Value Ref Range    Ventricular Rate 124 BPM    Atrial Rate 124 BPM    P-R Interval 112 ms    QRS Duration 124 ms    Q-T Interval 412 ms    QTc Calculation (Bazett) 591 ms    R Axis 165 degrees    T Axis -16 degrees    Diagnosis       Sinus tachycardia with premature supraventricular complexes  Right bundle branch block  Abnormal ECG  When compared with ECG of 15-MAY-2020 12:14,  Previous ECG has undetermined rhythm, needs review  QRS axis shifted right  T wave inversion no longer evident in Anterior leads  T wave inversion now evident in Lateral leads        Radiographs (if obtained):  [] The following radiograph wasinterpreted by myself in the absence of a radiologist:   [] Radiologist's Report Reviewed:  XR CHEST PORTABLE    (Results Pending)         EKG (if obtained): (All EKG's are interpreted by myself in the absence of a cardiologist)    The 12 lead EKG was interpreted by me, and the interpretation is as follows:  A fib, rate = 124. Intervals are not within the normal range. >> RBBB  QTc is  prolonged. ST elevations are not present. T wave inversions are not present. Non-specific T wave changes are not present. Delta waves, Brugada Syndrome, and Short FL are not present. There is no acute ischemia. Chart review shows recent radiographs:  XR ANKLE RIGHT (MIN 3 VIEWS)    Result Date: 3/29/2022  EXAMINATION: THREE XRAY VIEWS OF THE RIGHT ANKLE; THREE XRAY VIEWS OF THE RIGHT FOOT 3/29/2022 1:40 pm COMPARISON: None. HISTORY: ORDERING SYSTEM PROVIDED HISTORY: fall TECHNOLOGIST PROVIDED HISTORY: Reason for exam:->fall Reason for Exam: fall Additional signs and symptoms: lateral swelling FINDINGS: Right ankle:  Three views of the right ankle were reviewed. Small ossicles at the tip of the lateral malleolus with slight cortical irregularity of the tip of the lateral malleolus most suggestive of acute avulsion fracture even trauma history as well as lateral soft tissue edema. Anatomic alignment at the ankle mortise. Small posterior calcaneal enthesophyte. Mild hindfoot and midfoot osteoarthritis. Right foot: Three views of the right foot were reviewed. Acute fractures of the 2nd, 3rd, and 4th metatarsal bases. Grossly anatomic alignment of the midfoot on these nonweightbearing views. Slight amorphous calcification along the medial cortex of the 1st metatarsal head is likely chronic. Moderate degenerative change of the 1st MTP joint. Mild midfoot osteoarthritis. Soft tissue edema of the foot. 1. Suspect acute chip fracture of the right lateral malleolus with adjacent soft tissue edema. 2. Acute fractures of the right 2nd, 3rd, and 4th metatarsal bases. XR FOOT RIGHT (MIN 3 VIEWS)    Result Date: 3/29/2022  EXAMINATION: THREE XRAY VIEWS OF THE RIGHT ANKLE; THREE XRAY VIEWS OF THE RIGHT FOOT 3/29/2022 1:40 pm COMPARISON: None. HISTORY: ORDERING SYSTEM PROVIDED HISTORY: fall TECHNOLOGIST PROVIDED HISTORY: Reason for exam:->fall Reason for Exam: fall Additional signs and symptoms: lateral swelling FINDINGS: Right ankle: Three views of the right ankle were reviewed. Small ossicles at the tip of the lateral malleolus with slight cortical irregularity of the tip of the lateral malleolus most suggestive of acute avulsion fracture even trauma history as well as lateral soft tissue edema. Anatomic alignment at the ankle mortise. Small posterior calcaneal enthesophyte. Mild hindfoot and midfoot osteoarthritis. Right foot: Three views of the right foot were reviewed. Acute fractures of the 2nd, 3rd, and 4th metatarsal bases. Grossly anatomic alignment of the midfoot on these nonweightbearing views.   Slight amorphous calcification along the medial cortex of the 1st metatarsal head is likely chronic. Moderate degenerative change of the 1st MTP joint. Mild midfoot osteoarthritis. Soft tissue edema of the foot. 1. Suspect acute chip fracture of the right lateral malleolus with adjacent soft tissue edema. 2. Acute fractures of the right 2nd, 3rd, and 4th metatarsal bases. MDM:      Patient presents to the ED altered with an acute febrile illness recent UTI with positive urine culture of E. coli I will extend her spectrum with ceftriaxone 1 g she is hemodynamically stable rectal Tylenol given since she is nauseous and vomiting. Numerous other laboratory data will be obtained patient will warrant emergent hospitalization. Patient's oxygen saturation was low. She has poor breath sounds at the right base and some scattered rhonchi. Her portable chest x-ray to me does reveal some mild airspace disease I will send for Covid just to make sure that she does not have a multifocal pneumonia despite having UTI. Patient will be covered with appropriate antibiotics ; Covid pending           Clinical Impression:  1. Urinary tract infection without hematuria, site unspecified    2. Septicemia ([facility] [address] [address]      Disposition referral (if applicable):  No follow-up provider specified. Disposition medications (if applicable):  New Prescriptions    No medications on file           Jaden Drake DO, FACEP      Comment: Please note this report has been produced using speech recognition software and maycontain errors related to that system including errors in grammar, punctuation, and spelling, as well as words and phrases that may be inappropriate. If there are any questions or concerns please feel free to contact thedictating provider for clarification.         Karla Saucedo DO  04/12/22 0513

## 2022-04-11 NOTE — ED TRIAGE NOTES
Arrived to room 3 for triage. Tolerated without difficulty. Bed in lowest position. Call light given. Gowned for exam. Monitor applied.

## 2022-04-12 ENCOUNTER — APPOINTMENT (OUTPATIENT)
Dept: CT IMAGING | Age: 87
DRG: 871 | End: 2022-04-12
Payer: MEDICARE

## 2022-04-12 LAB
ALBUMIN SERPL-MCNC: 2.6 GM/DL (ref 3.4–5)
ALP BLD-CCNC: 74 IU/L (ref 40–129)
ALT SERPL-CCNC: 9 U/L (ref 10–40)
ANION GAP SERPL CALCULATED.3IONS-SCNC: 8 MMOL/L (ref 4–16)
AST SERPL-CCNC: 15 IU/L (ref 15–37)
BASOPHILS ABSOLUTE: 0 K/CU MM
BASOPHILS RELATIVE PERCENT: 0.3 % (ref 0–1)
BILIRUB SERPL-MCNC: 0.4 MG/DL (ref 0–1)
BUN BLDV-MCNC: 12 MG/DL (ref 6–23)
CALCIUM SERPL-MCNC: 8.2 MG/DL (ref 8.3–10.6)
CHLORIDE BLD-SCNC: 106 MMOL/L (ref 99–110)
CO2: 25 MMOL/L (ref 21–32)
CREAT SERPL-MCNC: 0.9 MG/DL (ref 0.6–1.1)
DIFFERENTIAL TYPE: ABNORMAL
EKG DIAGNOSIS: NORMAL
EKG Q-T INTERVAL: 356 MS
EKG QRS DURATION: 122 MS
EKG QTC CALCULATION (BAZETT): 511 MS
EKG R AXIS: 188 DEGREES
EKG T AXIS: -22 DEGREES
EKG VENTRICULAR RATE: 124 BPM
EOSINOPHILS ABSOLUTE: 0.7 K/CU MM
EOSINOPHILS RELATIVE PERCENT: 5.9 % (ref 0–3)
GFR AFRICAN AMERICAN: >60 ML/MIN/1.73M2
GFR NON-AFRICAN AMERICAN: 60 ML/MIN/1.73M2
GLUCOSE BLD-MCNC: 100 MG/DL (ref 70–99)
HCT VFR BLD CALC: 41.4 % (ref 37–47)
HEMOGLOBIN: 13.4 GM/DL (ref 12.5–16)
HIGH SENSITIVE C-REACTIVE PROTEIN: 129 MG/L
IMMATURE NEUTROPHIL %: 0.4 % (ref 0–0.43)
INR BLD: 1.83 INDEX
LACTIC ACID, SEPSIS: 1.6 MMOL/L (ref 0.5–1.9)
LACTIC ACID, SEPSIS: 2.6 MMOL/L (ref 0.5–1.9)
LACTIC ACID, SEPSIS: NORMAL MMOL/L (ref 0.5–1.9)
LYMPHOCYTES ABSOLUTE: 2.4 K/CU MM
LYMPHOCYTES RELATIVE PERCENT: 22.2 % (ref 24–44)
MCH RBC QN AUTO: 31.5 PG (ref 27–31)
MCHC RBC AUTO-ENTMCNC: 32.4 % (ref 32–36)
MCV RBC AUTO: 97.2 FL (ref 78–100)
MONOCYTES ABSOLUTE: 0.9 K/CU MM
MONOCYTES RELATIVE PERCENT: 7.7 % (ref 0–4)
PDW BLD-RTO: 13.9 % (ref 11.7–14.9)
PLATELET # BLD: 273 K/CU MM (ref 140–440)
PMV BLD AUTO: 9.1 FL (ref 7.5–11.1)
POTASSIUM SERPL-SCNC: 4.4 MMOL/L (ref 3.5–5.1)
PRO-BNP: 1693 PG/ML
PROCALCITONIN: 0.47
PROTHROMBIN TIME: 22.8 SECONDS (ref 11.7–14.5)
RBC # BLD: 4.26 M/CU MM (ref 4.2–5.4)
SEGMENTED NEUTROPHILS ABSOLUTE COUNT: 7 K/CU MM
SEGMENTED NEUTROPHILS RELATIVE PERCENT: 63.5 % (ref 36–66)
SODIUM BLD-SCNC: 139 MMOL/L (ref 135–145)
TOTAL IMMATURE NEUTOROPHIL: 0.04 K/CU MM
TOTAL PROTEIN: 5.3 GM/DL (ref 6.4–8.2)
WBC # BLD: 11 K/CU MM (ref 4–10.5)

## 2022-04-12 PROCEDURE — 93010 ELECTROCARDIOGRAM REPORT: CPT | Performed by: INTERNAL MEDICINE

## 2022-04-12 PROCEDURE — 83605 ASSAY OF LACTIC ACID: CPT

## 2022-04-12 PROCEDURE — 71275 CT ANGIOGRAPHY CHEST: CPT

## 2022-04-12 PROCEDURE — 80053 COMPREHEN METABOLIC PANEL: CPT

## 2022-04-12 PROCEDURE — 97162 PT EVAL MOD COMPLEX 30 MIN: CPT

## 2022-04-12 PROCEDURE — 87449 NOS EACH ORGANISM AG IA: CPT

## 2022-04-12 PROCEDURE — 85610 PROTHROMBIN TIME: CPT

## 2022-04-12 PROCEDURE — 97166 OT EVAL MOD COMPLEX 45 MIN: CPT

## 2022-04-12 PROCEDURE — 2580000003 HC RX 258: Performed by: NURSE PRACTITIONER

## 2022-04-12 PROCEDURE — 85025 COMPLETE CBC W/AUTO DIFF WBC: CPT

## 2022-04-12 PROCEDURE — 6360000004 HC RX CONTRAST MEDICATION: Performed by: INTERNAL MEDICINE

## 2022-04-12 PROCEDURE — 83880 ASSAY OF NATRIURETIC PEPTIDE: CPT

## 2022-04-12 PROCEDURE — 6370000000 HC RX 637 (ALT 250 FOR IP): Performed by: NURSE PRACTITIONER

## 2022-04-12 PROCEDURE — 6360000002 HC RX W HCPCS: Performed by: NURSE PRACTITIONER

## 2022-04-12 PROCEDURE — 87899 AGENT NOS ASSAY W/OPTIC: CPT

## 2022-04-12 PROCEDURE — 97530 THERAPEUTIC ACTIVITIES: CPT

## 2022-04-12 PROCEDURE — 2140000000 HC CCU INTERMEDIATE R&B

## 2022-04-12 PROCEDURE — 36415 COLL VENOUS BLD VENIPUNCTURE: CPT

## 2022-04-12 RX ORDER — FUROSEMIDE 10 MG/ML
20 INJECTION INTRAMUSCULAR; INTRAVENOUS ONCE
Status: COMPLETED | OUTPATIENT
Start: 2022-04-12 | End: 2022-04-12

## 2022-04-12 RX ADMIN — DIGOXIN 125 MCG: 125 TABLET ORAL at 08:47

## 2022-04-12 RX ADMIN — Medication 400 UNITS: at 08:45

## 2022-04-12 RX ADMIN — ACETAMINOPHEN 500 MG: 500 TABLET ORAL at 22:10

## 2022-04-12 RX ADMIN — APIXABAN 5 MG: 5 TABLET, FILM COATED ORAL at 08:45

## 2022-04-12 RX ADMIN — APIXABAN 5 MG: 5 TABLET, FILM COATED ORAL at 20:03

## 2022-04-12 RX ADMIN — ATORVASTATIN CALCIUM 20 MG: 20 TABLET, FILM COATED ORAL at 08:46

## 2022-04-12 RX ADMIN — MEMANTINE 10 MG: 10 TABLET ORAL at 20:03

## 2022-04-12 RX ADMIN — SODIUM CHLORIDE, PRESERVATIVE FREE 10 ML: 5 INJECTION INTRAVENOUS at 20:04

## 2022-04-12 RX ADMIN — METOPROLOL TARTRATE 12.5 MG: 25 TABLET, FILM COATED ORAL at 20:03

## 2022-04-12 RX ADMIN — ACETAMINOPHEN 650 MG: 325 TABLET ORAL at 00:13

## 2022-04-12 RX ADMIN — Medication 2000 UNITS: at 08:45

## 2022-04-12 RX ADMIN — ACETAMINOPHEN 500 MG: 500 TABLET ORAL at 08:45

## 2022-04-12 RX ADMIN — MEMANTINE 10 MG: 10 TABLET ORAL at 08:46

## 2022-04-12 RX ADMIN — RIVASTIGMINE TARTRATE 3 MG: 1.5 CAPSULE ORAL at 08:46

## 2022-04-12 RX ADMIN — CEFTRIAXONE SODIUM 1000 MG: 1 INJECTION, POWDER, FOR SOLUTION INTRAMUSCULAR; INTRAVENOUS at 16:14

## 2022-04-12 RX ADMIN — FUROSEMIDE 20 MG: 10 INJECTION, SOLUTION INTRAMUSCULAR; INTRAVENOUS at 16:12

## 2022-04-12 RX ADMIN — SODIUM CHLORIDE, PRESERVATIVE FREE 10 ML: 5 INJECTION INTRAVENOUS at 09:47

## 2022-04-12 RX ADMIN — AZITHROMYCIN MONOHYDRATE 250 MG: 500 INJECTION, POWDER, LYOPHILIZED, FOR SOLUTION INTRAVENOUS at 17:45

## 2022-04-12 RX ADMIN — ACETAMINOPHEN 500 MG: 500 TABLET ORAL at 14:16

## 2022-04-12 RX ADMIN — LEVOTHYROXINE SODIUM 150 MCG: 0.15 TABLET ORAL at 09:47

## 2022-04-12 RX ADMIN — IOPAMIDOL 75 ML: 755 INJECTION, SOLUTION INTRAVENOUS at 14:52

## 2022-04-12 RX ADMIN — METOPROLOL TARTRATE 12.5 MG: 25 TABLET, FILM COATED ORAL at 08:45

## 2022-04-12 RX ADMIN — SERTRALINE 50 MG: 50 TABLET, FILM COATED ORAL at 08:45

## 2022-04-12 RX ADMIN — SODIUM CHLORIDE 25 ML/HR: 9 INJECTION, SOLUTION INTRAVENOUS at 16:13

## 2022-04-12 ASSESSMENT — PAIN DESCRIPTION - PROGRESSION
CLINICAL_PROGRESSION: NOT CHANGED
CLINICAL_PROGRESSION: GRADUALLY WORSENING
CLINICAL_PROGRESSION: GRADUALLY WORSENING
CLINICAL_PROGRESSION: NOT CHANGED

## 2022-04-12 ASSESSMENT — PAIN SCALES - GENERAL
PAINLEVEL_OUTOF10: 3
PAINLEVEL_OUTOF10: 3
PAINLEVEL_OUTOF10: 4
PAINLEVEL_OUTOF10: 3
PAINLEVEL_OUTOF10: 0

## 2022-04-12 ASSESSMENT — PAIN DESCRIPTION - FREQUENCY
FREQUENCY: INTERMITTENT
FREQUENCY: INTERMITTENT
FREQUENCY: CONTINUOUS

## 2022-04-12 ASSESSMENT — PAIN DESCRIPTION - DESCRIPTORS
DESCRIPTORS: ACHING;DISCOMFORT
DESCRIPTORS: ACHING;DISCOMFORT
DESCRIPTORS: DISCOMFORT

## 2022-04-12 ASSESSMENT — PAIN - FUNCTIONAL ASSESSMENT
PAIN_FUNCTIONAL_ASSESSMENT: ACTIVITIES ARE NOT PREVENTED
PAIN_FUNCTIONAL_ASSESSMENT: PREVENTS OR INTERFERES SOME ACTIVE ACTIVITIES AND ADLS
PAIN_FUNCTIONAL_ASSESSMENT: ACTIVITIES ARE NOT PREVENTED

## 2022-04-12 ASSESSMENT — PAIN DESCRIPTION - ORIENTATION
ORIENTATION: RIGHT
ORIENTATION: RIGHT

## 2022-04-12 ASSESSMENT — PAIN DESCRIPTION - LOCATION
LOCATION: FOOT
LOCATION: FOOT
LOCATION: GENERALIZED

## 2022-04-12 ASSESSMENT — PAIN DESCRIPTION - PAIN TYPE
TYPE: CHRONIC PAIN
TYPE: ACUTE PAIN
TYPE: ACUTE PAIN

## 2022-04-12 ASSESSMENT — PAIN DESCRIPTION - ONSET
ONSET: ON-GOING

## 2022-04-12 NOTE — PLAN OF CARE
Problem: Falls - Risk of:  Goal: Will remain free from falls  Description: Will remain free from falls  Outcome: Ongoing  Goal: Absence of physical injury  Description: Absence of physical injury  Outcome: Ongoing     Problem: Sensory:  Goal: General experience of comfort will improve  Description: General experience of comfort will improve  Outcome: Ongoing     Problem: Urinary Elimination:  Goal: Signs and symptoms of infection will decrease  Description: Signs and symptoms of infection will decrease  Outcome: Ongoing  Goal: Ability to reestablish a normal urinary elimination pattern will improve - after catheter removal  Description: Ability to reestablish a normal urinary elimination pattern will improve  Outcome: Ongoing  Goal: Complications related to the disease process, condition or treatment will be avoided or minimized  Description: Complications related to the disease process, condition or treatment will be avoided or minimized  Outcome: Ongoing     Problem: Discharge Planning:  Goal: Discharged to appropriate level of care  Description: Discharged to appropriate level of care  Outcome: Ongoing

## 2022-04-12 NOTE — PROGRESS NOTES
Occupational Therapy   Occupational Therapy Initial Assessment  Date: 2022   Patient Name: Gavino Richardson  MRN: [id number]     : 1935    Date of Service: 2022    Discharge Recommendations:  ECF with OT,24 hour supervision or assist,Subacute/Skilled Nursing Facility,Continue to assess pending progress  OT Equipment Recommendations  Equipment Needed: No    Assessment   Performance deficits / Impairments: Decreased functional mobility ; Decreased ADL status; Decreased strength;Decreased balance;Decreased endurance;Decreased posture;Decreased high-level IADLs;Decreased cognition  Assessment: Pt is a pleasant 82 yo female admitted to [facility] with altered mental status, UTI and sepsis. Pt presents with increased confusion and 2 recent falls. Pt has an avulsion fx on her R lateral malleoli and fx metatarsals 2-4 as well as instances of her R knee giving out secondary to falls. Pt presents with increased SOB upon exertion and decreased balance safety . She would benefit from occupational therapy to address improving strength, endurance and balance safety to perform functional mobility and maximize indep with ADLs. Treatment Diagnosis: Decreased endurance, Decreased functional mobility  Prognosis: Good  Decision Making: Medium Complexity  OT Education: OT Role;Plan of Care;Precautions;Transfer Training;Energy Conservation;Orientation  Barriers to Learning: Pt has hx of Dementia  REQUIRES OT FOLLOW UP: Yes  Activity Tolerance  Activity Tolerance: Patient limited by fatigue  Activity Tolerance: Limited d/t increased SOB and difficulty with WB through [address] in place: Yes  Type of devices: All fall risk precautions in place;Gait belt;Patient at risk for falls; Left in chair;Call light within reach; Chair alarm in place  Restraints  Initially in place: No           Patient Diagnosis(es): The primary encounter diagnosis was Urinary tract infection without hematuria, site unspecified. Diagnoses of Septicemia ([facility] and Pneumonia of both lower lobes due to infectious organism were also pertinent to this visit. has a past medical history of Acquired hypothyroidism, Chronic midline low back pain without sciatica, Dementia without behavioral disturbance ([facility] Gait disturbance, History of TIA (transient ischemic attack), Major depressive disorder with single episode, in full remission ([facility] Mixed hyperlipidemia, Persistent atrial fibrillation ([facility] PFO (patent foramen ovale), and Pneumonia due to infectious organism. has a past surgical history that includes Hysterectomy. Treatment Diagnosis: Decreased endurance, Decreased functional mobility      Restrictions  Restrictions/Precautions  Restrictions/Precautions: Fall Risk  Required Braces or Orthoses?: Yes  Required Braces or Orthoses  Right Lower Extremity Brace: Boot  RLE Brace Type: surgical shoe when WB  Position Activity Restriction  Other position/activity restrictions: O2, telemetry    Subjective   General  Patient assessed for rehabilitation services?: Yes  Family / Caregiver Present: Yes (daughter)  Subjective  Subjective: Pt reports she has short term memory loss. Per Pt's daughter, Pt lives alone and has an aid that comes 2-3  hours a day and she her sisters help in the morning but reports they are getting burnt out. Reports Pt rarely takes showers and when the aid encourages her to the Pt will talk her out of it. Daughter reports she feels Pt should not be living on her own and would benefit from assisted living  General Comment  Comments: Pt presents awake supine in bed with HOB elevated finished with breakfast and daughter present.   Patient Currently in Pain: Denies  Vital Signs  Patient Currently in Pain: Denies  Social/Functional History  Social/Functional History  Lives With: Alone  Type of Home: Apartment  Home Layout: One level  Home Access: Level entry  Bathroom Shower/Tub: Tub/Shower unit  Bathroom Toilet: Standard  Bathroom Equipment: Hand-held shower,Grab bars in shower,Tub transfer bench  Home Equipment: [address] bed (pt and daughter report the 4 [address] gets away from her and she is not supposed to use it. She reports she uses it to get to the mailbox.)  Receives Help From: Family (Aide that comes every day for 2-3 hours)  ADL Assistance: Independent (Per Pt reports. she states she gets assist sometimes from aide to get clothes from closet)  Homemaking Assistance: Needs assistance  Homemaking Responsibilities: Yes  Meal Prep Responsibility: Secondary (pt reports she gets meals on wheels and her daughters get her tea and toast and small things)  Laundry Responsibility: No (Pt reports aide does laundry)  Cleaning Responsibility: No (Pt reports her aide does cleaning, laundry, whatever she needs)  Shopping Responsibility: No (Pt's daughter or aide gets groceries)  Ambulation Assistance: Needs assistance (pt currently wears a shoe on R LE secondary to fxs. Pt uses RW or wheelchair.)  Active : No  Leisure & Hobbies: pt reports she is a reader and a . IADL Comments: pt reports she has been using her wheelchair to get around since her fx. She reports she gets some A dressing. Objective   Vision: Impaired  Vision Exceptions: Wears glasses for reading  Hearing: Exceptions to [facility]  Hearing Exceptions: Hard of hearing/hearing concerns;Bilateral hearing aid (Pt does not have hearing aides with her)    Orientation  Overall Orientation Status: Within Functional Limits (Pt reports she has STM loss and at times would have trouble thinking of a word and asked for what the word started with)  Observation/Palpation  Observation: Pt presented in bed [facility] HOB elevated.   Balance  Sitting Balance: Stand by assistance  Standing Balance: Minimal assistance  Standing Balance  Activity: Pt amb around bed over to chair using RW with CGA-Min A as Pt reported I  Indep  Sup   1. Putting on and taking off regular lower body clothing? [] 1    [] 2   [] 2   [] 3   [x] 3   [] 4      2. Bathing (including washing, rinsing, drying)? [] 1   [] 2   [] 2 [] 3 [x] 3 [] 4   3. Toileting, which includes using toilet, bedpan, or urinal? [] 1    [] 2   [] 2   [] 3   [x] 3   [] 4     4. Putting on and taking off regular upper body clothing? [] 1   [] 2   [] 2   [] 3   [x] 3    [] 4      5. Taking care of personal grooming such as brushing teeth? [] 1   [] 2    [] 2 [] 3    [x] 3   [] 4      6. Eating meals? [] 1   [] 2   [] 2   [] 3   [] 3   [x] 4      Raw Score:  19     [24=0% impaired(CH), 23=1-19%(CI), 20-22=20-39%(CJ), 15-19=40-59%(CK), 10-14=60-79%(CL), 7-9=80-99%(CM), 6=100%(CN)]              Goals  Short term goals  Time Frame for Short term goals: Until DC or goals met  Short term goal 1: Pt will complete trfs mod I w/o LOB  Short term goal 2: Pt will complete UE bathing/dressing with S  Short term goal 3: Pt will complete LE bathing/dressing with S  Short term goal 4: Pt will complete toileting mod I  Short term goal 5: Pt will complete 3+ min of standing/functional mobility during ADLs/therax with min increased SOB and no LOB       Therapy Time   Individual Concurrent Group Co-treatment   Time In       0850   Time Out       0925   Minutes       35   Timed Code Treatment Minutes: 15 Minutes       Alyce Kingston, OTR/L [id number]

## 2022-04-12 NOTE — PROGRESS NOTES
Daughter brought in the left hearing aid for the patient. Patient only wears her hearing aids occasionally and daughter states that she likes to take them out and leave them places and wants us to keep an eye on the one she brought.

## 2022-04-12 NOTE — ED PROVIDER NOTES
Emergency Department Encounter  Location: [address]    Patient: Mckay Mcnair  MRN: [id number]  : 1935  Date of evaluation: 2022  ED Provider: Bill Bang MD    1900:ppolly.  Mckay Mcnair was checked out to me by Dr. Dominick Grajeda. Please see his/her initial documentation for details of the patient's initial ED presentation, physical exam and completed studies. In brief, Mckay Mcnair is a 81 y.o. female that presented to the emergency department for changes in mental status, recent diagnosis of UTI. Patient does appear mildly confused here.     I have reviewed and interpreted all of the currently available lab results and diagnostics from this visit:  Results for orders placed or performed during the hospital encounter of 22   COVID-19, Rapid    Specimen: Nasopharyngeal   Result Value Ref Range    Source THROAT     SARS-CoV-2, NAAT NOT DETECTED NOT DETECTED   CBC with Auto Differential   Result Value Ref Range    WBC 17.0 (H) 4.0 - 10.5 K/CU MM    RBC 5.25 4.2 - 5.4 M/CU MM    Hemoglobin 16.6 (H) 12.5 - 16.0 GM/DL    Hematocrit 52.9 (H) 37 - 47 %    .8 (H) 78 - 100 FL    MCH 31.6 (H) 27 - 31 PG    MCHC 31.4 (L) 32.0 - 36.0 %    RDW 13.7 11.7 - 14.9 %    Platelets 513 912 - 791 K/CU MM    MPV 9.5 7.5 - 11.1 FL    Differential Type AUTOMATED DIFFERENTIAL     Segs Relative 71.2 (H) 36 - 66 %    Lymphocytes % 18.5 (L) 24 - 44 %    Monocytes % 6.7 (H) 0 - 4 %    Eosinophils % 2.7 0 - 3 %    Basophils % 0.4 0 - 1 %    Segs Absolute 12.1 K/CU MM    Lymphocytes Absolute 3.1 K/CU MM    Monocytes Absolute 1.1 K/CU MM    Eosinophils Absolute 0.5 K/CU MM    Basophils Absolute 0.1 K/CU MM    Immature Neutrophil % 0.5 (H) 0 - 0.43 %    Total Immature Neutrophil 0.08 K/CU MM   Comprehensive Metabolic Panel w/ Reflex to MG   Result Value Ref Range    Sodium 133 (L) 135 - 145 MMOL/L    Potassium 4.6 3.5 - 5.1 MMOL/L    Chloride 96 (L) 99 - 110 mMol/L    CO2 21 21 - 32 MMOL/L    BUN 13 6 - 23 MG/DL    CREATININE 0.9 0.6 - 1.1 MG/DL    Glucose 149 (H) 70 - 99 MG/DL    Calcium 9.6 8.3 - 10.6 MG/DL    Albumin 3.7 3.4 - 5.0 GM/DL    Total Protein 6.5 6.4 - 8.2 GM/DL    Total Bilirubin 0.6 0.0 - 1.0 MG/DL    ALT 15 10 - 40 U/L    AST 20 15 - 37 IU/L    Alkaline Phosphatase 106 40 - 129 IU/L    GFR Non-African American 60 (L) >60 mL/min/1.73m2    GFR African American >60 >60 mL/min/1.73m2    Anion Gap 16 4 - 16   Urinalysis with Microscopic   Result Value Ref Range    Color, UA YELLOW YELLOW    Clarity, UA SL HAZY CLEAR    Glucose, Urine NEGATIVE NEGATIVE MG/DL    Bilirubin Urine NEGATIVE NEGATIVE MG/DL    Ketones, Urine NEGATIVE NEGATIVE MG/DL    Specific Gravity, UA 1.020 1.001 - 1.035    Blood, Urine NEGATIVE NEGATIVE    pH, Urine 6.0 5.0 - 8.0    Protein, UA NEGATIVE NEGATIVE MG/DL    Urobilinogen, Urine 0.2 0.2 - 1.0 MG/DL    Nitrite Urine, Quantitative NEGATIVE NEGATIVE    Leukocyte Esterase, Urine NEGATIVE NEGATIVE    RBC, UA NEGATIVE 0 - 6 /HPF    WBC, UA NEGATIVE 0 - 5 /HPF    Epithelial Cells, UA 0 TO 1 /HPF    Cast Type NEGATIVE (A) NO CAST FORMS SEEN /HPF    Bacteria, UA FEW NEGATIVE /HPF    Crystal Type NEGATIVE NEGATIVE /HPF   Lactate, Sepsis   Result Value Ref Range    Lactic Acid, Sepsis  0.5 - 1.9 mMOL/L     LACTIC 3.7 CALLED AND RB AMORENO RN ER [id number] 2036 [address]   EKG 12 Lead   Result Value Ref Range    Ventricular Rate 124 BPM    Atrial Rate 92 BPM    QRS Duration 122 ms    Q-T Interval 356 ms    QTc Calculation (Bazett) 511 ms    R Axis 188 degrees    T Axis -22 degrees    Diagnosis       Atrial fibrillation with rapid ventricular response  Right bundle branch block  Abnormal ECG  When compared with ECG of 11-APR-2022 18:32,  Inverted T waves have replaced nonspecific T wave abnormality in Inferior leads       XR CHEST PORTABLE    Result Date: 4/11/2022  EXAMINATION: ONE XRAY VIEW OF THE CHEST 4/11/2022 3:31 pm COMPARISON: 11/11/2019 HISTORY: ORDERING SYSTEM PROVIDED HISTORY: ? pneumonia TECHNOLOGIST PROVIDED HISTORY: Reason for exam:->? pneumonia FINDINGS: Increased interstitial opacities noted, increased considerably when compared to the previous exam.  More focal opacities are noted within the lower lungs. In the small bilateral pleural effusions appear to be present. The cardial pericardial silhouette is unremarkable. Increased interstitial opacity, with more focal opacities at the lung bases. In this case, interstitial edema and multifocal pneumonia are both considered. Final ED Course and MDM: In brief, Eulogio Anderson is a 81 y.o. female whose care was signed out to me by the outgoing provider. Patient's work-up shows a leukocytosis with lactate of 3.7. Metabolic work-up is otherwise largely unremarkable. She is given IV fluids with improving heart rate and respiratory rate. Fevers treated. Urinalysis without evidence of ongoing UTI at this time. Chest x-ray is concerning for pneumonia. Patient was started on Rocephin and azithromycin. Admitted to the hospitalist for further care. Covid test was negative here.     ED Medication Orders (From admission, onward)    Start Ordered     Status Ordering Provider    04/11/22 1900 04/11/22 1847  0.9 % sodium chloride bolus  ONCE         Last MAR action: Stopped - by Denver Alfonso on 04/11/22 at [address] [place] [address], [facility]    04/11/22 1830 04/11/22 1821  acetaminophen (TYLENOL) suppository 650 mg  ONCE         Last MAR action: Given - by Denver Alfonso on 04/11/22 at [place] [address], [facility]    04/11/22 1830 04/11/22 1829  azithromycin (ZITHROMAX) 500 mg in dextrose 5 % 250 mL IVPB  ONCE        Question:  Antimicrobial Indications  Answer:  Pneumonia (CAP)    Last MAR action: [facility]    04/11/22 1815 04/11/22 1810  0.9 % sodium chloride infusion  CONTINUOUS         Last MAR action: New Bag - by Denver Alfonso on 04/11/22 at [address] [place] [address]    04/11/22 1815 04/11/22 1812  ondansetron (ZOFRAN) injection 4 mg  ONCE         Last MAR action: Given - by Theora Listen on 04/11/22 at [address]    04/11/22 1815 04/11/22 1812  cefTRIAXone (ROCEPHIN) 1000 mg IVPB in 50 mL D5W minibag  ONCE        Question:  Antimicrobial Indications  Answer:  Urinary Tract Infection    Last MAR action: Stopped - by Theora Listen on 04/11/22 at [facility]          Final Impression      1. Urinary tract infection without hematuria, site unspecified    2. Septicemia ([facility]    3.  Pneumonia of both lower lobes due to infectious organism        DISPOSITION Decision To Admit 04/11/2022 08:38:05 PM     (Please note that portions of this note may have been completed with a voice recognition program. Efforts were made to edit the dictations but occasionally words are mis-transcribed.)    MD Montse Mg MD  04/11/22 2107       Alyson Thompson MD  04/11/22 2136

## 2022-04-12 NOTE — PROGRESS NOTES
Hospitalist Progress Note      Name:  Shira Hutton /Age/Sex: 1935  (81 y.o. female)   MRN & CSN:  [id number] & [id number] Admission Date/Time: 2022  6:30 PM   Location:   PCP: Ameya Gonzalez MD         Hospital Day: 2    Assessment and Plan:     1. Sepsis, present on admission now resolved  2. Acute hypoxemic respiratory failure, patient is requiring 2 L of oxygen nasal cannula she does not wear oxygen at home. Wean as tolerated  3. Possible pneumonia, chest x-ray shows interstitial edema or pneumonia, CT chest is pending. Patient is on Zithromax and Rocephin at this time. Will give x1 dose Lasix. Procalcitonin is 0.470. Patient has not had echocardiogram will order. 4. Chronic atrial fibrillation patient is on Eliquis and Lopressor. [id number] score is 3  5. Elevated BNP at 1693 again echocardiogram has been ordered  6. Hypothyroidism, continue Synthroid  7. Hyperlipidemia continue simvastatin    Diet ADULT DIET; Regular; Low Fat/Low Chol/High Fiber/JEFF   DVT Prophylaxis on Eliquis   GI Prophylaxis [x] PPI,  [] H2 Blocker,  [] Carafate,  [] Diet/Tube Feeds   Code Status DNR-CCA             History of Present Illness:     Chief Complaint:   Patient seen and examined today, she is doing well she is just finished working with physical therapy she is sitting out of bed in chair. She has no complaints of any chest pain, no shortness of breath, no nausea no vomiting no diarrhea. Family is at bedside plan of care discussed with both patient and family all voiced understanding. Ten point ROS reviewed negative, unless as noted above    Objective:        Intake/Output Summary (Last 24 hours) at 2022 1432  Last data filed at 2022 1342  Gross per 24 hour   Intake 684.85 ml   Output 800 ml   Net -115.15 ml      Vitals:   Vitals:    22 0837   BP: 95/75   Pulse: 86   Resp: 16   Temp: 97.9 °F (36.6 °C)   SpO2: 90%     Physical Exam:   GEN Awake female, sitting upright in PRN              Electronically signed by NGOC Martin NP on 4/12/2022 at 2:32 PM

## 2022-04-12 NOTE — PROGRESS NOTES
Physical Therapy    Facility/Department: [facility] UN[facility]  Initial Assessment    NAME: Elisa Rios  : 1935  MRN: [id number]    Date of Service: 2022    Discharge Recommendations:  Continue to assess pending progress,24 hour supervision or assist,Subacute/Skilled Nursing Facility,ECF with PT   PT Equipment Recommendations  Other: Continue to assess    Assessment   Body structures, Functions, Activity limitations: Decreased functional mobility ; Decreased cognition;Decreased high-level IADLs;Decreased posture;Decreased ADL status; Decreased endurance;Decreased ROM; Decreased coordination;Decreased strength;Decreased balance;Decreased safe awareness; Increased pain  Assessment: Pt is a pleasant, but confused 82 yo female with increased confusion and 2 recent falls. She has an avulsion fx on her R lateral malleoli and fx metatarsals 2-4 as well as instances of her R knee giving out. Dominicka Ou She would benefit from skilled PT to address decreased ROM, strength, balance, and functional mobility. Prognosis: Good  Decision Making: Medium Complexity  History: see below. Pt is a pleasant, but confused 82 yo female with increased confusion and 2 recent falls. She has an avulsion fx on her R lateral malleoli and fx metatarsals 2-4 as well as instances of her R knee giving out. Chalodetta Ou She would benefit from skilled PT to address decreased ROM, strength, balance, and functional mobility. Exam: see below  Clinical Presentation: evolving  PT Education: Transfer Training;Equipment;Disease Specific Education;PT Role;Functional Mobility Training;Plan of Care;General Safety;Gait Training; Injury Prevention; Adaptive Device Training  Barriers to Learning: cognitive  REQUIRES PT FOLLOW UP: Yes  Activity Tolerance  Activity Tolerance: Patient limited by endurance; Patient limited by fatigue;Patient limited by cognitive status       Patient Diagnosis(es): The primary encounter diagnosis was Urinary tract infection without hematuria, site unspecified. Diagnoses of Septicemia ([facility] [address] and Pneumonia of both lower lobes due to infectious organism were also pertinent to this visit. has a past medical history of Acquired hypothyroidism, Chronic midline low back pain without sciatica, Dementia without behavioral disturbance ([address] Gait disturbance, History of TIA (transient ischemic attack), Major depressive disorder with single episode, in full remission ([facility] [address] Mixed hyperlipidemia, Persistent atrial fibrillation ([facility] [address] PFO (patent foramen ovale), and Pneumonia due to infectious organism. has a past surgical history that includes Hysterectomy. Restrictions  Restrictions/Precautions  Restrictions/Precautions: Fall Risk  Required Braces or Orthoses?: Yes  Required Braces or Orthoses  Right Lower Extremity Brace: Boot  RLE Brace Type: surgical shoe when WB  Position Activity Restriction  Other position/activity restrictions: O2, telemetry  Vision/Hearing  Vision: Impaired  Vision Exceptions: Wears glasses for reading  Hearing: Exceptions to [facility]  Hearing Exceptions: Hard of hearing/hearing concerns;Bilateral hearing aid (pt does not have her hearing aides.)     Subjective  General  Chart Reviewed: Yes  Patient assessed for rehabilitation services?: Yes  Family / Caregiver Present: Yes (pt daughter present)  Follows Commands: Within Functional Limits  General Comment  Comments: pt presented in bed with HOB elevated. Subjective  Subjective: Pt reports she  Pain Screening  Patient Currently in Pain: Denies          Orientation  Orientation  Overall Orientation Status: Within Normal Limits (pt reports she suffers from short term memory loss.  Pt frequently forgot words and asked for the first letter they started with.)  Social/Functional History  Social/Functional History  Lives With: Alone  Type of Home: Apartment  Home Layout: One level  Home Access: Level entry  Bathroom Shower/Tub: Tub/Shower unit  Bathroom Toilet: Standard  Bathroom Equipment: Hand-held shower,Grab bars in shower,Tub transfer bench  Home Equipment: [address] bed (pt and daughter report the [address] gets away from her and she is not supposed to use it. She reports she uses it to get to the mailbox.)  Receives Help From: Family  ADL Assistance: Independent  Homemaking Assistance: Needs assistance  Homemaking Responsibilities: Yes  Meal Prep Responsibility: Secondary (pt reports she gets meals on wheels and her daughters get her tea and toast and small things)  Laundry Responsibility: No (pt reports she has an aide daily)  Cleaning Responsibility: No (pt reports she has an aide daily)  Shopping Responsibility: No (pt daughter or aide A with groceries.)  Ambulation Assistance: Needs assistance (pt currently wears a shoe on R LE secondary to fxs. Pt uses RW or wheelchair.)  Active : No  Leisure & Hobbies: pt reports she is a reader and a . IADL Comments: pt reports she has been using her wheelchair to get around since her fx. She reports she gets some A dressing. Cognition        Objective     Observation/Palpation  Observation: Pt presented in bed wtih HOB elevated.     AROM RLE (degrees)  RLE AROM: WFL  AROM LLE (degrees)  LLE AROM : WFL  Strength RLE  Strength RLE: Exception  Comment: pt required CGA-Donna to remain upright while PT performed MMT  R Hip Flexion: 4-/5  R Knee Flexion: 4-/5  R Knee Extension: 4-/5  Strength LLE  Strength LLE: Exception  Comment: pt required min-mod A to remain upright while PT performed MMT  L Hip Flexion: 4-/5  L Knee Flexion: 4-/5  L Knee Extension: 4-/5  Tone RLE  RLE Tone: Normotonic  Tone LLE  LLE Tone: Normotonic  Sensation  Overall Sensation Status: WNL (per pt report)  Bed mobility  Supine to Sit: Contact guard assistance;Minimal assistance  Transfers  Sit to Stand: Contact guard assistance (cues for hand placement)  Stand to sit: Minimal Assistance (pt got near the chair and slid in sideways with A despite max cues for safety.)  Bed to Chair: Contact guard assistance  Stand Pivot Transfers: Contact guard assistance  Ambulation  Ambulation?: Yes  WB Status: FWB  More Ambulation?: No  Ambulation 1  Surface: level tile  Device: Rolling Walker  Assistance: Contact guard assistance;Minimal assistance  Quality of Gait: Pt demonstrated signficantly flexed posture, decreased speed, step length, and foot clearance and signficant lack of safety awareness. She let go of the walker and held onto the bottom of the bed despite max cues. Distance: 12 feet  Stairs/Curb  Stairs?: No     Balance  Posture: Poor  Sitting - Static: Poor  Sitting - Dynamic: Poor  Standing - Static: Poor  Standing - Dynamic: Poor  Comments: Pt required CGA-mod A to remain upright EOB        Plan   Plan  Times per week: Mon-Sat 4+/week  Current Treatment Recommendations: Tara Claros Re-education,Cognitive Reorientation,Patient/Caregiver Education & Training,ROM,ADL/Self-care Training,Equipment Evaluation, Education, & procurement,Balance Training,IADL Training,Gait Training,Home Exercise Program,Functional Mobility Training,Cognitive/Perceptual Training,Stair training,Safety Education & Training,Positioning (ther ex, ther act, bed mobility)  Safety Devices  Type of devices: Left in chair,Call light within reach,Gait belt,Nurse notified (NP present and examing pt)    Ampa Score:     Basic Mobility Six Clicks Form [facility] AM-PAC Score Conversion Table   How much difficulty does the patient currently have Unable   (pt is unable to do activity) A Lot   (activity is a struggle, requires great effort/time) A Little   (pt can manage, but takes more effort/time than should) None   (pt has no difficulty) Raw Score Standardized Score CMS -100% Score CMS Modifier        6 23.55 100% CN   Turning over in bed (including adjusting bedclothes, sheets, and blankets)?   []1 []2 [x]3  []4  7 26.42 92.36% CM        8 28.58 86.62% CM   Sitting down on and standing up from a chair with arms (e.g. wheelchair, bedside commode, etc.)? []1 []2 [x]3   []4   9 30.55 81.38% CM        10 32.29 76.75% CL   Moving from lying on back to sitting on the side of the bed? []1 []2  [x]3   []4   11 33.86 72.57% CL        12 35.33 68.66% CL   How much help from another person does the patient currently need Total   (Total/Dependent Assist) A Lot   (Max/Mod Assist) A Little   (Min/CGA/Supervision) None   (No human assistance) 13 36.74 64.91% CL        14 38.1 61.29% CL   Moving to and from a bed to a chair (including a wheelchair)? []1  []2   [x]3  []4   15 39.45 57.70% CK        16 40.78 54.16% CK   To walk in a hospital room? []1 []2   [x]3    []4  17 42.13 50.57% CK        18 43.63 46.58% CK   Climbing 3-5 steps with a railing? []1  [x]2   []3    []4  19 45.44 41.77% CK        20 47.67 35.83% CJ   Raw Score 17 21 50.25 28.97% CJ   Standardized Score 42.13  22 53.28 20.91% CJ   CMS 0-100% Score  50.57% 23 56.93 11.20% CI   CMS Modifier CK 24 61.14 0.00% CH     CH = 0% impaired  CI = 1-20% impaired  CJ = 20-40% impaired  CK = 40-60% impaired  CL = 60-80% impaired  CM = % impaired  CN = 100% impaired    Goals  Short term goals  Time Frame for Short term goals: 1 week or until discharge:  Short term goal 1: Pt will demonstrate the ability to safely perform bed mobility with supervision. Short term goal 2: Pt will demonstrate the ability to safely transfer sit to stand and stand to sit from 3 different height surfaces with CGA and proper hand placement with VCs only. Short term goal 3: Pt will demonstrate the ability to safely ambulate 25' with a RW and CGA with no rest breaks or instances of letting go of the walker for nearby furniture. Patient Goals   Patient goals : to return home.        Therapy Time   Individual Concurrent Group Co-treatment   Time In       5006   Time Out       0925   Minutes 35   Timed Code Treatment Minutes: [address] [facility], [address] [facility] DPT [id number]

## 2022-04-12 NOTE — CARE COORDINATION
CM met with the patient for discharge planning. Patient lives alone in her apartment ([address]), has insurance with Rx coverage & PCP, and stated that she requires some assistance with ADL's. Patient stated that she uses a walker and wheelchair at home and does not require home oxygen. Patient reports that she recently broke the 2nd, 3rd, and 4th metatarsal bones in her right foot and has a postoperative shoe to wear during ambulation. Patient has been using a wheelchair at home to limit weightbearing on that extremity. Patient stated that she was receiving [facility] services at home but cannot remember the name of the agency. Patient reports that she has three daughters that live nearby who assist her as needed. Patient believes that she is able to return home upon discharge. CM will follow. 3:20 PM  CM received call from the patient's [employer]  Cresencio Sarkar ([phone]) confirming that the patient receives services through the [facility] waiver program which include the following:  counseling, home delivered meals, emergency response system, incontinence supplies, personal aide services Monday-Friday (M/W/F 2p-5p, Thursday 10a-12:30p, & Friday 2p-5p) through [facility], and skilled nursing visits every other week through [facility].

## 2022-04-13 ENCOUNTER — APPOINTMENT (OUTPATIENT)
Dept: GENERAL RADIOLOGY | Age: 87
DRG: 871 | End: 2022-04-13
Payer: MEDICARE

## 2022-04-13 LAB
ALBUMIN SERPL-MCNC: 2.9 GM/DL (ref 3.4–5)
ALP BLD-CCNC: 78 IU/L (ref 40–129)
ALT SERPL-CCNC: 12 U/L (ref 10–40)
ANION GAP SERPL CALCULATED.3IONS-SCNC: 10 MMOL/L (ref 4–16)
AST SERPL-CCNC: 16 IU/L (ref 15–37)
BASOPHILS ABSOLUTE: 0 K/CU MM
BASOPHILS RELATIVE PERCENT: 0.4 % (ref 0–1)
BILIRUB SERPL-MCNC: 0.3 MG/DL (ref 0–1)
BUN BLDV-MCNC: 15 MG/DL (ref 6–23)
CALCIUM SERPL-MCNC: 8.2 MG/DL (ref 8.3–10.6)
CHLORIDE BLD-SCNC: 104 MMOL/L (ref 99–110)
CO2: 26 MMOL/L (ref 21–32)
CREAT SERPL-MCNC: 1 MG/DL (ref 0.6–1.1)
CULTURE: NORMAL
DIFFERENTIAL TYPE: ABNORMAL
EOSINOPHILS ABSOLUTE: 0.6 K/CU MM
EOSINOPHILS RELATIVE PERCENT: 8.6 % (ref 0–3)
GFR AFRICAN AMERICAN: >60 ML/MIN/1.73M2
GFR NON-AFRICAN AMERICAN: 53 ML/MIN/1.73M2
GLUCOSE BLD-MCNC: 88 MG/DL (ref 70–99)
HCT VFR BLD CALC: 41.7 % (ref 37–47)
HEMOGLOBIN: 13.3 GM/DL (ref 12.5–16)
IMMATURE NEUTROPHIL %: 0.4 % (ref 0–0.43)
LEGIONELLA URINARY AG: NEGATIVE
LV EF: 55 %
LVEF MODALITY: NORMAL
LYMPHOCYTES ABSOLUTE: 3 K/CU MM
LYMPHOCYTES RELATIVE PERCENT: 41.9 % (ref 24–44)
Lab: NORMAL
MCH RBC QN AUTO: 31.3 PG (ref 27–31)
MCHC RBC AUTO-ENTMCNC: 31.9 % (ref 32–36)
MCV RBC AUTO: 98.1 FL (ref 78–100)
MONOCYTES ABSOLUTE: 0.8 K/CU MM
MONOCYTES RELATIVE PERCENT: 11.2 % (ref 0–4)
PDW BLD-RTO: 13.9 % (ref 11.7–14.9)
PLATELET # BLD: 265 K/CU MM (ref 140–440)
PMV BLD AUTO: 9.4 FL (ref 7.5–11.1)
POTASSIUM SERPL-SCNC: 4 MMOL/L (ref 3.5–5.1)
PRO-BNP: 1512 PG/ML
PROCALCITONIN: 0.49
RBC # BLD: 4.25 M/CU MM (ref 4.2–5.4)
SEGMENTED NEUTROPHILS ABSOLUTE COUNT: 2.7 K/CU MM
SEGMENTED NEUTROPHILS RELATIVE PERCENT: 37.5 % (ref 36–66)
SODIUM BLD-SCNC: 140 MMOL/L (ref 135–145)
SPECIMEN: NORMAL
STREP PNEUMONIAE ANTIGEN: NORMAL
TOTAL IMMATURE NEUTOROPHIL: 0.03 K/CU MM
TOTAL PROTEIN: 5.3 GM/DL (ref 6.4–8.2)
WBC # BLD: 7.2 K/CU MM (ref 4–10.5)

## 2022-04-13 PROCEDURE — 84145 PROCALCITONIN (PCT): CPT

## 2022-04-13 PROCEDURE — 97535 SELF CARE MNGMENT TRAINING: CPT

## 2022-04-13 PROCEDURE — 83880 ASSAY OF NATRIURETIC PEPTIDE: CPT

## 2022-04-13 PROCEDURE — 93306 TTE W/DOPPLER COMPLETE: CPT

## 2022-04-13 PROCEDURE — 97110 THERAPEUTIC EXERCISES: CPT

## 2022-04-13 PROCEDURE — 85025 COMPLETE CBC W/AUTO DIFF WBC: CPT

## 2022-04-13 PROCEDURE — 6360000002 HC RX W HCPCS: Performed by: NURSE PRACTITIONER

## 2022-04-13 PROCEDURE — 94761 N-INVAS EAR/PLS OXIMETRY MLT: CPT

## 2022-04-13 PROCEDURE — 80053 COMPREHEN METABOLIC PANEL: CPT

## 2022-04-13 PROCEDURE — 36415 COLL VENOUS BLD VENIPUNCTURE: CPT

## 2022-04-13 PROCEDURE — 73630 X-RAY EXAM OF FOOT: CPT

## 2022-04-13 PROCEDURE — 97530 THERAPEUTIC ACTIVITIES: CPT

## 2022-04-13 PROCEDURE — 97116 GAIT TRAINING THERAPY: CPT

## 2022-04-13 PROCEDURE — 2580000003 HC RX 258: Performed by: NURSE PRACTITIONER

## 2022-04-13 PROCEDURE — 71046 X-RAY EXAM CHEST 2 VIEWS: CPT

## 2022-04-13 PROCEDURE — 2140000000 HC CCU INTERMEDIATE R&B

## 2022-04-13 PROCEDURE — 6370000000 HC RX 637 (ALT 250 FOR IP): Performed by: NURSE PRACTITIONER

## 2022-04-13 PROCEDURE — 2700000000 HC OXYGEN THERAPY PER DAY

## 2022-04-13 RX ORDER — FUROSEMIDE 10 MG/ML
40 INJECTION INTRAMUSCULAR; INTRAVENOUS ONCE
Status: COMPLETED | OUTPATIENT
Start: 2022-04-13 | End: 2022-04-13

## 2022-04-13 RX ORDER — ATORVASTATIN CALCIUM 20 MG/1
20 TABLET, FILM COATED ORAL NIGHTLY
Status: DISCONTINUED | OUTPATIENT
Start: 2022-04-14 | End: 2022-04-14 | Stop reason: HOSPADM

## 2022-04-13 RX ADMIN — CEFTRIAXONE SODIUM 1000 MG: 1 INJECTION, POWDER, FOR SOLUTION INTRAMUSCULAR; INTRAVENOUS at 15:46

## 2022-04-13 RX ADMIN — METOPROLOL TARTRATE 12.5 MG: 25 TABLET, FILM COATED ORAL at 20:09

## 2022-04-13 RX ADMIN — SODIUM CHLORIDE 25 ML: 9 INJECTION, SOLUTION INTRAVENOUS at 15:44

## 2022-04-13 RX ADMIN — SODIUM CHLORIDE, PRESERVATIVE FREE 10 ML: 5 INJECTION INTRAVENOUS at 20:10

## 2022-04-13 RX ADMIN — Medication 400 UNITS: at 08:16

## 2022-04-13 RX ADMIN — ACETAMINOPHEN 650 MG: 325 TABLET ORAL at 23:49

## 2022-04-13 RX ADMIN — MEMANTINE 10 MG: 10 TABLET ORAL at 08:17

## 2022-04-13 RX ADMIN — ACETAMINOPHEN 500 MG: 500 TABLET ORAL at 08:16

## 2022-04-13 RX ADMIN — FUROSEMIDE 40 MG: 10 INJECTION, SOLUTION INTRAMUSCULAR; INTRAVENOUS at 08:21

## 2022-04-13 RX ADMIN — AZITHROMYCIN MONOHYDRATE 250 MG: 500 INJECTION, POWDER, LYOPHILIZED, FOR SOLUTION INTRAVENOUS at 18:15

## 2022-04-13 RX ADMIN — ACETAMINOPHEN 500 MG: 500 TABLET ORAL at 20:08

## 2022-04-13 RX ADMIN — DIGOXIN 125 MCG: 125 TABLET ORAL at 08:16

## 2022-04-13 RX ADMIN — METOPROLOL TARTRATE 12.5 MG: 25 TABLET, FILM COATED ORAL at 08:17

## 2022-04-13 RX ADMIN — APIXABAN 5 MG: 5 TABLET, FILM COATED ORAL at 20:09

## 2022-04-13 RX ADMIN — RIVASTIGMINE TARTRATE 3 MG: 1.5 CAPSULE ORAL at 08:16

## 2022-04-13 RX ADMIN — SERTRALINE 50 MG: 50 TABLET, FILM COATED ORAL at 08:16

## 2022-04-13 RX ADMIN — MEMANTINE 10 MG: 10 TABLET ORAL at 20:09

## 2022-04-13 RX ADMIN — SODIUM CHLORIDE, PRESERVATIVE FREE 10 ML: 5 INJECTION INTRAVENOUS at 08:16

## 2022-04-13 RX ADMIN — APIXABAN 5 MG: 5 TABLET, FILM COATED ORAL at 08:16

## 2022-04-13 RX ADMIN — LEVOTHYROXINE SODIUM 150 MCG: 0.15 TABLET ORAL at 05:08

## 2022-04-13 RX ADMIN — Medication 2000 UNITS: at 08:16

## 2022-04-13 RX ADMIN — ATORVASTATIN CALCIUM 20 MG: 20 TABLET, FILM COATED ORAL at 08:16

## 2022-04-13 RX ADMIN — ACETAMINOPHEN 500 MG: 500 TABLET ORAL at 14:24

## 2022-04-13 ASSESSMENT — PAIN SCALES - GENERAL
PAINLEVEL_OUTOF10: 0
PAINLEVEL_OUTOF10: 0
PAINLEVEL_OUTOF10: 2
PAINLEVEL_OUTOF10: 3
PAINLEVEL_OUTOF10: 3
PAINLEVEL_OUTOF10: 0

## 2022-04-13 ASSESSMENT — PAIN DESCRIPTION - PAIN TYPE: TYPE: ACUTE PAIN

## 2022-04-13 ASSESSMENT — PAIN DESCRIPTION - ORIENTATION: ORIENTATION: RIGHT

## 2022-04-13 ASSESSMENT — PAIN DESCRIPTION - PROGRESSION: CLINICAL_PROGRESSION: GRADUALLY WORSENING

## 2022-04-13 ASSESSMENT — PAIN DESCRIPTION - LOCATION: LOCATION: FOOT

## 2022-04-13 ASSESSMENT — PAIN DESCRIPTION - FREQUENCY: FREQUENCY: INTERMITTENT

## 2022-04-13 ASSESSMENT — PAIN DESCRIPTION - ONSET: ONSET: ON-GOING

## 2022-04-13 ASSESSMENT — PAIN DESCRIPTION - DESCRIPTORS: DESCRIPTORS: DISCOMFORT

## 2022-04-13 NOTE — CARE COORDINATION
CM met with the patient to discuss PT/OT recommendations for SNF placement. Patient agreed that she would benefit from short-term therapy prior to returning home. CM discussed the swing bed program as the patient was hesitant about going to a traditional nursing facility. Patient voiced agreement with swing bed placement but asked that this  notify her daughter Jillian Camacho. CM will follow. 8:59 AM  CM notified the swing bed coordinator of referral.  CM will follow. 11:45 AM  CM met with the patient and her daughter to again discuss the discharge plan. CM advised the patient's daughter that the patient had expressed interest in the swing bed program earlier this morning. CM explained the swing bed program to the daughter and advised that she would likely be discharged and readmitted to the swing bed program tomorrow if the patient was agreeable. Patient and daughter agreeable with placement in the swing bed program.  The patient's daughter advised that the patient had a follow-up appointment today with Meseret Austin PA-C regarding the fractures in her right foot that they had to cancel. The daughter voiced that she thought Meseret Austin PA-C was going to perform follow-up x-rays in the office today during her scheduled appointment. CM will follow. 11:59 AM  CM sent [employer] message to Meseret Austin PA-C regarding patient's missed appointment today and possible need for further x-rays. CM will follow. 2:17 PM  CM notified Dr. Donovan Gonzales via [employer] that results of her right foot x-rays were available. 2:49 PM  Dr. Donovan Gonzales advised that the patient needs to continue wearing the post-op shoe while ambulating. Dr. James Das would like for the patient to follow-up as an outpatient in 4-6 weeks.

## 2022-04-13 NOTE — FLOWSHEET NOTE
until discharge:  Short term goal 1: Pt will demonstrate the ability to safely perform bed mobility with supervision. Short term goal 2: Pt will demonstrate the ability to safely transfer sit to stand and stand to sit from 3 different height surfaces with CGA and proper hand placement with VCs only. Short term goal 3: Pt will demonstrate the ability to safely ambulate 25' with a RW and CGA with no rest breaks or instances of letting go of the walker for nearby furniture. Plan of Care:             Times per week: Mon-Sat 4+/week            Current Treatment Recommendations: Marija Burton Re-education,Cognitive Reorientation,Patient/Caregiver Education & Training,ROM,ADL/Self-care Training,Equipment Evaluation, Education, & procurement,Balance Training,IADL Training,Gait Training,Home Exercise Program,Functional Mobility Training,Cognitive/Perceptual Training,Stair training,Safety Education & Training,Positioning (ther ex, ther act, bed mobility)      Objective Findings:    Date: 4-13-22 Date:  Date:  Date:  Date:    Bed mobility SBA, for safety and sequencing. Very unsafe. Sit to stand transfer Min assist, very unsafe, lets go of walker with one UE to reach for surface       Stand to sit transfer Min assist, very unsafe, lets go of walker with one UE to reach for surface       Commode transfer Min assist, very unsafe, lets go of walker with one UE to reach for surface       Standing tolerance 2-3 mins with transfer, toileting and gait. Ambulation Gait training with ortho shoe,  with RW,x 12+12 ft with min x 2 to manage lines and for safety, pt became very SOB and anxious letting go of walker reaching for surfaces. Sat EOB prior to getting to recline due to SOB and anxiety.        Stairs          Exercises:   Exercise/Equipment/Modalities  Date: 4-13-22 Date:  Date:  Date:    Hip flex Seated marching x 20      LAQ x20      Hip abd x20 Modality/intervention used:   [x ] Therapeutic Exercise   [ ] Modalities:   [ x] Therapeutic Activity     [ ] Ultrasound   [ ] Elec Stim   [ x] Gait Training     [ ] Cervical Traction  [ ] Lumbar Traction   [ ] Neuromuscular Re-education   [ ] Cold/hotpack  [ ] Iontophoresis   [ ] Instruction in HEP     [ ] Vasopneumatic   [ ] Manual Therapy   [ ] Aquatic Therapy     Other Therapeutic Activities: dyn sitting balance with forward reaching activities EOB unsupported, retro leaning with fatigue and impulsivity    Home Exercise Program/Education provided to patient: x    Manual Treatments: x  Communication with other providers: PA regarding o2 line, changed by respiratory.  Nurse cleared for therapy  Adverse reactions to treatment: SOB, anxiety, impulsivity,fatigue    Treatment/Activity Tolerance:   [x ] Patient limited by fatigue [ ] Patient limited by pain [ ] Patient limited by other medical complications [ ] Patient tolerated therapy well  [ ] Other:     Post Tx Pain Ratin /10     Safety Precautions:   [ ] left in bed  [x ] left in chair [x ] call light within reach  [ ] bed alarm on  [ x] personal alarm on  [ ] other staff present:    Plan:   [x ] Continue per plan of care [ ] Norris Ramos current plan   [ ] Plan of care initiated [ ] Hold pending MD visit [ ] Discharge     Plan for Next Session:     Time In: 227  Time Out: 1012  Total Treatment Minutes: 57  Billed Units: = 2ta, 1 gt, 1te    LEFTY Rdz,[id number] 2022, 9:16 AM

## 2022-04-13 NOTE — PROGRESS NOTES
Occupational Therapy    Occupational Therapy Treatment Note  Name: Samira Jnug MRN: [id number] :   1935   Date:  2022   Admission Date: 2022 Room:  [address] [place], [place] [id number]   Restrictions/Precautions:  Restrictions/Precautions  Restrictions/Precautions: Fall Risk  Required Braces or Orthoses?: Yes     Communication with other providers:   Cleared for treatment by RN. 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\"   Pain:   Location, Type, Intensity (0/10 to 10/10): No pain     Objective:    Observation:  Pt alert and oriented but confused and impulsive at times   Objective Measures:  78-92    Treatment, including education:  Transfers  Supine to sit Sup  Sit to supine :Sup  Scooting :Sup  Rolling :Sup  Sit to stand :CGA  Stand to sit :CGA  SPT:CGA with mod verbal cues for walker safety   Toilet:CGA with mod verbal cues for walker safety     Self Care Training:   Activities performed today included the following: Toileting  CGA with toilet hygiene    Grooming Sup with washing face    Bathing   CGA with mod verbal cues for safety awareness using RW. UB Dress  Donned gown     LB Dress Min A with pulling up brief for hip hike. Therapeutic Exercise:   Pt completed UE exercises to increase strength and ROM to facilitate increase for ADLs and functional mobility. Pt completed B UEs with 2# dumbbell exercises with curls, shoulder presses, punch, stirring and wrist curls x 20 reps with mod rest breaks due to fatigue. Therapeutic Activity Training: Pt stood x 3 min with CGA with RW to facilitate increased endurance/strength for ADL tasks and transfers. Pt completed functional mobility with RW x 12' x 2 with CGA and mod verbal cues for walker safety.           Safety Measures: Gait belt used, Left in bed, Pull/Bed Alarm activated and call light left in reach        Assessment / Impression:        Patient's tolerance of treatment: Good   Adverse Reaction: None  Significant change in status and impact:  None  Barriers to improvement:  Dec strength and endurance and dec cognition. Plan for Next Session:    Continue with POC.     Time in:  0915  Time out:  1012  Total treatment time: 62  Billed Units: 2 ADL, 1 TE, 1 TA  Electronically signed by:    Effie Garcia, [address]    4/13/2022, 1:28 PM    Previously filed values:     Short term goals  Time Frame for Short term goals: Until DC or goals met  Short term goal 1: Pt will complete trfs mod I w/o LOB  Short term goal 2: Pt will complete UE bathing/dressing with S  Short term goal 3: Pt will complete LE bathing/dressing with S  Short term goal 4: Pt will complete toileting mod I  Short term goal 5: Pt will complete 3+ min of standing/functional mobility during ADLs/therax with min increased SOB and no LOB

## 2022-04-13 NOTE — PROGRESS NOTES
Hospitalist Progress Note      Name:  Khoi Gibbs /Age/Sex: 1935  (81 y.o. female)   MRN & CSN:  [id number] & [id number] Admission Date/Time: 2022  6:30 PM   Location:   PCP: Luiz Kumar MD         Hospital Day: 3    Assessment and Plan:   Khoi Gibbs is a 81 y.o.  female  who presents with Sepsis ([address]    1. Sepsis, present on admission now resolved  2. Acute hypoxemic respiratory failure, patient is requiring 2 L of oxygen nasal cannula she does not wear oxygen at home. Wean as tolerated. Repeat chest x-ray has been ordered for today. 3. Possible pneumonia, continue IV antibiotics for now, procalcitonin 0.488, will repeat in a.m. if still trending down will most likely DC antibiotics. Doubt pneumonia. 4. Chronic atrial fibrillation patient is on Eliquis and Lopressor. [id number] score is 3  5. Elevated BNP at 1693 today BNP is 1512, still has crackles, will give 40 IV lasix x 1 today. Still awaiting results of ECHO. 6. Hypothyroidism, continue Synthroid  7. Hyperlipidemia continue simvastatin  8. Toe fractures on the right foot, patient's had seen orthopedics previous to admission and is in a walking shoe provided by orthopedics. Patient needs x-rays of her right foot and toes completed today and these have been ordered. Diet ADULT DIET; Regular; Low Fat/Low Chol/High Fiber/JEFF   DVT Prophylaxis  on Eliquis   GI Prophylaxis    Code Status DNR-CCA             History of Present Illness:     Chief Complaint:   Patient seen and examined today, she is doing well she is eating her breakfast, she has no complaints at this time. She has no chest pain, no shortness of breath, no nausea no vomiting no diarrhea. No abdominal pain. Ten point ROS reviewed negative, unless as noted above    Objective:        Intake/Output Summary (Last 24 hours) at 2022 1259  Last data filed at 2022 0600  Gross per 24 hour   Intake 430.36 ml   Output 1550 ml   Net -1119.64 ml Vitals:   Vitals:    04/13/22 0815   BP: 101/60   Pulse: 74   Resp: 16   Temp: 98.1 °F (36.7 °C)   SpO2: 96%     Physical Exam:   GEN Awake female, sitting upright in bed in no apparent distress. Appears given age. EYES Pupils are equally round. No scleral erythema, discharge, or conjunctivitis. HENT Mucous membranes are moist. Oral pharynx without exudates, no evidence of thrush. NECK Supple, no apparent thyromegaly or masses. RESP no wheezes,  or rhonchi. Crackles bilateral bases [facility] up symmetric chest movement while on 2 L nasal cannula  CARDIO/VASC S1/S2 auscultated. Irregular rate and rhythm Peripheral pulses equal bilaterally and palpable. No peripheral edema. GI Abdomen is soft without significant tenderness, masses, or guarding. Bowel sounds are normoactive. Rectal exam deferred.  No costovertebral angle tenderness. MSK No gross joint deformities. SKIN Normal coloration, warm, dry. NEURO Cranial nerves appear grossly intact, normal speech, no lateralizing weakness. PSYCH Awake, alert, oriented x 4. Affect appropriate.     Medications:   Medications:    [START ON 4/14/2022] atorvastatin  20 mg Oral Nightly    acetaminophen  500 mg Oral TID    apixaban  5 mg Oral BID    Vitamin D  2,000 Units Oral Daily    diclofenac sodium  4 g Topical 4x Daily    digoxin  125 mcg Oral Daily    levothyroxine  150 mcg Oral Daily    memantine  10 mg Oral BID    metoprolol tartrate  12.5 mg Oral BID    miconazole   Topical BID    rivastigmine  3 mg Oral Daily    sertraline  50 mg Oral Daily    vitamin E  400 Units Oral Daily    sodium chloride flush  5-40 mL IntraVENous 2 times per day    azithromycin  250 mg IntraVENous Q24H    cefTRIAXone (ROCEPHIN) IV  1,000 mg IntraVENous Q24H      Infusions:    sodium chloride Stopped (04/12/22 1854)     PRN Meds: sodium chloride flush, 5-40 mL, PRN  sodium chloride, , PRN  ondansetron, 4 mg, Q8H PRN   Or  ondansetron, 4 mg, Q6H PRN  polyethylene glycol, 17 g, Daily PRN  acetaminophen, 650 mg, Q6H PRN   Or  acetaminophen, 650 mg, Q6H PRN              Electronically signed by NGOC Mcfarland NP on 4/13/2022 at 12:59 PM

## 2022-04-14 ENCOUNTER — HOSPITAL ENCOUNTER (INPATIENT)
Age: 87
LOS: 15 days | Discharge: HOME | DRG: 948 | End: 2022-04-29
Attending: INTERNAL MEDICINE | Admitting: INTERNAL MEDICINE
Payer: MEDICARE

## 2022-04-14 VITALS
OXYGEN SATURATION: 96 % | DIASTOLIC BLOOD PRESSURE: 64 MMHG | HEIGHT: 69 IN | HEART RATE: 70 BPM | SYSTOLIC BLOOD PRESSURE: 130 MMHG | TEMPERATURE: 98.5 F | BODY MASS INDEX: 26.22 KG/M2 | RESPIRATION RATE: 22 BRPM | WEIGHT: 177 LBS

## 2022-04-14 PROBLEM — R53.81 PHYSICAL DEBILITY: Status: ACTIVE | Noted: 2022-04-14

## 2022-04-14 LAB
ALBUMIN SERPL-MCNC: 3 GM/DL (ref 3.4–5)
ALP BLD-CCNC: 81 IU/L (ref 40–129)
ALT SERPL-CCNC: 13 U/L (ref 10–40)
ANION GAP SERPL CALCULATED.3IONS-SCNC: 10 MMOL/L (ref 4–16)
AST SERPL-CCNC: 25 IU/L (ref 15–37)
BASOPHILS ABSOLUTE: 0 K/CU MM
BASOPHILS RELATIVE PERCENT: 0.4 % (ref 0–1)
BILIRUB SERPL-MCNC: 0.3 MG/DL (ref 0–1)
BUN BLDV-MCNC: 15 MG/DL (ref 6–23)
CALCIUM SERPL-MCNC: 8.6 MG/DL (ref 8.3–10.6)
CHLORIDE BLD-SCNC: 105 MMOL/L (ref 99–110)
CO2: 26 MMOL/L (ref 21–32)
CREAT SERPL-MCNC: 0.8 MG/DL (ref 0.6–1.1)
DIFFERENTIAL TYPE: ABNORMAL
EOSINOPHILS ABSOLUTE: 0.6 K/CU MM
EOSINOPHILS RELATIVE PERCENT: 8.2 % (ref 0–3)
GFR AFRICAN AMERICAN: >60 ML/MIN/1.73M2
GFR NON-AFRICAN AMERICAN: >60 ML/MIN/1.73M2
GLUCOSE BLD-MCNC: 91 MG/DL (ref 70–99)
HCT VFR BLD CALC: 41.7 % (ref 37–47)
HEMOGLOBIN: 13.7 GM/DL (ref 12.5–16)
IMMATURE NEUTROPHIL %: 0.3 % (ref 0–0.43)
LYMPHOCYTES ABSOLUTE: 3.4 K/CU MM
LYMPHOCYTES RELATIVE PERCENT: 50.5 % (ref 24–44)
MCH RBC QN AUTO: 31.8 PG (ref 27–31)
MCHC RBC AUTO-ENTMCNC: 32.9 % (ref 32–36)
MCV RBC AUTO: 96.8 FL (ref 78–100)
MONOCYTES ABSOLUTE: 0.7 K/CU MM
MONOCYTES RELATIVE PERCENT: 10.4 % (ref 0–4)
PDW BLD-RTO: 13.6 % (ref 11.7–14.9)
PLATELET # BLD: 291 K/CU MM (ref 140–440)
PMV BLD AUTO: 9.4 FL (ref 7.5–11.1)
POTASSIUM SERPL-SCNC: 4 MMOL/L (ref 3.5–5.1)
PRO-BNP: 1569 PG/ML
PROCALCITONIN: 0.34
RBC # BLD: 4.31 M/CU MM (ref 4.2–5.4)
SEGMENTED NEUTROPHILS ABSOLUTE COUNT: 2 K/CU MM
SEGMENTED NEUTROPHILS RELATIVE PERCENT: 30.2 % (ref 36–66)
SODIUM BLD-SCNC: 141 MMOL/L (ref 135–145)
TOTAL IMMATURE NEUTOROPHIL: 0.02 K/CU MM
TOTAL PROTEIN: 5.9 GM/DL (ref 6.4–8.2)
WBC # BLD: 6.7 K/CU MM (ref 4–10.5)

## 2022-04-14 PROCEDURE — 85025 COMPLETE CBC W/AUTO DIFF WBC: CPT

## 2022-04-14 PROCEDURE — 80053 COMPREHEN METABOLIC PANEL: CPT

## 2022-04-14 PROCEDURE — 6370000000 HC RX 637 (ALT 250 FOR IP): Performed by: NURSE PRACTITIONER

## 2022-04-14 PROCEDURE — 1200000002 HC SEMI PRIVATE SWING BED

## 2022-04-14 PROCEDURE — 2700000000 HC OXYGEN THERAPY PER DAY

## 2022-04-14 PROCEDURE — 83880 ASSAY OF NATRIURETIC PEPTIDE: CPT

## 2022-04-14 PROCEDURE — 97116 GAIT TRAINING THERAPY: CPT

## 2022-04-14 PROCEDURE — 97112 NEUROMUSCULAR REEDUCATION: CPT

## 2022-04-14 PROCEDURE — 36415 COLL VENOUS BLD VENIPUNCTURE: CPT

## 2022-04-14 PROCEDURE — 97530 THERAPEUTIC ACTIVITIES: CPT

## 2022-04-14 PROCEDURE — 94761 N-INVAS EAR/PLS OXIMETRY MLT: CPT

## 2022-04-14 PROCEDURE — 6360000002 HC RX W HCPCS: Performed by: NURSE PRACTITIONER

## 2022-04-14 PROCEDURE — 84145 PROCALCITONIN (PCT): CPT

## 2022-04-14 PROCEDURE — 2580000003 HC RX 258: Performed by: NURSE PRACTITIONER

## 2022-04-14 PROCEDURE — 97535 SELF CARE MNGMENT TRAINING: CPT

## 2022-04-14 RX ORDER — ONDANSETRON 4 MG/1
4 TABLET, ORALLY DISINTEGRATING ORAL EVERY 8 HOURS PRN
Status: CANCELLED | OUTPATIENT
Start: 2022-04-14

## 2022-04-14 RX ORDER — ACETAMINOPHEN 500 MG
500 TABLET ORAL 3 TIMES DAILY
Status: CANCELLED | OUTPATIENT
Start: 2022-04-14

## 2022-04-14 RX ORDER — FUROSEMIDE 10 MG/ML
40 INJECTION INTRAMUSCULAR; INTRAVENOUS ONCE
Status: COMPLETED | OUTPATIENT
Start: 2022-04-14 | End: 2022-04-14

## 2022-04-14 RX ORDER — ACETAMINOPHEN 650 MG/1
650 SUPPOSITORY RECTAL EVERY 6 HOURS PRN
Status: CANCELLED | OUTPATIENT
Start: 2022-04-14

## 2022-04-14 RX ORDER — FUROSEMIDE 40 MG/1
40 TABLET ORAL DAILY
Status: CANCELLED | OUTPATIENT
Start: 2022-04-15

## 2022-04-14 RX ORDER — RIVASTIGMINE TARTRATE 1.5 MG/1
3 CAPSULE ORAL DAILY
Status: CANCELLED | OUTPATIENT
Start: 2022-04-14

## 2022-04-14 RX ORDER — VITAMIN B COMPLEX
2000 TABLET ORAL DAILY
Status: DISCONTINUED | OUTPATIENT
Start: 2022-04-15 | End: 2022-04-15 | Stop reason: SDUPTHER

## 2022-04-14 RX ORDER — VITAMIN E 268 MG
400 CAPSULE ORAL DAILY
Status: DISCONTINUED | OUTPATIENT
Start: 2022-04-15 | End: 2022-04-15

## 2022-04-14 RX ORDER — DIGOXIN 125 MCG
125 TABLET ORAL DAILY
Status: CANCELLED | OUTPATIENT
Start: 2022-04-14

## 2022-04-14 RX ORDER — DIGOXIN 125 MCG
125 TABLET ORAL DAILY
Status: DISCONTINUED | OUTPATIENT
Start: 2022-04-15 | End: 2022-04-29 | Stop reason: HOSPADM

## 2022-04-14 RX ORDER — MEMANTINE HYDROCHLORIDE 10 MG/1
10 TABLET ORAL 2 TIMES DAILY
Status: CANCELLED | OUTPATIENT
Start: 2022-04-14

## 2022-04-14 RX ORDER — RIVASTIGMINE TARTRATE 1.5 MG/1
3 CAPSULE ORAL DAILY
Status: DISCONTINUED | OUTPATIENT
Start: 2022-04-15 | End: 2022-04-15

## 2022-04-14 RX ORDER — FUROSEMIDE 40 MG/1
40 TABLET ORAL DAILY
Status: DISCONTINUED | OUTPATIENT
Start: 2022-04-15 | End: 2022-04-14 | Stop reason: HOSPADM

## 2022-04-14 RX ORDER — LEVOTHYROXINE SODIUM 0.15 MG/1
150 TABLET ORAL DAILY
Status: CANCELLED | OUTPATIENT
Start: 2022-04-15

## 2022-04-14 RX ORDER — FUROSEMIDE 40 MG/1
40 TABLET ORAL DAILY
Status: DISCONTINUED | OUTPATIENT
Start: 2022-04-15 | End: 2022-04-20

## 2022-04-14 RX ORDER — MEMANTINE HYDROCHLORIDE 10 MG/1
10 TABLET ORAL 2 TIMES DAILY
Status: DISCONTINUED | OUTPATIENT
Start: 2022-04-14 | End: 2022-04-29 | Stop reason: HOSPADM

## 2022-04-14 RX ORDER — ATORVASTATIN CALCIUM 20 MG/1
20 TABLET, FILM COATED ORAL NIGHTLY
Status: CANCELLED | OUTPATIENT
Start: 2022-04-14

## 2022-04-14 RX ORDER — ACETAMINOPHEN 650 MG/1
650 SUPPOSITORY RECTAL EVERY 6 HOURS PRN
Status: DISCONTINUED | OUTPATIENT
Start: 2022-04-14 | End: 2022-04-15

## 2022-04-14 RX ORDER — ONDANSETRON 2 MG/ML
4 INJECTION INTRAMUSCULAR; INTRAVENOUS EVERY 6 HOURS PRN
Status: CANCELLED | OUTPATIENT
Start: 2022-04-14

## 2022-04-14 RX ORDER — ONDANSETRON 2 MG/ML
4 INJECTION INTRAMUSCULAR; INTRAVENOUS EVERY 6 HOURS PRN
Status: DISCONTINUED | OUTPATIENT
Start: 2022-04-14 | End: 2022-04-14

## 2022-04-14 RX ORDER — VITAMIN B COMPLEX
2000 TABLET ORAL DAILY
Status: CANCELLED | OUTPATIENT
Start: 2022-04-14

## 2022-04-14 RX ORDER — ATORVASTATIN CALCIUM 20 MG/1
20 TABLET, FILM COATED ORAL NIGHTLY
Status: DISCONTINUED | OUTPATIENT
Start: 2022-04-14 | End: 2022-04-29 | Stop reason: HOSPADM

## 2022-04-14 RX ORDER — ACETAMINOPHEN 500 MG
500 TABLET ORAL 3 TIMES DAILY
Status: DISCONTINUED | OUTPATIENT
Start: 2022-04-14 | End: 2022-04-29 | Stop reason: HOSPADM

## 2022-04-14 RX ORDER — ACETAMINOPHEN 325 MG/1
650 TABLET ORAL EVERY 6 HOURS PRN
Status: CANCELLED | OUTPATIENT
Start: 2022-04-14

## 2022-04-14 RX ORDER — POLYETHYLENE GLYCOL 3350 17 G/17G
17 POWDER, FOR SOLUTION ORAL DAILY PRN
Status: DISCONTINUED | OUTPATIENT
Start: 2022-04-14 | End: 2022-04-15 | Stop reason: SDUPTHER

## 2022-04-14 RX ORDER — ACETAMINOPHEN 325 MG/1
650 TABLET ORAL EVERY 6 HOURS PRN
Status: DISCONTINUED | OUTPATIENT
Start: 2022-04-14 | End: 2022-04-15

## 2022-04-14 RX ORDER — ONDANSETRON 4 MG/1
4 TABLET, ORALLY DISINTEGRATING ORAL EVERY 8 HOURS PRN
Status: DISCONTINUED | OUTPATIENT
Start: 2022-04-14 | End: 2022-04-14

## 2022-04-14 RX ORDER — POLYETHYLENE GLYCOL 3350 17 G/17G
17 POWDER, FOR SOLUTION ORAL DAILY PRN
Status: CANCELLED | OUTPATIENT
Start: 2022-04-14

## 2022-04-14 RX ORDER — LEVOTHYROXINE SODIUM 0.15 MG/1
150 TABLET ORAL DAILY
Status: DISCONTINUED | OUTPATIENT
Start: 2022-04-15 | End: 2022-04-15

## 2022-04-14 RX ADMIN — MEMANTINE 10 MG: 10 TABLET ORAL at 21:48

## 2022-04-14 RX ADMIN — ACETAMINOPHEN 500 MG: 500 TABLET ORAL at 21:48

## 2022-04-14 RX ADMIN — RIVASTIGMINE TARTRATE 3 MG: 1.5 CAPSULE ORAL at 10:15

## 2022-04-14 RX ADMIN — MEMANTINE 10 MG: 10 TABLET ORAL at 10:16

## 2022-04-14 RX ADMIN — ACETAMINOPHEN 500 MG: 500 TABLET ORAL at 10:15

## 2022-04-14 RX ADMIN — ACETAMINOPHEN 500 MG: 500 TABLET ORAL at 14:49

## 2022-04-14 RX ADMIN — SODIUM CHLORIDE, PRESERVATIVE FREE 10 ML: 5 INJECTION INTRAVENOUS at 10:16

## 2022-04-14 RX ADMIN — SERTRALINE 50 MG: 50 TABLET, FILM COATED ORAL at 10:16

## 2022-04-14 RX ADMIN — METOPROLOL TARTRATE 12.5 MG: 25 TABLET, FILM COATED ORAL at 21:48

## 2022-04-14 RX ADMIN — LEVOTHYROXINE SODIUM 150 MCG: 0.15 TABLET ORAL at 06:13

## 2022-04-14 RX ADMIN — APIXABAN 5 MG: 5 TABLET, FILM COATED ORAL at 10:16

## 2022-04-14 RX ADMIN — ATORVASTATIN CALCIUM 20 MG: 20 TABLET, FILM COATED ORAL at 21:48

## 2022-04-14 RX ADMIN — FUROSEMIDE 40 MG: 10 INJECTION, SOLUTION INTRAMUSCULAR; INTRAVENOUS at 10:16

## 2022-04-14 RX ADMIN — DIGOXIN 125 MCG: 125 TABLET ORAL at 10:16

## 2022-04-14 RX ADMIN — Medication 2000 UNITS: at 10:15

## 2022-04-14 RX ADMIN — APIXABAN 5 MG: 5 TABLET, FILM COATED ORAL at 21:48

## 2022-04-14 RX ADMIN — METOPROLOL TARTRATE 12.5 MG: 25 TABLET, FILM COATED ORAL at 10:16

## 2022-04-14 RX ADMIN — Medication 400 UNITS: at 10:16

## 2022-04-14 ASSESSMENT — PAIN SCALES - GENERAL
PAINLEVEL_OUTOF10: 0
PAINLEVEL_OUTOF10: 4
PAINLEVEL_OUTOF10: 3
PAINLEVEL_OUTOF10: 0
PAINLEVEL_OUTOF10: 0

## 2022-04-14 ASSESSMENT — PAIN DESCRIPTION - PROGRESSION
CLINICAL_PROGRESSION: GRADUALLY WORSENING
CLINICAL_PROGRESSION: NOT CHANGED

## 2022-04-14 ASSESSMENT — PAIN DESCRIPTION - LOCATION
LOCATION: ANKLE
LOCATION: FOOT

## 2022-04-14 ASSESSMENT — PAIN - FUNCTIONAL ASSESSMENT: PAIN_FUNCTIONAL_ASSESSMENT: ACTIVITIES ARE NOT PREVENTED

## 2022-04-14 ASSESSMENT — PAIN DESCRIPTION - ORIENTATION
ORIENTATION: RIGHT
ORIENTATION: RIGHT

## 2022-04-14 ASSESSMENT — PAIN DESCRIPTION - FREQUENCY: FREQUENCY: INTERMITTENT

## 2022-04-14 ASSESSMENT — PAIN DESCRIPTION - DESCRIPTORS
DESCRIPTORS: DISCOMFORT
DESCRIPTORS: ACHING

## 2022-04-14 ASSESSMENT — PAIN DESCRIPTION - PAIN TYPE
TYPE: ACUTE PAIN
TYPE: ACUTE PAIN

## 2022-04-14 ASSESSMENT — PAIN DESCRIPTION - ONSET: ONSET: ON-GOING

## 2022-04-14 NOTE — PROGRESS NOTES
Occupational Therapy    Occupational Therapy Treatment Note  Name: Paula Saul MRN: [id number] :   1935   Date:  2022   Admission Date: 2022 Room:  [address] [place] [address]   Restrictions/Precautions:  Restrictions/Precautions  Restrictions/Precautions: Fall Risk  Required Braces or Orthoses?: Yes     Communication with other providers:   Cleared for treatment by RN. Subjective:  Patient states:  \"I need to use the bathroom and wash down below\"  Pain:   Location, Type, Intensity (0/10 to 10/10):  2-3/10    Objective:    Observation:  Pt alert and oriented to self  Objective Measures:  HR 80-90 at rest and up to 145 with activity    Treatment, including education:  Transfers  Supine to sit :Sup  Sit to supine :Sup  Scooting :Sup  Sit to stand :CGA  Stand to sit :CGA  SPT:CGA with mod verbal cues for walker safety due to abandoning walker and hand placement. Toilet:CGA with mod verbal cues    Self Care Training:   Activities performed today included the following: Toileting  CGA while standing for hygiene and clothing management    Bathing   CGA while washing georgette area. Therapeutic Activity Training: Pt stood x 2, 2:45 min at RW with CGA while completing dynamic stand task while reaching outside base of support to facilitate increased balance for ADL tasks and transfers. Pt completed functional mobility with RW x 30,37 and 8' with CGA. Safety Measures: Gait belt used, Left in bed, Pull/Bed Alarm activated and call light left in reach        Assessment / Impression:        Patient's tolerance of treatment: Good   Adverse Reaction: None  Significant change in status and impact:  None  Barriers to improvement:  Dec strength and endurance    Plan for Next Session:    Continue with POC.     Time in:  1039  Time out:  1136  Total treatment time:  63  Billed Units: 2 ADL, 2 TA  Electronically signed by:    Andrea Frey [address]    2022, 11:48 AM    Previously filed values: Short term goals  Time Frame for Short term goals: Until DC or goals met  Short term goal 1: Pt will complete trfs mod I w/o LOB  Short term goal 2: Pt will complete UE bathing/dressing with S  Short term goal 3: Pt will complete LE bathing/dressing with S  Short term goal 4: Pt will complete toileting mod I  Short term goal 5: Pt will complete 3+ min of standing/functional mobility during ADLs/therax with min increased SOB and no LOB

## 2022-04-14 NOTE — PLAN OF CARE
Problem: Falls - Risk of:  Goal: Will remain free from falls  Description: Will remain free from falls  4/14/2022 1054 by Rosalba Huynh RN  Outcome: Completed  4/14/2022 1054 by Rosalba Huynh RN  Outcome: Ongoing  Goal: Absence of physical injury  Description: Absence of physical injury  4/14/2022 1054 by Rosalba Huynh RN  Outcome: Completed  4/14/2022 1054 by Rosalba Huynh RN  Outcome: Ongoing     Problem: Sensory:  Goal: General experience of comfort will improve  Description: General experience of comfort will improve  4/14/2022 1054 by Rosalba Huynh RN  Outcome: Completed  4/14/2022 1054 by Rosalba Huynh RN  Outcome: Ongoing     Problem: Urinary Elimination:  Goal: Signs and symptoms of infection will decrease  Description: Signs and symptoms of infection will decrease  4/14/2022 1054 by Rosalba Huynh RN  Outcome: Completed  4/14/2022 1054 by Rosalba Huynh RN  Outcome: Ongoing  Goal: Ability to reestablish a normal urinary elimination pattern will improve - after catheter removal  Description: Ability to reestablish a normal urinary elimination pattern will improve  4/14/2022 1054 by Rosalba Huynh RN  Outcome: Completed  4/14/2022 1054 by Rosalba Huynh RN  Outcome: Ongoing  Goal: Complications related to the disease process, condition or treatment will be avoided or minimized  Description: Complications related to the disease process, condition or treatment will be avoided or minimized  4/14/2022 1054 by Rosalba Huynh RN  Outcome: Completed  4/14/2022 1054 by Rosalba Huynh RN  Outcome: Ongoing     Problem: Discharge Planning:  Goal: Discharged to appropriate level of care  Description: Discharged to appropriate level of care  4/14/2022 1054 by Rosalba Huynh RN  Outcome: Completed  4/14/2022 1054 by Rosalba Huynh RN  Outcome: Ongoing

## 2022-04-14 NOTE — PLAN OF CARE
Problem: Falls - Risk of:  Goal: Will remain free from falls  Description: Will remain free from falls  4/13/2022 2356 by Zhen Song RN  Outcome: Ongoing  4/13/2022 1757 by Baron Meghna RN  Outcome: Ongoing  Goal: Absence of physical injury  Description: Absence of physical injury  4/13/2022 2356 by Zhen Song RN  Outcome: Ongoing  4/13/2022 1757 by Baron Meghna RN  Outcome: Ongoing     Problem: Sensory:  Goal: General experience of comfort will improve  Description: General experience of comfort will improve  4/13/2022 2356 by Zhen Song RN  Outcome: Ongoing  4/13/2022 1757 by Baron Meghna RN  Outcome: Ongoing     Problem: Urinary Elimination:  Goal: Signs and symptoms of infection will decrease  Description: Signs and symptoms of infection will decrease  4/13/2022 2356 by Zhen Song RN  Outcome: Ongoing  4/13/2022 1757 by Baron Meghna RN  Outcome: Ongoing  Goal: Ability to reestablish a normal urinary elimination pattern will improve - after catheter removal  Description: Ability to reestablish a normal urinary elimination pattern will improve  4/13/2022 2356 by Zhen Song RN  Outcome: Ongoing  4/13/2022 1757 by Baron Meghna RN  Outcome: Ongoing  Goal: Complications related to the disease process, condition or treatment will be avoided or minimized  Description: Complications related to the disease process, condition or treatment will be avoided or minimized  4/13/2022 2356 by Zhen Song RN  Outcome: Ongoing  4/13/2022 1757 by Baron Meghna RN  Outcome: Ongoing     Problem: Discharge Planning:  Goal: Discharged to appropriate level of care  Description: Discharged to appropriate level of care  4/13/2022 2356 by Zhen Song RN  Outcome: Ongoing  4/13/2022 1757 by Baron Meghna RN  Outcome: Ongoing     Problem: Falls - Risk of:  Goal: Will remain free from falls  Description: Will remain free from falls  4/13/2022 2356 by Lorna Henning RN  Outcome: Ongoing  4/13/2022 1757 by Ann Catherine RN  Outcome: Ongoing  Goal: Absence of physical injury  Description: Absence of physical injury  4/13/2022 2356 by Lorna Henning RN  Outcome: Ongoing  4/13/2022 1757 by Ann Catherine RN  Outcome: Ongoing     Problem: Sensory:  Goal: General experience of comfort will improve  Description: General experience of comfort will improve  4/13/2022 2356 by Lorna Henning RN  Outcome: Ongoing  4/13/2022 1757 by Ann Catherine RN  Outcome: Ongoing     Problem: Urinary Elimination:  Goal: Signs and symptoms of infection will decrease  Description: Signs and symptoms of infection will decrease  4/13/2022 2356 by Lorna Henning RN  Outcome: Ongoing  4/13/2022 1757 by Ann Catherine RN  Outcome: Ongoing  Goal: Ability to reestablish a normal urinary elimination pattern will improve - after catheter removal  Description: Ability to reestablish a normal urinary elimination pattern will improve  4/13/2022 2356 by Lorna Henning RN  Outcome: Ongoing  4/13/2022 1757 by Ann Catherine RN  Outcome: Ongoing  Goal: Complications related to the disease process, condition or treatment will be avoided or minimized  Description: Complications related to the disease process, condition or treatment will be avoided or minimized  4/13/2022 2356 by Lorna Henning RN  Outcome: Ongoing  4/13/2022 1757 by Ann Catherine RN  Outcome: Ongoing     Problem: Discharge Planning:  Goal: Discharged to appropriate level of care  Description: Discharged to appropriate level of care  4/13/2022 2356 by Lorna Henning RN  Outcome: Ongoing  4/13/2022 1757 by Ann Catherine RN  Outcome: Ongoing

## 2022-04-14 NOTE — PLAN OF CARE
Problem: Falls - Risk of:  Goal: Will remain free from falls  Description: Will remain free from falls  Outcome: Ongoing  Goal: Absence of physical injury  Description: Absence of physical injury  Outcome: Ongoing     Problem: Safety:  Goal: Free from accidental physical injury  Description: Free from accidental physical injury  Outcome: Ongoing  Goal: Free from intentional harm  Description: Free from intentional harm  Outcome: Ongoing     Problem: Daily Care:  Goal: Daily care needs are met  Description: Daily care needs are met  Outcome: Ongoing     Problem: Pain:  Goal: Patient's pain/discomfort is manageable  Description: Patient's pain/discomfort is manageable  Outcome: Ongoing     Problem: Pain:  Goal: Patient's pain/discomfort is manageable  Description: Patient's pain/discomfort is manageable  Outcome: Ongoing

## 2022-04-14 NOTE — FLOWSHEET NOTE
Physical Therapy Treatment Note   Date: 2022 Room: [unfilled]   Name: Daniel Ruiz : 1935   MRN: [id number] Admission Date:2022    Primary Problem:   Past Medical History:   Diagnosis Date    Acquired hypothyroidism 2019    Patient is taking Synthroid    Chronic midline low back pain without sciatica 2019    Dementia without behavioral disturbance ([facility] 10/22/2019    Patient has dementia. She is taking Namenda Patient is seeing urologist Dr. Eliane Montano Gait disturbance 10/22/2019    Patient uses walker to ambulate    History of TIA (transient ischemic attack) 2019    Patient has a history of TIA and also recently may have had TIA. Patient is on Eliquis. Sees neurologist Dr. Eliane Montano Major depressive disorder with single episode, in full remission ([facility] [address] 2019    Patient is taking Zoloft and that is helping.  Mixed hyperlipidemia 2019    Patient is on simvastatin    Persistent atrial fibrillation ([address] 10/22/2019    A. fib on recently in 2019 . Patient is on metoprolol 25 mg twice a day and Eliquis    PFO (patent foramen ovale) 2019    PFO seen on echocardiogram in 2016    Pneumonia due to infectious organism 10/22/2019    Patient had pneumonia and pleural effusion during admission in 2019. A repeat CT scan of the chest will be done Patient is also seen pulmonologist     Past Surgical History:   Procedure Laterality Date    HYSTERECTOMY       Rehabilitation Diagnosis: The primary encounter diagnosis was Urinary tract infection without hematuria, site unspecified. Diagnoses of Septicemia ([address] and Pneumonia of both lower lobes due to infectious organism were also pertinent to this visit. Restrictions/Precautions:   Right Lower Extremity Brace: Boot, surgical shoe when WB, Fall Risk,    Subjective:   Frequently asks about her heart rate. Initial Pain level: 2 sitting/10, 3/10 standing,  back pain,not new.     Goals: Short term goals  Time Frame for Short term goals: 1 week or until discharge:  Short term goal 1: Pt will demonstrate the ability to safely perform bed mobility with supervision. Short term goal 2: Pt will demonstrate the ability to safely transfer sit to stand and stand to sit from 3 different height surfaces with CGA and proper hand placement with VCs only. Short term goal 3: Pt will demonstrate the ability to safely ambulate 25' with a RW and CGA with no rest breaks or instances of letting go of the walker for nearby furniture. Plan of Care:             Times per week: Mon-Sat 4+/week            Current Treatment Recommendations: Ronnald Roll Re-education,Cognitive Reorientation,Patient/Caregiver Education & Training,ROM,ADL/Self-care Training,Equipment Evaluation, Education, & procurement,Balance Training,IADL Training,Gait Training,Home Exercise Program,Functional Mobility Training,Cognitive/Perceptual Training,Stair training,Safety Education & Training,Positioning (ther ex, ther act, bed mobility)      Objective Findings:    Date: 4-13-22 Date: 4-14-22 Date:  Date:    Bed mobility SBA, for safety and sequencing. Very unsafe. SBA for safety and sequencing     Sit to stand transfer Min assist, very unsafe, lets go of walker with one UE to reach for surface MIN x 1, vc for walker safety     Stand to sit transfer Min assist, very unsafe, lets go of walker with one UE to reach for surface Cg x 1     Commode transfer Min assist, very unsafe, lets go of walker with one UE to reach for surface MIN x 1, cues for walker safety with turn to sit     Standing tolerance 2-3 mins with transfer, toileting and gait. 2-3 mins with transfers, toilet hygiene. Completed dyn reaching 2min 45 sec + 2 min.      Ambulation Gait training with ortho shoe,  with RW,x 12+12 ft with min x 2 to manage lines and for safety, pt became very SOB and anxious letting go of walker reaching for surfaces. Sat EOB prior to getting to recline due to SOB and anxiety. Gait training with standard walker 30+37+8 ft with cg x 2 for safety due to need to sit quickly and line management. Ortho shoe donned with assist for strap. Stairs         Exercises:   Exercise/Equipment/Modalities  Date: 22 Date:  Date:  Date:    Hip flex Seated marching x 20      LAQ x20      Hip abd x20                                                            Modality/intervention used:   [x ] Therapeutic Exercise   [ ] Modalities:   [ x] Therapeutic Activity     [ ] Ultrasound   [ ] [place] Rito Avila   [ x] Gait Training     [ ] Cervical Traction  [ ] Lumbar Traction   [ ] Neuromuscular Re-education   [ ] Cold/hotpack  [ ] Iontophoresis   [ ] Instruction in HEP     [ ] Vasopneumatic   [ ] Manual Therapy   [ ] Aquatic Therapy     Other Therapeutic Activities: dyn sitting balance with forward reaching activities EOB unsupported, dyn standing with cg to reach forward and laterally out of ALTHEA. Time spent educating pt and daughter for walker safety with transfers.   Home Exercise Program/Education provided to patient: x    Manual Treatments: x  Communication with other providers   :nurse cleared for therapy     Adverse reactions to treatment: SOB, anxiety, impulsivity,fatigue    Treatment/Activity Tolerance:   [x ] Patient limited by fatigue [ ] Patient limited by pain [ ] Patient limited by other medical complications [ ] Patient tolerated therapy well  [ ] Other:     Post Tx Pain Ratin /10     Safety Precautions:   [ ] left in bed  [x ] left in chair [x ] call light within reach  [ ] bed alarm on  [ x] personal alarm on  [ ] other staff present:    Plan:   [x ] Continue per plan of care [ ] Mina Arias current plan   [ ] Plan of care initiated [ ] Paul Amaro pending MD visit [ ] Discharge     Plan for Next Session:     Time In: 1039  Time Out: 1136  Total Treatment Minutes:57  Billed Units: = 1 gt, 1 neuro, 2 TA    Signed: Donovan Garcia, [id number] 4/14/2022, 11:15 AM

## 2022-04-14 NOTE — PLAN OF CARE
Problem: Falls - Risk of:  Goal: Will remain free from falls  Description: Will remain free from falls  4/14/2022 1054 by Jewel Aguilar RN  Outcome: Completed  4/14/2022 1054 by Jewel Aguilar RN  Outcome: Ongoing  4/13/2022 2356 by Carmen Glez RN  Outcome: Ongoing  Goal: Absence of physical injury  Description: Absence of physical injury  4/14/2022 1054 by Jewel Aguilar RN  Outcome: Completed  4/14/2022 1054 by Jewel Aguilar RN  Outcome: Ongoing  4/13/2022 2356 by Carmen Glez RN  Outcome: Ongoing     Problem: Urinary Elimination:  Goal: Signs and symptoms of infection will decrease  Description: Signs and symptoms of infection will decrease  4/14/2022 1054 by Jewel Aguilar RN  Outcome: Completed  4/14/2022 1054 by Jewel Aguilar RN  Outcome: Ongoing  4/13/2022 2356 by Carmen Glez RN  Outcome: Ongoing  Goal: Ability to reestablish a normal urinary elimination pattern will improve - after catheter removal  Description: Ability to reestablish a normal urinary elimination pattern will improve  4/14/2022 1054 by Jewel Aguilar RN  Outcome: Completed  4/14/2022 1054 by Jewel Aguilar RN  Outcome: Ongoing  4/13/2022 2356 by Carmen Glez RN  Outcome: Ongoing  Goal: Complications related to the disease process, condition or treatment will be avoided or minimized  Description: Complications related to the disease process, condition or treatment will be avoided or minimized  4/14/2022 1054 by Jewel Aguilar RN  Outcome: Completed  4/14/2022 1054 by Jewel Aguilar RN  Outcome: Ongoing  4/13/2022 2356 by Carmen Glez RN  Outcome: Ongoing     Problem: Discharge Planning:  Goal: Discharged to appropriate level of care  Description: Discharged to appropriate level of care  4/14/2022 1054 by Jewel Aguilar RN  Outcome: Completed  4/14/2022 1054 by Jewel Aguilar RN  Outcome: Ongoing  4/13/2022 2356 by Carmen Glez RN  Outcome: Ongoing

## 2022-04-14 NOTE — CARE COORDINATION
SWING BED PROGRAM PRE-ADMISSION ASSESSMENT  (TRADITIONAL MEDICARE PATIENTS)  Patient Name: Mary Castellanos      : 1935  (85 y.o.) Gender: female   Inpatient Admission Dater:   2022  Room: 017-01 MRN: [id number]    Home Phone #:  [phone]  Emergency Contact and Phone Number:  Vincent Bauer  [phone]    Inpatient Admission Date: 2022 Date & Time of Referral: 2022 9:00 a.m. Referred By: Kane Vásquez MD Referred from:  [x] Myriam   [] Other:     # of Skilled Care Days Used in Last 60 days: 0 Insurance: [x]  Medicare                      []  Secondary - Type:     Present Condition/Diagnosis:    Debility [R53.81]      Previous Medical History:   Past Medical History:   Diagnosis Date    Acquired hypothyroidism 2019    Patient is taking Synthroid    Chronic midline low back pain without sciatica 2019    Dementia without behavioral disturbance ([facility] [address] 10/22/2019    Patient has dementia. She is taking Namenda Patient is seeing urologist Dr. Boyd Abel Gait disturbance 10/22/2019    Patient uses walker to ambulate    History of TIA (transient ischemic attack) 2019    Patient has a history of TIA and also recently may have had TIA. Patient is on Eliquis. Sees neurologist Dr. Boyd Abel Major depressive disorder with single episode, in full remission ([facility] [address] 2019    Patient is taking Zoloft and that is helping.  Mixed hyperlipidemia 2019    Patient is on simvastatin    Persistent atrial fibrillation ([address] 10/22/2019    A. fib on recently in 2019 . Patient is on metoprolol 25 mg twice a day and Eliquis    PFO (patent foramen ovale) 2019    PFO seen on echocardiogram in 2016    Pneumonia due to infectious organism 10/22/2019    Patient had pneumonia and pleural effusion during admission in 2019.  A repeat CT scan of the chest will be done Patient is also seen pulmonologist        COGNITIVE/BEHAVIORAL  Change in cognitive status in last 90 days:  ( )no change  ( ) improved  ( ) deteriorated  Behavioral changes in last seven days:  ( ) wandering  ( ) verbally abusive  ( )phys. Abusive                                                                         ( ) socially inappropriate  ( ) resists care  ADL Performance Last Seven (7) Days (Please Score)   Patients performance over all shifts during last seven (7) days   0 = Independent - No help or oversight  1 = Supervision - Oversight, encouragement or cueing provided  2 = Limited Assist - Receives physical help in guided maneuvering of limbs or other non-weight bearing assistance  3 = Extensive Assist - Patient performs part of the activity, weight bearing support was provided  4 = Dependence = Full staff performance of activity *NOTE: USE THE FOLLOWING SCORING FOR EATING ONLY:  1 = Supervision or Independent  2 = Limited Assist  3 = Dependent or Extensive Assist     SCORE   BED MOBILITY - How client moves to and from lying position, turns side to side, and positions body while in bed 2   TRANSFER - How resident moves between surfaces - to/from: bed, chair, wheelchair, standing position (EXCLUDE to/from bath/toilet) 2   TOILET USE - How client uses the toilet room (or commode, bedpan, urinal);transfers on/off toilet, cleanses, changes pad, manages ostomy or catheter, adjusts clothes 2   EATING (SCORE 1-3 ONLY*) - How client eats and drinks (regardless of skill).  Includes intake of nourishment by other means (e.g., tube feeding, total parenteral nutrition) 1   Estimated Pre-Admission ADL Value: 7     PATIENT WILL RECEIVE THE FOLLOWING SKILLED SERVICES AS A SWING BED PATIENT:       REHABILITATION (PT/OT/SP)    [] ULTRA HIGH 720 or more minutes minimum per week of at least two (2) disciplines - 1st for at least five (5) days and 2nd for at least three (3) days   [] VERY HIGH 500 or more minutes minimum per week of at least one (1) discipline for at least five (5) days   [x] HIGH 325 or more minutes minimum per week of at least one (1) discipline for at least five (5) days   [] MEDIUM 150 or more minutes minimum per week at least five (5) days of any combination with three (3) therapies   [] LOW Restorative nursing at least six (6) days, two (2) activities, or therapies for at least three (3) days at least forty-five (45) minute per week minimum services. EXTENSIVE SERVICES   [] Tracheostomy Care   [] Ventilator/Respirator   [] Infection Isolation   SPECIAL CARE HIGH   [] MS with ADL greater than or equal to 10  [] Quadriplegic with ADL greater than or equal to 5  [] emphysema/COPD and shortness of breath when lying flat  [] Fever w/at least one (1) of the following: [] dehydration [] pneumonia [] vomiting [] weight loss  [] feeding tube  [] Septicemia  [] Coma (not awake & completely dependent in ADL)  [] Diabetes and injections seven (7) days and Dr. order change two (2) or more days.   [] Parenteral/IV feeding  [] respiratory therapy for seven (7) days   SPECIAL CARE LOW:   [] Respiratory Therapy  [] Ulcers (2+sites all stages) w/treatment  [] Multiple Sclerosis  [] Cerebral Palsy  [] Parkinsons Disease  [] Oxygen Therapy  [] Extensive care services w/ADL less than 6  [] Fever w/at least one (1) of the following: [] dehydration [] pneumonia [] vomiting [] weight loss  [] Foot Infection or open lesions on the foot with treatment  [] tube-feeding - calories greater than or equal to 51% or tube feeding with total calories greater than or equal to 26% and fluid parental or enteral intake of greater than or equal to 50 ml per day   CLINICALLY COMPLEX   CURRENTLY:  [] Pneumonia  [] Hemiplegics with ADL with ADL Sum greater than or equal to 10  [] IV medications delivered post admission in the SNF  [] Surgical wounds or open lesions w/treatment      EVALUATORS SIGNATURE:Queenie Liao DATE: 4/14/2022       [x] ACCEPTED FOR ADMISSION ON:  04/14/2022                              ELOS:  [] 1-2wks  [] 2-3wks  [] 3-4wks   [] ADMISSION DENIED BASED ON:    [] [address] STAFF COORDINATOR

## 2022-04-14 NOTE — CARE COORDINATION
[facility]  Swing Bed Evaluation for Certification for [address] meets skilled criteria due to the need for   [x ] Therapy; physical and occupational; for decreased strength, balance and self     care activities. [ ] Tpn   [ ] Moe Monty care  [ ] IV therapy  [ ] Wound care   Calvin Amezquita lives [x ] Alone   [ ] With Spouse  [ ] Other:   and plans on returning there at discharge. Calvin Amezquita prefers this facially for skilled care. Calvin Amezquita will require skilled care on a daily basis beginning 04/14/2022, if medically stable.         Estimated length of stay [ x] 1-2 weeks   [ ] 2-3 weeks  [ ] 3-4 weeks       Aleksandr VILLAGRAN 04/14/2022             Cosigned by:    Revision History

## 2022-04-14 NOTE — DISCHARGE SUMMARY
Discharge Summary    Name:  Samira Jung /Age/Sex: 1935  (81 y.o. female)   MRN & CSN:  [id number] & [id number] Admission Date/Time: 2022  6:30 PM   Attending:  Toby Desai MD Discharging Physician: NGOC Almanza NP     Hospital Course:   Samira Jung is a 81 y.o.  female  who presents with Sepsis ([address]     Samira Jung is a 81 y.o.  female  Whose medical history include, hypothyroidism, Afib, hyperlipidemia, back pain, hard of hearing, TIA, Dementia,She was brought from an independent living facility by EMS. She was seen last Friday by her PCP. Was diagnosed with UTI and placed on Macrobid. Her symptoms did not improve. She came in today with complaint of  Fever, nausea vomiting, fatigue , generalized body weakness and altered mentation. At the ED patient was observed to have hypoxic respiratory failure, having leucocytosis and x-Ray indicative of pneumonia. She was then started on IV antibiotics, which have since then been discontinued due to x-ray further x-ray showing resolution of what appears to be more congestive heart failure pulmonary edema. Patient was treated with IV Lasix for this, she has improved. She does have baseline dementia. She did not have any further UTI urine culture was negative, blood cultures negative, urine Legionella and urine strep all negative. No white count. At this time patient is doing well she is still requiring 2 L of oxygen we are weaning this at this time. She is going to be discharged to swing bed program today. 1. Sepsis, present on admission now resolved  2. Acute hypoxemic respiratory failure, patient is requiring 2 L of oxygen nasal cannula she does not wear oxygen at home. Primus List as tolerated. Will go to [facility] bed with 02 continue to wean. 3. Possible pneumonia, no pneumonia on repeat chest xray, d/c antibiotics, repeat procal was 0.343  4. Chronic atrial fibrillation patient is on Eliquis and Lopressor.   [id number] score is 3  5. Elevated BNP  Still awaiting results of ECHO. BNP trending down and is normal based on pts age. Will transition to PO lasix of 20 mgs daily at discharge to swing bed today. 6. Hypothyroidism, continue Synthroid  7. Hyperlipidemia continue simvastatin  8. Toe fractures on the right foot, patient's had seen orthopedics previous to admission and is in a walking shoe provided by orthopedics. See results below. 9.    Dementia, continue Namenda and Exelon    The patient and family expressed appropriate understanding of and agreement with the discharge recommendations, medications, and plan.      Consults this admission:  Johanna Newman HOSPITALIST    Discharge Instruction:   D/C TO SWING BED PROGRAM.  Primary care physician:  within 2 weeks    Diet:  low fat, low cholesterol diet   Activity: activity as tolerated  Disposition: Discharged to: Swing bed program  Condition on discharge: Stable    Discharge Medications:        Medication List      ASK your doctor about these medications    apixaban 5 MG Tabs tablet  Commonly known as: Eliquis  TAKE 1 TABLET BY MOUTH 2 TIMES DAILY     diclofenac sodium 1 % Gel  Commonly known as: VOLTAREN  Apply 4 g topically 4 times daily     digoxin 125 MCG tablet  Commonly known as: LANOXIN  Take 1 tablet by mouth daily     levothyroxine 150 MCG tablet  Commonly known as: SYNTHROID  Take 1 tablet by mouth daily     memantine 10 MG tablet  Commonly known as: NAMENDA  Take 1 tablet by mouth 2 times daily     metoprolol tartrate 25 MG tablet  Commonly known as: LOPRESSOR  Take 0.5 tablets by mouth 2 times daily     nitrofurantoin (macrocrystal-monohydrate) 100 MG capsule  Commonly known as: MACROBID  Take 1 capsule by mouth 2 times daily for 7 days  Ask about: Should I take this medication?     rivastigmine 3 MG capsule  Commonly known as: EXELON  Take 1 capsule by mouth daily     sertraline 50 MG tablet  Commonly known as: ZOLOFT  Take 1 tablet by mouth daily     simvastatin 40 MG tablet  Commonly known as: ZOCOR  Take 1 tablet by mouth nightly     Vitamin D3 50 MCG (2000 UT) Tabs  Take 1 tablet by mouth daily     vitamin E 400 UNIT capsule  Take 1 capsule by mouth daily            Objective Findings at Discharge:   /64   Pulse 70   Temp 98.5 °F (36.9 °C) (Oral)   Resp 22   Ht 5' 9\" (1.753 m)   Wt 177 lb (80.3 kg)   SpO2 96%   BMI 26.14 kg/m²            PHYSICAL EXAM   GEN Awake female, sitting upright in bed in no apparent distress. Appears given age. EYES Pupils are equally round. No scleral erythema, discharge, or conjunctivitis. HENT Mucous membranes are moist. Oral pharynx without exudates, no evidence of thrush. NECK Supple, no apparent thyromegaly or masses. RESP Clear to auscultation, no wheezes,or rhonchi faint crackles bilateral bases. Symmetric chest movement while on 2 L nasal cannula  CARDIO/VASC S1/S2 auscultated. Irregular ratePeripheral pulses equal bilaterally and palpable. No peripheral edema. GI Abdomen is soft without significant tenderness, masses, or guarding. Bowel sounds are normoactive. Rectal exam deferred.  No costovertebral angle tenderness  MSK No gross joint deformities. SKIN Normal coloration, warm, dry. NEURO Cranial nerves appear grossly intact, normal speech, no lateralizing weakness. PSYCH Awake, alert, oriented but does appear to have some dementia. Sadiq Madrigal       BMP/CBC  Recent Labs     04/12/22  0615 04/13/22  0555 04/14/22  0610    140 141   K 4.4 4.0 4.0    104 105   CO2 25 26 26   BUN 12 15 15   CREATININE 0.9 1.0 0.8   WBC 11.0* 7.2 6.7   HCT 41.4 41.7 41.7    265 291       IMAGING:  Echocardiogram pending  EXAMINATION:   THREE XRAY VIEWS OF THE RIGHT FOOT       4/13/2022 12:55 pm       COMPARISON:   Radiographs of the foot of 03/29/2022       HISTORY:   ORDERING SYSTEM PROVIDED HISTORY: re eval of previous fx   TECHNOLOGIST PROVIDED HISTORY:   Reason for exam:->re eval of previous fx   Additional signs and symptoms: re eval of previous fx       FINDINGS:   Diffuse osteopenia.  Overall unchanged alignment of fractures at the bases of   the 3rd and 4th metatarsals.  Decreased conspicuity of the fracture lines   with associated sclerosis and remodeling is consistent with interval healing. No new fracture identified.  No traumatic malalignment.  Mild 1st MTP   degenerative changes.  Soft tissue swelling along the dorsum of the foot. Scattered vascular calcifications.           Impression   Progressive healing and unchanged alignment of fractures of the base of the   3rd and 4th metatarsals     TWO XRAY VIEWS OF THE CHEST       4/13/2022 12:55 pm       COMPARISON:   Chest radiograph 04/11/2022       HISTORY:   ORDERING SYSTEM PROVIDED HISTORY: eval chf   TECHNOLOGIST PROVIDED HISTORY:   Reason for exam:->eval chf   Additional signs and symptoms: eval chf       FINDINGS:   Similar cardiomegaly.  Interval decrease in size of bilateral pleural   effusions with improved aeration at the lung bases.  Significant interval   improvement in interstitial pulmonary edema.  No acute osseous abnormality. No pneumothorax.           Impression   Significant interval improvement in bilateral interstitial pulmonary edema.       Decreased size of bilateral pleural effusions with improved aeration at the   lung bases.       Similar cardiomegaly. EXAMINATION:   CTA OF THE CHEST, 4/12/2022 11:19 am       TECHNIQUE:   CTA of the chest was performed after the administration of intravenous   contrast.  Multiplanar reformatted images are provided for review.  MIP   images are provided for review.  Dose modulation, iterative reconstruction,   and/or weight based adjustment of the mA/kV was utilized to reduce the   radiation dose to as low as reasonably achievable.       COMPARISON:   11/22/2019       HISTORY:   ORDERING SYSTEM PROVIDED HISTORY: Korin sue PE   TECHNOLOGIST PROVIDED HISTORY:   Reason for Exam:  Rule out PE       FINDINGS: Pulmonary Arteries: The pulmonary arteries are adequately opacified.  No   filling defects are identified within the pulmonary arteries to suggest   pulmonary embolism.  Main pulmonary artery is normal in caliber.       Mediastinum:  Thyroid is either atrophic or has been previously resected.       There are multiple enlarged mediastinal lymph nodes identified.  These have   increased in size when compared to the previous exam.  For example, a right   paratracheal/precarinal lymph node measures 1.4 cm in short axis (image 150),   previously 0.9 cm.       There is a small to moderate-sized hiatal hernia.  The esophagus is otherwise   unremarkable.       Cardiac chambers unremarkable.  No pericardial effusion.  Thoracic aorta is   normal in caliber without evidence of dissection.       Lungs/pleura:  There is increased interstitial opacity identified within the   periphery the lungs associated with ground-glass opacity, similar when   compared to the previous exam.  The pleural base mass-like opacities seen on   the left are no longer detected.       Small bilateral pleural effusions are noted, similar when compared to the   previous exam.  Airways are patent.       Upper Abdomen: Unremarkable.       Soft Tissues/Bones: Visualized extrathoracic soft tissues unremarkable.  No   acute bony abnormality identified.           Impression   No evidence of pulmonary embolism or aortic dissection.       Irregular interstitial opacities within the periphery of the lungs   bilaterally, associated with ground-glass opacity.  Much or all of this   likely relates to interstitial lung disease with fibrosis.       With that said, inflammatory/infectious interstitial pneumonitis would be   considered as well.  A component of pulmonary edema would also be a   consideration, especially given presence of small bilateral pleural effusions.       There are enlarged mediastinal lymph nodes which have increased in size when   compared to the previous exam.  While these could be reactive, neoplasm must   be considered.  Short-term follow-up in approximately 3 months is recommended   to ensure resolution. EXAMINATION:   ONE XRAY VIEW OF THE CHEST       4/11/2022 3:31 pm       COMPARISON:   11/11/2019       HISTORY:   ORDERING SYSTEM PROVIDED HISTORY: ? pneumonia   TECHNOLOGIST PROVIDED HISTORY:   Reason for exam:->? pneumonia       FINDINGS:   Increased interstitial opacities noted, increased considerably when compared   to the previous exam.  More focal opacities are noted within the lower lungs. In the small bilateral pleural effusions appear to be present.  The cardial   pericardial silhouette is unremarkable.           Impression   Increased interstitial opacity, with more focal opacities at the lung bases.    In this case, interstitial edema and multifocal pneumonia are both considered.             Discharge Time of 35 minutes    Electronically signed by NGOC Krishna NP on 4/14/2022 at 1:50 PM

## 2022-04-15 ENCOUNTER — CARE COORDINATION (OUTPATIENT)
Dept: CARE COORDINATION | Age: 87
End: 2022-04-15

## 2022-04-15 PROBLEM — R53.1 GENERALIZED WEAKNESS: Status: ACTIVE | Noted: 2022-04-15

## 2022-04-15 PROCEDURE — 1200000002 HC SEMI PRIVATE SWING BED

## 2022-04-15 PROCEDURE — 94150 VITAL CAPACITY TEST: CPT

## 2022-04-15 PROCEDURE — 97166 OT EVAL MOD COMPLEX 45 MIN: CPT

## 2022-04-15 PROCEDURE — 2700000000 HC OXYGEN THERAPY PER DAY

## 2022-04-15 PROCEDURE — 97162 PT EVAL MOD COMPLEX 30 MIN: CPT

## 2022-04-15 PROCEDURE — 6370000000 HC RX 637 (ALT 250 FOR IP): Performed by: NURSE PRACTITIONER

## 2022-04-15 PROCEDURE — 94761 N-INVAS EAR/PLS OXIMETRY MLT: CPT

## 2022-04-15 PROCEDURE — 6370000000 HC RX 637 (ALT 250 FOR IP): Performed by: INTERNAL MEDICINE

## 2022-04-15 RX ORDER — ACETAMINOPHEN 650 MG/1
650 SUPPOSITORY RECTAL EVERY 6 HOURS PRN
Status: DISCONTINUED | OUTPATIENT
Start: 2022-04-15 | End: 2022-04-29 | Stop reason: HOSPADM

## 2022-04-15 RX ORDER — MEMANTINE HYDROCHLORIDE 10 MG/1
10 TABLET ORAL 2 TIMES DAILY
Status: DISCONTINUED | OUTPATIENT
Start: 2022-04-15 | End: 2022-04-15

## 2022-04-15 RX ORDER — MAGNESIUM HYDROXIDE/ALUMINUM HYDROXICE/SIMETHICONE 120; 1200; 1200 MG/30ML; MG/30ML; MG/30ML
30 SUSPENSION ORAL EVERY 6 HOURS PRN
Status: DISCONTINUED | OUTPATIENT
Start: 2022-04-15 | End: 2022-04-29 | Stop reason: HOSPADM

## 2022-04-15 RX ORDER — RIVASTIGMINE TARTRATE 1.5 MG/1
3 CAPSULE ORAL DAILY
Status: DISCONTINUED | OUTPATIENT
Start: 2022-04-16 | End: 2022-04-29 | Stop reason: HOSPADM

## 2022-04-15 RX ORDER — ONDANSETRON 2 MG/ML
4 INJECTION INTRAMUSCULAR; INTRAVENOUS EVERY 6 HOURS PRN
Status: DISCONTINUED | OUTPATIENT
Start: 2022-04-15 | End: 2022-04-15

## 2022-04-15 RX ORDER — LEVOTHYROXINE SODIUM 0.15 MG/1
150 TABLET ORAL DAILY
Status: DISCONTINUED | OUTPATIENT
Start: 2022-04-16 | End: 2022-04-29 | Stop reason: HOSPADM

## 2022-04-15 RX ORDER — SIMVASTATIN 40 MG
40 TABLET ORAL NIGHTLY
Status: DISCONTINUED | OUTPATIENT
Start: 2022-04-15 | End: 2022-04-15

## 2022-04-15 RX ORDER — DIGOXIN 125 MCG
125 TABLET ORAL DAILY
Status: DISCONTINUED | OUTPATIENT
Start: 2022-04-15 | End: 2022-04-15

## 2022-04-15 RX ORDER — ACETAMINOPHEN 325 MG/1
650 TABLET ORAL EVERY 6 HOURS PRN
Status: DISCONTINUED | OUTPATIENT
Start: 2022-04-15 | End: 2022-04-29 | Stop reason: HOSPADM

## 2022-04-15 RX ORDER — VITAMIN B COMPLEX
2000 TABLET ORAL DAILY
Status: DISCONTINUED | OUTPATIENT
Start: 2022-04-16 | End: 2022-04-29 | Stop reason: HOSPADM

## 2022-04-15 RX ORDER — POLYETHYLENE GLYCOL 3350 17 G/17G
17 POWDER, FOR SOLUTION ORAL DAILY PRN
Status: DISCONTINUED | OUTPATIENT
Start: 2022-04-15 | End: 2022-04-29 | Stop reason: HOSPADM

## 2022-04-15 RX ORDER — VITAMIN E 268 MG
400 CAPSULE ORAL DAILY
Status: DISCONTINUED | OUTPATIENT
Start: 2022-04-16 | End: 2022-04-29 | Stop reason: HOSPADM

## 2022-04-15 RX ORDER — ONDANSETRON 4 MG/1
4 TABLET, ORALLY DISINTEGRATING ORAL EVERY 8 HOURS PRN
Status: DISCONTINUED | OUTPATIENT
Start: 2022-04-15 | End: 2022-04-29 | Stop reason: HOSPADM

## 2022-04-15 RX ADMIN — MEMANTINE 10 MG: 10 TABLET ORAL at 08:17

## 2022-04-15 RX ADMIN — POLYETHYLENE GLYCOL 3350 17 G: 17 POWDER, FOR SOLUTION ORAL at 19:36

## 2022-04-15 RX ADMIN — MEMANTINE 10 MG: 10 TABLET ORAL at 19:37

## 2022-04-15 RX ADMIN — DIGOXIN 125 MCG: 125 TABLET ORAL at 08:18

## 2022-04-15 RX ADMIN — RIVASTIGMINE TARTRATE 3 MG: 1.5 CAPSULE ORAL at 08:18

## 2022-04-15 RX ADMIN — LEVOTHYROXINE SODIUM 150 MCG: 0.15 TABLET ORAL at 05:53

## 2022-04-15 RX ADMIN — ACETAMINOPHEN 500 MG: 500 TABLET ORAL at 19:37

## 2022-04-15 RX ADMIN — ATORVASTATIN CALCIUM 20 MG: 20 TABLET, FILM COATED ORAL at 19:37

## 2022-04-15 RX ADMIN — METOPROLOL TARTRATE 12.5 MG: 25 TABLET, FILM COATED ORAL at 08:18

## 2022-04-15 RX ADMIN — DICLOFENAC SODIUM 4 G: 10 GEL TOPICAL at 19:38

## 2022-04-15 RX ADMIN — ACETAMINOPHEN 500 MG: 500 TABLET ORAL at 13:45

## 2022-04-15 RX ADMIN — FUROSEMIDE 40 MG: 40 TABLET ORAL at 08:18

## 2022-04-15 RX ADMIN — Medication 400 UNITS: at 08:17

## 2022-04-15 RX ADMIN — APIXABAN 5 MG: 5 TABLET, FILM COATED ORAL at 19:37

## 2022-04-15 RX ADMIN — DICLOFENAC SODIUM 4 G: 10 GEL TOPICAL at 16:54

## 2022-04-15 RX ADMIN — Medication 2000 UNITS: at 08:17

## 2022-04-15 RX ADMIN — METOPROLOL TARTRATE 12.5 MG: 25 TABLET, FILM COATED ORAL at 20:54

## 2022-04-15 RX ADMIN — APIXABAN 5 MG: 5 TABLET, FILM COATED ORAL at 08:18

## 2022-04-15 RX ADMIN — SERTRALINE 50 MG: 50 TABLET, FILM COATED ORAL at 08:17

## 2022-04-15 RX ADMIN — ACETAMINOPHEN 500 MG: 500 TABLET ORAL at 08:17

## 2022-04-15 RX ADMIN — DICLOFENAC SODIUM 4 G: 10 GEL TOPICAL at 13:05

## 2022-04-15 ASSESSMENT — PAIN DESCRIPTION - PAIN TYPE
TYPE: ACUTE PAIN

## 2022-04-15 ASSESSMENT — PAIN SCALES - GENERAL
PAINLEVEL_OUTOF10: 0
PAINLEVEL_OUTOF10: 1
PAINLEVEL_OUTOF10: 0
PAINLEVEL_OUTOF10: 5
PAINLEVEL_OUTOF10: 7
PAINLEVEL_OUTOF10: 0
PAINLEVEL_OUTOF10: 0

## 2022-04-15 ASSESSMENT — PAIN DESCRIPTION - ONSET
ONSET: ON-GOING
ONSET: ON-GOING

## 2022-04-15 ASSESSMENT — PAIN - FUNCTIONAL ASSESSMENT
PAIN_FUNCTIONAL_ASSESSMENT: ACTIVITIES ARE NOT PREVENTED
PAIN_FUNCTIONAL_ASSESSMENT: ACTIVITIES ARE NOT PREVENTED

## 2022-04-15 ASSESSMENT — PAIN DESCRIPTION - FREQUENCY
FREQUENCY: INTERMITTENT
FREQUENCY: CONTINUOUS

## 2022-04-15 ASSESSMENT — PAIN DESCRIPTION - ORIENTATION
ORIENTATION: RIGHT

## 2022-04-15 ASSESSMENT — PAIN DESCRIPTION - PROGRESSION
CLINICAL_PROGRESSION: NOT CHANGED
CLINICAL_PROGRESSION: GRADUALLY WORSENING

## 2022-04-15 ASSESSMENT — PAIN DESCRIPTION - DESCRIPTORS
DESCRIPTORS: ACHING;CONSTANT;DISCOMFORT
DESCRIPTORS: ACHING

## 2022-04-15 ASSESSMENT — PAIN DESCRIPTION - LOCATION
LOCATION: FOOT

## 2022-04-15 NOTE — PROGRESS NOTES
SWING BED WEEKLY TEAM SHEET     WEEK# 1    NUTRITION   Diet: Low fat/low chol/high fiber/2 GM Na                  TF:no       TPN: no    Appropriate/Adequate [] yes [ ] no     Meal intakes: no po intake documented at this time  Weight: 80.7 kg   BMI: 26.27   Significant Change: n/a   Recommendations:  Add high protein oral nutrition supplements  Issues to be resolved before discharge: pt will consume greater than 50-75% of meals and supplements    Dietitian Signature: Nichole Self MS, RDN, LD

## 2022-04-15 NOTE — CARE COORDINATION
3: 15 p.m.- 3:40 p.m. Visit with patient and offered craft project. Patient choose colors of paint colors, able to squeeze bottle of paint and poured into bottle for muted colors. Asked many questions during project and was happy to keep in window to share with family. Very talkative and thoroughly enjoyed.     Mabel PINEDA  4/15/2022

## 2022-04-15 NOTE — PLAN OF CARE
Problem: Falls - Risk of:  Goal: Will remain free from falls  Description: Will remain free from falls  4/14/2022 2222 by Olivia Campbell LPN  Outcome: Ongoing  4/14/2022 1543 by Martin Peterson RN  Outcome: Ongoing  Goal: Absence of physical injury  Description: Absence of physical injury  4/14/2022 2222 by Olivia Campbell LPN  Outcome: Ongoing  4/14/2022 1543 by Martin Peterson RN  Outcome: Ongoing     Problem: Safety:  Goal: Free from accidental physical injury  Description: Free from accidental physical injury  4/14/2022 2222 by Olivia Campbell LPN  Outcome: Ongoing  4/14/2022 1543 by Martin Peterson RN  Outcome: Ongoing  Goal: Free from intentional harm  Description: Free from intentional harm  4/14/2022 2222 by Olivia Campbell LPN  Outcome: Ongoing  4/14/2022 1543 by Martin Peterson RN  Outcome: Ongoing     Problem: Daily Care:  Goal: Daily care needs are met  Description: Daily care needs are met  4/14/2022 2222 by Olivia Campbell LPN  Outcome: Ongoing  4/14/2022 1543 by Martin Peterson RN  Outcome: Ongoing     Problem: Pain:  Goal: Patient's pain/discomfort is manageable  Description: Patient's pain/discomfort is manageable  4/14/2022 2222 by Olivia Campbell LPN  Outcome: Ongoing  4/14/2022 1543 by Martin Peterson RN  Outcome: Ongoing

## 2022-04-15 NOTE — PROGRESS NOTES
Physical Therapy    Facility/Department: [facility] UNIT  Initial Assessment    NAME: German Olivo  : 1935  MRN: [id number]    Date of Service: 4/15/2022    Discharge Recommendations:  Home with assist PRN,Home with nursing aide        Assessment   Body structures, Functions, Activity limitations: Decreased functional mobility ; Decreased high-level IADLs;Decreased posture;Decreased ADL status; Decreased endurance;Decreased coordination;Decreased strength;Decreased balance  Assessment: Pt is a pleasant, 80 yo female with 2 recent falls. Currently has R foot fractures. . She will  benefit from swing bed admission and skilled PT to address deficits with functional mobility. and prepare he to return to prior level of function  Treatment Diagnosis: impaired mobility  Decision Making: Medium Complexity  History: PF- patient's age  Exam: ROM/ strength/ bed mobility/transfers/ gait  Clinical Presentation: evolving  Barriers to Learning: cognitive  REQUIRES PT FOLLOW UP: Yes       Patient Diagnosis(es): There were no encounter diagnoses. has a past medical history of Acquired hypothyroidism, Chronic midline low back pain without sciatica, Dementia without behavioral disturbance ([address] Gait disturbance, History of TIA (transient ischemic attack), Major depressive disorder with single episode, in full remission ([address] Mixed hyperlipidemia, Persistent atrial fibrillation ([address] PFO (patent foramen ovale), and Pneumonia due to infectious organism. has a past surgical history that includes Hysterectomy.     Restrictions  Restrictions/Precautions  Restrictions/Precautions: Weight Bearing  Required Braces or Orthoses?: Yes  Lower Extremity Weight Bearing Restrictions  Right Lower Extremity Weight Bearing: Weight Bearing As Tolerated  Required Braces or Orthoses  Right Lower Extremity Brace: Boot  RLE Brace Type: surgical shoe when WB  Position Activity Restriction  Other position/activity restrictions: O2, telemetry  Vision/Hearing  Vision Exceptions: Wears glasses for reading  Hearing Exceptions: Hard of hearing/hearing concerns;Bilateral hearing aid     Subjective  General  Chart Reviewed: Yes  Patient assessed for rehabilitation services?: Yes  Follows Commands: Within Functional Limits  Pain Screening  Patient Currently in Pain: Yes     Pre Treatment Pain Screening  Pain at present: 0    Orientation  Orientation  Overall Orientation Status: Within Normal Limits  Social/Functional History  Social/Functional History  Lives With: Alone (daughters lives close by)  Type of Home: Apartment  Home Layout: One level  Home Access: Level entry  Bathroom Shower/Tub: Tub/Shower unit,Shower chair with back  Bathroom Toilet: Standard  Bathroom Equipment: Hand-held shower,Grab bars in shower,Tub transfer bench  Home Equipment: [address]  [employer] Help From: [facility] attendant  ADL Assistance: [facility]: Needs assistance (has an aide daily that assists with IADL)  Homemaking Responsibilities: Yes  Meal Prep Responsibility: Secondary  Laundry Responsibility: No  Cleaning Responsibility: No  Shopping Responsibility: No  Ambulation Assistance: Needs assistance  Active : No  Leisure & Hobbies: pt reports she is a reader and a . IADL Comments: pt reports she has been using her wheelchair to get around since her fx. She reports she gets some A dressing.   Cognition        Objective     Observation/Palpation  Observation: Pt presented in chair    AROM RLE (degrees)  RLE AROM: WNL  AROM LLE (degrees)  LLE AROM : WNL  Strength RLE  R Hip Flexion: 4-/5  R Knee Flexion: 4-/5  R Knee Extension: 4-/5  Strength LLE  L Hip Flexion: 4-/5  L Knee Flexion: 4-/5  L Knee Extension: 4-/5        Bed mobility  Supine to Sit: Stand by assistance  Sit to Supine: Stand by assistance  Transfers  Sit to Stand: Contact guard assistance  Stand to sit: Contact guard assistance  Bed to Chair: Contact guard assistance  Ambulation  Ambulation?: Yes  WB Status: FWB  Ambulation 1  Surface: level tile  Device: Standard Walker  Assistance: Contact guard assistance;Minimal assistance  Quality of Gait: Pt demonstrated signficantly flexed posture, decreased speed, step length, and foot clearance  Distance: 35 ft  Stairs/Curb  Stairs?: No     Balance  Sitting - Static: Fair;+  Sitting - Dynamic: Fair;+  Standing - Static: Fair;+  Standing - Dynamic: Fair;+        Plan   Plan  Times per week: Mon-Sat 5+/wk  Current Treatment Recommendations: Learta Bolds Re-education,Cognitive Reorientation,Patient/Caregiver Education & Training,ROM,ADL/Self-care Training,Equipment Evaluation, Education, & procurement,Balance Training,IADL Training,Gait Training,Home Exercise Program,Functional Mobility Training,Cognitive/Perceptual Training,Stair training,Safety Education & Training,Positioning  Safety Devices  Type of devices: Gait belt,Call light within reach,Left in [employer] alarm in place    G-Code       OutComes Score                                                  AM-PAC Score        Basic Mobility Six Clicks Form [address] [facility] [address] [address] AM-PAC Score Conversion Table   How much difficulty does the patient currently have Unable   (pt is unable to do activity) A Lot   (activity is a struggle, requires great effort/time) A Little   (pt can manage, but takes more effort/time than should) None   (pt has no difficulty) Raw Score Standardized Score CMS -100% Score CMS Modifier        6 23.55 100% CN   Turning over in bed (including adjusting bedclothes, sheets, and blankets)? []1 []2  [x]3  []4  7 26.42 92.36% CM        8 28.58 86.62% CM   Sitting down on and standing up from a chair with arms (e.g. wheelchair, bedside commode, etc.)?  []1 []2 [x]3   []4   9 30.55 81.38% CM        10 32.29 76.75% CL   Moving from lying on back to sitting on the side of the bed? []1 []2  [x]3   []4   11 33.86 72.57% CL        12 35.33 68.66% CL   How much help from another person does the patient currently need Total   (Total/Dependent Assist) A Lot   (Max/Mod Assist) A Little   (Min/CGA/Supervision) None   (No human assistance) 13 36.74 64.91% CL        14 38.1 61.29% CL   Moving to and from a bed to a chair (including a wheelchair)? []1  []2   [x]3  []4   15 39.45 57.70% CK        16 40.78 54.16% CK   To walk in a hospital room? []1 []2   [x]3    []4  17 42.13 50.57% CK        18 43.63 46.58% CK   Climbing 3-5 steps with a railing? []1  [x]2   []3    []4  19 45.44 41.77% CK        20 47.67 35.83% CJ   Raw Score  20 21 50.25 28.97% CJ   Standardized Score   22 53.28 20.91% CJ   CMS 0-100% Score   23 56.93 11.20% CI   CMS Modifier  24 61.14 0.00% CH     CH = 0% impaired  CI = 1-20% impaired  CJ = 20-40% impaired  CK = 40-60% impaired  CL = 60-80% impaired  CM = % impaired  CN = 100% impaired          Goals  Short term goals  Time Frame for Short term goals: 1 week or until discharge:  Short term goal 1: Pt will demonstrate the ability to safely perform bed mobility with supervision. Short term goal 2: Pt will demonstrate the ability to safely transfer sit to stand and stand to sit from 3 different height surfaces with S and proper hand placement with VCs only. Short term goal 3: Pt will demonstrate the ability to safely ambulate 150' with a RW and S with no rest breaks or instances of letting go of the walker for nearby furniture.        Therapy Time   Individual Concurrent Group Co-treatment   Time In [address]         Time Out 1157         Minutes 17                 QUINTON Vail, PT

## 2022-04-15 NOTE — PROGRESS NOTES
Occupational Therapy   Occupational Therapy Initial Assessment  Date: 4/15/2022   Patient Name: Gavino Richardson  MRN: [id number]     : 1935    Date of Service: 4/15/2022    Discharge Recommendations:  Subacute/Skilled Nursing Facility,Continue to assess pending progress,24 hour supervision or assist,ECF with OT       Assessment   Performance deficits / Impairments: Decreased functional mobility ; Decreased ADL status; Decreased strength;Decreased balance;Decreased endurance;Decreased posture;Decreased high-level IADLs  Assessment: Pt is a pleasant 80 yo female admitted to [facility] with altered mental status, UTI and sepsis. Pt presents with  2 recent falls. Pt has an avulsion fx on her R lateral malleoli and fx metatarsals 2-4 as well as instances of her R knee giving out secondary to falls. Pt presents with increased SOB upon exertion and decreased balance safety . She would benefit from occupational therapy to address improving strength, endurance and balance safety to perform functional mobility and maximize indep with ADLs. Treatment Diagnosis: generalized weakness  Prognosis: Good  Decision Making: Medium Complexity  OT Education: OT Role;Plan of Care;Transfer Training  Barriers to Learning: Pt has hx of Dementia  REQUIRES OT FOLLOW UP: Yes  Activity Tolerance  Activity Tolerance: Patient Tolerated treatment well  Safety Devices  Safety Devices in place: Yes  Type of devices: All fall risk precautions in place;Gait belt;Patient at risk for falls; Left in chair;Call light within reach; Chair alarm in place           Patient Diagnosis(es): There were no encounter diagnoses.      has a past medical history of Acquired hypothyroidism, Chronic midline low back pain without sciatica, Dementia without behavioral disturbance ([address] Gait disturbance, History of TIA (transient ischemic attack), Major depressive disorder with single episode, in full remission ([address] Mixed hyperlipidemia, Persistent atrial fibrillation ([facility] PFO (patent foramen ovale), and Pneumonia due to infectious organism. has a past surgical history that includes Hysterectomy. Treatment Diagnosis: generalized weakness      Restrictions  Restrictions/Precautions  Restrictions/Precautions: Fall Risk  Required Braces or Orthoses?: Yes  Required Braces or Orthoses  Right Lower Extremity Brace: Boot  RLE Brace Type: surgical shoe when WB  Position Activity Restriction  Other position/activity restrictions: O2, telemetry    Subjective   General  Chart Reviewed: Yes  Patient assessed for rehabilitation services?: Yes  Family / Caregiver Present: Yes  Subjective  Subjective: Patient sitting in recliner upon arrival, pleasant and cooperative. Patients daughter present throughout evaluation. Patient Currently in Pain: Yes  Pain Assessment  Pain Assessment: 0-10  Pain Level: 7 (7/10 with movement)  Pain Type: Acute pain  Pain Location: Foot  Pain Orientation: Right  Vital Signs  Patient Currently in Pain: Yes  Height and Weight  Height: 5' 9.02\" (175.3 cm)  Patient Observation  Observations: Instructed to do every hour x 10 breaths. Social/Functional History  Social/Functional History  Lives With: Alone (daughters live close)  Type of Home: Apartment  Home Layout: One level  Home Access: Level entry  Bathroom Shower/Tub: Tub/Shower unit,Shower chair with back  Bathroom Toilet: Standard  Bathroom Equipment: Hand-held shower,Grab bars in shower,Tub transfer bench  Home Equipment: [address] bed (using wheelchair since breaking foot)  Receives Help From: Family (daughters live close and assist PRN.  Patient also has an aide who assists daily)  ADL Assistance: Independent  Homemaking Assistance: Needs assistance  Homemaking Responsibilities: Yes  Meal Prep Responsibility: Secondary  Laundry Responsibility: No  Cleaning Responsibility: No  Shopping Responsibility: No  Ambulation Assistance: Needs assistance  Active : No  Leisure & Hobbies: pt reports she is a reader and a . IADL Comments: pt reports she has been using her wheelchair to get around since her fx. She reports she gets some A with bathing. Objective   Vision: Impaired  Vision Exceptions: Wears glasses for reading  Hearing: Exceptions to [facility]  Hearing Exceptions: Hard of hearing/hearing concerns;Bilateral hearing aid    Balance  Sitting Balance: Modified independent   Functional Mobility  Functional - Mobility Device: Standard Walker  Activity:  (patient ambulated round bed. While ambulating patient urinated in briefs. Patient reported it is normal for her to urinate after sitting long periods of time.)  Assist Level: Stand by assistance  ADL  Feeding: Modified independent ;Setup  LE Dressing: Stand by assistance;Modified independent  (Donned R boot sitting in recliner MI. Patient doffed/donned briefs SBA standing edge of recliner)  Tone RUE  RUE Tone: Normotonic  Tone LUE  LUE Tone: Normotonic  Coordination  Movements Are Fluid And Coordinated: Yes  Bed mobility  Comment: not assesed. Patient sitting in recliner upon arrival and sitting in recliner at end of evaluation.   Transfers  Sit to stand: Stand by assistance  Stand to sit: Stand by assistance  LUE AROM (degrees)  LUE AROM : WFL  RUE AROM (degrees)  RUE AROM : WFL  LUE Strength  Gross LUE Strength: WFL (4+/5)  RUE Strength  Gross RUE Strength: WFL (4+/5)     Plan   Plan  Times per week: 3+  Current Treatment Recommendations: Strengthening,Balance Training,Functional Mobility Training,Endurance Training,Safety Education & Training,Patient/Caregiver Education & Training,Equipment Evaluation, Education, & procurement,Self-Care / ADL,Positioning,Cognitive Reorientation    G-Code     OutComes Score                                                  AM-PAC Score  AM-PAC 6 click short form for inpatient daily activity:   How much help from another person does the patient currently need. .. Unable  Dep A Lot  Max A A Lot   Mod A A Little  Min A A Little   CGA  SBA None   Mod I  Indep  Sup   1. Putting on and taking off regular lower body clothing? [] 1    [] 2   [] 2   [] 3   [x] 3   [] 4      2. Bathing (including washing, rinsing, drying)? [] 1   [] 2   [] 2 [] 3 [x] 3 [] 4   3. Toileting, which includes using toilet, bedpan, or urinal? [] 1    [] 2   [] 2   [] 3   [x] 3   [] 4     4. Putting on and taking off regular upper body clothing? [] 1   [] 2   [] 2   [] 3   [] 3    [x] 4      5. Taking care of personal grooming such as brushing teeth? [] 1   [] 2    [] 2 [] 3    [x] 3   [] 4      6. Eating meals?    [] 1   [] 2   [] 2   [] 3   [] 3   [x] 4      Raw Score:  20     [24=0% impaired(CH), 23=1-19%(CI), 20-22=20-39%(CJ), 15-19=40-59%(CK), 10-14=60-79%(CL), 7-9=80-99%(CM), 6=100%(CN)]              Goals  Short term goals  Time Frame for Short term goals: Until DC or goals met  Short term goal 1: Pt will complete trfs mod I w/o LOB  Short term goal 2: Pt will complete UE bathing/dressing with S  Short term goal 3: Pt will complete LE bathing/dressing with S  Short term goal 4: Pt will complete toileting mod I  Short term goal 5: Pt will complete 10+ min of standing/functional mobility during ADLs/therax with min increased SOB and no LOB       Therapy Time   Individual Concurrent Group Co-treatment   Time In 1205         Time Out 1225         Minutes [address], [place] [id number]

## 2022-04-15 NOTE — CARE COORDINATION
Inpatient discharge noted with plan to d/c to swing bed. ACM outreach deferred. Plan for next outreach in 7-10 days.

## 2022-04-15 NOTE — H&P
History and Physical  Chong Hodgkin MD      Name:  Bronwyn Walden /Age/Sex: 1935  (81 y.o. female)   MRN & CSN:  [id number] & [id number] Admission Date/Time: 2022  3:03 PM   Location:   PCP: Shirley Jasso MD       Hospital Day: 2    Assessment and Plan:     1. Chronic atrial fibrillation patient is on Eliquis and Lopressor. [id number] score is 3  2. CHF. Continue home medications  3. Hypothyroidism, continue Synthroid  4. Hyperlipidemia continue simvastatin  5. Toe fractures on the right foot, continue therapy  6. Dementia, continue Namenda and Exelon    Body mass index is 26.29 kg/m². Patient is in agreement with the plan as stated above. The patient's medication has been reviewed and verified with the patient and/or family/pharmacy. Records from [facility] everywhere\" has been reviewed including progress notes, office  note, labs and investigation prior and summarized in the body of HPI    History of Present Illness:     Chief Complaint: Osman Villeda is a 81 y.o.  female  Whose medical history include, hypothyroidism, Afib, hyperlipidemia, back pain, hard of hearing, TIA, Dementia,She was initially brought from an independent living facility. She was admitted for sepsis likely from pneumonia. She was then started on IV antibiotics, which have since then been discontinued due to x-ray further x-ray showing resolution of what appears to be more congestive heart failure pulmonary edema. Patient was treated with IV Lasix for this, she has improved. She does have baseline dementia. She did not have any further UTI urine culture was negative. Her oxygen were weaned off to 2 L. Patient was evaluated by PT and OT and they recommended further rehab. At that time the patient was transferred to swing bed.        Review of Systems:    Ten point ROS reviewed negative, unless as noted above    Const: no Weight Loss   Eyes:  No itching, or redness   ENT  No rhinorrhea, throat pain, or swelling   Resp:  No hemomptysis   CVS:  No Orthopnea   GI:  No hematochezia   :  No hematuria  MSK: No joint pain   Endo:  No increased thirst, heat/cold intolerance   Skin:  No bruising, rashes  Neuro:  No changes in mental status, headaches  Psych:  No depression or anxiet    Objective: Intake/Output Summary (Last 24 hours) at 4/15/2022 0954  Last data filed at 4/14/2022 1522  Gross per 24 hour   Intake --   Output 800 ml   Net -800 ml        No results found for this or any previous visit (from the past 24 hour(s)). Vitals:   Vitals:    04/15/22 0731   BP:    Pulse:    Resp:    Temp:    SpO2: 96%       Physical Exam:     Gen: NAD. Eye: YUE, No Icterus  Nose: Midline, No Rhinorrhea  Oropharynx: Moist Mucus membrane. Ears: B/L Symmetrical, severe hard of hearing. Neck: Supple, No JVD  CVS: S1-S2, IRRR. Resp: CTAB, No end expiratory wheezing, No Crackles. Normal Resp effort. Abdo: BS+, S, NT/ND, No Guarding, No rigidity, no rebound  Neuro:  nonfocal sensory/motor exam. Normal Speech, CN II-XII grossly normal  Psych: AOX3. Appropriate mood and affect, Good Insight and judgement  Extrem: No  Edema, No Cyanosis, right foot swollen  Skin: No rash, no subcutaneous nodule palpated    Past Medical History:      PMH:   Past Medical History:   Diagnosis Date    Acquired hypothyroidism 12/9/2019    Patient is taking Synthroid    Chronic midline low back pain without sciatica 11/20/2019    Dementia without behavioral disturbance ([facility] 10/22/2019    Patient has dementia. She is taking Namenda Patient is seeing urologist Dr. Sumi Reynoso Gait disturbance 10/22/2019    Patient uses walker to ambulate    History of TIA (transient ischemic attack) 12/9/2019    Patient has a history of TIA and also recently may have had TIA. Patient is on Eliquis.  Sees neurologist Dr. Sumi Reynoso Major depressive disorder with single episode, in full remission ([facility] [facility] 12/9/2019    Patient is taking Zoloft and that is helping.  Mixed hyperlipidemia 12/9/2019    Patient is on simvastatin    Persistent atrial fibrillation ([address] 10/22/2019    A. fib on recently in October 2019 . Patient is on metoprolol 25 mg twice a day and Eliquis    PFO (patent foramen ovale) 12/9/2019    PFO seen on echocardiogram in 2016    Pneumonia due to infectious organism 10/22/2019    Patient had pneumonia and pleural effusion during admission in October 2019. A repeat CT scan of the chest will be done Patient is also seen pulmonologist       PSHX:  has a past surgical history that includes Hysterectomy. Allergies: No Known Allergies    FAM HX: family history includes Alcohol Abuse in her mother; No Known Problems in her father; Obesity in her mother. Soc HX:   Social History     Socioeconomic History    Marital status:      Spouse name: Not on file    Number of children: Not on file    Years of education: Not on file    Highest education level: Not on file   Occupational History    Not on file   Tobacco Use    Smoking status: Never Smoker    Smokeless tobacco: Never Used   Vaping Use    Vaping Use: Never used   Substance and Sexual Activity    Alcohol use: No    Drug use: No    Sexual activity: Not on file   Other Topics Concern    Not on file   Social History Narrative    Not on file     Social Determinants of Health     Financial Resource Strain: Low Risk     Difficulty of Paying Living Expenses: Not hard at all   Food Insecurity: No Food Insecurity    Worried About [address] in the Last Year: Never true    [facility] in the Last Year: Never true   Transportation Needs:     Lack of Transportation (Medical): Not on file    Lack of Transportation (Non-Medical):  Not on file   Physical Activity: Insufficiently Active    Days of Exercise per Week: 7 days    Minutes of Exercise per Session: 20 min   Stress:     Feeling of Stress : Not on file   Social Connections:     Frequency of Communication with Friends and Family: Not on file    Frequency of Social Gatherings with Friends and Family: Not on file    Attends Congregation Services: Not on file    Active Member of Clubs or Organizations: Not on file    Attends Club or Organization Meetings: Not on file    Marital Status: Not on file   Intimate Partner Violence:     Fear of Current or Ex-Partner: Not on file    Emotionally Abused: Not on file    Physically Abused: Not on file    Sexually Abused: Not on file   Housing Stability:     Unable to Pay for Housing in the Last Year: Not on file    Number of [place] in the Last Year: Not on file    Unstable Housing in the Last Year: Not on file       Medications:     Medications: Reviewed    Electronically signed by Roxi Nicholson MD, MD on 4/15/2022 at 9:54 AM

## 2022-04-16 PROCEDURE — 97530 THERAPEUTIC ACTIVITIES: CPT

## 2022-04-16 PROCEDURE — 6370000000 HC RX 637 (ALT 250 FOR IP): Performed by: INTERNAL MEDICINE

## 2022-04-16 PROCEDURE — 1200000002 HC SEMI PRIVATE SWING BED

## 2022-04-16 PROCEDURE — 97110 THERAPEUTIC EXERCISES: CPT

## 2022-04-16 PROCEDURE — 6370000000 HC RX 637 (ALT 250 FOR IP): Performed by: NURSE PRACTITIONER

## 2022-04-16 PROCEDURE — 94761 N-INVAS EAR/PLS OXIMETRY MLT: CPT

## 2022-04-16 RX ADMIN — APIXABAN 5 MG: 5 TABLET, FILM COATED ORAL at 20:19

## 2022-04-16 RX ADMIN — Medication 2000 UNITS: at 08:33

## 2022-04-16 RX ADMIN — LEVOTHYROXINE SODIUM 150 MCG: 0.15 TABLET ORAL at 06:01

## 2022-04-16 RX ADMIN — DICLOFENAC SODIUM 4 G: 10 GEL TOPICAL at 09:12

## 2022-04-16 RX ADMIN — ACETAMINOPHEN 500 MG: 500 TABLET ORAL at 08:33

## 2022-04-16 RX ADMIN — RIVASTIGMINE TARTRATE 3 MG: 1.5 CAPSULE ORAL at 08:33

## 2022-04-16 RX ADMIN — POLYETHYLENE GLYCOL 3350 17 G: 17 POWDER, FOR SOLUTION ORAL at 14:29

## 2022-04-16 RX ADMIN — SERTRALINE 50 MG: 50 TABLET, FILM COATED ORAL at 08:33

## 2022-04-16 RX ADMIN — METOPROLOL TARTRATE 12.5 MG: 25 TABLET, FILM COATED ORAL at 20:19

## 2022-04-16 RX ADMIN — METOPROLOL TARTRATE 12.5 MG: 25 TABLET, FILM COATED ORAL at 08:34

## 2022-04-16 RX ADMIN — APIXABAN 5 MG: 5 TABLET, FILM COATED ORAL at 08:35

## 2022-04-16 RX ADMIN — Medication 400 UNITS: at 08:34

## 2022-04-16 RX ADMIN — MEMANTINE 10 MG: 10 TABLET ORAL at 20:19

## 2022-04-16 RX ADMIN — DICLOFENAC SODIUM 4 G: 10 GEL TOPICAL at 13:11

## 2022-04-16 RX ADMIN — ACETAMINOPHEN 500 MG: 500 TABLET ORAL at 20:19

## 2022-04-16 RX ADMIN — FUROSEMIDE 40 MG: 40 TABLET ORAL at 08:34

## 2022-04-16 RX ADMIN — MEMANTINE 10 MG: 10 TABLET ORAL at 08:34

## 2022-04-16 RX ADMIN — DICLOFENAC SODIUM 4 G: 10 GEL TOPICAL at 20:19

## 2022-04-16 RX ADMIN — ATORVASTATIN CALCIUM 20 MG: 20 TABLET, FILM COATED ORAL at 20:19

## 2022-04-16 RX ADMIN — ACETAMINOPHEN 500 MG: 500 TABLET ORAL at 13:11

## 2022-04-16 RX ADMIN — DICLOFENAC SODIUM 4 G: 10 GEL TOPICAL at 18:10

## 2022-04-16 RX ADMIN — DIGOXIN 125 MCG: 125 TABLET ORAL at 08:33

## 2022-04-16 ASSESSMENT — PAIN SCALES - GENERAL
PAINLEVEL_OUTOF10: 0
PAINLEVEL_OUTOF10: 2
PAINLEVEL_OUTOF10: 0
PAINLEVEL_OUTOF10: 1
PAINLEVEL_OUTOF10: 0
PAINLEVEL_OUTOF10: 0

## 2022-04-16 ASSESSMENT — PAIN DESCRIPTION - FREQUENCY: FREQUENCY: INTERMITTENT

## 2022-04-16 ASSESSMENT — PAIN DESCRIPTION - DESCRIPTORS: DESCRIPTORS: ACHING

## 2022-04-16 ASSESSMENT — PAIN - FUNCTIONAL ASSESSMENT: PAIN_FUNCTIONAL_ASSESSMENT: ACTIVITIES ARE NOT PREVENTED

## 2022-04-16 ASSESSMENT — PAIN DESCRIPTION - ONSET: ONSET: ON-GOING

## 2022-04-16 ASSESSMENT — PAIN DESCRIPTION - PROGRESSION: CLINICAL_PROGRESSION: GRADUALLY WORSENING

## 2022-04-16 ASSESSMENT — PAIN DESCRIPTION - LOCATION: LOCATION: FOOT

## 2022-04-16 ASSESSMENT — PAIN DESCRIPTION - ORIENTATION: ORIENTATION: RIGHT

## 2022-04-16 ASSESSMENT — PAIN DESCRIPTION - PAIN TYPE: TYPE: ACUTE PAIN

## 2022-04-16 NOTE — PLAN OF CARE
Problem: Falls - Risk of:  Goal: Will remain free from falls  Description: Will remain free from falls  Outcome: Ongoing  Goal: Absence of physical injury  Description: Absence of physical injury  Outcome: Ongoing     Problem: Safety:  Goal: Free from accidental physical injury  Description: Free from accidental physical injury  Outcome: Ongoing  Goal: Free from intentional harm  Description: Free from intentional harm  Outcome: Ongoing     Problem: Daily Care:  Goal: Daily care needs are met  Description: Daily care needs are met  Outcome: Ongoing     Problem: Pain:  Description: Pain management should include both nonpharmacologic and pharmacologic interventions.   Goal: Patient's pain/discomfort is manageable  Description: Patient's pain/discomfort is manageable  Outcome: Ongoing  Goal: Pain level will decrease  Description: Pain level will decrease  Outcome: Ongoing  Goal: Control of acute pain  Description: Control of acute pain  Outcome: Ongoing  Goal: Control of chronic pain  Description: Control of chronic pain  Outcome: Ongoing

## 2022-04-16 NOTE — PLAN OF CARE
Problem: Falls - Risk of:  Goal: Will remain free from falls  Description: Will remain free from falls  Outcome: Met This Shift  Goal: Absence of physical injury  Description: Absence of physical injury  Outcome: Met This Shift     Problem: Safety:  Goal: Free from accidental physical injury  Description: Free from accidental physical injury  Outcome: Met This Shift  Goal: Free from intentional harm  Description: Free from intentional harm  Outcome: Met This Shift     Problem: Daily Care:  Goal: Daily care needs are met  Description: Daily care needs are met  Outcome: Met This Shift     Problem: Pain:  Goal: Patient's pain/discomfort is manageable  Description: Patient's pain/discomfort is manageable  Outcome: Ongoing  Goal: Pain level will decrease  Description: Pain level will decrease  Outcome: Ongoing  Goal: Control of acute pain  Description: Control of acute pain  Outcome: Ongoing  Goal: Control of chronic pain  Description: Control of chronic pain  Outcome: Ongoing

## 2022-04-16 NOTE — PLAN OF CARE
Problem: Falls - Risk of:  Goal: Will remain free from falls  Description: Will remain free from falls  4/16/2022 1925 by Alberto Guthrie RN  Outcome: Ongoing  4/16/2022 1727 by Drew Harper RN  Outcome: Met This Shift  Goal: Absence of physical injury  Description: Absence of physical injury  4/16/2022 1925 by Alberto Guthrie RN  Outcome: Ongoing  4/16/2022 1727 by Drew Harper RN  Outcome: Met This Shift     Problem: Safety:  Goal: Free from accidental physical injury  Description: Free from accidental physical injury  4/16/2022 1925 by Alberto Guthrie RN  Outcome: Ongoing  4/16/2022 1727 by Drew Harper RN  Outcome: Met This Shift  Goal: Free from intentional harm  Description: Free from intentional harm  4/16/2022 1925 by Alberto Guthrie RN  Outcome: Ongoing  4/16/2022 1727 by Drew Harper RN  Outcome: Met This Shift     Problem: Daily Care:  Goal: Daily care needs are met  Description: Daily care needs are met  4/16/2022 1925 by Alberto Guthrie RN  Outcome: Ongoing  4/16/2022 1727 by Drew Harper RN  Outcome: Met This Shift     Problem: Pain:  Description: Pain management should include both nonpharmacologic and pharmacologic interventions.   Goal: Patient's pain/discomfort is manageable  Description: Patient's pain/discomfort is manageable  4/16/2022 1925 by Alberto Guthrie RN  Outcome: Ongoing  4/16/2022 1727 by Drew Harper RN  Outcome: Ongoing  Goal: Pain level will decrease  Description: Pain level will decrease  4/16/2022 1925 by Alberto Guthrie RN  Outcome: Ongoing  4/16/2022 1727 by Drew Harper RN  Outcome: Ongoing  Goal: Control of acute pain  Description: Control of acute pain  4/16/2022 1925 by Alberto Guthrie RN  Outcome: Ongoing  4/16/2022 1727 by Drew Harper RN  Outcome: Ongoing  Goal: Control of chronic pain  Description: Control of chronic pain  4/16/2022 1925 by Alberto Guthrie RN  Outcome: Ongoing  4/16/2022 1727 by Jesus Alberto Rehman RN  Outcome: Ongoing

## 2022-04-16 NOTE — CARE COORDINATION
9: 15 A. M. - 9:30 A. M     Visit with patient in room. Provided variety of artifical flowers that she could choose to put in the vase that she painted yesterday. Voiced how she enjoyed and that she will be able to keep in window sill. Also provided her a laminated reading sheet on [place] that she enjoys reading about. Appreciative of the visit.     Samantha VILLAGRAN 4/16/2022

## 2022-04-16 NOTE — PROGRESS NOTES
Occupational Therapy    Occupational Therapy Treatment Note      Name: Vale Venegas MRN: [id number] :   1935   Date:  2022   Admission Date: 2022 Room:  [address] [place] [address]     Primary Problem:  Generalized Weakness, avulsion fx on her R lateral malleoli and fx metatarsals 2-4 as well as instances of her R knee giving out secondary to falls    Restrictions/Precautions:    Restrictions/Precautions: Fall Risk  Required Braces or Orthoses?: Yes  Required Braces or Orthoses  Right Lower Extremity Brace: Boot  RLE Brace Type: surgical shoe when WB  Position Activity Restriction  Communication with other providers:  Informed nursing of pt's goals to walk 2x and complete UE therex 3x daily. Subjective:  Patient states: \"My knee gives out\"  Pain: Does not report. (location, type, intensity)    Objective:    Observation:  Pt up in chair upon therapist arrival       Treatment, including education  Therapeutic Activity Training:   Therapeutic activity training was instructed today. Cues were given for safety, sequence, UE/LE placement, awareness, and balance. Activities performed today includedsit-stand, functional mobility. Pt performs sit to stand from chair with CGA x5 throughout session. Pt performs functional mobility from chair to franco using RW. She reports fatiuge following this and requests to sit. Pt then attempts to ambulate again but reports that her knee is \"too tired. \" Pt propels wheelchair using bilateral LE to therapy gym and back to room with minimal rest breaks. Therapeutic Exercise:  Cues were given for technique, safety, recruitment, and rationale. Cues were verbal and/or tactile. Pt given therex sheet with 4 UE band ex. Visual and verbal demonstrations were given of each ex, pt is able to demonstrate and verbalize competency of each ex. Pt completes 10 reps of each exercises with bilateral UEs and minimal rest breaks. Pt reports fatigue following therex.  Educated pt on importance of completing these and LE ex 2-3x daily between sessions. Discussed taking 2-3 walks daily with nursing staff to improve endurance. Pt verbalizes understanding. Assessment / Impression:    Patient's tolerance of treatment: good  Adverse Reaction: fatigue in LEs.    Significant change in status and impact:  improving UE strength   Barriers to improvement: LE pain and fatigue       Plan for Next Session:    Continue per OT POC    Time in: 922  Time out:  952  Timed treatment minutes:  30  Total treatment time:  30  1TA 1TE     Electronically signed by:    PETTY Reyes, OTR/L [id number]  4/16/2022, 10:46 AM

## 2022-04-17 LAB
ANION GAP SERPL CALCULATED.3IONS-SCNC: 10 MMOL/L (ref 4–16)
BUN BLDV-MCNC: 16 MG/DL (ref 6–23)
CALCIUM SERPL-MCNC: 8.9 MG/DL (ref 8.3–10.6)
CHLORIDE BLD-SCNC: 103 MMOL/L (ref 99–110)
CO2: 27 MMOL/L (ref 21–32)
CREAT SERPL-MCNC: 1 MG/DL (ref 0.6–1.1)
CULTURE: NORMAL
CULTURE: NORMAL
GFR AFRICAN AMERICAN: >60 ML/MIN/1.73M2
GFR NON-AFRICAN AMERICAN: 53 ML/MIN/1.73M2
GLUCOSE BLD-MCNC: 102 MG/DL (ref 70–99)
HCT VFR BLD CALC: 42.2 % (ref 37–47)
HEMOGLOBIN: 13.6 GM/DL (ref 12.5–16)
Lab: NORMAL
Lab: NORMAL
MAGNESIUM: 1.8 MG/DL (ref 1.8–2.4)
MCH RBC QN AUTO: 31.7 PG (ref 27–31)
MCHC RBC AUTO-ENTMCNC: 32.2 % (ref 32–36)
MCV RBC AUTO: 98.4 FL (ref 78–100)
PDW BLD-RTO: 13.7 % (ref 11.7–14.9)
PLATELET # BLD: 257 K/CU MM (ref 140–440)
PMV BLD AUTO: 9.3 FL (ref 7.5–11.1)
POTASSIUM SERPL-SCNC: 4.3 MMOL/L (ref 3.5–5.1)
RBC # BLD: 4.29 M/CU MM (ref 4.2–5.4)
SODIUM BLD-SCNC: 140 MMOL/L (ref 135–145)
SPECIMEN: NORMAL
SPECIMEN: NORMAL
WBC # BLD: 9.9 K/CU MM (ref 4–10.5)

## 2022-04-17 PROCEDURE — 36415 COLL VENOUS BLD VENIPUNCTURE: CPT

## 2022-04-17 PROCEDURE — 85027 COMPLETE CBC AUTOMATED: CPT

## 2022-04-17 PROCEDURE — 1200000002 HC SEMI PRIVATE SWING BED

## 2022-04-17 PROCEDURE — 6370000000 HC RX 637 (ALT 250 FOR IP): Performed by: NURSE PRACTITIONER

## 2022-04-17 PROCEDURE — 80048 BASIC METABOLIC PNL TOTAL CA: CPT

## 2022-04-17 PROCEDURE — 6370000000 HC RX 637 (ALT 250 FOR IP): Performed by: INTERNAL MEDICINE

## 2022-04-17 PROCEDURE — 83735 ASSAY OF MAGNESIUM: CPT

## 2022-04-17 RX ADMIN — ACETAMINOPHEN 500 MG: 500 TABLET ORAL at 08:25

## 2022-04-17 RX ADMIN — APIXABAN 5 MG: 5 TABLET, FILM COATED ORAL at 08:26

## 2022-04-17 RX ADMIN — DICLOFENAC SODIUM 4 G: 10 GEL TOPICAL at 22:27

## 2022-04-17 RX ADMIN — Medication 400 UNITS: at 08:26

## 2022-04-17 RX ADMIN — ACETAMINOPHEN 500 MG: 500 TABLET ORAL at 22:23

## 2022-04-17 RX ADMIN — METOPROLOL TARTRATE 12.5 MG: 25 TABLET, FILM COATED ORAL at 22:24

## 2022-04-17 RX ADMIN — DICLOFENAC SODIUM 4 G: 10 GEL TOPICAL at 18:01

## 2022-04-17 RX ADMIN — DICLOFENAC SODIUM 4 G: 10 GEL TOPICAL at 08:29

## 2022-04-17 RX ADMIN — Medication 2000 UNITS: at 08:26

## 2022-04-17 RX ADMIN — APIXABAN 5 MG: 5 TABLET, FILM COATED ORAL at 22:24

## 2022-04-17 RX ADMIN — RIVASTIGMINE TARTRATE 3 MG: 1.5 CAPSULE ORAL at 08:25

## 2022-04-17 RX ADMIN — DIGOXIN 125 MCG: 125 TABLET ORAL at 08:26

## 2022-04-17 RX ADMIN — SERTRALINE 50 MG: 50 TABLET, FILM COATED ORAL at 08:26

## 2022-04-17 RX ADMIN — FUROSEMIDE 40 MG: 40 TABLET ORAL at 08:26

## 2022-04-17 RX ADMIN — MEMANTINE 10 MG: 10 TABLET ORAL at 08:26

## 2022-04-17 RX ADMIN — MEMANTINE 10 MG: 10 TABLET ORAL at 22:24

## 2022-04-17 RX ADMIN — LEVOTHYROXINE SODIUM 150 MCG: 0.15 TABLET ORAL at 06:25

## 2022-04-17 RX ADMIN — METOPROLOL TARTRATE 12.5 MG: 25 TABLET, FILM COATED ORAL at 08:26

## 2022-04-17 RX ADMIN — ACETAMINOPHEN 500 MG: 500 TABLET ORAL at 13:52

## 2022-04-17 RX ADMIN — ATORVASTATIN CALCIUM 20 MG: 20 TABLET, FILM COATED ORAL at 22:24

## 2022-04-17 RX ADMIN — DICLOFENAC SODIUM 4 G: 10 GEL TOPICAL at 13:52

## 2022-04-17 ASSESSMENT — PAIN DESCRIPTION - FREQUENCY
FREQUENCY: INTERMITTENT
FREQUENCY: INTERMITTENT

## 2022-04-17 ASSESSMENT — PAIN DESCRIPTION - DESCRIPTORS
DESCRIPTORS: ACHING
DESCRIPTORS: ACHING

## 2022-04-17 ASSESSMENT — PAIN SCALES - GENERAL
PAINLEVEL_OUTOF10: 0
PAINLEVEL_OUTOF10: 0
PAINLEVEL_OUTOF10: 1
PAINLEVEL_OUTOF10: 0
PAINLEVEL_OUTOF10: 1
PAINLEVEL_OUTOF10: 0
PAINLEVEL_OUTOF10: 0
PAINLEVEL_OUTOF10: 4

## 2022-04-17 ASSESSMENT — PAIN DESCRIPTION - ONSET
ONSET: GRADUAL
ONSET: ON-GOING

## 2022-04-17 ASSESSMENT — PAIN DESCRIPTION - PAIN TYPE
TYPE: ACUTE PAIN
TYPE: ACUTE PAIN
TYPE: CHRONIC PAIN

## 2022-04-17 ASSESSMENT — PAIN DESCRIPTION - LOCATION
LOCATION: GENERALIZED
LOCATION: FOOT;KNEE

## 2022-04-17 ASSESSMENT — PAIN DESCRIPTION - PROGRESSION
CLINICAL_PROGRESSION: NOT CHANGED
CLINICAL_PROGRESSION: GRADUALLY WORSENING

## 2022-04-17 ASSESSMENT — PAIN DESCRIPTION - ORIENTATION
ORIENTATION: RIGHT
ORIENTATION: RIGHT

## 2022-04-17 ASSESSMENT — PAIN - FUNCTIONAL ASSESSMENT
PAIN_FUNCTIONAL_ASSESSMENT: PREVENTS OR INTERFERES SOME ACTIVE ACTIVITIES AND ADLS
PAIN_FUNCTIONAL_ASSESSMENT: ACTIVITIES ARE NOT PREVENTED

## 2022-04-17 NOTE — CARE COORDINATION
8: 45 a.m.-9:00 a.m. Visit with patient. Reminisced about Easter, Spring and traditions. Patient shared memories of her growing up and getting new shoes and Easter bonnet each year. 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 Patient appreciative of laminated Easter Card and she choose a silk potted flower to keep. Looking forward to family coming to visit her today. Reminisced about her Cat she has at home named \"Catie\" and share photos that family had just provided to her. [address] [facility] 4/17/2022

## 2022-04-17 NOTE — PLAN OF CARE
Problem: Falls - Risk of:  Goal: Will remain free from falls  Description: Will remain free from falls  Outcome: Ongoing  Goal: Absence of physical injury  Description: Absence of physical injury  Outcome: Ongoing     Problem: Safety:  Goal: Free from accidental physical injury  Description: Free from accidental physical injury  Outcome: Ongoing  Goal: Free from intentional harm  Description: Free from intentional harm  Outcome: Ongoing     Problem: Daily Care:  Goal: Daily care needs are met  Description: Daily care needs are met  Outcome: Ongoing     Problem: Pain:  Goal: Patient's pain/discomfort is manageable  Description: Patient's pain/discomfort is manageable  Outcome: Ongoing  Goal: Pain level will decrease  Description: Pain level will decrease  Outcome: Ongoing  Goal: Control of acute pain  Description: Control of acute pain  Outcome: Ongoing  Goal: Control of chronic pain  Description: Control of chronic pain  Outcome: Ongoing

## 2022-04-17 NOTE — PROGRESS NOTES
Hospitalist Progress Note      Name:  Ashley Thornton /Age/Sex: 1935  (81 y.o. female)   MRN & CSN:  [id number] & [id number] Admission Date/Time: 2022  3:03 PM   Location:   PCP: Peggy Bennett MD         Hospital Day: 4    Assessment and Plan:   Ashley Thornton is a 81 y.o.  female  who presents with Physical debility    1. Debility, patient is working with physical and Occupational Therapy she is doing well. 2. Chronic atrial fibrillation, continue Eliquis and Lopressor [id number] course 3  3. History of CHF, continue current home medications  4. Hypothyroidism, continue Synthroid  5. Dementia continue Namenda and Exelon  6. Toe fractures of the right foot, healing patient has special shoe that she wears when up. Diet ADULT DIET; Regular; Low Fat/Low Chol/High Fiber/2 gm Na           Code Status DNR-CCA             History of Present Illness:     Chief Complaint: Physical debility  Ms. Donovan Rolon is a very pleasant 28-year-old female who is very hard of hearing and does wear hearing aids she does have a past medical history of hypothyroidism, chronic atrial fibrillation, hyperlipidemia, congestive heart failure, back pain, dementia and history of TIA. She was originally admitted for sepsis likely from pneumonia as she was started on IV antibiotics which have since been discontinued x-ray showed resolution of what appeared to be congestive heart failure more so than pneumonia. Patient was treated with IV Lasix she improved. She does have baseline dementia. She was weaned off her oxygen. She was discharged from acute hospital stay and she has been admitted to swing bed for rehab. Today she is doing well, she has no complaints. She has no chest pain, no shortness of breath, no nausea no vomiting no diarrhea. Ten point ROS reviewed negative, unless as noted above    Objective:        Intake/Output Summary (Last 24 hours) at 2022 0913  Last data filed at 2022 2326  Gross per 24 hour   Intake 120 ml   Output --   Net 120 ml      Vitals:   Vitals:    04/17/22 0808   BP: 126/74   Pulse: 78   Resp: 16   Temp: 98.4 °F (36.9 °C)   SpO2: 94%     Physical Exam:   GEN Awake female, sitting upright in bed in no apparent distress. Appears given age. EYES Pupils are equally round. No scleral erythema, discharge, or conjunctivitis. HENT Mucous membranes are moist. Oral pharynx without exudates, no evidence of thrush. NECK Supple, no apparent thyromegaly or masses. RESP Clear to auscultation, no wheezes, rales or rhonchi. Symmetric chest movement while on room air. CARDIO/VASC S1/S2 auscultated. Regular rate without appreciable murmurs, rubs, or gallops. No JVD or carotid bruits. Peripheral pulses equal bilaterally and palpable. No peripheral edema. GI Abdomen is soft without significant tenderness, masses, or guarding. Bowel sounds are normoactive. Rectal exam deferred.  No costovertebral angle tenderness. MSK No gross joint deformities. SKIN Normal coloration, warm, dry. NEURO Cranial nerves appear grossly intact, normal speech, no lateralizing weakness. PSYCH Awake, alert, oriented x 3.      Medications:   Medications:    Vitamin D  2,000 Units Oral Daily    rivastigmine  3 mg Oral Daily    levothyroxine  150 mcg Oral Daily    sertraline  50 mg Oral Daily    vitamin E  400 Units Oral Daily    acetaminophen  500 mg Oral TID    apixaban  5 mg Oral BID    atorvastatin  20 mg Oral Nightly    diclofenac sodium  4 g Topical 4x Daily    digoxin  125 mcg Oral Daily    furosemide  40 mg Oral Daily    memantine  10 mg Oral BID    metoprolol tartrate  12.5 mg Oral BID    miconazole   Topical BID      Infusions:   PRN Meds: ondansetron, 4 mg, Q8H PRN  polyethylene glycol, 17 g, Daily PRN  aluminum & magnesium hydroxide-simethicone, 30 mL, Q6H PRN  acetaminophen, 650 mg, Q6H PRN   Or  acetaminophen, 650 mg, Q6H PRN            Electronically signed by Conor Renteria APRN - NP on 4/17/2022 at 9:13 AM

## 2022-04-18 PROCEDURE — 6370000000 HC RX 637 (ALT 250 FOR IP): Performed by: NURSE PRACTITIONER

## 2022-04-18 PROCEDURE — 97535 SELF CARE MNGMENT TRAINING: CPT

## 2022-04-18 PROCEDURE — 97116 GAIT TRAINING THERAPY: CPT

## 2022-04-18 PROCEDURE — 1200000002 HC SEMI PRIVATE SWING BED

## 2022-04-18 PROCEDURE — 6370000000 HC RX 637 (ALT 250 FOR IP): Performed by: INTERNAL MEDICINE

## 2022-04-18 PROCEDURE — 97110 THERAPEUTIC EXERCISES: CPT

## 2022-04-18 RX ADMIN — METOPROLOL TARTRATE 12.5 MG: 25 TABLET, FILM COATED ORAL at 09:41

## 2022-04-18 RX ADMIN — DICLOFENAC SODIUM 4 G: 10 GEL TOPICAL at 09:36

## 2022-04-18 RX ADMIN — ACETAMINOPHEN 500 MG: 500 TABLET ORAL at 20:20

## 2022-04-18 RX ADMIN — MEMANTINE 10 MG: 10 TABLET ORAL at 20:20

## 2022-04-18 RX ADMIN — LEVOTHYROXINE SODIUM 150 MCG: 0.15 TABLET ORAL at 06:12

## 2022-04-18 RX ADMIN — Medication 400 UNITS: at 09:38

## 2022-04-18 RX ADMIN — APIXABAN 5 MG: 5 TABLET, FILM COATED ORAL at 09:38

## 2022-04-18 RX ADMIN — RIVASTIGMINE TARTRATE 3 MG: 1.5 CAPSULE ORAL at 09:39

## 2022-04-18 RX ADMIN — Medication 2000 UNITS: at 09:38

## 2022-04-18 RX ADMIN — MEMANTINE 10 MG: 10 TABLET ORAL at 09:39

## 2022-04-18 RX ADMIN — ATORVASTATIN CALCIUM 20 MG: 20 TABLET, FILM COATED ORAL at 20:20

## 2022-04-18 RX ADMIN — DIGOXIN 125 MCG: 125 TABLET ORAL at 09:44

## 2022-04-18 RX ADMIN — APIXABAN 5 MG: 5 TABLET, FILM COATED ORAL at 21:00

## 2022-04-18 RX ADMIN — ACETAMINOPHEN 500 MG: 500 TABLET ORAL at 14:09

## 2022-04-18 RX ADMIN — SERTRALINE 50 MG: 50 TABLET, FILM COATED ORAL at 09:39

## 2022-04-18 RX ADMIN — DICLOFENAC SODIUM 4 G: 10 GEL TOPICAL at 17:15

## 2022-04-18 RX ADMIN — DICLOFENAC SODIUM 4 G: 10 GEL TOPICAL at 14:09

## 2022-04-18 RX ADMIN — DICLOFENAC SODIUM 4 G: 10 GEL TOPICAL at 20:20

## 2022-04-18 RX ADMIN — FUROSEMIDE 40 MG: 40 TABLET ORAL at 09:44

## 2022-04-18 RX ADMIN — ACETAMINOPHEN 500 MG: 500 TABLET ORAL at 09:40

## 2022-04-18 ASSESSMENT — PAIN DESCRIPTION - DESCRIPTORS
DESCRIPTORS: ACHING
DESCRIPTORS: ACHING

## 2022-04-18 ASSESSMENT — PAIN DESCRIPTION - PROGRESSION
CLINICAL_PROGRESSION: GRADUALLY WORSENING

## 2022-04-18 ASSESSMENT — PAIN SCALES - GENERAL
PAINLEVEL_OUTOF10: 0
PAINLEVEL_OUTOF10: 2
PAINLEVEL_OUTOF10: 0
PAINLEVEL_OUTOF10: 1
PAINLEVEL_OUTOF10: 0
PAINLEVEL_OUTOF10: 1

## 2022-04-18 ASSESSMENT — PAIN DESCRIPTION - PAIN TYPE
TYPE: ACUTE PAIN
TYPE: ACUTE PAIN

## 2022-04-18 ASSESSMENT — PAIN DESCRIPTION - ORIENTATION
ORIENTATION: RIGHT
ORIENTATION: RIGHT

## 2022-04-18 ASSESSMENT — PAIN DESCRIPTION - LOCATION
LOCATION: FOOT
LOCATION: FOOT

## 2022-04-18 ASSESSMENT — PAIN DESCRIPTION - FREQUENCY
FREQUENCY: INTERMITTENT
FREQUENCY: INTERMITTENT

## 2022-04-18 ASSESSMENT — PAIN DESCRIPTION - ONSET
ONSET: GRADUAL
ONSET: GRADUAL

## 2022-04-18 NOTE — PROGRESS NOTES
Physical Therapy Treatment Note   Date: 2022 Room: [unfilled]   Name: Daniel Ruiz : 1935   MRN: [id number] Admission Date:2022    Primary Problem:   Past Medical History:   Diagnosis Date    Acquired hypothyroidism 2019    Patient is taking Synthroid    Chronic midline low back pain without sciatica 2019    Dementia without behavioral disturbance ([facility] [address] 10/22/2019    Patient has dementia. She is taking Namenda Patient is seeing urologist Dr. Eliaen Montano Gait disturbance 10/22/2019    Patient uses walker to ambulate    History of TIA (transient ischemic attack) 2019    Patient has a history of TIA and also recently may have had TIA. Patient is on Eliquis. Sees neurologist Dr. Eliane Montano Major depressive disorder with single episode, in full remission ([facility] [address] 2019    Patient is taking Zoloft and that is helping.  Mixed hyperlipidemia 2019    Patient is on simvastatin    Persistent atrial fibrillation ([facility] [address] 10/22/2019    A. fib on recently in 2019 . Patient is on metoprolol 25 mg twice a day and Eliquis    PFO (patent foramen ovale) 2019    PFO seen on echocardiogram in 2016    Pneumonia due to infectious organism 10/22/2019    Patient had pneumonia and pleural effusion during admission in 2019. A repeat CT scan of the chest will be done Patient is also seen pulmonologist     Past Surgical History:   Procedure Laterality Date    HYSTERECTOMY       Rehabilitation Diagnosis: impaired mobility  Restrictions/Precautions:post op shoe on R foot when walking    Subjective: Patient agreeable to PT  Initial Pain level: 0/10    Goals:                   Short term goals  Time Frame for Short term goals: 1 week or until discharge:  Short term goal 1: Pt will demonstrate the ability to safely perform bed mobility with supervision.   Short term goal 2: Pt will demonstrate the ability to safely transfer sit to stand and stand to sit from 3 different height surfaces with S and proper hand placement with VCs only. Short term goal 3: Pt will demonstrate the ability to safely ambulate 150' with a RW and S with no rest breaks or instances of letting go of the walker for nearby furniture. Plan of Care:             Times per week: Mon-Sat 5+/wk            Current Treatment Recommendations: Briana Celestino Re-education,Cognitive Reorientation,Patient/Caregiver Education & Training,ROM,ADL/Self-care Training,Equipment Evaluation, Education, & procurement,Balance Training,IADL Training,Gait Training,Home Exercise Program,Functional Mobility Training,Cognitive/Perceptual Training,Stair training,Safety Education & Training,Positioning      Objective Findings:    Date: 22 Date:  Date:  Date:  Date:    Bed mobility SBA       Sit to stand transfer SBA       Stand to sit transfer SBA       Commode transfer SBA       Standing tolerance        Ambulation amb 35 ft with RW= CGA       Stairs x         Exercises:   Exercise/Equipment/Modalities  Date:  Date:  Date:  Date:                                                                               Modality/intervention used:   [x ] Therapeutic Exercise   [ ] Modalities:   [ ] Therapeutic Activity     [ ] Ultrasound   [ ] Elec Stim   [ x] Gait Training     [ ] Cervical Traction  [ ] Lumbar Traction   [ ] Neuromuscular Re-education   [ ] Cold/hotpack  [ ] Iontophoresis   [ ] Instruction in HEP     [ ] Vasopneumatic   [ ] Manual Therapy   [ ] Aquatic Therapy     Other Therapeutic Activities:    Home Exercise Program/Education provided to patient:     Manual Treatments:     Communication with other providers:      Adverse reactions to treatment:     Treatment/Activity Tolerance:   [ ] Patient limited by fatigue [ ] Patient limited by pain [ ] Patient limited by other medical complications [ ] Patient tolerated therapy well  [ ] Other:     Post Tx Pain Ratin/10 Safety Precautions:   [ x] left in bed  [ ] left in chair [x ] call light within reach  [x ] bed alarm on  [ ] personal alarm on  [ ] other staff present:    Plan:   [ x] Continue per plan of care [ ] Josef Servin current plan   [ ] Plan of care initiated [ ] Hold pending MD visit [ ] Discharge     Plan for Next Session:     Time In: 1140  Time Out: 1200  Total Treatment Minutes: 20  Billed Units: 1gt    Signed: Sincere Carlos PT, 4/18/2022, 12:31 PM

## 2022-04-18 NOTE — FLOWSHEET NOTE
Physical Therapy Treatment Note   Date: 2022 Room: [unfilled]   Name: Aris Nolen : 1935   MRN: [id number] Admission Date:2022    Primary Problem:   Past Medical History:   Diagnosis Date    Acquired hypothyroidism 2019    Patient is taking Synthroid    Chronic midline low back pain without sciatica 2019    Dementia without behavioral disturbance ([facility] [address] 10/22/2019    Patient has dementia. She is taking Namenda Patient is seeing urologist Dr. Kirby Palmer Gait disturbance 10/22/2019    Patient uses walker to ambulate    History of TIA (transient ischemic attack) 2019    Patient has a history of TIA and also recently may have had TIA. Patient is on Eliquis. Sees neurologist Dr. Kirby Palmer Major depressive disorder with single episode, in full remission ([facility] [address] 2019    Patient is taking Zoloft and that is helping.  Mixed hyperlipidemia 2019    Patient is on simvastatin    Persistent atrial fibrillation ([facility] [address] 10/22/2019    A. fib on recently in 2019 . Patient is on metoprolol 25 mg twice a day and Eliquis    PFO (patent foramen ovale) 2019    PFO seen on echocardiogram in 2016    Pneumonia due to infectious organism 10/22/2019    Patient had pneumonia and pleural effusion during admission in 2019. A repeat CT scan of the chest will be done Patient is also seen pulmonologist     Past Surgical History:   Procedure Laterality Date    HYSTERECTOMY       Rehabilitation Diagnosis:   Restrictions/Precautions:     Subjective: Patient asleep upon entering but aroused easily, and was agreeable to working with therapy. Initial Pain level: c/o right knee pain but did not rate    Goals:                   Short term goals  Time Frame for Short term goals: 1 week or until discharge:  Short term goal 1: Pt will demonstrate the ability to safely perform bed mobility with supervision.   Short term goal 2: Pt will demonstrate the ability to safely transfer sit to stand and stand to sit from 3 different height surfaces with S and proper hand placement with VCs only. Short term goal 3: Pt will demonstrate the ability to safely ambulate 150' with a RW and S with no rest breaks or instances of letting go of the walker for nearby furniture.                Plan of Care:             Times per week: Mon-Sat 5+/wk            Current Treatment Recommendations: Cassie Munir Re-education,Cognitive Reorientation,Patient/Caregiver Education & Training,ROM,ADL/Self-care Training,Equipment Evaluation, Education, & procurement,Balance Training,IADL Training,Gait Training,Home Exercise Program,Functional Mobility Training,Cognitive/Perceptual Training,Stair training,Safety Education & Training,Positioning      Objective Findings:    Date: 4/18/22 Date:  Date:  Date:  Date:    Bed mobility Mod I       Sit to stand transfer CGA of 1 and VC's for safety       Stand to sit transfer CGA of 1 and VC's for safety       Commode transfer CGA-Min A of 1 w/VC's for safety/sequencing       Standing tolerance For amb and georgette care       Ambulation W/std walker and CGA-Min A of 1 20ft and 10ft as pt is impulsive and was grabbing the end of the bed to walk instead of the walker and c/o her knee giving out on her as she amb to the bathroom        Stairs x         Exercises:   Exercise/Equipment/Modalities  Date:  Date:  Date:  Date:                                                                               Modality/intervention used:   [ ] Therapeutic Exercise   [ ] Modalities:   [ ] Therapeutic Activity     [ ] Ultrasound   [ ] Elec Stim   [x ] Gait Training     [ ] Cervical Traction  [ ] Lumbar Traction   [ ] Neuromuscular Re-education   [ ] Cold/hotpack  [ ] Iontophoresis   [ ] Instruction in HEP     [ ] Vasopneumatic   [ ] Manual Therapy   [ ] Aquatic Therapy     Other Therapeutic Activities:patient required full assist to don her foot brace and Min-Mod A to pull up/down her depends     Home Exercise Program/Education provided to patient:     Manual Treatments:     Communication with other providers:      Adverse reactions to treatment:  Patient is impulsive and requires MAX VC's for safety with amb as she tends to grab onto objects near her taking her hands off the walker and with turning to sit in a chair or on the commode as she does not make sure they are behind her prior to starting to sit    Treatment/Activity Tolerance:   [ ] Patient limited by fatigue [x ] Patient limited by pain [ ] Patient limited by other medical complications [ ] Patient tolerated therapy well  [ ] Other:     Post Tx Pain Rating: does not rate /10     Safety Precautions:   [ ] left in bed  [x ] left in chair [x ] call light within reach  [ ] bed alarm on  [x ] personal alarm on  [ ] other staff present:    Plan:   [x ] Continue per plan of care [ ] Juan Ramirez current plan   [ ] Plan of care initiated [ ] Hold pending MD visit [ ] Discharge     Plan for Next Session:     Time In: 0805  Time Out: 0823  Total Treatment Minutes: 18  Billed Units: 1gt    Signed: Zoe Alcaraz 4/18/2022, 8:25 AM

## 2022-04-18 NOTE — PLAN OF CARE
Patient verbalized that pain is manageable with voltaren gel and acetaminophen.    Problem: Pain:  Goal: Patient's pain/discomfort is manageable  Description: Patient's pain/discomfort is manageable  Outcome: Ongoing

## 2022-04-18 NOTE — PROGRESS NOTES
Occupational Therapy    Occupational Therapy Treatment Note  Name: German Olivo MRN: [id number] :   1935   Date:  2022   Admission Date: 2022 Room:  [address] [place] [address]   Restrictions/Precautions:  Restrictions/Precautions  Restrictions/Precautions: Weight Bearing  Required Braces or Orthoses?: Yes     Communication with other providers:   Cleared for treatment by  RN. Subjective:  Patient states:  \"I still sleepy\"  Pain:   Location, Type, Intensity (0/10 to 10/10): \"No pain really but my knee gives out\"    Objective:    Observation:  Pt alert and oriented to self but confused    Treatment, including education:  Transfers    Self Care Training:   Activities performed today included the following:    Grooming  Mod I to wash face with min verbal cue to initiate. Bathing   Sup to wash self, pt declined to wash buttocks and states \"I will do it when I go the bathroom next time. I can kill two birds with one stone\". UB Dress  donned gown    LB Dress  Mod A pt able to doff socks, but required assistance to aman socks. Therapeutic Exercise: PM session  Pt completed UE exercises to increase strength and ROM to facilitate increase for ADLs and functional mobility. Pt completed B UEs with 2# dumbbell exercises with curls, shoulder presses, punch, stirring and wrist curls x 20 reps with mod rest breaks due to fatigue. Safety Measures: Gait belt used, Left in bed, Pull/Bed Alarm activated and call light left in reach        Assessment / Impression:        Patient's tolerance of treatment: Good   Adverse Reaction: None  Significant change in status and impact:  None  Barriers to improvement:  Dec strength and endurance, poor cognition    Plan for Next Session:    Continue with POC.     Time in:  [id number]  Time out:  1000  1503  Total treatment time:  35 +33=68  Billed Units: 2 TE, 3 ADl  Electronically signed by:    Steffany Rodarte, [address]    2022, 10:05 AM    Previously filed values:     Short term goals  Time Frame for Short term goals: Until DC or goals met  Short term goal 1: Pt will complete trfs mod I w/o LOB  Short term goal 2: Pt will complete UE bathing/dressing with S  Short term goal 3: Pt will complete LE bathing/dressing with S  Short term goal 4: Pt will complete toileting mod I  Short term goal 5: Pt will complete 10+ min of standing/functional mobility during ADLs/therax with min increased SOB and no LOB

## 2022-04-18 NOTE — CARE COORDINATION
11: 15 a.m.-11:30 a.m. Visit with patient this morning in room. Watching the [facility] and reminiscing on her love of long distance running that she did for years. Always wanted to attend the [facility]. Reminisced on many stories of her running. Also enjoyed looking at 20 + laminated booklet of Photo's of cats. Has a picture of her own cat that she holds onto here. Each photo she looked at she shared story about her own cat. Talked of how she enjoyed activity and how her family is caring for her cat while she is here. Very talkative and appreciative of time spent with her.     Craig VILLAGRAN 4/18/2022

## 2022-04-19 PROCEDURE — 97110 THERAPEUTIC EXERCISES: CPT

## 2022-04-19 PROCEDURE — 97530 THERAPEUTIC ACTIVITIES: CPT

## 2022-04-19 PROCEDURE — 1200000002 HC SEMI PRIVATE SWING BED

## 2022-04-19 PROCEDURE — 97116 GAIT TRAINING THERAPY: CPT

## 2022-04-19 PROCEDURE — 6370000000 HC RX 637 (ALT 250 FOR IP): Performed by: FAMILY MEDICINE

## 2022-04-19 PROCEDURE — 97535 SELF CARE MNGMENT TRAINING: CPT

## 2022-04-19 PROCEDURE — 6370000000 HC RX 637 (ALT 250 FOR IP): Performed by: INTERNAL MEDICINE

## 2022-04-19 PROCEDURE — 6370000000 HC RX 637 (ALT 250 FOR IP): Performed by: NURSE PRACTITIONER

## 2022-04-19 RX ADMIN — ACETAMINOPHEN 500 MG: 500 TABLET ORAL at 13:04

## 2022-04-19 RX ADMIN — MEMANTINE 10 MG: 10 TABLET ORAL at 20:13

## 2022-04-19 RX ADMIN — METOPROLOL TARTRATE 12.5 MG: 25 TABLET, FILM COATED ORAL at 09:59

## 2022-04-19 RX ADMIN — RIVASTIGMINE TARTRATE 3 MG: 1.5 CAPSULE ORAL at 09:59

## 2022-04-19 RX ADMIN — ACETAMINOPHEN 500 MG: 500 TABLET ORAL at 10:00

## 2022-04-19 RX ADMIN — METOPROLOL TARTRATE 12.5 MG: 25 TABLET, FILM COATED ORAL at 20:12

## 2022-04-19 RX ADMIN — DICLOFENAC SODIUM 4 G: 10 GEL TOPICAL at 13:04

## 2022-04-19 RX ADMIN — ACETAMINOPHEN 500 MG: 500 TABLET ORAL at 20:13

## 2022-04-19 RX ADMIN — DICLOFENAC SODIUM 4 G: 10 GEL TOPICAL at 18:08

## 2022-04-19 RX ADMIN — SERTRALINE 50 MG: 50 TABLET, FILM COATED ORAL at 09:59

## 2022-04-19 RX ADMIN — DIGOXIN 125 MCG: 125 TABLET ORAL at 09:59

## 2022-04-19 RX ADMIN — LEVOTHYROXINE SODIUM 150 MCG: 0.15 TABLET ORAL at 06:12

## 2022-04-19 RX ADMIN — MEMANTINE 10 MG: 10 TABLET ORAL at 09:59

## 2022-04-19 RX ADMIN — Medication 400 UNITS: at 09:59

## 2022-04-19 RX ADMIN — DICLOFENAC SODIUM 4 G: 10 GEL TOPICAL at 20:13

## 2022-04-19 RX ADMIN — APIXABAN 5 MG: 5 TABLET, FILM COATED ORAL at 20:13

## 2022-04-19 RX ADMIN — ATORVASTATIN CALCIUM 20 MG: 20 TABLET, FILM COATED ORAL at 20:13

## 2022-04-19 RX ADMIN — DICLOFENAC SODIUM 4 G: 10 GEL TOPICAL at 09:58

## 2022-04-19 RX ADMIN — Medication 2000 UNITS: at 09:59

## 2022-04-19 RX ADMIN — APIXABAN 5 MG: 5 TABLET, FILM COATED ORAL at 09:59

## 2022-04-19 ASSESSMENT — PAIN DESCRIPTION - ONSET: ONSET: ON-GOING

## 2022-04-19 ASSESSMENT — PAIN SCALES - GENERAL
PAINLEVEL_OUTOF10: 0
PAINLEVEL_OUTOF10: 1
PAINLEVEL_OUTOF10: 0
PAINLEVEL_OUTOF10: 1
PAINLEVEL_OUTOF10: 0

## 2022-04-19 ASSESSMENT — PAIN DESCRIPTION - LOCATION: LOCATION: FOOT

## 2022-04-19 ASSESSMENT — PAIN DESCRIPTION - FREQUENCY: FREQUENCY: INTERMITTENT

## 2022-04-19 ASSESSMENT — PAIN DESCRIPTION - DESCRIPTORS: DESCRIPTORS: ACHING

## 2022-04-19 ASSESSMENT — PAIN DESCRIPTION - ORIENTATION: ORIENTATION: RIGHT

## 2022-04-19 ASSESSMENT — PAIN - FUNCTIONAL ASSESSMENT: PAIN_FUNCTIONAL_ASSESSMENT: ACTIVITIES ARE NOT PREVENTED

## 2022-04-19 ASSESSMENT — PAIN DESCRIPTION - PAIN TYPE: TYPE: ACUTE PAIN

## 2022-04-19 ASSESSMENT — PAIN DESCRIPTION - PROGRESSION: CLINICAL_PROGRESSION: NOT CHANGED

## 2022-04-19 NOTE — PLAN OF CARE
Problem: Falls - Risk of:  Goal: Will remain free from falls  Description: Will remain free from falls  4/18/2022 2339 by Jolene Ling RN  Outcome: Ongoing  4/18/2022 1139 by Rhoda Collado RN  Outcome: Ongoing  Goal: Absence of physical injury  Description: Absence of physical injury  4/18/2022 2339 by Jolene Ling RN  Outcome: Ongoing  4/18/2022 1139 by Rhoda Collado RN  Outcome: Ongoing     Problem: Safety:  Goal: Free from accidental physical injury  Description: Free from accidental physical injury  4/18/2022 2339 by Jolene Ling RN  Outcome: Ongoing  4/18/2022 1139 by Rhoda Collado RN  Outcome: Ongoing  Goal: Free from intentional harm  Description: Free from intentional harm  4/18/2022 2339 by Jolene Ling RN  Outcome: Ongoing  4/18/2022 1139 by Rhoda Collado RN  Outcome: Ongoing     Problem: Daily Care:  Goal: Daily care needs are met  Description: Daily care needs are met  4/18/2022 2339 by Jolene Ling RN  Outcome: Ongoing  4/18/2022 1139 by Rhoda Collado RN  Outcome: Ongoing     Problem: Pain:  Description: Pain management should include both nonpharmacologic and pharmacologic interventions.   Goal: Patient's pain/discomfort is manageable  Description: Patient's pain/discomfort is manageable  4/18/2022 2339 by Jolene Ling RN  Outcome: Ongoing  4/18/2022 1139 by Rhoda Collado RN  Outcome: Ongoing  Goal: Pain level will decrease  Description: Pain level will decrease  4/18/2022 2339 by Jolene Ling RN  Outcome: Ongoing  4/18/2022 1139 by Rhoda Collado RN  Outcome: Ongoing  Goal: Control of acute pain  Description: Control of acute pain  4/18/2022 2339 by Jolene Ling RN  Outcome: Ongoing  4/18/2022 1139 by Rhoda Collado RN  Outcome: Ongoing  Goal: Control of chronic pain  Description: Control of chronic pain  4/18/2022 2339 by Jolene Ling RN  Outcome: Ongoing  4/18/2022 1139 by Rhoda Collado RN  Outcome: Ongoing

## 2022-04-19 NOTE — PROGRESS NOTES
Hospitalist Progress Note      Name:  Jen Bailon /Age/Sex: 1935  (81 y.o. female)   MRN & CSN:  [id number] & [id number] Admission Date/Time: 2022  3:03 PM   Location:   PCP: NG[facility] Tran Day: 6                                               Attending Physician Dr. Chitra Zeng and Plan:   Jen Bailon is a 81 y.o.  female  who presents with Physical debility    Physical debility   Admitted swing bed program    PT/OT     Chronic atrial fibrillation. [id number] Score: 3   Continue Eliquis/Lopressor/digoxin    Hypothyroid- continue synthroid. Last TSH 0.487 (2/10/22)     Right metatarsal fracture- XR 3/29. 2nd -4th metatarsal base fracture. Suspected acute chip fracture right lateral malleolus   Wear postop boot while OOB/WB   [facility] [facility] [place]    Dementia-continue Namenda/Exelon    Depression continue Zoloft    Diet ADULT DIET; Regular; Low Fat/Low Chol/High Fiber/2 gm Na   DVT Prophylaxis [] Lovenox, []  Heparin, [] SCDs, [x] Ambulation   GI Prophylaxis [] PPI,  [] H2 Blocker,  [] Carafate,  [x] Diet/Tube Feeds   Code Status DNR-CCA     -Patient assessment and plan discussed with supervising physician-  Subjective 2022     Jen Bailon is a 81 y.o.  female, who presented with generalized weakness . Patient seen and examined at bedside  No overnight concerns per patient  Nursing expressed concerns that she refuses to get out of bed to urinate and will just use extra depends with intentional urinary incontinence    He was initially admitted  for concerns of sepsis and was discharged on  to swing bed program.       Ten point ROS reviewed negative, unless as noted above    Objective:        Intake/Output Summary (Last 24 hours) at 2022 1405  Last data filed at 2022 2247  Gross per 24 hour   Intake 80 ml   Output --   Net 80 ml      Vitals:   Vitals:    22 1930 22 2130 22 0600 04/19/22 0945   BP: 97/61 (!) 93/59  132/64   Pulse: 76   83   Resp: 17   16   Temp: 97.7 °F (36.5 °C)   96.2 °F (35.7 °C)   TempSrc: Infrared   Infrared   SpO2: 97%   94%   Weight:   177 lb 9.6 oz (80.6 kg)    Height:         Physical Exam: 04/19/22     Gen:  awake, alert, cooperative, no apparent distress normal body habitus  Head/Eyes:  Normocephalic atraumatic, EOMI   NECK:   symmetrical, trachea midline  LUNGS: Normal Effort/ symmetry movement   CARDIOVASCULAR:  Normal rate   ABDOMEN: Non tender, non distended, no HSM noted. MUSCULOSKELETAL: no gross deformities  NEUROLOGIC: Alert and Oriented,  Cranial nerves II-XII are grossly intact. SKIN:  no bruising or bleeding, normal skin color,  no redness    Medications:   Medications:    Vitamin D  2,000 Units Oral Daily    rivastigmine  3 mg Oral Daily    levothyroxine  150 mcg Oral Daily    sertraline  50 mg Oral Daily    vitamin E  400 Units Oral Daily    acetaminophen  500 mg Oral TID    apixaban  5 mg Oral BID    atorvastatin  20 mg Oral Nightly    diclofenac sodium  4 g Topical 4x Daily    digoxin  125 mcg Oral Daily    [Held by provider] furosemide  40 mg Oral Daily    memantine  10 mg Oral BID    metoprolol tartrate  12.5 mg Oral BID    miconazole   Topical BID      Infusions:   PRN Meds: ondansetron, 4 mg, Q8H PRN  polyethylene glycol, 17 g, Daily PRN  aluminum & magnesium hydroxide-simethicone, 30 mL, Q6H PRN  acetaminophen, 650 mg, Q6H PRN   Or  acetaminophen, 650 mg, Q6H PRN        LABS  CBC   Recent Labs     04/17/22  0545   WBC 9.9   HGB 13.6   HCT 42.2         RENAL  Recent Labs     04/17/22  0545      K 4.3      CO2 27   BUN 16   CREATININE 1.0     LFT'S  No results for input(s): AST, ALT, ALB, BILIDIR, BILITOT, ALKPHOS in the last 72 hours. COAG  No results for input(s): INR in the last 72 hours. CARDIAC ENZYMES  No results for input(s): CKTOTAL, CKMB, CKMBINDEX, TROPONINI in the last 72 hours.       Radiology this visit:  Reviewed. Echocardiogram complete 2D with doppler with color    Result Date: 4/15/2022  Transthoracic Echocardiography Report (TTE)  Demographics   Patient Name       Emeli MARIE  Date of Study       04/13/2022   Date of Birth      1935        Gender              Female   Age                81 year(s)        Race                   Patient Number     [id number]        Room Number         940   Visit Number       [id number]   Corporate ID       [id number]   Accession Number   [id number]        Julian Amin RDMS, T   Ordering Physician                   Interpreting        Tonny Lowe                                       Physician           Carolyn Tristan MD  Procedure Type of Study   TTE procedure:ECHOCARDIOGRAM COMPLETE 2D W DOPPLER W COLOR. Procedure Date Date: 04/13/2022 Start: 06:11 AM Study Location: Portable Technical Quality: Fair visualization Indications:Elevated BNP. Patient Status: Routine Height: 69 inches Weight: 170 pounds BSA: 1.93 m2 BMI: 25.1 kg/m2 HR: 89 bpm BP: 101/60 mmHg  Conclusions   Summary  Left ventricular systolic function is normal.  Ejection fraction is visually estimated at 55%. Mild left ventricular hypertrophy. Moderate mitral and aortic regurgiation; PHT: 366msec. Severe tricuspid regurgitation; RVSP: 68 mmHg. Severe PHTN. No evidence of any pericardial effusion. Signature   ------------------------------------------------------------------  Electronically signed by Kamila Grider MD  (Interpreting physician) on 04/15/2022 at 12:04 PM  ------------------------------------------------------------------   Findings   Left Ventricle  Left ventricular systolic function is normal.  Ejection fraction is visually estimated at 55%. Mild left ventricular hypertrophy. No regional wall motion abnormalites.   Left ventricle size is normal.  Cannot determine diastolic function due to arrhythmia. Left Atrium  Mildly dilated left atrium. Right Atrium  Essentially normal right atrium. Right Ventricle  Essentially normal right ventricle. Aortic Valve  Moderate aortic regurgiation; PHT: 366msec. Mitral Valve  Moderate mitral regurgitation. Tricuspid Valve  Severe tricuspid regurgitation; RVSP: 68 mmHg. Severe PHTN. Pulmonic Valve  The pulmonic valve was not well visualized. Pericardial Effusion  No evidence of any pericardial effusion. Pleural Effusion  No evidence of pleural effusion. Miscellaneous  Dilated IVC with normal respiratory collapse.   M-Mode/2D Measurements & Calculations   LV Diastolic Dimension:  LV Systolic Dimension:  LA Dimension: 3.6 cmAO Root  4.2 cm                   2.5 cm                  Dimension: 3.2 cmLA Area:  LV FS:40.5 %             LV Volume Diastolic: 43 90.9 cm2  LV PW Diastolic: 1.2 cm  ml  LV PW Systolic: 1.3 cm   LV Volume Systolic: 19  Septum Diastolic: 1.1 cm ml  Septum Systolic: 1.7 cm  LV EDV/LV EDV Index: 43 RV Diastolic Dimension: 3.3  CO: 4.74 l/min           ml/22 m2LV ESV/LV ESV   cm  CI: 2.46 l/m*m2          Index: 19 ml/10 m2                           EF Calculated (A4C):    LA/Aorta: 6.86  LV Area Diastolic: 78.7  47.0 %  cm2                      EF Calculated (2D):     LA volume/Index: 59.7 ml  LV Area Systolic: 63.2   15.5 %                  /31m2  cm2                           LV Length: 6.68 cm                            LVOT: 1.9 cm  Doppler Measurements & Calculations   MV Peak E-Wave: 101  AV Peak Velocity: 113 cm/s    LVOT Peak Velocity: 99  cm/s                 AV Peak Gradient: 5.11 mmHg   cm/s                       AV Mean Velocity: 80.3 cm/s   LVOT Mean Velocity: 72  MV Peak Gradient:    AV Mean Gradient: 3 mmHg      cm/s  4.08 mmHg            AV VTI: 20.9 cm               LVOT Peak Gradient: 4                       AV Area (Continuity):2.55 cm2 mmHgLVOT Mean Gradient: 2  MV P1/2t: 33 msec mmHg  MVA by PHT:6.67 cm2  LVOT VTI: 18.8 cm             Estimated RVSP: 56 mmHg                       AV P1/2t: 366 msec            Estimated RAP:8 mmHg                       Estimated PASP: 65.15 mmHg                                                      TR Velocity:378 cm/s                                                     TR Gradient:57.15 mmHg      XR CHEST (2 VW)    Result Date: 4/13/2022  EXAMINATION: TWO XRAY VIEWS OF THE CHEST 4/13/2022 12:55 pm COMPARISON: Chest radiograph 04/11/2022 HISTORY: ORDERING SYSTEM PROVIDED HISTORY: eval chf TECHNOLOGIST PROVIDED HISTORY: Reason for exam:->eval chf Additional signs and symptoms: eval chf FINDINGS: Similar cardiomegaly. Interval decrease in size of bilateral pleural effusions with improved aeration at the lung bases. Significant interval improvement in interstitial pulmonary edema. No acute osseous abnormality. No pneumothorax. Significant interval improvement in bilateral interstitial pulmonary edema. Decreased size of bilateral pleural effusions with improved aeration at the lung bases. Similar cardiomegaly. XR ANKLE RIGHT (MIN 3 VIEWS)    Result Date: 3/29/2022  EXAMINATION: THREE XRAY VIEWS OF THE RIGHT ANKLE; THREE XRAY VIEWS OF THE RIGHT FOOT 3/29/2022 1:40 pm COMPARISON: None. HISTORY: ORDERING SYSTEM PROVIDED HISTORY: fall TECHNOLOGIST PROVIDED HISTORY: Reason for exam:->fall Reason for Exam: fall Additional signs and symptoms: lateral swelling FINDINGS: Right ankle: Three views of the right ankle were reviewed. Small ossicles at the tip of the lateral malleolus with slight cortical irregularity of the tip of the lateral malleolus most suggestive of acute avulsion fracture even trauma history as well as lateral soft tissue edema. Anatomic alignment at the ankle mortise. Small posterior calcaneal enthesophyte. Mild hindfoot and midfoot osteoarthritis. Right foot: Three views of the right foot were reviewed.   Acute fractures of the 2nd, 3rd, and 4th metatarsal bases. Grossly anatomic alignment of the midfoot on these nonweightbearing views. Slight amorphous calcification along the medial cortex of the 1st metatarsal head is likely chronic. Moderate degenerative change of the 1st MTP joint. Mild midfoot osteoarthritis. Soft tissue edema of the foot. 1. Suspect acute chip fracture of the right lateral malleolus with adjacent soft tissue edema. 2. Acute fractures of the right 2nd, 3rd, and 4th metatarsal bases. XR FOOT RIGHT (MIN 3 VIEWS)    Result Date: 4/13/2022  EXAMINATION: THREE XRAY VIEWS OF THE RIGHT FOOT 4/13/2022 12:55 pm COMPARISON: Radiographs of the foot of 03/29/2022 HISTORY: ORDERING SYSTEM PROVIDED HISTORY: re eval of previous fx TECHNOLOGIST PROVIDED HISTORY: Reason for exam:->re eval of previous fx Additional signs and symptoms: re eval of previous fx FINDINGS: Diffuse osteopenia. Overall unchanged alignment of fractures at the bases of the 3rd and 4th metatarsals. Decreased conspicuity of the fracture lines with associated sclerosis and remodeling is consistent with interval healing. No new fracture identified. No traumatic malalignment. Mild 1st MTP degenerative changes. Soft tissue swelling along the dorsum of the foot. Scattered vascular calcifications. Progressive healing and unchanged alignment of fractures of the base of the 3rd and 4th metatarsals     XR FOOT RIGHT (MIN 3 VIEWS)    Result Date: 3/29/2022  EXAMINATION: THREE XRAY VIEWS OF THE RIGHT ANKLE; THREE XRAY VIEWS OF THE RIGHT FOOT 3/29/2022 1:40 pm COMPARISON: None. HISTORY: ORDERING SYSTEM PROVIDED HISTORY: fall TECHNOLOGIST PROVIDED HISTORY: Reason for exam:->fall Reason for Exam: fall Additional signs and symptoms: lateral swelling FINDINGS: Right ankle: Three views of the right ankle were reviewed.   Small ossicles at the tip of the lateral malleolus with slight cortical irregularity of the tip of the lateral malleolus most suggestive of acute avulsion fracture even trauma history as well as lateral soft tissue edema. Anatomic alignment at the ankle mortise. Small posterior calcaneal enthesophyte. Mild hindfoot and midfoot osteoarthritis. Right foot: Three views of the right foot were reviewed. Acute fractures of the 2nd, 3rd, and 4th metatarsal bases. Grossly anatomic alignment of the midfoot on these nonweightbearing views. Slight amorphous calcification along the medial cortex of the 1st metatarsal head is likely chronic. Moderate degenerative change of the 1st MTP joint. Mild midfoot osteoarthritis. Soft tissue edema of the foot. 1. Suspect acute chip fracture of the right lateral malleolus with adjacent soft tissue edema. 2. Acute fractures of the right 2nd, 3rd, and 4th metatarsal bases. XR CHEST PORTABLE    Result Date: 4/11/2022  EXAMINATION: ONE XRAY VIEW OF THE CHEST 4/11/2022 3:31 pm COMPARISON: 11/11/2019 HISTORY: ORDERING SYSTEM PROVIDED HISTORY: ? pneumonia TECHNOLOGIST PROVIDED HISTORY: Reason for exam:->? pneumonia FINDINGS: Increased interstitial opacities noted, increased considerably when compared to the previous exam.  More focal opacities are noted within the lower lungs. In the small bilateral pleural effusions appear to be present. The cardial pericardial silhouette is unremarkable. Increased interstitial opacity, with more focal opacities at the lung bases. In this case, interstitial edema and multifocal pneumonia are both considered. CTA PULMONARY W CONTRAST    Result Date: 4/12/2022  EXAMINATION: CTA OF THE CHEST, 4/12/2022 11:19 am TECHNIQUE: CTA of the chest was performed after the administration of intravenous contrast.  Multiplanar reformatted images are provided for review. MIP images are provided for review. Dose modulation, iterative reconstruction, and/or weight based adjustment of the mA/kV was utilized to reduce the radiation dose to as low as reasonably achievable. COMPARISON: 11/22/2019 HISTORY: ORDERING SYSTEM PROVIDED HISTORY:  Rule out PE TECHNOLOGIST PROVIDED HISTORY: Reason for Exam:  Rule out PE FINDINGS: Pulmonary Arteries: The pulmonary arteries are adequately opacified. No filling defects are identified within the pulmonary arteries to suggest pulmonary embolism. Main pulmonary artery is normal in caliber. Mediastinum:  Thyroid is either atrophic or has been previously resected. There are multiple enlarged mediastinal lymph nodes identified. These have increased in size when compared to the previous exam.  For example, a right paratracheal/precarinal lymph node measures 1.4 cm in short axis (image 150), previously 0.9 cm. There is a small to moderate-sized hiatal hernia. The esophagus is otherwise unremarkable. Cardiac chambers unremarkable. No pericardial effusion. Thoracic aorta is normal in caliber without evidence of dissection. Lungs/pleura: There is increased interstitial opacity identified within the periphery the lungs associated with ground-glass opacity, similar when compared to the previous exam.  The pleural base mass-like opacities seen on the left are no longer detected. Small bilateral pleural effusions are noted, similar when compared to the previous exam.  Airways are patent. Upper Abdomen: Unremarkable. Soft Tissues/Bones: Visualized extrathoracic soft tissues unremarkable. No acute bony abnormality identified. No evidence of pulmonary embolism or aortic dissection. Irregular interstitial opacities within the periphery of the lungs bilaterally, associated with ground-glass opacity. Much or all of this likely relates to interstitial lung disease with fibrosis. With that said, inflammatory/infectious interstitial pneumonitis would be considered as well. A component of pulmonary edema would also be a consideration, especially given presence of small bilateral pleural effusions.  There are enlarged mediastinal lymph nodes which have increased in size when compared to the previous exam.  While these could be reactive, neoplasm must be considered. Short-term follow-up in approximately 3 months is recommended to ensure resolution.              Electronically signed by NGOC Dumas CNP on 4/19/2022 at 2:05 PM

## 2022-04-19 NOTE — PLAN OF CARE
Staff utilizing chair/bed alarm, locked wheels on bed, side rails up times two, and call light within reach. Patient has been educated to use call light to request assistance and wait for assistance before getting out of chair/bed.     Problem: Falls - Risk of:  Goal: Will remain free from falls  Description: Will remain free from falls  Outcome: Ongoing

## 2022-04-19 NOTE — CARE COORDINATION
10: 30 A.M.-10:50 A. M. Patient accepted visit from \"Kian\" the therapy dog in her room. Very talkative and happy to see and pet the dog. Patient knew exactly what type of dog it was. Reminisced of her love for dogs and ones she had in the past.  Shared picture of her cat \"Catie\" and expressed how much visit meant to her this a.m.     Isamar PINEDA 4/19/2022

## 2022-04-19 NOTE — FLOWSHEET NOTE
Physical Therapy Treatment Note   Date: 2022 Room: [unfilled]   Name: Aris Nolen : 1935   MRN: [id number] Admission Date:2022    Primary Problem:   Past Medical History:   Diagnosis Date    Acquired hypothyroidism 2019    Patient is taking Synthroid    Chronic midline low back pain without sciatica 2019    Dementia without behavioral disturbance ([facility] [address] 10/22/2019    Patient has dementia. She is taking Namenda Patient is seeing urologist Dr. Kirby Palmer Gait disturbance 10/22/2019    Patient uses walker to ambulate    History of TIA (transient ischemic attack) 2019    Patient has a history of TIA and also recently may have had TIA. Patient is on Eliquis. Sees neurologist Dr. Kirby Palmer Major depressive disorder with single episode, in full remission ([facility] [address] 2019    Patient is taking Zoloft and that is helping.  Mixed hyperlipidemia 2019    Patient is on simvastatin    Persistent atrial fibrillation ([facility] [address] 10/22/2019    A. fib on recently in 2019 . Patient is on metoprolol 25 mg twice a day and Eliquis    PFO (patent foramen ovale) 2019    PFO seen on echocardiogram in 2016    Pneumonia due to infectious organism 10/22/2019    Patient had pneumonia and pleural effusion during admission in 2019. A repeat CT scan of the chest will be done Patient is also seen pulmonologist     Past Surgical History:   Procedure Laterality Date    HYSTERECTOMY       Rehabilitation Diagnosis:   Restrictions/Precautions:     Subjective: Patient up in chair at bedside and agreeable to working with therapy  Initial Pain level: c/o right knee/back pain but did not rate    Goals:                   Short term goals  Time Frame for Short term goals: 1 week or until discharge:  Short term goal 1: Pt will demonstrate the ability to safely perform bed mobility with supervision.   Short term goal 2: Pt will demonstrate the ability to safely transfer sit to stand and stand to sit from 3 different height surfaces with S and proper hand placement with VCs only. Short term goal 3: Pt will demonstrate the ability to safely ambulate 150' with a RW and S with no rest breaks or instances of letting go of the walker for nearby furniture.                Plan of Care:             Times per week: Mon-Sat 5+/wk            Current Treatment Recommendations: Frantz Delgadillo Re-education,Cognitive Reorientation,Patient/Caregiver Education & Training,ROM,ADL/Self-care Training,Equipment Evaluation, Education, & procurement,Balance Training,IADL Training,Gait Training,Home Exercise Program,Functional Mobility Training,Cognitive/Perceptual Training,Stair training,Safety Education & Training,Positioning      Objective Findings:    Date: 4/18/22 Date: 4/19/22 Date:  Date:  Date:    Bed mobility Mod I x      Sit to stand transfer CGA of 1 and VC's for safety SBA x5 throughout session      Stand to sit transfer CGA of 1 and VC's for safety SBA x5 throughout session      Commode transfer CGA-Min A of 1 w/VC's for safety/sequencing x      Standing tolerance For amb and georgette care For amb      Ambulation W/std walker and CGA-Min A of 1 20ft and 10ft as pt is impulsive and was grabbing the end of the bed to walk instead of the walker and c/o her knee giving out on her as she amb to the bathroom  Amb w/std walker and CGA of 1, SBA of another for WC follow 114ft total w/4 rest breaks  Pt reports twice her right knee giving out on her due to pain      Stairs x x        Exercises:   Exercise/Equipment/Modalities  Date: 4/19/22 Date:  Date:  Date:    Seated clive 2x10 B      WICHO 2x10 B                                                                   Modality/intervention used:   [x ] Therapeutic Exercise   [ ] Modalities:   [x ] Therapeutic Activity     [ ] Ultrasound   [ ] Elec Stim   [x ] Gait Training     [ ] Cervical Traction  [ ] Lumbar Traction   [ ] Neuromuscular Re-education   [ ] Cold/hotpack  [ ] Iontophoresis   [ ] Instruction in HEP     [ ] Vasopneumatic   [ ] Manual Therapy   [ ] Aquatic Therapy     Other Therapeutic Activities:   Patient used her feet to wheel herself back to her room requiring slight assist at times     Home Exercise Program/Education provided to patient:     Manual Treatments:     Communication with other providers:      Adverse reactions to treatment:  SOB reported during amb w/O2 SAT at 94 during amb    Treatment/Activity Tolerance:   [ ] Patient limited by fatigue [x ] Patient limited by pain [ ] Patient limited by other medical complications [ ] Patient tolerated therapy well  [ ] Other:     Post Tx Pain Rating: does not rate /10     Safety Precautions:   [ ] left in bed  [x ] left in chair [x ] call light within reach  [ ] bed alarm on  [x ] personal alarm on  [ ] other staff present:    Plan:   [x ] Continue per plan of care [ ] Eric Ontiveros current plan   [ ] Plan of care initiated [ ] Adalberto Baires pending MD visit [ ] Discharge     Plan for Next Session:     Time In:  1110  Time Out:  1150  Total Treatment Minutes: 40  Billed Units: 1gt 1te 1ta    Signed: Linda Louis 4/19/2022, 1:09 PM

## 2022-04-19 NOTE — PROGRESS NOTES
Patient refused to change depends at this time stating she is only [place] little wet\" and wants \"to stay under the warm blankets. \" Attempted to educate without success.

## 2022-04-19 NOTE — PROGRESS NOTES
Occupational Therapy    Occupational Therapy Treatment Note  Name: Ally Coppola MRN: [id number] :   1935   Date:  2022   Admission Date: 2022 Room:  [address] [place] [address]   Restrictions/Precautions:  Restrictions/Precautions  Restrictions/Precautions: Weight Bearing  Required Braces or Orthoses?: Yes     Communication with other providers:   Cleared for treatment by  RN. Subjective:  Patient states:  \"I need to get better so I can get out of here\"  Pain:   Location, Type, Intensity (0/10 to 10/10): Denies pain    Objective:    Observation:  Pt alert and oriented to self but confused    Treatment, including education:  Transfers    Self Care Training:   Activities performed today included the following: Toileting SBA to perform toilet hygiene and clothing management    Grooming  Mod I to wash face     Bathing   SBA to wash georgette area and buttocks    UB Dress declined to change gown at this time    Therapeutic Activity Training:   Therapeutic activity training was instructed today. Cues were given for safety, sequence, UE/LE placement, awareness, and balance. Activities performed today included bed mobility training, sup-sit, sit-stand, SPT. Pt completed functional mobility with RW from bed to out in hallway x 15' x 2 with pt being winded and required rest break. Pt also completed functional mobility with RW x 8' x 2 in room. Pt required mod verbal cues for walker safety especially with turning using RW. Discussed with pt about using BSC in room at night to urinate and pt reports \"I don't want to get out of bed and get cold and woke up at night\" Therapist encouraged pt to try to use bathroom more frequently during night.          Safety Measures: Gait belt used, Left in bed, Pull/Bed Alarm activated and call light left in reach        Assessment / Impression:        Patient's tolerance of treatment: Good   Adverse Reaction: None  Significant change in status and impact:  None  Barriers to improvement:  Dec strength and endurance, poor cognition    Plan for Next Session:    Continue with POC.     Time in:  0910  Time out:  0950   Total treatment time:  40  Billed Units: 1 TA, 2 ADL  Electronically signed by:    Shanita Palma, [address]    4/19/2022, 10:11 AM    Previously filed values:     Short term goals  Time Frame for Short term goals: Until DC or goals met  Short term goal 1: Pt will complete trfs mod I w/o LOB  Short term goal 2: Pt will complete UE bathing/dressing with S  Short term goal 3: Pt will complete LE bathing/dressing with S  Short term goal 4: Pt will complete toileting mod I  Short term goal 5: Pt will complete 10+ min of standing/functional mobility during ADLs/therax with min increased SOB and no LOB

## 2022-04-20 LAB
ANION GAP SERPL CALCULATED.3IONS-SCNC: 10 MMOL/L (ref 4–16)
BASOPHILS ABSOLUTE: 0.1 K/CU MM
BASOPHILS RELATIVE PERCENT: 0.9 % (ref 0–1)
BUN BLDV-MCNC: 17 MG/DL (ref 6–23)
CALCIUM SERPL-MCNC: 8.9 MG/DL (ref 8.3–10.6)
CHLORIDE BLD-SCNC: 104 MMOL/L (ref 99–110)
CO2: 25 MMOL/L (ref 21–32)
CREAT SERPL-MCNC: 1 MG/DL (ref 0.6–1.1)
DIFFERENTIAL TYPE: ABNORMAL
EOSINOPHILS ABSOLUTE: 0.8 K/CU MM
EOSINOPHILS RELATIVE PERCENT: 7.8 % (ref 0–3)
GFR AFRICAN AMERICAN: >60 ML/MIN/1.73M2
GFR NON-AFRICAN AMERICAN: 53 ML/MIN/1.73M2
GLUCOSE BLD-MCNC: 106 MG/DL (ref 70–99)
HCT VFR BLD CALC: 44.6 % (ref 37–47)
HEMOGLOBIN: 14.1 GM/DL (ref 12.5–16)
IMMATURE NEUTROPHIL %: 0.2 % (ref 0–0.43)
LYMPHOCYTES ABSOLUTE: 4.8 K/CU MM
LYMPHOCYTES RELATIVE PERCENT: 48.8 % (ref 24–44)
MCH RBC QN AUTO: 31.3 PG (ref 27–31)
MCHC RBC AUTO-ENTMCNC: 31.6 % (ref 32–36)
MCV RBC AUTO: 98.9 FL (ref 78–100)
MONOCYTES ABSOLUTE: 1.1 K/CU MM
MONOCYTES RELATIVE PERCENT: 10.8 % (ref 0–4)
PDW BLD-RTO: 13.7 % (ref 11.7–14.9)
PLATELET # BLD: 270 K/CU MM (ref 140–440)
PMV BLD AUTO: 9.5 FL (ref 7.5–11.1)
POTASSIUM SERPL-SCNC: 4.3 MMOL/L (ref 3.5–5.1)
RBC # BLD: 4.51 M/CU MM (ref 4.2–5.4)
SEGMENTED NEUTROPHILS ABSOLUTE COUNT: 3.1 K/CU MM
SEGMENTED NEUTROPHILS RELATIVE PERCENT: 31.5 % (ref 36–66)
SODIUM BLD-SCNC: 139 MMOL/L (ref 135–145)
TOTAL IMMATURE NEUTOROPHIL: 0.02 K/CU MM
WBC # BLD: 9.8 K/CU MM (ref 4–10.5)

## 2022-04-20 PROCEDURE — 6370000000 HC RX 637 (ALT 250 FOR IP): Performed by: INTERNAL MEDICINE

## 2022-04-20 PROCEDURE — 97110 THERAPEUTIC EXERCISES: CPT

## 2022-04-20 PROCEDURE — 94761 N-INVAS EAR/PLS OXIMETRY MLT: CPT

## 2022-04-20 PROCEDURE — 85025 COMPLETE CBC W/AUTO DIFF WBC: CPT

## 2022-04-20 PROCEDURE — 6370000000 HC RX 637 (ALT 250 FOR IP): Performed by: FAMILY MEDICINE

## 2022-04-20 PROCEDURE — 80048 BASIC METABOLIC PNL TOTAL CA: CPT

## 2022-04-20 PROCEDURE — 97530 THERAPEUTIC ACTIVITIES: CPT

## 2022-04-20 PROCEDURE — 1200000002 HC SEMI PRIVATE SWING BED

## 2022-04-20 PROCEDURE — 6370000000 HC RX 637 (ALT 250 FOR IP): Performed by: NURSE PRACTITIONER

## 2022-04-20 PROCEDURE — 97116 GAIT TRAINING THERAPY: CPT

## 2022-04-20 RX ORDER — FUROSEMIDE 20 MG/1
20 TABLET ORAL DAILY
Status: DISCONTINUED | OUTPATIENT
Start: 2022-04-20 | End: 2022-04-20

## 2022-04-20 RX ADMIN — ATORVASTATIN CALCIUM 20 MG: 20 TABLET, FILM COATED ORAL at 20:51

## 2022-04-20 RX ADMIN — DICLOFENAC SODIUM 4 G: 10 GEL TOPICAL at 18:25

## 2022-04-20 RX ADMIN — APIXABAN 5 MG: 5 TABLET, FILM COATED ORAL at 20:51

## 2022-04-20 RX ADMIN — DIGOXIN 125 MCG: 125 TABLET ORAL at 09:29

## 2022-04-20 RX ADMIN — ACETAMINOPHEN 500 MG: 500 TABLET ORAL at 20:51

## 2022-04-20 RX ADMIN — RIVASTIGMINE TARTRATE 3 MG: 1.5 CAPSULE ORAL at 09:29

## 2022-04-20 RX ADMIN — ACETAMINOPHEN 500 MG: 500 TABLET ORAL at 14:26

## 2022-04-20 RX ADMIN — DICLOFENAC SODIUM 4 G: 10 GEL TOPICAL at 13:17

## 2022-04-20 RX ADMIN — SERTRALINE 50 MG: 50 TABLET, FILM COATED ORAL at 09:29

## 2022-04-20 RX ADMIN — Medication 400 UNITS: at 09:29

## 2022-04-20 RX ADMIN — ACETAMINOPHEN 500 MG: 500 TABLET ORAL at 09:28

## 2022-04-20 RX ADMIN — APIXABAN 5 MG: 5 TABLET, FILM COATED ORAL at 09:29

## 2022-04-20 RX ADMIN — MEMANTINE 10 MG: 10 TABLET ORAL at 09:28

## 2022-04-20 RX ADMIN — LEVOTHYROXINE SODIUM 150 MCG: 0.15 TABLET ORAL at 09:31

## 2022-04-20 RX ADMIN — Medication 2000 UNITS: at 09:29

## 2022-04-20 RX ADMIN — DICLOFENAC SODIUM 4 G: 10 GEL TOPICAL at 20:51

## 2022-04-20 RX ADMIN — METOPROLOL TARTRATE 12.5 MG: 25 TABLET, FILM COATED ORAL at 09:29

## 2022-04-20 RX ADMIN — METOPROLOL TARTRATE 12.5 MG: 25 TABLET, FILM COATED ORAL at 20:51

## 2022-04-20 RX ADMIN — MEMANTINE 10 MG: 10 TABLET ORAL at 20:51

## 2022-04-20 RX ADMIN — DICLOFENAC SODIUM 4 G: 10 GEL TOPICAL at 09:29

## 2022-04-20 ASSESSMENT — PAIN DESCRIPTION - LOCATION: LOCATION: FOOT

## 2022-04-20 ASSESSMENT — PAIN - FUNCTIONAL ASSESSMENT: PAIN_FUNCTIONAL_ASSESSMENT: ACTIVITIES ARE NOT PREVENTED

## 2022-04-20 ASSESSMENT — PAIN SCALES - GENERAL
PAINLEVEL_OUTOF10: 0

## 2022-04-20 ASSESSMENT — PAIN DESCRIPTION - ORIENTATION: ORIENTATION: RIGHT

## 2022-04-20 ASSESSMENT — PAIN DESCRIPTION - DESCRIPTORS: DESCRIPTORS: ACHING

## 2022-04-20 NOTE — PLAN OF CARE
Problem: Falls - Risk of:  Goal: Will remain free from falls  Description: Will remain free from falls  4/19/2022 2321 by Mili Gardiner RN  Outcome: Ongoing  4/19/2022 1818 by Donna Mayo RN  Outcome: Ongoing  Goal: Absence of physical injury  Description: Absence of physical injury  4/19/2022 2321 by Mili Gardiner RN  Outcome: Ongoing  4/19/2022 1818 by Donna Mayo RN  Outcome: Ongoing     Problem: Safety:  Goal: Free from accidental physical injury  Description: Free from accidental physical injury  4/19/2022 2321 by Mili Gardiner RN  Outcome: Ongoing  4/19/2022 1818 by Donna Mayo RN  Outcome: Ongoing  Goal: Free from intentional harm  Description: Free from intentional harm  4/19/2022 2321 by Mili Gardiner RN  Outcome: Ongoing  4/19/2022 1818 by Donna Mayo RN  Outcome: Ongoing     Problem: Daily Care:  Goal: Daily care needs are met  Description: Daily care needs are met  4/19/2022 2321 by Mili Gardiner RN  Outcome: Ongoing  4/19/2022 1818 by Donna Mayo RN  Outcome: Ongoing     Problem: Pain:  Description: Pain management should include both nonpharmacologic and pharmacologic interventions.   Goal: Patient's pain/discomfort is manageable  Description: Patient's pain/discomfort is manageable  4/19/2022 2321 by Mili Gardiner RN  Outcome: Ongoing  4/19/2022 1818 by Donna Mayo RN  Outcome: Ongoing  Goal: Pain level will decrease  Description: Pain level will decrease  4/19/2022 2321 by Mili Gardiner RN  Outcome: Ongoing  4/19/2022 1818 by Donna Mayo RN  Outcome: Ongoing  Goal: Control of acute pain  Description: Control of acute pain  4/19/2022 2321 by Mili Gardiner RN  Outcome: Ongoing  4/19/2022 1818 by Donna Mayo RN  Outcome: Ongoing  Goal: Control of chronic pain  Description: Control of chronic pain  4/19/2022 2321 by Mili Gardiner RN  Outcome: Ongoing  4/19/2022 1818 by Donna Mayo RN  Outcome: Ongoing

## 2022-04-20 NOTE — CARE COORDINATION
SWING BED WEEKLY TEAM SHEET     German Olivo   4/20/2022 WEEK # 1    Care Management    Issues to be resolved before discharge: Increased strength, balance, and mobility.     Family Education: Patient/staff to update family    Discharge Plan: [facility]   Patient/Family Adjustment: N/A     Goals of previous week:   [] 1st team   [] met   [] partially met    [x] not met             Why goals were not met: Continued weakness     Skilled Level of Care Remains   [x] yes   [] no    Estimated length of stay: 1-2 weeks   Patient/Family Concerns/input: Daughter concerned about patient's return to home     Patient/Family  Signature _________________  4/20/2022       Care Management Signature:  Kiet Hill RN

## 2022-04-20 NOTE — PLAN OF CARE
Nutrition Problem #1: Inadequate oral intake  Intervention: Food and/or Nutrient Delivery: Continue Current Diet  Nutritional Goals: Pt will consume greater than 50-75% of meals and supplements

## 2022-04-20 NOTE — PROGRESS NOTES
SWING BED WEEKLY TEAM SHEET     WEEK#  2     NUTRITION   Diet: low fat/low chol/high fiber/2 gm Na                 TF: no        TPN: no     Appropriate/Adequate [X] yes [ ] no     Meal intakes: %    Weight: 80.6 kg      BMI:26.21              Significant Change: N/A  Recommendations: continue current diet as ordered    Issues to be resolved before discharge: pt will consume greater than 75% of meals    Dietitian Signature:Nathaly Hannon, MS, RD, LD

## 2022-04-20 NOTE — FLOWSHEET NOTE
Physical Therapy Treatment Note   Date: 2022 Room: [unfilled]   Name: Hortensia Jiang : 1935   MRN: [id number] Admission Date:2022    Primary Problem:   Past Medical History:   Diagnosis Date    Acquired hypothyroidism 2019    Patient is taking Synthroid    Chronic midline low back pain without sciatica 2019    Dementia without behavioral disturbance ([address] 10/22/2019    Patient has dementia. She is taking Namenda Patient is seeing urologist Dr. Tsering Garcia Gait disturbance 10/22/2019    Patient uses walker to ambulate    History of TIA (transient ischemic attack) 2019    Patient has a history of TIA and also recently may have had TIA. Patient is on Eliquis. Sees neurologist Dr. Tsering Garcia Major depressive disorder with single episode, in full remission ([address] 2019    Patient is taking Zoloft and that is helping.  Mixed hyperlipidemia 2019    Patient is on simvastatin    Persistent atrial fibrillation ([address] 10/22/2019    A. fib on recently in 2019 . Patient is on metoprolol 25 mg twice a day and Eliquis    PFO (patent foramen ovale) 2019    PFO seen on echocardiogram in 2016    Pneumonia due to infectious organism 10/22/2019    Patient had pneumonia and pleural effusion during admission in 2019. A repeat CT scan of the chest will be done Patient is also seen pulmonologist     Past Surgical History:   Procedure Laterality Date    HYSTERECTOMY       Rehabilitation Diagnosis: impaired mobility   The primary encounter diagnosis was Urinary tract infection without hematuria, site unspecified. Diagnoses of Septicemia ([address] and Pneumonia of both lower lobes due to infectious organism were also pertinent to this visit.     Restrictions/Precautions: post op shoe on R foot when walking    Subjective: Patient up in chair at bedside and agreeable to working with therapy, states she does not feel confident to walk longer distances without [facility].    Initial Pain level: 1/10 back    Goals:                   Short Term Goals  Time Frame for Short term goals: 1 week or until discharge:  Short term goal 1: Pt will demonstrate the ability to safely perform bed mobility with supervision. Short term goal 2: Pt will demonstrate the ability to safely transfer sit to stand and stand to sit from 3 different height surfaces with S and proper hand placement with VCs only. Short term goal 3: Pt will demonstrate the ability to safely ambulate 150' with a RW and S with no rest breaks or instances of letting go of the walker for nearby furniture.                Plan of Care:             Times per week: Mon-Sat 5+/wk            Current Treatment Recommendations: Marcela Isbell Re-education,Cognitive Reorientation,Patient/Caregiver Education & Training,ROM,ADL/Self-care Training,Equipment Evaluation, Education, & procurement,Balance Training,IADL Training,Gait Training,Home Exercise Program,Functional Mobility Training,Cognitive/Perceptual Training,Stair training,Safety Education & Training,Positioning      Objective Findings:    Date: 4/18/22 Date: 4/19/22 Date:   4-20-22 Date:  Date:    Bed mobility Mod I x      Sit to stand transfer CGA of 1 and VC's for safety SBA x5 throughout session Cg to initiate due to distracted     Stand to sit transfer CGA of 1 and VC's for safety SBA x5 throughout session Cg and vc for walker safety     Commode transfer CGA-Min A of 1 w/VC's for safety/sequencing x x     Standing tolerance For amb and georgette care For amb 1-2 mins for gait training     Ambulation W/std walker and CGA-Min A of 1 20ft and 10ft as pt is impulsive and was grabbing the end of the bed to walk instead of the walker and c/o her knee giving out on her as she amb to the bathroom  Amb w/std walker and CGA of 1, SBA of another for WC follow 114ft total w/4 rest breaks  Pt reports twice her right knee giving out on her due to pain Gait training with standard walker with cg 15 ft,standing rest break +15 ft,  seated rest break, +20 ft standing rest break +20 ft seated rest break. Pt reluctant to increase distance without whc behind. Standing rest breaks due to SOB and fatigue. States she can not change flexed posture. Stairs x x        Exercises:   Exercise/Equipment/Modalities  Date: 4/19/22 Date: 4-20-22   Date:  Date:    Seated marches 2x10 B X 20 BLE     LAQ 2x10 B x20 BLE     Hip abd  2x10 reclined     SLR  2 x 10 reclined                                                    Modality/intervention used:   [x ] Therapeutic Exercise   [ ] Modalities:   [x ] Therapeutic Activity     [ ] Ultrasound   [ ] Elec Stim   [x ] Gait Training     [ ] Cervical Traction  [ ] Lumbar Traction   [ ] Neuromuscular Re-education   [ ] Cold/hotpack  [ ] Iontophoresis   [ ] Instruction in HEP     [ ] Vasopneumatic   [ ] Manual Therapy   [ ] Aquatic Therapy     Other Therapeutic Activities:   x    Home Exercise Program/Education provided to patient: seated BLE exs    Manual Treatments: x    Communication with other providers: nurse cleared for therapy    Adverse reactions to treatment: SOB, not confident for longer distances.     Treatment/Activity Tolerance:   [ x] Patient limited by fatigue [x ] Patient limited by pain [ ] Patient limited by other medical complications [ ] Patient tolerated therapy well  [ ] Other:     Post Tx Pain Rating: does not rate /10     Safety Precautions:   [ ] left in bed  [x ] left in chair [x ] call light within reach  [ ] bed alarm on  [x ] personal alarm on  [ ] other staff present:    Plan:   [x ] Continue per plan of care [ ] Ramirez Joyce current plan   [ ] Plan of care initiated [ ] Hold pending MD visit [ ] Discharge     Plan for Next Session:     Time In:  903  Time Out:  928  Total Treatment Minutes: 25  Billed Units:2= 1 gt, 1 te    Georges Simmons, [place], [id number] 4/20/2022, 9:14 AM

## 2022-04-20 NOTE — PROGRESS NOTES
Patient put her call light on to inform this nurse she is still awake and wants it put in her chart she is awake and is unsure of how she will feel at breakfast. Patient then stated if she asleep she is not to be woken up for her 0600 synthroid and asked to toilet and/or change depends. Patient stated all \"those things can wait. \"

## 2022-04-20 NOTE — PROGRESS NOTES
I have personally seen and examined the patient independently. I have reviewed the patient's available data,including medical history and recent test results. Reviewed and discussed note as documented by LAWSON. I agree with the physical exam findings, assessment and plan. My documented MDM is a substantive portion of the supervisory note. Patient denies any swelling or shortness of breath. Blood pressure improved. Lasix not continued at this juncture and will add back as needed. General: NAD  Eyes: EOMI  ENT: neck supple  Cardiovascular: Regular rate. Respiratory: Clear to auscultation  Gastrointestinal: Soft, non tender  Genitourinary: no suprapubic tenderness  Musculoskeletal: No edema  Skin: warm, dry  Neuro: Alert. Psych: Mood appropriate.

## 2022-04-20 NOTE — PROGRESS NOTES
Occupational Therapy    Occupational Therapy Treatment Note  Name: Daniel Ruiz MRN: [id number] :   1935   Date:  2022   Admission Date: 2022 Room:  [address] [place] [address]   Restrictions/Precautions:  Restrictions/Precautions  Restrictions/Precautions: Weight Bearing  Required Braces or Orthoses?: Yes     Communication with other providers:   Cleared for treatment by  RN. Subjective:  Patient states:  \"I would like to walk some today\"  Pain:   Location, Type, Intensity (0/10 to 10/10): Denies pain    Objective:    Observation:  Pt alert and oriented to self but confused    Treatment, including education:  Transfers      Therapeutic Activity Training:   Therapeutic activity training was instructed today. Cues were given for safety, sequence, UE/LE placement, awareness, and balance. Activities performed today included bed mobility training, sup-sit, sit-stand, SPT. Pt completed functional mobility with RW from bed to out in hallway x 25' x 3 and 40' x 1 with pt being winded and required rest break. Pt stood x 4 and 3 min with SBA with SW to facilitate increased endurance/strength for ADL tasks and transfers. Safety Measures: Gait belt used, Left in bed, Pull/Bed Alarm activated and call light left in reach        Assessment / Impression:        Patient's tolerance of treatment: Good   Adverse Reaction: None  Significant change in status and impact:  None  Barriers to improvement:  Dec strength and endurance, poor safety awareness    Plan for Next Session:    Continue with POC.     Time in:  1500  Time out: 1530   Total treatment time: 30  Billed Units: 2 TA  Electronically signed by:    TAMERA Flowers 2022, 3:38 PM    Previously filed values:     Short Term Goals  Time Frame for Short term goals: Until DC or goals met  Short Term Goal 1: Pt will complete trfs mod I w/o LOB  Short Term Goal 2: Pt will complete UE bathing/dressing with S  Short Term Goal 3: Pt will complete LE bathing/dressing with S  Short Term Goal 4: Pt will complete toileting mod I  Short Term Goal 5: Pt will complete 10+ min of standing/functional mobility during ADLs/therax with min increased SOB and no LOB

## 2022-04-21 PROCEDURE — 2500000003 HC RX 250 WO HCPCS: Performed by: NURSE PRACTITIONER

## 2022-04-21 PROCEDURE — 97110 THERAPEUTIC EXERCISES: CPT

## 2022-04-21 PROCEDURE — 97530 THERAPEUTIC ACTIVITIES: CPT

## 2022-04-21 PROCEDURE — 97116 GAIT TRAINING THERAPY: CPT

## 2022-04-21 PROCEDURE — 6370000000 HC RX 637 (ALT 250 FOR IP): Performed by: INTERNAL MEDICINE

## 2022-04-21 PROCEDURE — 94761 N-INVAS EAR/PLS OXIMETRY MLT: CPT

## 2022-04-21 PROCEDURE — 1200000002 HC SEMI PRIVATE SWING BED

## 2022-04-21 PROCEDURE — 6370000000 HC RX 637 (ALT 250 FOR IP): Performed by: NURSE PRACTITIONER

## 2022-04-21 PROCEDURE — 6370000000 HC RX 637 (ALT 250 FOR IP): Performed by: FAMILY MEDICINE

## 2022-04-21 RX ADMIN — RIVASTIGMINE TARTRATE 3 MG: 1.5 CAPSULE ORAL at 09:26

## 2022-04-21 RX ADMIN — METOPROLOL TARTRATE 12.5 MG: 25 TABLET, FILM COATED ORAL at 20:45

## 2022-04-21 RX ADMIN — DIGOXIN 125 MCG: 125 TABLET ORAL at 09:26

## 2022-04-21 RX ADMIN — METOPROLOL TARTRATE 12.5 MG: 25 TABLET, FILM COATED ORAL at 09:26

## 2022-04-21 RX ADMIN — ACETAMINOPHEN 500 MG: 500 TABLET ORAL at 09:25

## 2022-04-21 RX ADMIN — Medication 2000 UNITS: at 09:26

## 2022-04-21 RX ADMIN — APIXABAN 5 MG: 5 TABLET, FILM COATED ORAL at 20:45

## 2022-04-21 RX ADMIN — MEMANTINE 10 MG: 10 TABLET ORAL at 20:45

## 2022-04-21 RX ADMIN — DICLOFENAC SODIUM 4 G: 10 GEL TOPICAL at 09:30

## 2022-04-21 RX ADMIN — DICLOFENAC SODIUM 4 G: 10 GEL TOPICAL at 17:15

## 2022-04-21 RX ADMIN — ATORVASTATIN CALCIUM 20 MG: 20 TABLET, FILM COATED ORAL at 20:45

## 2022-04-21 RX ADMIN — Medication 400 UNITS: at 09:26

## 2022-04-21 RX ADMIN — MICONAZOLE NITRATE: 2 POWDER TOPICAL at 20:45

## 2022-04-21 RX ADMIN — SERTRALINE 50 MG: 50 TABLET, FILM COATED ORAL at 09:26

## 2022-04-21 RX ADMIN — MEMANTINE 10 MG: 10 TABLET ORAL at 09:26

## 2022-04-21 RX ADMIN — APIXABAN 5 MG: 5 TABLET, FILM COATED ORAL at 09:26

## 2022-04-21 RX ADMIN — ACETAMINOPHEN 500 MG: 500 TABLET ORAL at 12:50

## 2022-04-21 RX ADMIN — DICLOFENAC SODIUM 4 G: 10 GEL TOPICAL at 20:46

## 2022-04-21 RX ADMIN — DICLOFENAC SODIUM 4 G: 10 GEL TOPICAL at 12:51

## 2022-04-21 RX ADMIN — ACETAMINOPHEN 500 MG: 500 TABLET ORAL at 20:45

## 2022-04-21 RX ADMIN — LEVOTHYROXINE SODIUM 150 MCG: 0.15 TABLET ORAL at 06:22

## 2022-04-21 ASSESSMENT — PAIN - FUNCTIONAL ASSESSMENT: PAIN_FUNCTIONAL_ASSESSMENT: ACTIVITIES ARE NOT PREVENTED

## 2022-04-21 ASSESSMENT — PAIN DESCRIPTION - PAIN TYPE: TYPE: ACUTE PAIN

## 2022-04-21 ASSESSMENT — PAIN SCALES - GENERAL
PAINLEVEL_OUTOF10: 1
PAINLEVEL_OUTOF10: 1
PAINLEVEL_OUTOF10: 0

## 2022-04-21 ASSESSMENT — PAIN DESCRIPTION - ONSET: ONSET: ON-GOING

## 2022-04-21 ASSESSMENT — PAIN DESCRIPTION - DESCRIPTORS: DESCRIPTORS: ACHING

## 2022-04-21 ASSESSMENT — PAIN SCALES - WONG BAKER
WONGBAKER_NUMERICALRESPONSE: 0
WONGBAKER_NUMERICALRESPONSE: 0

## 2022-04-21 ASSESSMENT — PAIN DESCRIPTION - FREQUENCY: FREQUENCY: CONTINUOUS

## 2022-04-21 ASSESSMENT — PAIN DESCRIPTION - ORIENTATION: ORIENTATION: RIGHT

## 2022-04-21 ASSESSMENT — PAIN DESCRIPTION - LOCATION: LOCATION: FOOT

## 2022-04-21 NOTE — FLOWSHEET NOTE
Physical Therapy Treatment Note   Date: 2022 Room: [unfilled]   Name: Eulogio Anderson : 1935   MRN: [id number] Admission Date:2022    Primary Problem:   Past Medical History:   Diagnosis Date    Acquired hypothyroidism 2019    Patient is taking Synthroid    Chronic midline low back pain without sciatica 2019    Dementia without behavioral disturbance ([facility] 10/22/2019    Patient has dementia. She is taking Namenda Patient is seeing urologist Dr. Adilene Dietz Gait disturbance 10/22/2019    Patient uses walker to ambulate    History of TIA (transient ischemic attack) 2019    Patient has a history of TIA and also recently may have had TIA. Patient is on Eliquis. Sees neurologist Dr. Adilene Dietz Major depressive disorder with single episode, in full remission ([facility] 2019    Patient is taking Zoloft and that is helping.  Mixed hyperlipidemia 2019    Patient is on simvastatin    Persistent atrial fibrillation ([address] 10/22/2019    A. fib on recently in 2019 . Patient is on metoprolol 25 mg twice a day and Eliquis    PFO (patent foramen ovale) 2019    PFO seen on echocardiogram in 2016    Pneumonia due to infectious organism 10/22/2019    Patient had pneumonia and pleural effusion during admission in 2019. A repeat CT scan of the chest will be done Patient is also seen pulmonologist     Past Surgical History:   Procedure Laterality Date    HYSTERECTOMY       Rehabilitation Diagnosis: impaired mobility   The primary encounter diagnosis was Urinary tract infection without hematuria, site unspecified. Diagnoses of Septicemia ([address] and Pneumonia of both lower lobes due to infectious organism were also pertinent to this visit.     Restrictions/Precautions: post op shoe on R foot when walking    Subjective:  Complaints of back and knee pain, requesting frequent rest breaks    Initial Pain level: 5-8/10 back    Goals:                   Short Term Goals  Time Frame for Short term goals: 1 week or until discharge:  Short term goal 1: Pt will demonstrate the ability to safely perform bed mobility with supervision. Short term goal 2: Pt will demonstrate the ability to safely transfer sit to stand and stand to sit from 3 different height surfaces with S and proper hand placement with VCs only. Short term goal 3: Pt will demonstrate the ability to safely ambulate 150' with a RW and S with no rest breaks or instances of letting go of the walker for nearby furniture. Plan of Care:             Times per week: Mon-Sat 5+/wk            Current Treatment Recommendations: Jennifer Pretty Re-education,Cognitive Reorientation,Patient/Caregiver Education & Training,ROM,ADL/Self-care Training,Equipment Evaluation, Education, & procurement,Balance Training,IADL Training,Gait Training,Home Exercise Program,Functional Mobility Training,Cognitive/Perceptual Training,Stair training,Safety Education & Training,Positioning      Objective Findings:    Date: 4/18/22 Date: 4/19/22 Date:   4-20-22 Date:  4-21-22 Date:    Bed mobility Mod I x      Sit to stand transfer CGA of 1 and VC's for safety SBA x5 throughout session Cg to initiate due to distracted Sba, impulsive, taking very short rest breaks and standing without therapist    Stand to sit transfer CGA of 1 and VC's for safety SBA x5 throughout session Cg and vc for walker safety Sba, cues for walker safety, side sits to chair due to fatigue    Commode transfer CGA-Min A of 1 w/VC's for safety/sequencing x x x    Standing tolerance For amb and georgette care For amb 1-2 mins for gait training 1-2 mins with dyn standing balance.     Ambulation W/std walker and CGA-Min A of 1 20ft and 10ft as pt is impulsive and was grabbing the end of the bed to walk instead of the walker and c/o her knee giving out on her as she amb to the bathroom  Amb w/std walker and CGA of 1, SBA of another for WC follow 114ft total w/4 rest breaks  Pt reports twice her right knee giving out on her due to pain Gait training with standard walker with cg 15 ft,standing rest break +15 ft,  seated rest break, +20 ft standing rest break +20 ft seated rest break. Pt reluctant to increase distance without whc behind. Standing rest breaks due to SOB and fatigue. States she can not change flexed posture. Gait training, standard walker 54+11+41+33+77. Seated rest breaks due to fatigue, sob and pain. encouraged to take longer breaks to increased ability to walk longer distance but impulsively stands back up after 30-45 seconds. Stairs x x        Exercises:   Exercise/Equipment/Modalities  Date: 4/19/22 Date: 4-20-22   Date:  Date:    Seated marches 2x10 B X 20 BLE     LAQ 2x10 B x20 BLE     Hip abd  2x10 reclined     SLR  2 x 10 reclined     NU step   X 8.5 mins =430 steps                                            Modality/intervention used:   [x ] Therapeutic Exercise   [ ] Modalities:   [x ] Therapeutic Activity     [ ] Ultrasound   [ ] Elec Stim   [x ] Gait Training     [ ] Cervical Traction  [ ] Lumbar Traction   [ ] Neuromuscular Re-education   [ ] Cold/hotpack  [ ] Iontophoresis   [ ] Instruction in HEP     [ ] Vasopneumatic   [ ] Manual Therapy   [ ] Aquatic Therapy     Other Therapeutic Activities:   Dyn standing balance to kick ball with RLE 3x10, still sitting for only short rest breaks just to stand back up when still fatigued. Educated on posture corrections due to complaints of back pain. Pt became slightly irritated stating her back has been this way for years and she can not change it. Pointed out when pt did self correct and pt states \" well I cant hold it very long\"  Encouraged pt to continue to make frequent, even if brief, corrections to help decrease pain and prevent progression of forward flexed posture.  Educated pt that this therapist would not being doing due diligence if did not educate and to please not get upset at therapist.    Home Exercise Program/Education provided to patient: BLE HEP hand out selected and printed for pt with inst to completed in bed or recliner. Manual Treatments: x    Communication with other providers: nurse cleared for therapy    Adverse reactions to treatment: SOB, not confident for longer distances.     Treatment/Activity Tolerance:   [ x] Patient limited by fatigue [x ] Patient limited by pain [ ] Patient limited by other medical complications [ ] Patient tolerated therapy well  [ ] Other:     Post Tx Pain Rating: does not rate /10     Safety Precautions:   [ ] left in bed  [x ] left in chair [x ] call light within reach  [ ] bed alarm on  [x ] personal alarm on  [ ] other staff present:    Plan:   [x ] Continue per plan of care [ ] Lourdes Mathew current plan   [ ] Plan of care initiated [ ] Hold pending MD visit [ ] Discharge     Plan for Next Session:     Time In:  4040  Time Out:  1145  Total Treatment Minutes: 53  Billed Units= 4/53 = 2gt, 1 te, 1 ta    Dana Hikcs, [place], [id number] 4/21/2022, 10:51 AM

## 2022-04-21 NOTE — PROGRESS NOTES
Hospitalist Progress Note      Name:  Samira Jung /Age/Sex: 1935  (81 y.o. female)   MRN & CSN:  [id number] & [id number] Admission Date/Time: 2022  3:03 PM   Location:   PCP: April Artis [place] [address] Day: 8                                               Attending Physician Dr. Mario Sharif and Plan:   Samira Jung is a 81 y.o.  female  who presents with Physical debility    Physical debility   Admitted swing bed program    PT/OT     Chronic atrial fibrillation. [id number] Score: 3   Continue Eliquis/Lopressor/digoxin    HFpEF/ VHD    Last TTE (2022) EF 55%, mild LVH, moderate MR/AR, severe TR/severe P HTN   Monitor for clinical symptoms of hypervolemia/decompensation   Previously on Lasix but discontinued per patient request    Hypothyroid- continue synthroid. Last TSH 0.487 (2/10/22)     Right metatarsal fracture- XR 3/29. 2nd -4th metatarsal base fracture. Suspected acute chip fracture right lateral malleolus   Wear postop boot while OOB/WB   Follow-up with Dr. Ariane Ga    Dementia-continue Namenda/Exelon    Depression continue Zoloft    History of interstitial lung disease follows with pulmonology at [facility]; Regular; Low Fat/Low Chol/High Fiber/2 gm Na   DVT Prophylaxis [] Lovenox, []  Heparin, [] SCDs, [x] Ambulation   GI Prophylaxis [] PPI,  [] H2 Blocker,  [] Carafate,  [x] Diet/Tube Feeds   Code Status DNR-CCA     -Patient assessment and plan discussed with supervising physician-  Subjective 2022     Samira Jung is a 81 y.o.  female, who presented with generalized weakness . Patient seen and examined at bedside  No overnight concerns per patient  Reports right foot injury improving    He was initially admitted  for concerns of sepsis and was discharged on  to swing bed program.        Ten point ROS reviewed negative, unless as noted above    Objective:        Intake/Output Summary (Last 24 hours) at 4/21/2022 1331  Last data filed at 4/21/2022 0950  Gross per 24 hour   Intake 240 ml   Output --   Net 240 ml      Vitals:   Vitals:    04/20/22 1447 04/20/22 2015 04/21/22 0746 04/21/22 0904   BP:  123/75 (!) 141/52    Pulse:  68 75    Resp:  20 18    Temp:  96.2 °F (35.7 °C) 97.6 °F (36.4 °C)    TempSrc:  Infrared Infrared    SpO2:  96% 94% 95%   Weight:       Height: 5' 9.02\" (1.753 m)        Physical Exam: 04/21/22     Gen:  awake, alert, cooperative, no apparent distress normal body habitus  Head/Eyes:  Normocephalic atraumatic, EOMI   NECK:   symmetrical, trachea midline  LUNGS: Normal Effort/ symmetry movement   CARDIOVASCULAR:  Normal rate + murmur  ABDOMEN: Non tender, non distended, no HSM noted. MUSCULOSKELETAL: no gross deformities  NEUROLOGIC: Alert and Oriented,  Cranial nerves II-XII are grossly intact. SKIN:  no bruising or bleeding, normal skin color,  no redness    Medications:   Medications:    Vitamin D  2,000 Units Oral Daily    rivastigmine  3 mg Oral Daily    levothyroxine  150 mcg Oral Daily    sertraline  50 mg Oral Daily    vitamin E  400 Units Oral Daily    acetaminophen  500 mg Oral TID    apixaban  5 mg Oral BID    atorvastatin  20 mg Oral Nightly    diclofenac sodium  4 g Topical 4x Daily    digoxin  125 mcg Oral Daily    memantine  10 mg Oral BID    metoprolol tartrate  12.5 mg Oral BID    miconazole   Topical BID      Infusions:   PRN Meds: ondansetron, 4 mg, Q8H PRN  polyethylene glycol, 17 g, Daily PRN  aluminum & magnesium hydroxide-simethicone, 30 mL, Q6H PRN  acetaminophen, 650 mg, Q6H PRN   Or  acetaminophen, 650 mg, Q6H PRN        LABS  CBC   Recent Labs     04/20/22  0545   WBC 9.8   HGB 14.1   HCT 44.6         RENAL  Recent Labs     04/20/22  0545      K 4.3      CO2 25   BUN 17   CREATININE 1.0     Radiology this visit:  Reviewed.     Echocardiogram complete 2D with doppler with color    Result Date: 4/15/2022  Transthoracic Echocardiography Report (TTE)  Demographics   Patient Name       Adolfo Andrew  Date of Study       04/13/2022   Date of Birth      1935        Gender              Female   Age                81 year(s)        Race                   Patient Number     [id number]        Room Number         954   Visit Number       [id number]   Corporate ID       [id number]   Accession Number   [id number]        Julian Amin RDMS, RVT   Ordering Physician                   Interpreting        Renan Parkinson                                       Physician           Lul Mustafa MD  Procedure Type of Study   TTE procedure:ECHOCARDIOGRAM COMPLETE 2D W DOPPLER W COLOR. Procedure Date Date: 04/13/2022 Start: 06:11 AM Study Location: Portable Technical Quality: Fair visualization Indications:Elevated BNP. Patient Status: Routine Height: 69 inches Weight: 170 pounds BSA: 1.93 m2 BMI: 25.1 kg/m2 HR: 89 bpm BP: 101/60 mmHg  Conclusions   Summary  Left ventricular systolic function is normal.  Ejection fraction is visually estimated at 55%. Mild left ventricular hypertrophy. Moderate mitral and aortic regurgiation; PHT: 366msec. Severe tricuspid regurgitation; RVSP: 68 mmHg. Severe PHTN. No evidence of any pericardial effusion. Signature   ------------------------------------------------------------------  Electronically signed by Pippa Fisher MD  (Interpreting physician) on 04/15/2022 at 12:04 PM  ------------------------------------------------------------------   Findings   Left Ventricle  Left ventricular systolic function is normal.  Ejection fraction is visually estimated at 55%. Mild left ventricular hypertrophy. No regional wall motion abnormalites. Left ventricle size is normal.  Cannot determine diastolic function due to arrhythmia. Left Atrium  Mildly dilated left atrium.    Right Atrium  Essentially normal right atrium. Right Ventricle  Essentially normal right ventricle. Aortic Valve  Moderate aortic regurgiation; PHT: 366msec. Mitral Valve  Moderate mitral regurgitation. Tricuspid Valve  Severe tricuspid regurgitation; RVSP: 68 mmHg. Severe PHTN. Pulmonic Valve  The pulmonic valve was not well visualized. Pericardial Effusion  No evidence of any pericardial effusion. Pleural Effusion  No evidence of pleural effusion. Miscellaneous  Dilated IVC with normal respiratory collapse.   M-Mode/2D Measurements & Calculations   LV Diastolic Dimension:  LV Systolic Dimension:  LA Dimension: 3.6 cmAO Root  4.2 cm                   2.5 cm                  Dimension: 3.2 cmLA Area:  LV FS:40.5 %             LV Volume Diastolic: 43 05.5 cm2  LV PW Diastolic: 1.2 cm  ml  LV PW Systolic: 1.3 cm   LV Volume Systolic: 19  Septum Diastolic: 1.1 cm ml  Septum Systolic: 1.7 cm  LV EDV/LV EDV Index: 43 RV Diastolic Dimension: 3.3  CO: 4.74 l/min           ml/22 m2LV ESV/LV ESV   cm  CI: 2.46 l/m*m2          Index: 19 ml/10 m2                           EF Calculated (A4C):    LA/Aorta: 7.61  LV Area Diastolic: 53.7  23.8 %  cm2                      EF Calculated (2D):     LA volume/Index: 59.7 ml  LV Area Systolic: 36.7   81.0 %                  /31m2  cm2                           LV Length: 6.68 cm                            LVOT: 1.9 cm  Doppler Measurements & Calculations   MV Peak E-Wave: 101  AV Peak Velocity: 113 cm/s    LVOT Peak Velocity: 99  cm/s                 AV Peak Gradient: 5.11 mmHg   cm/s                       AV Mean Velocity: 80.3 cm/s   LVOT Mean Velocity: 72  MV Peak Gradient:    AV Mean Gradient: 3 mmHg      cm/s  4.08 mmHg            AV VTI: 20.9 cm               LVOT Peak Gradient: 4                       AV Area (Continuity):2.55 cm2 mmHgLVOT Mean Gradient: 2  MV P1/2t: 33 msec                                  mmHg  MVA by PHT:6.67 cm2  LVOT VTI: 18.8 cm             Estimated RVSP: 56 mmHg AV P1/2t: 366 msec            Estimated RAP:8 mmHg                       Estimated PASP: 65.15 mmHg                                                      TR Velocity:378 cm/s                                                     TR Gradient:57.15 mmHg      XR CHEST (2 VW)    Result Date: 4/13/2022  EXAMINATION: TWO XRAY VIEWS OF THE CHEST 4/13/2022 12:55 pm COMPARISON: Chest radiograph 04/11/2022 HISTORY: ORDERING SYSTEM PROVIDED HISTORY: eval chf TECHNOLOGIST PROVIDED HISTORY: Reason for exam:->eval chf Additional signs and symptoms: eval chf FINDINGS: Similar cardiomegaly. Interval decrease in size of bilateral pleural effusions with improved aeration at the lung bases. Significant interval improvement in interstitial pulmonary edema. No acute osseous abnormality. No pneumothorax. Significant interval improvement in bilateral interstitial pulmonary edema. Decreased size of bilateral pleural effusions with improved aeration at the lung bases. Similar cardiomegaly. XR ANKLE RIGHT (MIN 3 VIEWS)    Result Date: 3/29/2022  EXAMINATION: THREE XRAY VIEWS OF THE RIGHT ANKLE; THREE XRAY VIEWS OF THE RIGHT FOOT 3/29/2022 1:40 pm COMPARISON: None. HISTORY: ORDERING SYSTEM PROVIDED HISTORY: fall TECHNOLOGIST PROVIDED HISTORY: Reason for exam:->fall Reason for Exam: fall Additional signs and symptoms: lateral swelling FINDINGS: Right ankle: Three views of the right ankle were reviewed. Small ossicles at the tip of the lateral malleolus with slight cortical irregularity of the tip of the lateral malleolus most suggestive of acute avulsion fracture even trauma history as well as lateral soft tissue edema. Anatomic alignment at the ankle mortise. Small posterior calcaneal enthesophyte. Mild hindfoot and midfoot osteoarthritis. Right foot: Three views of the right foot were reviewed. Acute fractures of the 2nd, 3rd, and 4th metatarsal bases.   Grossly anatomic alignment of the midfoot on these nonweightbearing views. Slight amorphous calcification along the medial cortex of the 1st metatarsal head is likely chronic. Moderate degenerative change of the 1st MTP joint. Mild midfoot osteoarthritis. Soft tissue edema of the foot. 1. Suspect acute chip fracture of the right lateral malleolus with adjacent soft tissue edema. 2. Acute fractures of the right 2nd, 3rd, and 4th metatarsal bases. XR FOOT RIGHT (MIN 3 VIEWS)    Result Date: 4/13/2022  EXAMINATION: THREE XRAY VIEWS OF THE RIGHT FOOT 4/13/2022 12:55 pm COMPARISON: Radiographs of the foot of 03/29/2022 HISTORY: ORDERING SYSTEM PROVIDED HISTORY: re eval of previous fx TECHNOLOGIST PROVIDED HISTORY: Reason for exam:->re eval of previous fx Additional signs and symptoms: re eval of previous fx FINDINGS: Diffuse osteopenia. Overall unchanged alignment of fractures at the bases of the 3rd and 4th metatarsals. Decreased conspicuity of the fracture lines with associated sclerosis and remodeling is consistent with interval healing. No new fracture identified. No traumatic malalignment. Mild 1st MTP degenerative changes. Soft tissue swelling along the dorsum of the foot. Scattered vascular calcifications. Progressive healing and unchanged alignment of fractures of the base of the 3rd and 4th metatarsals     XR FOOT RIGHT (MIN 3 VIEWS)    Result Date: 3/29/2022  EXAMINATION: THREE XRAY VIEWS OF THE RIGHT ANKLE; THREE XRAY VIEWS OF THE RIGHT FOOT 3/29/2022 1:40 pm COMPARISON: None. HISTORY: ORDERING SYSTEM PROVIDED HISTORY: fall TECHNOLOGIST PROVIDED HISTORY: Reason for exam:->fall Reason for Exam: fall Additional signs and symptoms: lateral swelling FINDINGS: Right ankle: Three views of the right ankle were reviewed. Small ossicles at the tip of the lateral malleolus with slight cortical irregularity of the tip of the lateral malleolus most suggestive of acute avulsion fracture even trauma history as well as lateral soft tissue edema. Anatomic alignment at the ankle mortise. Small posterior calcaneal enthesophyte. Mild hindfoot and midfoot osteoarthritis. Right foot: Three views of the right foot were reviewed. Acute fractures of the 2nd, 3rd, and 4th metatarsal bases. Grossly anatomic alignment of the midfoot on these nonweightbearing views. Slight amorphous calcification along the medial cortex of the 1st metatarsal head is likely chronic. Moderate degenerative change of the 1st MTP joint. Mild midfoot osteoarthritis. Soft tissue edema of the foot. 1. Suspect acute chip fracture of the right lateral malleolus with adjacent soft tissue edema. 2. Acute fractures of the right 2nd, 3rd, and 4th metatarsal bases. XR CHEST PORTABLE    Result Date: 4/11/2022  EXAMINATION: ONE XRAY VIEW OF THE CHEST 4/11/2022 3:31 pm COMPARISON: 11/11/2019 HISTORY: ORDERING SYSTEM PROVIDED HISTORY: ? pneumonia TECHNOLOGIST PROVIDED HISTORY: Reason for exam:->? pneumonia FINDINGS: Increased interstitial opacities noted, increased considerably when compared to the previous exam.  More focal opacities are noted within the lower lungs. In the small bilateral pleural effusions appear to be present. The cardial pericardial silhouette is unremarkable. Increased interstitial opacity, with more focal opacities at the lung bases. In this case, interstitial edema and multifocal pneumonia are both considered. CTA PULMONARY W CONTRAST    Result Date: 4/12/2022  EXAMINATION: CTA OF THE CHEST, 4/12/2022 11:19 am TECHNIQUE: CTA of the chest was performed after the administration of intravenous contrast.  Multiplanar reformatted images are provided for review. MIP images are provided for review. Dose modulation, iterative reconstruction, and/or weight based adjustment of the mA/kV was utilized to reduce the radiation dose to as low as reasonably achievable.  COMPARISON: 11/22/2019 HISTORY: ORDERING SYSTEM PROVIDED HISTORY:  Rule out PE TECHNOLOGIST PROVIDED HISTORY: Reason for Exam:  Rule out PE FINDINGS: Pulmonary Arteries: The pulmonary arteries are adequately opacified. No filling defects are identified within the pulmonary arteries to suggest pulmonary embolism. Main pulmonary artery is normal in caliber. Mediastinum:  Thyroid is either atrophic or has been previously resected. There are multiple enlarged mediastinal lymph nodes identified. These have increased in size when compared to the previous exam.  For example, a right paratracheal/precarinal lymph node measures 1.4 cm in short axis (image 150), previously 0.9 cm. There is a small to moderate-sized hiatal hernia. The esophagus is otherwise unremarkable. Cardiac chambers unremarkable. No pericardial effusion. Thoracic aorta is normal in caliber without evidence of dissection. Lungs/pleura: There is increased interstitial opacity identified within the periphery the lungs associated with ground-glass opacity, similar when compared to the previous exam.  The pleural base mass-like opacities seen on the left are no longer detected. Small bilateral pleural effusions are noted, similar when compared to the previous exam.  Airways are patent. Upper Abdomen: Unremarkable. Soft Tissues/Bones: Visualized extrathoracic soft tissues unremarkable. No acute bony abnormality identified. No evidence of pulmonary embolism or aortic dissection. Irregular interstitial opacities within the periphery of the lungs bilaterally, associated with ground-glass opacity. Much or all of this likely relates to interstitial lung disease with fibrosis. With that said, inflammatory/infectious interstitial pneumonitis would be considered as well. A component of pulmonary edema would also be a consideration, especially given presence of small bilateral pleural effusions.  There are enlarged mediastinal lymph nodes which have increased in size when compared to the previous exam.  While these could be reactive, neoplasm must be considered. Short-term follow-up in approximately 3 months is recommended to ensure resolution.              Electronically signed by NGOC Dumas CNP on 4/21/2022 at 1:31 PM

## 2022-04-21 NOTE — PLAN OF CARE
Patient pain in right foot has been manageable between a zero to one of pain. Patient denies any chronic pain.   Problem: Pain:  Goal: Patient's pain/discomfort is manageable  Description: Patient's pain/discomfort is manageable  Outcome: Progressing     Problem: Pain:  Goal: Pain level will decrease  Description: Pain level will decrease  Outcome: Adequate for Discharge     Problem: Pain:  Goal: Control of acute pain  Description: Control of acute pain  Outcome: Adequate for Discharge     Problem: Pain:  Goal: Control of chronic pain  Description: Control of chronic pain  Outcome: Completed

## 2022-04-21 NOTE — PROGRESS NOTES
Occupational Therapy  SWING BED WEEKLY TEAM SHEET     Gavino Richardson   4/21/2022   WEEK # 1    Occupational Therapy    Feeding  [x] Independ [] SBA [] MIN assist  [] mod assist  [] max assist [] tot assist  Grooming [x] Independ [] SBA [] MIN assist  [] mod assist  [] max assist [] tot assist   Bathing upper body [x] Independ [] SBA [] MIN assist  [] mod assist  [] max assist              [] tot assist    Dressing upper body [] Independ [x] SBA [] MIN assist  [] mod assist  [] max assist              [] tot assist    Dressing lower body [] Independ [] SBA [x] MIN assist  [] mod assist  [] max assist              [] tot assist   Toileting [] Independ [] SBA [x] MIN assist  [] mod assist  [] max assist [] tot assist    Home Management: Has help from family PRN    Cognitive/Perception: slight impaired with safety awareness    Upper extremity motor control: WFL    Other   Goals of previous week:   [] 1st team   [] met   [x] partially met    [] not met             Why goals were not met dec strength and endurance.      Issues to be resolved before discharge:      Occupational Therapy Signature: [address]

## 2022-04-21 NOTE — PROGRESS NOTES
Occupational Therapy    Occupational Therapy Treatment Note  Name: Jen Bailon MRN: [id number] :   1935   Date:  2022   Admission Date: 2022 Room:  [address] [place] [address]   Restrictions/Precautions:  Restrictions/Precautions  Restrictions/Precautions: Weight Bearing  Required Braces or Orthoses?: Yes     Communication with other providers:   Cleared for treatment by  RN. Subjective:  Patient states: \"My knee is talking to me today\"  Pain:   Location, Type, Intensity (0/10 to 10/10):  10    Objective:    Observation:  Pt alert and oriented to self and place. Treatment, including education:  Transfers      Therapeutic Activity Training:   Therapeutic activity training was instructed today. Cues were given for safety, sequence, UE/LE placement, awareness, and balance. Activities performed today included bed mobility training, sup-sit, sit-stand, SPT. Pt completed functional mobility with RW from bed to out in hallway x 20, 25,20, 35' with pt being winded and required rest break. Pt stood x 1-2 min with several attempts SBA with SW to facilitate increased endurance/strength for ADL tasks and transfers while kicking ball with L foot x 10-20 reps x 4. Therapeutic Exercise:  Cues were given for technique, safety, recruitment, and rationale. Cues were verbal and/or tactile. Pt completed Nustep x 8 min 30 seconds on lev 3 for 430 steps. Safety Measures: Gait belt used, Left in bed, Pull/Bed Alarm activated and call light left in reach        Assessment / Impression:        Patient's tolerance of treatment: Good   Adverse Reaction: None  Significant change in status and impact:  None  Barriers to improvement:  Dec strength and endurance, poor safety awareness    Plan for Next Session:    Continue with POC.     Time in:  1052  Time out: 1140  Total treatment time: 48  Billed Units: 3TA  Electronically signed by:    Sarika Aragon [address]    2022, 2:17 PM    Previously filed values:     Short Term Goals  Time Frame for Short term goals: Until DC or goals met  Short Term Goal 1: Pt will complete trfs mod I w/o LOB  Short Term Goal 2: Pt will complete UE bathing/dressing with S  Short Term Goal 3: Pt will complete LE bathing/dressing with S  Short Term Goal 4: Pt will complete toileting mod I  Short Term Goal 5: Pt will complete 10+ min of standing/functional mobility during ADLs/therax with min increased SOB and no LOB

## 2022-04-21 NOTE — PATIENT CARE CONFERENCE
SWING BED WEEKLY TEAM SHEET      Daniel Ruiz   4/21/2022             WEEK# 1    PHYSICAL THERAPY       Ambulation: Distance:  114 ft    Device: RW    Assist: CGA     Wt bearing:  Cast shoe R foot    W/C skills:  Propulsion:  np     W/C parts management:     Stairs:  Amount/size: np      Assist:        Device:      Pain:     Transfers:   Sit to stand:  CGA  Stand to sit:      SBA         Bed Mobility:  Rolls right:     Rolls left:     Positioning:     Supine to sit: Mod I     Sit to supine: Mod I          Strength/ROM:      Balance:     Static sitting    [] P  [] F-   [] F    [] F+    [x] G               Dynamic Sitting           [] P  [] F-   [] F    [] F+    [x] G                     Static standing            [] P  [] F-   [] F    [x] F+    [] G   [x]  With  [] Without device Dynamic standing       [] P  [] F-   [] F    [x] F+    [] G   [x]  With  [] Without device  (P= poor   F= fair   G= good)    Issues to be resolved before discharge    Goals of previous week  [x] 1st team   [] met   [] partially met    [] not met                                          Why the goals were not met     Goals:   Short term goal 1: Pt will demonstrate the ability to safely perform bed mobility with supervision. Short term goal 2: Pt will demonstrate the ability to safely transfer sit to stand and stand to sit from 3 different height surfaces with S and proper hand placement with VCs only. Short term goal 3: Pt will demonstrate the ability to safely ambulate 150' with a RW and S with no rest breaks or instances of letting go of the walker for nearby furniture.   Updated Goals:        Physical Therapy Signature:  Iesha Reyes PT

## 2022-04-22 PROCEDURE — 6370000000 HC RX 637 (ALT 250 FOR IP): Performed by: FAMILY MEDICINE

## 2022-04-22 PROCEDURE — 97530 THERAPEUTIC ACTIVITIES: CPT

## 2022-04-22 PROCEDURE — 6370000000 HC RX 637 (ALT 250 FOR IP): Performed by: INTERNAL MEDICINE

## 2022-04-22 PROCEDURE — 6370000000 HC RX 637 (ALT 250 FOR IP): Performed by: NURSE PRACTITIONER

## 2022-04-22 PROCEDURE — 1200000002 HC SEMI PRIVATE SWING BED

## 2022-04-22 PROCEDURE — 94761 N-INVAS EAR/PLS OXIMETRY MLT: CPT

## 2022-04-22 PROCEDURE — 97535 SELF CARE MNGMENT TRAINING: CPT

## 2022-04-22 PROCEDURE — 97110 THERAPEUTIC EXERCISES: CPT

## 2022-04-22 PROCEDURE — 97116 GAIT TRAINING THERAPY: CPT

## 2022-04-22 RX ADMIN — DICLOFENAC SODIUM 4 G: 10 GEL TOPICAL at 14:44

## 2022-04-22 RX ADMIN — APIXABAN 5 MG: 5 TABLET, FILM COATED ORAL at 21:25

## 2022-04-22 RX ADMIN — Medication 400 UNITS: at 10:43

## 2022-04-22 RX ADMIN — RIVASTIGMINE TARTRATE 3 MG: 1.5 CAPSULE ORAL at 10:43

## 2022-04-22 RX ADMIN — METOPROLOL TARTRATE 12.5 MG: 25 TABLET, FILM COATED ORAL at 21:25

## 2022-04-22 RX ADMIN — ACETAMINOPHEN 500 MG: 500 TABLET ORAL at 14:43

## 2022-04-22 RX ADMIN — ATORVASTATIN CALCIUM 20 MG: 20 TABLET, FILM COATED ORAL at 21:26

## 2022-04-22 RX ADMIN — Medication 2000 UNITS: at 10:43

## 2022-04-22 RX ADMIN — ACETAMINOPHEN 500 MG: 500 TABLET ORAL at 10:44

## 2022-04-22 RX ADMIN — DICLOFENAC SODIUM 4 G: 10 GEL TOPICAL at 21:24

## 2022-04-22 RX ADMIN — APIXABAN 5 MG: 5 TABLET, FILM COATED ORAL at 10:44

## 2022-04-22 RX ADMIN — LEVOTHYROXINE SODIUM 150 MCG: 0.15 TABLET ORAL at 11:04

## 2022-04-22 RX ADMIN — SERTRALINE 50 MG: 50 TABLET, FILM COATED ORAL at 10:44

## 2022-04-22 RX ADMIN — ACETAMINOPHEN 500 MG: 500 TABLET ORAL at 21:24

## 2022-04-22 RX ADMIN — DICLOFENAC SODIUM 4 G: 10 GEL TOPICAL at 10:52

## 2022-04-22 RX ADMIN — DIGOXIN 125 MCG: 125 TABLET ORAL at 10:43

## 2022-04-22 RX ADMIN — MEMANTINE 10 MG: 10 TABLET ORAL at 21:26

## 2022-04-22 RX ADMIN — MEMANTINE 10 MG: 10 TABLET ORAL at 10:44

## 2022-04-22 RX ADMIN — METOPROLOL TARTRATE 12.5 MG: 25 TABLET, FILM COATED ORAL at 10:45

## 2022-04-22 ASSESSMENT — PAIN DESCRIPTION - FREQUENCY: FREQUENCY: INTERMITTENT

## 2022-04-22 ASSESSMENT — PAIN SCALES - GENERAL
PAINLEVEL_OUTOF10: 0
PAINLEVEL_OUTOF10: 0
PAINLEVEL_OUTOF10: 1
PAINLEVEL_OUTOF10: 0
PAINLEVEL_OUTOF10: 3
PAINLEVEL_OUTOF10: 0
PAINLEVEL_OUTOF10: 0

## 2022-04-22 ASSESSMENT — PAIN DESCRIPTION - LOCATION: LOCATION: KNEE

## 2022-04-22 ASSESSMENT — PAIN DESCRIPTION - DESCRIPTORS: DESCRIPTORS: ACHING;DISCOMFORT

## 2022-04-22 ASSESSMENT — PAIN - FUNCTIONAL ASSESSMENT: PAIN_FUNCTIONAL_ASSESSMENT: PREVENTS OR INTERFERES SOME ACTIVE ACTIVITIES AND ADLS

## 2022-04-22 ASSESSMENT — PAIN DESCRIPTION - ORIENTATION: ORIENTATION: RIGHT

## 2022-04-22 ASSESSMENT — PAIN DESCRIPTION - ONSET: ONSET: GRADUAL

## 2022-04-22 ASSESSMENT — PAIN DESCRIPTION - PAIN TYPE: TYPE: CHRONIC PAIN

## 2022-04-22 NOTE — FLOWSHEET NOTE
Physical Therapy Treatment Note   Date: 2022 Room: [unfilled]   Name: Meenu Fleming : 1935   MRN: [id number] Admission Date:2022    Primary Problem:   Past Medical History:   Diagnosis Date    Acquired hypothyroidism 2019    Patient is taking Synthroid    Chronic midline low back pain without sciatica 2019    Dementia without behavioral disturbance ([facility] [address] 10/22/2019    Patient has dementia. She is taking Namenda Patient is seeing urologist Dr. Leslye Lopez Gait disturbance 10/22/2019    Patient uses walker to ambulate    History of TIA (transient ischemic attack) 2019    Patient has a history of TIA and also recently may have had TIA. Patient is on Eliquis. Sees neurologist Dr. Leslye Lopez Major depressive disorder with single episode, in full remission ([facility] [address] 2019    Patient is taking Zoloft and that is helping.  Mixed hyperlipidemia 2019    Patient is on simvastatin    Persistent atrial fibrillation ([address] 10/22/2019    A. fib on recently in 2019 . Patient is on metoprolol 25 mg twice a day and Eliquis    PFO (patent foramen ovale) 2019    PFO seen on echocardiogram in 2016    Pneumonia due to infectious organism 10/22/2019    Patient had pneumonia and pleural effusion during admission in 2019. A repeat CT scan of the chest will be done Patient is also seen pulmonologist     Past Surgical History:   Procedure Laterality Date    HYSTERECTOMY       Rehabilitation Diagnosis: impaired mobility   The primary encounter diagnosis was Urinary tract infection without hematuria, site unspecified. Diagnoses of Septicemia ([address] and Pneumonia of both lower lobes due to infectious organism were also pertinent to this visit. Restrictions/Precautions: post op shoe on R foot when walking    Subjective:  \"My knee and back hurt, but I had Tylenol about 30 minutes ago\".     Initial Pain level: 5-8/10 back    Goals:                   Short Term Goals  Time Frame for Short term goals: 1 week or until discharge:  Short term goal 1: Pt will demonstrate the ability to safely perform bed mobility with supervision. Short term goal 2: Pt will demonstrate the ability to safely transfer sit to stand and stand to sit from 3 different height surfaces with S and proper hand placement with VCs only. Short term goal 3: Pt will demonstrate the ability to safely ambulate 150' with a RW and S with no rest breaks or instances of letting go of the walker for nearby furniture. Plan of Care:             Times per week: Mon-Sat 5+/wk            Current Treatment Recommendations: Daniel Torres Re-education,Cognitive Reorientation,Patient/Caregiver Education & Training,ROM,ADL/Self-care Training,Equipment Evaluation, Education, & procurement,Balance Training,IADL Training,Gait Training,Home Exercise Program,Functional Mobility Training,Cognitive/Perceptual Training,Stair training,Safety Education & Training,Positioning      Objective Findings:    Date: 4/18/22 Date: 4/19/22 Date:   4-20-22 Date:  4-21-22 Date:   4/22/2022   Bed mobility Mod I x      Sit to stand transfer CGA of 1 and VC's for safety SBA x5 throughout session Cg to initiate due to distracted Sba, impulsive, taking very short rest breaks and standing without therapist SBA    Stand to sit transfer CGA of 1 and VC's for safety SBA x5 throughout session Cg and vc for walker safety Sba, cues for walker safety, side sits to chair due to fatigue SBA   Commode transfer CGA-Min A of 1 w/VC's for safety/sequencing x x x x   Standing tolerance For amb and georgette care For amb 1-2 mins for gait training 1-2 mins with dyn standing balance.  For ambulation   Ambulation W/std walker and CGA-Min A of 1 20ft and 10ft as pt is impulsive and was grabbing the end of the bed to walk instead of the walker and c/o her knee giving out on her as she amb to the bathroom  Amb w/std walker and CGA of 1, SBA of another for WC follow 114ft total w/4 rest breaks  Pt reports twice her right knee giving out on her due to pain Gait training with standard walker with cg 15 ft,standing rest break +15 ft,  seated rest break, +20 ft standing rest break +20 ft seated rest break. Pt reluctant to increase distance without whc behind. Standing rest breaks due to SOB and fatigue. States she can not change flexed posture. Gait training, standard walker 54+42+09+27+72. Seated rest breaks due to fatigue, sob and pain. encouraged to take longer breaks to increased ability to walk longer distance but impulsively stands back up after 30-45 seconds. Gait training with standard walker 13'+33'+33'+63'    Seated rest breaks due to fatigue. Patient very impulsive. Almost sitting before chair is behind her. Also pushes walker instead of lifting it at times. Stairs x x        Exercises:   Exercise/Equipment/Modalities  Date: 4/19/22 Date: 4-20-22   Date: 4/22/2022 Date:    Seated marches 2x10 B X 20 BLE     LAQ 2x10 B x20 BLE     Hip abd  2x10 reclined     SLR  2 x 10 reclined     NU step   X 8.5 mins =430 steps 10 min   600 steps                                           Modality/intervention used:   [x ] Therapeutic Exercise   [ ] Modalities:   [x ] Therapeutic Activity     [ ] Ultrasound   [ ] Elec Stim   [x ] Gait Training     [ ] Cervical Traction  [ ] Lumbar Traction   [ ] Neuromuscular Re-education   [ ] Cold/hotpack  [ ] Iontophoresis   [ ] Instruction in HEP     [ ] Vasopneumatic   [ ] Manual Therapy   [ ] Aquatic Therapy     Other Therapeutic Activities:  x  Home Exercise Program/Education provided to patient: BLE HEP hand out selected and printed for pt with inst to completed in bed or recliner. Manual Treatments: x    Communication with other providers: nurse cleared for therapy    Adverse reactions to treatment: SOB, not confident for longer distances.     Treatment/Activity Tolerance:   [ x] Patient limited by fatigue [x ] Patient limited by pain [ ] Patient limited by other medical complications [ ] Patient tolerated therapy well  [ ] Other:     Post Tx Pain Rating: does not rate /10     Safety Precautions:   [ ] left in bed  [x ] left in chair [x ] call light within reach  [ ] bed alarm on  [x ] personal alarm on  [ ] other staff present:    Plan:   [x ] Continue per plan of care [ ] Aiyana Hilario current plan   [ ] Plan of care initiated [ ] Lisa Dickey pending MD visit [ ] Discharge     Plan for Next Session:     Time In:  1140  Time Out:  1209  Total Treatment Minutes: 29  Billed Units=  1 Gait , 1 TE    Signed: Anastasia Hilliard,HARISH  4/22/2022, 11:53 AM

## 2022-04-22 NOTE — PROGRESS NOTES
Occupational Therapy    Occupational Therapy Treatment Note  Name: Norma Singer MRN: [id number] :   1935   Date:  2022   Admission Date: 2022 Room:  [address] [place] [address]   Restrictions/Precautions:  Restrictions/Precautions  Restrictions/Precautions: Weight Bearing  Required Braces or Orthoses?: Yes     Communication with other providers:   Cleared for treatment by  RN. Subjective:  Patient states:  \"I would like to use the restroom before we do anything\"  Pain:   Location, Type, Intensity (0/10 to 10/10):  5/10 when moving    Objective:    Observation: pt alert and oriented     Treatment, including education:  Transfers    Sit to stand :SBA  Stand to sit :SBA  SPT:SBA  Toilet:SBA    Self Care Training:   Activities performed today included the following: Toileting  SBA with clothing management and toilet hygiene. Therapeutic Activity Training: pt completed functional mobility with SW x 25', 40, 30' and 10' with SBA and min verbal cues for safety. Safety Measures: Gait belt used, Left in recliner, Pull/Bed Alarm activated and call light left in reach        Assessment / Impression:        Patient's tolerance of treatment: Good   Adverse Reaction: None  Significant change in status and impact:  None  Barriers to improvement:  Dec strength and endurance    Plan for Next Session:    Continue with POC.     Time in:  920  Time out: 950  Total treatment time:  30  Billed Units: 1 ADL, 1 TA  Electronically signed by:    Mike Bradshaw, [address]    2022, 9:28 AM    Previously filed values:     Short Term Goals  Time Frame for Short term goals: Until DC or goals met  Short Term Goal 1: Pt will complete trfs mod I w/o LOB  Short Term Goal 2: Pt will complete UE bathing/dressing with S  Short Term Goal 3: Pt will complete LE bathing/dressing with S  Short Term Goal 4: Pt will complete toileting mod I  Short Term Goal 5: Pt will complete 10+ min of standing/functional mobility during ADLs/therax with min increased SOB and no LOB

## 2022-04-22 NOTE — PLAN OF CARE
Problem: Falls - Risk of:  Goal: Will remain free from falls  Description: Will remain free from falls  Outcome: Progressing  Goal: Absence of physical injury  Description: Absence of physical injury  Outcome: Progressing     Problem: Safety:  Goal: Free from accidental physical injury  Description: Free from accidental physical injury  Outcome: Progressing  Goal: Free from intentional harm  Description: Free from intentional harm  Outcome: Progressing     Problem: Daily Care:  Goal: Daily care needs are met  Description: Daily care needs are met  Outcome: Progressing     Problem: Pain:  Goal: Patient's pain/discomfort is manageable  Description: Patient's pain/discomfort is manageable  4/21/2022 2156 by Chasity Deluca LPN  Outcome: Progressing  4/21/2022 1750 by Osman Munoz RN  Outcome: Progressing  Goal: Pain level will decrease  Description: Pain level will decrease  4/21/2022 2156 by Chasity Deluca LPN  Outcome: Progressing  4/21/2022 1750 by Osman Munoz RN  Outcome: Adequate for Discharge  Goal: Control of acute pain  Description: Control of acute pain  4/21/2022 2156 by Chasity Deluca LPN  Outcome: Progressing  4/21/2022 1750 by Osman Munoz RN  Outcome: Adequate for Discharge     Problem: Discharge Planning  Goal: Discharge to home or other facility with appropriate resources  Outcome: Progressing     Problem: ABCDS Injury Assessment  Goal: Absence of physical injury  Outcome: Progressing

## 2022-04-23 PROCEDURE — 6370000000 HC RX 637 (ALT 250 FOR IP): Performed by: NURSE PRACTITIONER

## 2022-04-23 PROCEDURE — 6370000000 HC RX 637 (ALT 250 FOR IP): Performed by: FAMILY MEDICINE

## 2022-04-23 PROCEDURE — 1200000002 HC SEMI PRIVATE SWING BED

## 2022-04-23 PROCEDURE — 94761 N-INVAS EAR/PLS OXIMETRY MLT: CPT

## 2022-04-23 PROCEDURE — 6370000000 HC RX 637 (ALT 250 FOR IP): Performed by: INTERNAL MEDICINE

## 2022-04-23 PROCEDURE — 97110 THERAPEUTIC EXERCISES: CPT

## 2022-04-23 PROCEDURE — 97116 GAIT TRAINING THERAPY: CPT

## 2022-04-23 RX ADMIN — DICLOFENAC SODIUM 4 G: 10 GEL TOPICAL at 16:06

## 2022-04-23 RX ADMIN — DICLOFENAC SODIUM 4 G: 10 GEL TOPICAL at 12:06

## 2022-04-23 RX ADMIN — Medication 400 UNITS: at 09:20

## 2022-04-23 RX ADMIN — MEMANTINE 10 MG: 10 TABLET ORAL at 20:23

## 2022-04-23 RX ADMIN — ACETAMINOPHEN 500 MG: 500 TABLET ORAL at 12:06

## 2022-04-23 RX ADMIN — METOPROLOL TARTRATE 12.5 MG: 25 TABLET, FILM COATED ORAL at 20:23

## 2022-04-23 RX ADMIN — SERTRALINE 50 MG: 50 TABLET, FILM COATED ORAL at 09:20

## 2022-04-23 RX ADMIN — ACETAMINOPHEN 500 MG: 500 TABLET ORAL at 20:22

## 2022-04-23 RX ADMIN — ACETAMINOPHEN 500 MG: 500 TABLET ORAL at 08:10

## 2022-04-23 RX ADMIN — METOPROLOL TARTRATE 12.5 MG: 25 TABLET, FILM COATED ORAL at 09:21

## 2022-04-23 RX ADMIN — MEMANTINE 10 MG: 10 TABLET ORAL at 09:20

## 2022-04-23 RX ADMIN — DIGOXIN 125 MCG: 125 TABLET ORAL at 09:20

## 2022-04-23 RX ADMIN — RIVASTIGMINE TARTRATE 3 MG: 1.5 CAPSULE ORAL at 09:20

## 2022-04-23 RX ADMIN — DICLOFENAC SODIUM 4 G: 10 GEL TOPICAL at 08:10

## 2022-04-23 RX ADMIN — ATORVASTATIN CALCIUM 20 MG: 20 TABLET, FILM COATED ORAL at 20:23

## 2022-04-23 RX ADMIN — APIXABAN 5 MG: 5 TABLET, FILM COATED ORAL at 20:22

## 2022-04-23 RX ADMIN — APIXABAN 5 MG: 5 TABLET, FILM COATED ORAL at 09:20

## 2022-04-23 RX ADMIN — LEVOTHYROXINE SODIUM 150 MCG: 0.15 TABLET ORAL at 05:34

## 2022-04-23 RX ADMIN — Medication 2000 UNITS: at 09:20

## 2022-04-23 ASSESSMENT — PAIN DESCRIPTION - LOCATION
LOCATION: KNEE;FOOT
LOCATION: HIP
LOCATION: KNEE

## 2022-04-23 ASSESSMENT — PAIN SCALES - GENERAL
PAINLEVEL_OUTOF10: 0
PAINLEVEL_OUTOF10: 1
PAINLEVEL_OUTOF10: 0
PAINLEVEL_OUTOF10: 3
PAINLEVEL_OUTOF10: 0
PAINLEVEL_OUTOF10: 0
PAINLEVEL_OUTOF10: 1
PAINLEVEL_OUTOF10: 0
PAINLEVEL_OUTOF10: 3
PAINLEVEL_OUTOF10: 0

## 2022-04-23 ASSESSMENT — PAIN DESCRIPTION - DESCRIPTORS
DESCRIPTORS: ACHING;DISCOMFORT
DESCRIPTORS: ACHING
DESCRIPTORS: ACHING;DISCOMFORT

## 2022-04-23 ASSESSMENT — PAIN DESCRIPTION - ORIENTATION
ORIENTATION: LEFT
ORIENTATION: RIGHT
ORIENTATION: LEFT

## 2022-04-23 ASSESSMENT — PAIN DESCRIPTION - ONSET: ONSET: PROGRESSIVE

## 2022-04-23 ASSESSMENT — PAIN - FUNCTIONAL ASSESSMENT: PAIN_FUNCTIONAL_ASSESSMENT: PREVENTS OR INTERFERES SOME ACTIVE ACTIVITIES AND ADLS

## 2022-04-23 ASSESSMENT — PAIN DESCRIPTION - PAIN TYPE: TYPE: ACUTE PAIN

## 2022-04-23 ASSESSMENT — PAIN DESCRIPTION - FREQUENCY: FREQUENCY: INTERMITTENT

## 2022-04-23 NOTE — PLAN OF CARE
Problem: Falls - Risk of:  Goal: Will remain free from falls  Description: Will remain free from falls  4/22/2022 2256 by Ciarra Berman RN  Outcome: Progressing  4/22/2022 2247 by Ciarra Berman RN  Outcome: Progressing  Goal: Absence of physical injury  Description: Absence of physical injury  4/22/2022 2256 by Ciarra Berman RN  Outcome: Progressing  4/22/2022 2247 by Ciarra Berman RN  Outcome: Progressing     Problem: Safety:  Goal: Free from accidental physical injury  Description: Free from accidental physical injury  4/22/2022 2256 by Ciarra Berman RN  Outcome: Progressing  4/22/2022 2247 by Ciarra Berman RN  Outcome: Progressing  Goal: Free from intentional harm  Description: Free from intentional harm  4/22/2022 2256 by Ciarra Berman RN  Outcome: Progressing  4/22/2022 2247 by Ciarra Berman RN  Outcome: Progressing     Problem: Daily Care:  Goal: Daily care needs are met  Description: Daily care needs are met  4/22/2022 2256 by Ciarra Berman RN  Outcome: Progressing  4/22/2022 2247 by Ciarra Berman RN  Outcome: Progressing     Problem: Pain:  Goal: Patient's pain/discomfort is manageable  Description: Patient's pain/discomfort is manageable  4/22/2022 2256 by Ciarra Berman RN  Outcome: Progressing  4/22/2022 2247 by Ciarra Berman RN  Outcome: Progressing  Goal: Pain level will decrease  Description: Pain level will decrease  4/22/2022 2256 by Ciarra Berman RN  Outcome: Progressing  4/22/2022 2247 by Ciarra Berman RN  Outcome: Progressing  Goal: Control of acute pain  Description: Control of acute pain  4/22/2022 2256 by Ciarra Berman RN  Outcome: Progressing  4/22/2022 2247 by Ciarra Berman RN  Outcome: Progressing     Problem: Discharge Planning  Goal: Discharge to home or other facility with appropriate resources  4/22/2022 2256 by Ciarra Berman RN  Outcome: Progressing  4/22/2022 2247 by Ciarra Berman RN  Outcome: Progressing     Problem: ABCDS Injury Assessment  Goal: Absence of physical injury  4/22/2022 2256 by Berkley Cohn RN  Outcome: Progressing  4/22/2022 2247 by Berkley Cohn RN  Outcome: Progressing

## 2022-04-23 NOTE — PROGRESS NOTES
Hospitalist Progress Note      Name:  Jen Bailon /Age/Sex: 1935  (81 y.o. female)   MRN & CSN:  [id number] & [id number] Admission Date/Time: 2022  3:03 PM   Location:   PCP: [facility] [address] Day: 10                                               Attending Physician Dr. Chitra Zeng and Plan:   Jen Bailon is a 81 y.o.  female  who presents with Physical debility    Physical debility   Admitted swing bed program    PT/OT   Reevaluation on . Per  family wanting see her progression with therapy     Chronic atrial fibrillation. [id number] Score: 3   Continue Eliquis/Lopressor/digoxin    HFpEF/ VHD    Last TTE (2022) EF 55%, mild LVH, moderate MR/AR, severe TR/severe P HTN   Monitor for clinical symptoms of hypervolemia/decompensation   Previously on Lasix but discontinued per patient request on     Hypothyroid- continue synthroid. Last TSH 0.487 (2/10/22)     Right metatarsal fracture- XR 3/29. 2nd -4th metatarsal base fracture. Suspected acute chip fracture right lateral malleolus   Wear postop boot while OOB/WB   Follow-up with Dr. Khan [facility] states follow up on     Dementia-continue Namenda/Exelon    Depression continue Zoloft    History of interstitial lung disease follows with pulmonology at [facility]; Regular; Low Fat/Low Chol/High Fiber/2 gm Na   DVT Prophylaxis [] Lovenox, []  Heparin, [] SCDs, [x] Ambulation   GI Prophylaxis [] PPI,  [] H2 Blocker,  [] Carafate,  [x] Diet/Tube Feeds   Code Status DNR-CCA     -Patient assessment and plan discussed with supervising physician-  Subjective 2022     Jen Bailon is a 81 y.o.  female, who presented with generalized weakness .     Patient seen and examined at bedside      He was initially admitted  for concerns of sepsis and was discharged on  to swing bed program.        Ten point ROS reviewed negative, unless as noted above    Objective: Intake/Output Summary (Last 24 hours) at 4/23/2022 0936  Last data filed at 4/23/2022 0448  Gross per 24 hour   Intake 440 ml   Output --   Net 440 ml      Vitals:   Vitals:    04/22/22 1958 04/23/22 0533 04/23/22 0758 04/23/22 0818   BP: (!) 128/58  133/77    Pulse: 65  81    Resp: 17  16    Temp: 97.6 °F (36.4 °C)  97.5 °F (36.4 °C)    TempSrc: Oral  Infrared    SpO2: 94%  95% 95%   Weight:  177 lb 12.8 oz (80.6 kg)     Height:         Physical Exam: 04/23/22     Gen:  awake, alert, cooperative, no apparent distress normal body habitus  Head/Eyes:  Normocephalic atraumatic, EOMI   NECK:   symmetrical, trachea midline  LUNGS: Normal Effort/ symmetry movement   CARDIOVASCULAR:  Normal rate + murmur  ABDOMEN: Non tender, non distended, no HSM noted. MUSCULOSKELETAL: no gross deformities  NEUROLOGIC: Alert and Oriented, forgetful. Cranial nerves II-XII are grossly intact. SKIN:  no bleeding, normal skin color,  no redness. Ecchymosis to R dorsal foot at fracture site     Medications:   Medications:    Vitamin D  2,000 Units Oral Daily    rivastigmine  3 mg Oral Daily    levothyroxine  150 mcg Oral Daily    sertraline  50 mg Oral Daily    vitamin E  400 Units Oral Daily    acetaminophen  500 mg Oral TID    apixaban  5 mg Oral BID    atorvastatin  20 mg Oral Nightly    diclofenac sodium  4 g Topical 4x Daily    digoxin  125 mcg Oral Daily    memantine  10 mg Oral BID    metoprolol tartrate  12.5 mg Oral BID    miconazole   Topical BID      Infusions:   PRN Meds: ondansetron, 4 mg, Q8H PRN  polyethylene glycol, 17 g, Daily PRN  aluminum & magnesium hydroxide-simethicone, 30 mL, Q6H PRN  acetaminophen, 650 mg, Q6H PRN   Or  acetaminophen, 650 mg, Q6H PRN      LABS  Radiology this visit:  Reviewed.     Echocardiogram complete 2D with doppler with color    Result Date: 4/15/2022  Transthoracic Echocardiography Report (TTE)  Demographics   Patient Name       [facility] YOSELIN L  Date of Study       04/13/2022   Date of Birth      1935        Gender              Female   Age                81 year(s)        Race                   Patient Number     [id number]        Room Number         356   Visit Number       [id number]   Corporate ID       [id number]   Accession Number   [id number]        Julian Amin RDMS, RVT   Ordering Physician                   Interpreting        Shell King                                       Physician           Jennifer Crespo MD  Procedure Type of Study   TTE procedure:ECHOCARDIOGRAM COMPLETE 2D W DOPPLER W COLOR. Procedure Date Date: 04/13/2022 Start: 06:11 AM Study Location: Portable Technical Quality: Fair visualization Indications:Elevated BNP. Patient Status: Routine Height: 69 inches Weight: 170 pounds BSA: 1.93 m2 BMI: 25.1 kg/m2 HR: 89 bpm BP: 101/60 mmHg  Conclusions   Summary  Left ventricular systolic function is normal.  Ejection fraction is visually estimated at 55%. Mild left ventricular hypertrophy. Moderate mitral and aortic regurgiation; PHT: 366msec. Severe tricuspid regurgitation; RVSP: 68 mmHg. Severe PHTN. No evidence of any pericardial effusion. Signature   ------------------------------------------------------------------  Electronically signed by Skinny Ahmadi MD  (Interpreting physician) on 04/15/2022 at 12:04 PM  ------------------------------------------------------------------   Findings   Left Ventricle  Left ventricular systolic function is normal.  Ejection fraction is visually estimated at 55%. Mild left ventricular hypertrophy. No regional wall motion abnormalites. Left ventricle size is normal.  Cannot determine diastolic function due to arrhythmia. Left Atrium  Mildly dilated left atrium. Right Atrium  Essentially normal right atrium. Right Ventricle  Essentially normal right ventricle.    Aortic Valve Moderate aortic regurgiation; PHT: 366msec. Mitral Valve  Moderate mitral regurgitation. Tricuspid Valve  Severe tricuspid regurgitation; RVSP: 68 mmHg. Severe PHTN. Pulmonic Valve  The pulmonic valve was not well visualized. Pericardial Effusion  No evidence of any pericardial effusion. Pleural Effusion  No evidence of pleural effusion. Miscellaneous  Dilated IVC with normal respiratory collapse.   M-Mode/2D Measurements & Calculations   LV Diastolic Dimension:  LV Systolic Dimension:  LA Dimension: 3.6 cmAO Root  4.2 cm                   2.5 cm                  Dimension: 3.2 cmLA Area:  LV FS:40.5 %             LV Volume Diastolic: 43 59.3 cm2  LV PW Diastolic: 1.2 cm  ml  LV PW Systolic: 1.3 cm   LV Volume Systolic: 19  Septum Diastolic: 1.1 cm ml  Septum Systolic: 1.7 cm  LV EDV/LV EDV Index: 43 RV Diastolic Dimension: 3.3  CO: 4.74 l/min           ml/22 m2LV ESV/LV ESV   cm  CI: 2.46 l/m*m2          Index: 19 ml/10 m2                           EF Calculated (A4C):    LA/Aorta: 7.17  LV Area Diastolic: 79.6  45.4 %  cm2                      EF Calculated (2D):     LA volume/Index: 59.7 ml  LV Area Systolic: 67.6   93.3 %                  /31m2  cm2                           LV Length: 6.68 cm                            LVOT: 1.9 cm  Doppler Measurements & Calculations   MV Peak E-Wave: 101  AV Peak Velocity: 113 cm/s    LVOT Peak Velocity: 99  cm/s                 AV Peak Gradient: 5.11 mmHg   cm/s                       AV Mean Velocity: 80.3 cm/s   LVOT Mean Velocity: 72  MV Peak Gradient:    AV Mean Gradient: 3 mmHg      cm/s  4.08 mmHg            AV VTI: 20.9 cm               LVOT Peak Gradient: 4                       AV Area (Continuity):2.55 cm2 mmHgLVOT Mean Gradient: 2  MV P1/2t: 33 msec                                  mmHg  MVA by PHT:6.67 cm2  LVOT VTI: 18.8 cm             Estimated RVSP: 56 mmHg                       AV P1/2t: 366 msec            Estimated RAP:8 mmHg Estimated PASP: 65.15 mmHg                                                      TR Velocity:378 cm/s                                                     TR Gradient:57.15 mmHg      XR CHEST (2 VW)    Result Date: 4/13/2022  EXAMINATION: TWO XRAY VIEWS OF THE CHEST 4/13/2022 12:55 pm COMPARISON: Chest radiograph 04/11/2022 HISTORY: ORDERING SYSTEM PROVIDED HISTORY: eval chf TECHNOLOGIST PROVIDED HISTORY: Reason for exam:->eval chf Additional signs and symptoms: eval chf FINDINGS: Similar cardiomegaly. Interval decrease in size of bilateral pleural effusions with improved aeration at the lung bases. Significant interval improvement in interstitial pulmonary edema. No acute osseous abnormality. No pneumothorax. Significant interval improvement in bilateral interstitial pulmonary edema. Decreased size of bilateral pleural effusions with improved aeration at the lung bases. Similar cardiomegaly. XR ANKLE RIGHT (MIN 3 VIEWS)    Result Date: 3/29/2022  EXAMINATION: THREE XRAY VIEWS OF THE RIGHT ANKLE; THREE XRAY VIEWS OF THE RIGHT FOOT 3/29/2022 1:40 pm COMPARISON: None. HISTORY: ORDERING SYSTEM PROVIDED HISTORY: fall TECHNOLOGIST PROVIDED HISTORY: Reason for exam:->fall Reason for Exam: fall Additional signs and symptoms: lateral swelling FINDINGS: Right ankle: Three views of the right ankle were reviewed. Small ossicles at the tip of the lateral malleolus with slight cortical irregularity of the tip of the lateral malleolus most suggestive of acute avulsion fracture even trauma history as well as lateral soft tissue edema. Anatomic alignment at the ankle mortise. Small posterior calcaneal enthesophyte. Mild hindfoot and midfoot osteoarthritis. Right foot: Three views of the right foot were reviewed. Acute fractures of the 2nd, 3rd, and 4th metatarsal bases. Grossly anatomic alignment of the midfoot on these nonweightbearing views.   Slight amorphous calcification along the medial cortex of the 1st metatarsal head is likely chronic. Moderate degenerative change of the 1st MTP joint. Mild midfoot osteoarthritis. Soft tissue edema of the foot. 1. Suspect acute chip fracture of the right lateral malleolus with adjacent soft tissue edema. 2. Acute fractures of the right 2nd, 3rd, and 4th metatarsal bases. XR FOOT RIGHT (MIN 3 VIEWS)    Result Date: 4/13/2022  EXAMINATION: THREE XRAY VIEWS OF THE RIGHT FOOT 4/13/2022 12:55 pm COMPARISON: Radiographs of the foot of 03/29/2022 HISTORY: ORDERING SYSTEM PROVIDED HISTORY: re eval of previous fx TECHNOLOGIST PROVIDED HISTORY: Reason for exam:->re eval of previous fx Additional signs and symptoms: re eval of previous fx FINDINGS: Diffuse osteopenia. Overall unchanged alignment of fractures at the bases of the 3rd and 4th metatarsals. Decreased conspicuity of the fracture lines with associated sclerosis and remodeling is consistent with interval healing. No new fracture identified. No traumatic malalignment. Mild 1st MTP degenerative changes. Soft tissue swelling along the dorsum of the foot. Scattered vascular calcifications. Progressive healing and unchanged alignment of fractures of the base of the 3rd and 4th metatarsals     XR FOOT RIGHT (MIN 3 VIEWS)    Result Date: 3/29/2022  EXAMINATION: THREE XRAY VIEWS OF THE RIGHT ANKLE; THREE XRAY VIEWS OF THE RIGHT FOOT 3/29/2022 1:40 pm COMPARISON: None. HISTORY: ORDERING SYSTEM PROVIDED HISTORY: fall TECHNOLOGIST PROVIDED HISTORY: Reason for exam:->fall Reason for Exam: fall Additional signs and symptoms: lateral swelling FINDINGS: Right ankle: Three views of the right ankle were reviewed. Small ossicles at the tip of the lateral malleolus with slight cortical irregularity of the tip of the lateral malleolus most suggestive of acute avulsion fracture even trauma history as well as lateral soft tissue edema. Anatomic alignment at the ankle mortise. Small posterior calcaneal enthesophyte.   Mild hindfoot and midfoot osteoarthritis. Right foot: Three views of the right foot were reviewed. Acute fractures of the 2nd, 3rd, and 4th metatarsal bases. Grossly anatomic alignment of the midfoot on these nonweightbearing views. Slight amorphous calcification along the medial cortex of the 1st metatarsal head is likely chronic. Moderate degenerative change of the 1st MTP joint. Mild midfoot osteoarthritis. Soft tissue edema of the foot. 1. Suspect acute chip fracture of the right lateral malleolus with adjacent soft tissue edema. 2. Acute fractures of the right 2nd, 3rd, and 4th metatarsal bases. XR CHEST PORTABLE    Result Date: 4/11/2022  EXAMINATION: ONE XRAY VIEW OF THE CHEST 4/11/2022 3:31 pm COMPARISON: 11/11/2019 HISTORY: ORDERING SYSTEM PROVIDED HISTORY: ? pneumonia TECHNOLOGIST PROVIDED HISTORY: Reason for exam:->? pneumonia FINDINGS: Increased interstitial opacities noted, increased considerably when compared to the previous exam.  More focal opacities are noted within the lower lungs. In the small bilateral pleural effusions appear to be present. The cardial pericardial silhouette is unremarkable. Increased interstitial opacity, with more focal opacities at the lung bases. In this case, interstitial edema and multifocal pneumonia are both considered. CTA PULMONARY W CONTRAST    Result Date: 4/12/2022  EXAMINATION: CTA OF THE CHEST, 4/12/2022 11:19 am TECHNIQUE: CTA of the chest was performed after the administration of intravenous contrast.  Multiplanar reformatted images are provided for review. MIP images are provided for review. Dose modulation, iterative reconstruction, and/or weight based adjustment of the mA/kV was utilized to reduce the radiation dose to as low as reasonably achievable. COMPARISON: 11/22/2019 HISTORY: ORDERING SYSTEM PROVIDED HISTORY:  Rule out PE TECHNOLOGIST PROVIDED HISTORY: Reason for Exam:  Rule out PE FINDINGS: Pulmonary Arteries:  The pulmonary arteries are adequately opacified. No filling defects are identified within the pulmonary arteries to suggest pulmonary embolism. Main pulmonary artery is normal in caliber. Mediastinum:  Thyroid is either atrophic or has been previously resected. There are multiple enlarged mediastinal lymph nodes identified. These have increased in size when compared to the previous exam.  For example, a right paratracheal/precarinal lymph node measures 1.4 cm in short axis (image 150), previously 0.9 cm. There is a small to moderate-sized hiatal hernia. The esophagus is otherwise unremarkable. Cardiac chambers unremarkable. No pericardial effusion. Thoracic aorta is normal in caliber without evidence of dissection. Lungs/pleura: There is increased interstitial opacity identified within the periphery the lungs associated with ground-glass opacity, similar when compared to the previous exam.  The pleural base mass-like opacities seen on the left are no longer detected. Small bilateral pleural effusions are noted, similar when compared to the previous exam.  Airways are patent. Upper Abdomen: Unremarkable. Soft Tissues/Bones: Visualized extrathoracic soft tissues unremarkable. No acute bony abnormality identified. No evidence of pulmonary embolism or aortic dissection. Irregular interstitial opacities within the periphery of the lungs bilaterally, associated with ground-glass opacity. Much or all of this likely relates to interstitial lung disease with fibrosis. With that said, inflammatory/infectious interstitial pneumonitis would be considered as well. A component of pulmonary edema would also be a consideration, especially given presence of small bilateral pleural effusions. There are enlarged mediastinal lymph nodes which have increased in size when compared to the previous exam.  While these could be reactive, neoplasm must be considered. Short-term follow-up in approximately 3 months is recommended to ensure resolution. Electronically signed by [facility] Farah CNP on 4/23/2022 at 9:36 AM

## 2022-04-23 NOTE — PROGRESS NOTES
Patient did therapy today.  No new complaints    No acute distress  Sitting in chair  Has right knee wrapped

## 2022-04-23 NOTE — FLOWSHEET NOTE
Physical Therapy Treatment Note   Date: 2022 Room: [unfilled]   Name: Elisa Rios : 1935   MRN: [id number] Admission Date:2022    Primary Problem:   Past Medical History:   Diagnosis Date    Acquired hypothyroidism 2019    Patient is taking Synthroid    Chronic midline low back pain without sciatica 2019    Dementia without behavioral disturbance ([address] 10/22/2019    Patient has dementia. She is taking Namenda Patient is seeing urologist Dr. Ernie Boyd Gait disturbance 10/22/2019    Patient uses walker to ambulate    History of TIA (transient ischemic attack) 2019    Patient has a history of TIA and also recently may have had TIA. Patient is on Eliquis. Sees neurologist Dr. Ernie Boyd Major depressive disorder with single episode, in full remission ([facility] [address] 2019    Patient is taking Zoloft and that is helping.  Mixed hyperlipidemia 2019    Patient is on simvastatin    Persistent atrial fibrillation ([address] 10/22/2019    A. fib on recently in 2019 . Patient is on metoprolol 25 mg twice a day and Eliquis    PFO (patent foramen ovale) 2019    PFO seen on echocardiogram in 2016    Pneumonia due to infectious organism 10/22/2019    Patient had pneumonia and pleural effusion during admission in 2019. A repeat CT scan of the chest will be done Patient is also seen pulmonologist     Past Surgical History:   Procedure Laterality Date    HYSTERECTOMY       Rehabilitation Diagnosis: impaired mobility   The primary encounter diagnosis was Urinary tract infection without hematuria, site unspecified. Diagnoses of Septicemia ([address] and Pneumonia of both lower lobes due to infectious organism were also pertinent to this visit.     Restrictions/Precautions: post op shoe on R foot when walking    Subjective:patient agreeable to PT  Initial Pain level:0/10 back    Goals:                   Short Term Goals  Time Frame for Short term goals: 1 week or until discharge:  Short term goal 1: Pt will demonstrate the ability to safely perform bed mobility with supervision. Short term goal 2: Pt will demonstrate the ability to safely transfer sit to stand and stand to sit from 3 different height surfaces with S and proper hand placement with VCs only. Short term goal 3: Pt will demonstrate the ability to safely ambulate 150' with a RW and S with no rest breaks or instances of letting go of the walker for nearby furniture. Plan of Care:             Times per week: Mon-Sat 5+/wk            Current Treatment Recommendations: Sergio Meredith Re-education,Cognitive Reorientation,Patient/Caregiver Education & Training,ROM,ADL/Self-care Training,Equipment Evaluation, Education, & procurement,Balance Training,IADL Training,Gait Training,Home Exercise Program,Functional Mobility Training,Cognitive/Perceptual Training,Stair training,Safety Education & Training,Positioning      Objective Findings:    Date: 4/18/22 Date: 4/19/22 Date:   4-20-22 Date:  4-21-22 Date:   4/22/2022 4/23/22   Bed mobility Mod I x       Sit to stand transfer CGA of 1 and VC's for safety SBA x5 throughout session Cg to initiate due to distracted Sba, impulsive, taking very short rest breaks and standing without therapist SBA  SBA   Stand to sit transfer CGA of 1 and VC's for safety SBA x5 throughout session Cg and vc for walker safety Sba, cues for walker safety, side sits to chair due to fatigue SBA SBA   Commode transfer CGA-Min A of 1 w/VC's for safety/sequencing x x x x    Standing tolerance For amb and georgette care For amb 1-2 mins for gait training 1-2 mins with dyn standing balance.  For ambulation    Ambulation W/std walker and CGA-Min A of 1 20ft and 10ft as pt is impulsive and was grabbing the end of the bed to walk instead of the walker and c/o her knee giving out on her as she amb to the bathroom  Amb w/std walker and CGA of 1, SBA of another for WC follow 114ft total w/4 rest breaks  Pt reports twice her right knee giving out on her due to pain Gait training with standard walker with cg 15 ft,standing rest break +15 ft,  seated rest break, +20 ft standing rest break +20 ft seated rest break. Pt reluctant to increase distance without whc behind. Standing rest breaks due to SOB and fatigue. States she can not change flexed posture. Gait training, standard walker 81+32+21+24+75. Seated rest breaks due to fatigue, sob and pain. encouraged to take longer breaks to increased ability to walk longer distance but impulsively stands back up after 30-45 seconds. Gait training with standard walker 61'+71'+98'+61'    Seated rest breaks due to fatigue. Patient very impulsive. Almost sitting before chair is behind her. Also pushes walker instead of lifting it at times. Gait training with standard walker 33'+'+30'+10'x2   Stairs x x         Exercises:   Exercise/Equipment/Modalities  Date: 4/19/22 Date: 4-20-22   Date: 4/22/2022 Date: 4/23/22   Seated marches 2x10 B X 20 BLE     LAQ 2x10 B x20 BLE     Hip abd  2x10 reclined     SLR  2 x 10 reclined     NU step   X 8.5 mins =430 steps 10 min   600 steps 10 min   590steps                                          Modality/intervention used:   [x ] Therapeutic Exercise   [ ] Modalities:   [x ] Therapeutic Activity     [ ] Ultrasound   [ ] Elec Stim   [x ] Gait Training     [ ] Cervical Traction  [ ] Lumbar Traction   [ ] Neuromuscular Re-education   [ ] Cold/hotpack  [ ] Iontophoresis   [ ] Instruction in HEP     [ ] Vasopneumatic   [ ] Manual Therapy   [ ] Aquatic Therapy     Other Therapeutic Activities:  x  Home Exercise Program/Education provided to patient: BLE HEP hand out selected and printed for pt with inst to completed in bed or recliner.      Manual Treatments: x    Communication with other providers: nurse cleared for therapy    Adverse reactions to treatment: SOB, not confident for longer distances.     Treatment/Activity Tolerance:   [ x] Patient limited by fatigue [x ] Patient limited by pain [ ] Patient limited by other medical complications [ ] Patient tolerated therapy well  [ ] Other:     Post Tx Pain Rating: does not rate /10     Safety Precautions:   [ ] left in bed  [x ] left in chair [x ] call light within reach  [ ] bed alarm on  [x ] personal alarm on  [ ] other staff present:    Plan:   [x ] Continue per plan of care [ ] Saranya El current plan   [ ] Plan of care initiated [ ] Hold pending MD visit [ ] Discharge     Plan for Next Session:     Time In:  891  Time Out:  945  Total Treatment Minutes:30Billed Units=  1 Gait , 1 TE    Signed: Ally Cody PT,  4/23/2022, 10:37 AM

## 2022-04-23 NOTE — PLAN OF CARE
Problem: Falls - Risk of:  Goal: Will remain free from falls  Description: Will remain free from falls  Outcome: Progressing  Goal: Absence of physical injury  Description: Absence of physical injury  Outcome: Progressing     Problem: Safety:  Goal: Free from accidental physical injury  Description: Free from accidental physical injury  Outcome: Progressing  Goal: Free from intentional harm  Description: Free from intentional harm  Outcome: Progressing     Problem: Daily Care:  Goal: Daily care needs are met  Description: Daily care needs are met  Outcome: Progressing     Problem: Pain:  Goal: Patient's pain/discomfort is manageable  Description: Patient's pain/discomfort is manageable  Outcome: Progressing  Goal: Pain level will decrease  Description: Pain level will decrease  Outcome: Progressing  Goal: Control of acute pain  Description: Control of acute pain  Outcome: Progressing     Problem: Discharge Planning  Goal: Discharge to home or other facility with appropriate resources  Outcome: Progressing     Problem: ABCDS Injury Assessment  Goal: Absence of physical injury  Outcome: Progressing

## 2022-04-24 PROCEDURE — 6370000000 HC RX 637 (ALT 250 FOR IP): Performed by: FAMILY MEDICINE

## 2022-04-24 PROCEDURE — 1200000002 HC SEMI PRIVATE SWING BED

## 2022-04-24 PROCEDURE — 6370000000 HC RX 637 (ALT 250 FOR IP): Performed by: NURSE PRACTITIONER

## 2022-04-24 PROCEDURE — 6370000000 HC RX 637 (ALT 250 FOR IP): Performed by: INTERNAL MEDICINE

## 2022-04-24 RX ADMIN — MEMANTINE 10 MG: 10 TABLET ORAL at 09:27

## 2022-04-24 RX ADMIN — Medication 400 UNITS: at 09:28

## 2022-04-24 RX ADMIN — ACETAMINOPHEN 500 MG: 500 TABLET ORAL at 08:19

## 2022-04-24 RX ADMIN — METOPROLOL TARTRATE 12.5 MG: 25 TABLET, FILM COATED ORAL at 09:28

## 2022-04-24 RX ADMIN — APIXABAN 5 MG: 5 TABLET, FILM COATED ORAL at 09:27

## 2022-04-24 RX ADMIN — ACETAMINOPHEN 500 MG: 500 TABLET ORAL at 13:07

## 2022-04-24 RX ADMIN — METOPROLOL TARTRATE 12.5 MG: 25 TABLET, FILM COATED ORAL at 20:31

## 2022-04-24 RX ADMIN — DICLOFENAC SODIUM 4 G: 10 GEL TOPICAL at 16:29

## 2022-04-24 RX ADMIN — APIXABAN 5 MG: 5 TABLET, FILM COATED ORAL at 20:31

## 2022-04-24 RX ADMIN — RIVASTIGMINE TARTRATE 3 MG: 1.5 CAPSULE ORAL at 09:28

## 2022-04-24 RX ADMIN — MEMANTINE 10 MG: 10 TABLET ORAL at 20:31

## 2022-04-24 RX ADMIN — SERTRALINE 50 MG: 50 TABLET, FILM COATED ORAL at 09:27

## 2022-04-24 RX ADMIN — DICLOFENAC SODIUM 4 G: 10 GEL TOPICAL at 09:28

## 2022-04-24 RX ADMIN — Medication 2000 UNITS: at 09:27

## 2022-04-24 RX ADMIN — ACETAMINOPHEN 500 MG: 500 TABLET ORAL at 20:31

## 2022-04-24 RX ADMIN — DICLOFENAC SODIUM 4 G: 10 GEL TOPICAL at 13:04

## 2022-04-24 RX ADMIN — DIGOXIN 125 MCG: 125 TABLET ORAL at 09:28

## 2022-04-24 RX ADMIN — ATORVASTATIN CALCIUM 20 MG: 20 TABLET, FILM COATED ORAL at 20:31

## 2022-04-24 RX ADMIN — LEVOTHYROXINE SODIUM 150 MCG: 0.15 TABLET ORAL at 06:30

## 2022-04-24 ASSESSMENT — PAIN SCALES - GENERAL
PAINLEVEL_OUTOF10: 0

## 2022-04-24 ASSESSMENT — PAIN DESCRIPTION - PROGRESSION

## 2022-04-24 NOTE — PLAN OF CARE
Problem: Falls - Risk of:  Goal: Will remain free from falls  Description: Will remain free from falls  Outcome: Progressing  Goal: Absence of physical injury  Description: Absence of physical injury  Outcome: Progressing     Problem: Safety:  Goal: Free from accidental physical injury  Description: Free from accidental physical injury  Outcome: Progressing  Goal: Free from intentional harm  Description: Free from intentional harm  Outcome: Progressing     Problem: Daily Care:  Goal: Daily care needs are met  Description: Daily care needs are met  Outcome: Adequate for Discharge     Problem: Pain:  Description: Pain management should include both nonpharmacologic and pharmacologic interventions.   Goal: Patient's pain/discomfort is manageable  Description: Patient's pain/discomfort is manageable  Outcome: Adequate for Discharge  Goal: Pain level will decrease  Description: Pain level will decrease  Outcome: Adequate for Discharge  Goal: Control of acute pain  Description: Control of acute pain  Outcome: Adequate for Discharge     Problem: Discharge Planning  Goal: Discharge to home or other facility with appropriate resources  Outcome: Progressing     Problem: ABCDS Injury Assessment  Goal: Absence of physical injury  Outcome: Adequate for Discharge

## 2022-04-24 NOTE — PLAN OF CARE
Problem: Falls - Risk of:  Goal: Will remain free from falls  Description: Will remain free from falls  4/23/2022 2059 by Moses Manning RN  Outcome: Progressing  4/23/2022 1140 by Marquise Rolon RN  Outcome: Progressing  Goal: Absence of physical injury  Description: Absence of physical injury  4/23/2022 2059 by Moses Manning RN  Outcome: Progressing  4/23/2022 1140 by Marquise Rolon RN  Outcome: Progressing     Problem: Safety:  Goal: Free from accidental physical injury  Description: Free from accidental physical injury  4/23/2022 2059 by Moses Manning RN  Outcome: Progressing  4/23/2022 1140 by Marquise Rolon RN  Outcome: Progressing  Goal: Free from intentional harm  Description: Free from intentional harm  4/23/2022 2059 by Moses Manning RN  Outcome: Progressing  4/23/2022 1140 by Marquise Rolon RN  Outcome: Progressing     Problem: Daily Care:  Goal: Daily care needs are met  Description: Daily care needs are met  4/23/2022 2059 by Moses Manning RN  Outcome: Progressing  4/23/2022 1140 by Marquise Rolon RN  Outcome: Progressing     Problem: Pain:  Goal: Patient's pain/discomfort is manageable  Description: Patient's pain/discomfort is manageable  4/23/2022 2059 by Moses Manning RN  Outcome: Progressing  4/23/2022 1140 by Marquise Rolon RN  Outcome: Progressing  Goal: Pain level will decrease  Description: Pain level will decrease  4/23/2022 2059 by Moses Manning RN  Outcome: Progressing  4/23/2022 1140 by Marquise Rolon RN  Outcome: Progressing  Goal: Control of acute pain  Description: Control of acute pain  4/23/2022 2059 by Moses Manning RN  Outcome: Progressing  4/23/2022 1140 by Marquise Rolon RN  Outcome: Progressing     Problem: Discharge Planning  Goal: Discharge to home or other facility with appropriate resources  4/23/2022 2059 by Moses Manning RN  Outcome: Progressing  4/23/2022 1140 by Marquise Rolon RN  Outcome: Progressing Problem: ABCDS Injury Assessment  Goal: Absence of physical injury  4/23/2022 2059 by Moses Manning RN  Outcome: Progressing  4/23/2022 1140 by Marquise Rolon RN  Outcome: Progressing

## 2022-04-25 ENCOUNTER — CARE COORDINATION (OUTPATIENT)
Dept: CARE COORDINATION | Age: 87
End: 2022-04-25

## 2022-04-25 PROCEDURE — 6370000000 HC RX 637 (ALT 250 FOR IP): Performed by: NURSE PRACTITIONER

## 2022-04-25 PROCEDURE — 1200000002 HC SEMI PRIVATE SWING BED

## 2022-04-25 PROCEDURE — 6370000000 HC RX 637 (ALT 250 FOR IP): Performed by: FAMILY MEDICINE

## 2022-04-25 PROCEDURE — 6370000000 HC RX 637 (ALT 250 FOR IP): Performed by: INTERNAL MEDICINE

## 2022-04-25 RX ADMIN — SERTRALINE 50 MG: 50 TABLET, FILM COATED ORAL at 08:45

## 2022-04-25 RX ADMIN — APIXABAN 5 MG: 5 TABLET, FILM COATED ORAL at 20:40

## 2022-04-25 RX ADMIN — APIXABAN 5 MG: 5 TABLET, FILM COATED ORAL at 08:43

## 2022-04-25 RX ADMIN — MEMANTINE 10 MG: 10 TABLET ORAL at 20:40

## 2022-04-25 RX ADMIN — LEVOTHYROXINE SODIUM 150 MCG: 0.15 TABLET ORAL at 06:23

## 2022-04-25 RX ADMIN — ACETAMINOPHEN 500 MG: 500 TABLET ORAL at 08:45

## 2022-04-25 RX ADMIN — ACETAMINOPHEN 500 MG: 500 TABLET ORAL at 20:39

## 2022-04-25 RX ADMIN — MEMANTINE 10 MG: 10 TABLET ORAL at 08:45

## 2022-04-25 RX ADMIN — METOPROLOL TARTRATE 12.5 MG: 25 TABLET, FILM COATED ORAL at 20:39

## 2022-04-25 RX ADMIN — Medication 2000 UNITS: at 08:43

## 2022-04-25 RX ADMIN — ATORVASTATIN CALCIUM 20 MG: 20 TABLET, FILM COATED ORAL at 20:39

## 2022-04-25 RX ADMIN — RIVASTIGMINE TARTRATE 3 MG: 1.5 CAPSULE ORAL at 08:43

## 2022-04-25 RX ADMIN — Medication 400 UNITS: at 08:43

## 2022-04-25 RX ADMIN — DIGOXIN 125 MCG: 125 TABLET ORAL at 08:43

## 2022-04-25 RX ADMIN — METOPROLOL TARTRATE 12.5 MG: 25 TABLET, FILM COATED ORAL at 08:45

## 2022-04-25 ASSESSMENT — PAIN DESCRIPTION - PROGRESSION
CLINICAL_PROGRESSION: GRADUALLY IMPROVING

## 2022-04-25 ASSESSMENT — PAIN SCALES - GENERAL
PAINLEVEL_OUTOF10: 0
PAINLEVEL_OUTOF10: 0

## 2022-04-25 ASSESSMENT — PAIN SCALES - WONG BAKER
WONGBAKER_NUMERICALRESPONSE: 0
WONGBAKER_NUMERICALRESPONSE: 0

## 2022-04-25 NOTE — FLOWSHEET NOTE
1125:  Stopped in to talk to patient about her therapy today. Patient to leave facility at 1230 for two appointments and will not be back until 1630. Informed patient that she most likely will not get therapy today. Patient stated that she would get enough therapy with going to her appointments. Will resume PT tomorrow.   Elsa Stauffer, PTA

## 2022-04-25 NOTE — PLAN OF CARE
Problem: Falls - Risk of:  Goal: Will remain free from falls  Description: Will remain free from falls  4/24/2022 2111 by Yuliana Brown LPN  Outcome: Progressing  4/24/2022 2105 by Yuliana Brown LPN  Outcome: Progressing  4/24/2022 1258 by Maggie Richardson RN  Outcome: Progressing  Goal: Absence of physical injury  Description: Absence of physical injury  4/24/2022 2111 by Yuliana Brown LPN  Outcome: Progressing  4/24/2022 2105 by Yuliana Brown LPN  Outcome: Progressing  4/24/2022 1258 by Maggie Richardson RN  Outcome: Progressing     Problem: Safety:  Goal: Free from accidental physical injury  Description: Free from accidental physical injury  4/24/2022 2111 by Yuliana Brown LPN  Outcome: Progressing  4/24/2022 2105 by Yuliana Brown LPN  Outcome: Progressing  4/24/2022 1258 by Maggie Richardson RN  Outcome: Progressing  Goal: Free from intentional harm  Description: Free from intentional harm  4/24/2022 2111 by Yuliana Brown LPN  Outcome: Progressing  4/24/2022 2105 by Yuliana Brown LPN  Outcome: Progressing  4/24/2022 1258 by Maggie Richardson RN  Outcome: Progressing     Problem: Daily Care:  Goal: Daily care needs are met  Description: Daily care needs are met  4/24/2022 2111 by Yuliana Brown LPN  Outcome: Progressing  4/24/2022 2105 by Yuliana Brown LPN  Outcome: Progressing  4/24/2022 1258 by Maggie Richardson RN  Outcome: Adequate for Discharge     Problem: Pain:  Goal: Patient's pain/discomfort is manageable  Description: Patient's pain/discomfort is manageable  4/24/2022 2111 by Yuliana Brown LPN  Outcome: Progressing  4/24/2022 2105 by Yuliana Brown LPN  Outcome: Progressing  4/24/2022 1258 by Maggie Richardson RN  Outcome: Adequate for Discharge  Goal: Pain level will decrease  Description: Pain level will decrease  4/24/2022 2111 by Yuliana Brown LPN  Outcome: Progressing  4/24/2022 2105 by Yuliana Brown LPN  Outcome: Progressing  4/24/2022 1258 by Maggie Richardson RN  Outcome: Adequate for Discharge  Goal: Control of acute pain  Description: Control of acute pain  4/24/2022 2111 by Funmilayo Mahoney LPN  Outcome: Progressing  4/24/2022 2105 by Funmilayo Mahoney LPN  Outcome: Progressing  4/24/2022 1258 by Ivon Rosales RN  Outcome: Adequate for Discharge     Problem: Discharge Planning  Goal: Discharge to home or other facility with appropriate resources  4/24/2022 2111 by Funmilayo Mahoney LPN  Outcome: Progressing  4/24/2022 2105 by Funmilayo Mahoney LPN  Outcome: Progressing  4/24/2022 1258 by Iovn Rosales RN  Outcome: Progressing     Problem: ABCDS Injury Assessment  Goal: Absence of physical injury  4/24/2022 2111 by Funmilayo Mahoney LPN  Outcome: Progressing  4/24/2022 2105 by Funmilayo Mahoney LPN  Outcome: Progressing  4/24/2022 1258 by Ivon Rosales RN  Outcome: Adequate for Discharge

## 2022-04-25 NOTE — PLAN OF CARE
Problem: Falls - Risk of:  Goal: Will remain free from falls  Description: Will remain free from falls  4/24/2022 2105 by Susan Haji LPN  Outcome: Progressing  4/24/2022 1258 by Willie Parisi RN  Outcome: Progressing  Goal: Absence of physical injury  Description: Absence of physical injury  4/24/2022 2105 by Susan Haji LPN  Outcome: Progressing  4/24/2022 1258 by Willie Parisi RN  Outcome: Progressing     Problem: Safety:  Goal: Free from accidental physical injury  Description: Free from accidental physical injury  4/24/2022 2105 by Susan Haji LPN  Outcome: Progressing  4/24/2022 1258 by Willie Parisi RN  Outcome: Progressing  Goal: Free from intentional harm  Description: Free from intentional harm  4/24/2022 2105 by Susan Haji LPN  Outcome: Progressing  4/24/2022 1258 by Willie Parisi RN  Outcome: Progressing     Problem: Daily Care:  Goal: Daily care needs are met  Description: Daily care needs are met  4/24/2022 2105 by Susan Haji LPN  Outcome: Progressing  4/24/2022 1258 by Willie Parisi RN  Outcome: Adequate for Discharge     Problem: Pain:  Goal: Patient's pain/discomfort is manageable  Description: Patient's pain/discomfort is manageable  4/24/2022 2105 by Susan Haji LPN  Outcome: Progressing  4/24/2022 1258 by Willie Parisi RN  Outcome: Adequate for Discharge  Goal: Pain level will decrease  Description: Pain level will decrease  4/24/2022 2105 by Susan Haji LPN  Outcome: Progressing  4/24/2022 1258 by Willie Parisi RN  Outcome: Adequate for Discharge  Goal: Control of acute pain  Description: Control of acute pain  4/24/2022 2105 by Susan Haji LPN  Outcome: Progressing  4/24/2022 1258 by Willie Parisi RN  Outcome: Adequate for Discharge     Problem: Discharge Planning  Goal: Discharge to home or other facility with appropriate resources  4/24/2022 2105 by Susan Haji LPN  Outcome: Progressing  4/24/2022 1258 by Willie Parisi RN  Outcome: Progressing     Problem: ABCDS Injury Assessment  Goal: Absence of physical injury  4/24/2022 2105 by Jsoe Capps LPN  Outcome: Progressing  4/24/2022 1258 by Daren Dunlap RN  Outcome: Adequate for Discharge

## 2022-04-25 NOTE — PROGRESS NOTES
Patient's daughter here to  [facility] for her outpatient appointments. She has an appointment at [facility] in [facility] and another appointment around 2pm. She is going to see cardiology and pulmonology today.  Release signed by the daughter, understanding that her mother is in her care when they leave for the appointments until she gets back to the hospital.

## 2022-04-25 NOTE — PROGRESS NOTES
Only complaints is she cant find her hearing aid.  Right foot is feeling fine    No acute distress  Faint expiratory wheeze, but seems to clear up with cough  [id number] rrr  Soft nt nd  Mild tenderness below right ankle

## 2022-04-25 NOTE — CARE COORDINATION
Chart reviewed. CM met with patient and her mother at bedside and patient is getting ready for an appointment today. She states that therapy is going very well and she will talk about plan of care when she gets back from appointment.

## 2022-04-25 NOTE — PROGRESS NOTES
Occupational Therapy  Pt not seen on this date to patient out of facility for medical appointments.   Arsalan SINGH/CYNTHIA [id number]

## 2022-04-25 NOTE — PROGRESS NOTES
Hospitalist Progress Note      Name:  Mckay Mcnair /Age/Sex: 1935  (81 y.o. female)   MRN & CSN:  [id number] & [id number] Admission Date/Time: 2022  3:03 PM   Location:   PCP: Roslyn Riedel, [address] Day: 12                                               Attending Physician Dr. Katie Duffy and Plan:   Mckay Mcnair is a 81 y.o.  female  who presents with Physical debility    Physical debility   Admitted swing bed program    PT/OT      Chronic atrial fibrillation. [id number] Score: 3   Continue Eliquis/Lopressor/digoxin    HFpEF/ VHD    Last TTE (2022) EF 55%, mild LVH, moderate MR/AR, severe TR/severe P HTN   Monitor for clinical symptoms of hypervolemia/decompensation   Previously on Lasix but discontinued per patient request on    Cardiology appointment today in [place]     Hypothyroid- continue synthroid. Last TSH 0.487 (2/10/22)     Right metatarsal fracture- XR 3/29. 2nd -4th metatarsal base fracture. Suspected acute chip fracture right lateral malleolus   Wear postop boot while OOB/WB   Follow-up with Dr. Matias today     Dementia-continue Namenda/Exelon    Depression continue Zoloft    History of interstitial lung disease follows with pulmonology at [facility]; Regular; Low Fat/Low Chol/High Fiber/2 gm Na   DVT Prophylaxis [] Lovenox, []  Heparin, [] SCDs, [x] Ambulation   GI Prophylaxis [] PPI,  [] H2 Blocker,  [] Carafate,  [x] Diet/Tube Feeds   Code Status DNR-CCA     -Patient assessment and plan discussed with supervising physician-  Subjective 2022     Mckay Mcnair is a 81 y.o.  female, who presented with generalized weakness . Patient seen and examined at bedside    Denies new concerns.  Seems anxious regarding follow up appointments   Lost hearing aid in bed but found by nursing prior to appointments         Ten point ROS reviewed negative, unless as noted above    Objective: Intake/Output Summary (Last 24 hours) at 4/25/2022 1223  Last data filed at 4/24/2022 1746  Gross per 24 hour   Intake 200 ml   Output --   Net 200 ml      Vitals:   Vitals:    04/24/22 0804 04/24/22 2031 04/25/22 0600 04/25/22 0645   BP: (!) 118/55 (!) 109/49  114/64   Pulse: 70 77  79   Resp: 15 16  18   Temp: 98.4 °F (36.9 °C) 98 °F (36.7 °C)  96.4 °F (35.8 °C)   TempSrc: Oral Infrared  Infrared   SpO2: 96% 94%  97%   Weight:   176 lb 12.8 oz (80.2 kg)    Height:         Physical Exam: 04/25/22     Gen:  awake, alert, cooperative, no apparent distress normal body habitus  Head/Eyes:  Normocephalic atraumatic, EOMI   NECK:   symmetrical, trachea midline  LUNGS: Normal Effort/ symmetry movement   CARDIOVASCULAR:  Normal rate + murmur  ABDOMEN: Non tender, non distended, no HSM noted. MUSCULOSKELETAL: no gross deformities  NEUROLOGIC: Alert and Oriented, forgetful. Cranial nerves II-XII are grossly intact. SKIN:  no bleeding, normal skin color,  no redness. Ecchymosis to R dorsal foot at fracture site     Medications:   Medications:    Vitamin D  2,000 Units Oral Daily    rivastigmine  3 mg Oral Daily    levothyroxine  150 mcg Oral Daily    sertraline  50 mg Oral Daily    vitamin E  400 Units Oral Daily    acetaminophen  500 mg Oral TID    apixaban  5 mg Oral BID    atorvastatin  20 mg Oral Nightly    diclofenac sodium  4 g Topical 4x Daily    digoxin  125 mcg Oral Daily    memantine  10 mg Oral BID    metoprolol tartrate  12.5 mg Oral BID    miconazole   Topical BID      Infusions:   PRN Meds: ondansetron, 4 mg, Q8H PRN  polyethylene glycol, 17 g, Daily PRN  aluminum & magnesium hydroxide-simethicone, 30 mL, Q6H PRN  acetaminophen, 650 mg, Q6H PRN   Or  acetaminophen, 650 mg, Q6H PRN      LABS  Radiology this visit:  Reviewed.     Echocardiogram complete 2D with doppler with color    Result Date: 4/15/2022  Transthoracic Echocardiography Report (TTE)  Demographics   Patient Name       [facility] YOSELIN CYNTHIA  Date of Study       04/13/2022   Date of Birth      1935        Gender              Female   Age                81 year(s)        Race                   Patient Number     [id number]        Room Number         [address] [facility]   Visit Number       [id number]   Corporate ID       [id number]   Accession Number   [id number]        Julian Amin RDMS, RVT   Ordering Physician                   Interpreting        Artis Taylor                                       Physician           Eugenia Dietz MD  Procedure Type of Study   TTE procedure:ECHOCARDIOGRAM COMPLETE 2D W DOPPLER W COLOR. Procedure Date Date: 04/13/2022 Start: 06:11 AM Study Location: Portable Technical Quality: Fair visualization Indications:Elevated BNP. Patient Status: Routine Height: 69 inches Weight: 170 pounds BSA: 1.93 m2 BMI: 25.1 kg/m2 HR: 89 bpm BP: 101/60 mmHg  Conclusions   Summary  Left ventricular systolic function is normal.  Ejection fraction is visually estimated at 55%. Mild left ventricular hypertrophy. Moderate mitral and aortic regurgiation; PHT: 366msec. Severe tricuspid regurgitation; RVSP: 68 mmHg. Severe PHTN. No evidence of any pericardial effusion. Signature   ------------------------------------------------------------------  Electronically signed by Peyton Castro MD  (Interpreting physician) on 04/15/2022 at 12:04 PM  ------------------------------------------------------------------   Findings   Left Ventricle  Left ventricular systolic function is normal.  Ejection fraction is visually estimated at 55%. Mild left ventricular hypertrophy. No regional wall motion abnormalites. Left ventricle size is normal.  Cannot determine diastolic function due to arrhythmia. Left Atrium  Mildly dilated left atrium. Right Atrium  Essentially normal right atrium. Right Ventricle  Essentially normal right ventricle. Aortic Valve  Moderate aortic regurgiation; PHT: 366msec. Mitral Valve  Moderate mitral regurgitation. Tricuspid Valve  Severe tricuspid regurgitation; RVSP: 68 mmHg. Severe PHTN. Pulmonic Valve  The pulmonic valve was not well visualized. Pericardial Effusion  No evidence of any pericardial effusion. Pleural Effusion  No evidence of pleural effusion. Miscellaneous  Dilated IVC with normal respiratory collapse.   M-Mode/2D Measurements & Calculations   LV Diastolic Dimension:  LV Systolic Dimension:  LA Dimension: 3.6 cmAO Root  4.2 cm                   2.5 cm                  Dimension: 3.2 cmLA Area:  LV FS:40.5 %             LV Volume Diastolic: 43 69.6 cm2  LV PW Diastolic: 1.2 cm  ml  LV PW Systolic: 1.3 cm   LV Volume Systolic: 19  Septum Diastolic: 1.1 cm ml  Septum Systolic: 1.7 cm  LV EDV/LV EDV Index: 43 RV Diastolic Dimension: 3.3  CO: 4.74 l/min           ml/22 m2LV ESV/LV ESV   cm  CI: 2.46 l/m*m2          Index: 19 ml/10 m2                           EF Calculated (A4C):    LA/Aorta: 0.43  LV Area Diastolic: 80.5  45.9 %  cm2                      EF Calculated (2D):     LA volume/Index: 59.7 ml  LV Area Systolic: 14.4   34.9 %                  /31m2  cm2                           LV Length: 6.68 cm                            LVOT: 1.9 cm  Doppler Measurements & Calculations   MV Peak E-Wave: 101  AV Peak Velocity: 113 cm/s    LVOT Peak Velocity: 99  cm/s                 AV Peak Gradient: 5.11 mmHg   cm/s                       AV Mean Velocity: 80.3 cm/s   LVOT Mean Velocity: 72  MV Peak Gradient:    AV Mean Gradient: 3 mmHg      cm/s  4.08 mmHg            AV VTI: 20.9 cm               LVOT Peak Gradient: 4                       AV Area (Continuity):2.55 cm2 mmHgLVOT Mean Gradient: 2  MV P1/2t: 33 msec                                  mmHg  MVA by PHT:6.67 cm2  LVOT VTI: 18.8 cm             Estimated RVSP: 56 mmHg                       AV P1/2t: 366 msec            Estimated RAP:8 mmHg Estimated PASP: 65.15 mmHg                                                      TR Velocity:378 cm/s                                                     TR Gradient:57.15 mmHg      XR CHEST (2 VW)    Result Date: 4/13/2022  EXAMINATION: TWO XRAY VIEWS OF THE CHEST 4/13/2022 12:55 pm COMPARISON: Chest radiograph 04/11/2022 HISTORY: ORDERING SYSTEM PROVIDED HISTORY: eval chf TECHNOLOGIST PROVIDED HISTORY: Reason for exam:->eval chf Additional signs and symptoms: eval chf FINDINGS: Similar cardiomegaly. Interval decrease in size of bilateral pleural effusions with improved aeration at the lung bases. Significant interval improvement in interstitial pulmonary edema. No acute osseous abnormality. No pneumothorax. Significant interval improvement in bilateral interstitial pulmonary edema. Decreased size of bilateral pleural effusions with improved aeration at the lung bases. Similar cardiomegaly. XR ANKLE RIGHT (MIN 3 VIEWS)    Result Date: 3/29/2022  EXAMINATION: THREE XRAY VIEWS OF THE RIGHT ANKLE; THREE XRAY VIEWS OF THE RIGHT FOOT 3/29/2022 1:40 pm COMPARISON: None. HISTORY: ORDERING SYSTEM PROVIDED HISTORY: fall TECHNOLOGIST PROVIDED HISTORY: Reason for exam:->fall Reason for Exam: fall Additional signs and symptoms: lateral swelling FINDINGS: Right ankle: Three views of the right ankle were reviewed. Small ossicles at the tip of the lateral malleolus with slight cortical irregularity of the tip of the lateral malleolus most suggestive of acute avulsion fracture even trauma history as well as lateral soft tissue edema. Anatomic alignment at the ankle mortise. Small posterior calcaneal enthesophyte. Mild hindfoot and midfoot osteoarthritis. Right foot: Three views of the right foot were reviewed. Acute fractures of the 2nd, 3rd, and 4th metatarsal bases. Grossly anatomic alignment of the midfoot on these nonweightbearing views.   Slight amorphous calcification along the medial cortex of the 1st metatarsal head is likely chronic. Moderate degenerative change of the 1st MTP joint. Mild midfoot osteoarthritis. Soft tissue edema of the foot. 1. Suspect acute chip fracture of the right lateral malleolus with adjacent soft tissue edema. 2. Acute fractures of the right 2nd, 3rd, and 4th metatarsal bases. XR FOOT RIGHT (MIN 3 VIEWS)    Result Date: 4/13/2022  EXAMINATION: THREE XRAY VIEWS OF THE RIGHT FOOT 4/13/2022 12:55 pm COMPARISON: Radiographs of the foot of 03/29/2022 HISTORY: ORDERING SYSTEM PROVIDED HISTORY: re eval of previous fx TECHNOLOGIST PROVIDED HISTORY: Reason for exam:->re eval of previous fx Additional signs and symptoms: re eval of previous fx FINDINGS: Diffuse osteopenia. Overall unchanged alignment of fractures at the bases of the 3rd and 4th metatarsals. Decreased conspicuity of the fracture lines with associated sclerosis and remodeling is consistent with interval healing. No new fracture identified. No traumatic malalignment. Mild 1st MTP degenerative changes. Soft tissue swelling along the dorsum of the foot. Scattered vascular calcifications. Progressive healing and unchanged alignment of fractures of the base of the 3rd and 4th metatarsals     XR FOOT RIGHT (MIN 3 VIEWS)    Result Date: 3/29/2022  EXAMINATION: THREE XRAY VIEWS OF THE RIGHT ANKLE; THREE XRAY VIEWS OF THE RIGHT FOOT 3/29/2022 1:40 pm COMPARISON: None. HISTORY: ORDERING SYSTEM PROVIDED HISTORY: fall TECHNOLOGIST PROVIDED HISTORY: Reason for exam:->fall Reason for Exam: fall Additional signs and symptoms: lateral swelling FINDINGS: Right ankle: Three views of the right ankle were reviewed. Small ossicles at the tip of the lateral malleolus with slight cortical irregularity of the tip of the lateral malleolus most suggestive of acute avulsion fracture even trauma history as well as lateral soft tissue edema. Anatomic alignment at the ankle mortise. Small posterior calcaneal enthesophyte.   Mild hindfoot and midfoot osteoarthritis. Right foot: Three views of the right foot were reviewed. Acute fractures of the 2nd, 3rd, and 4th metatarsal bases. Grossly anatomic alignment of the midfoot on these nonweightbearing views. Slight amorphous calcification along the medial cortex of the 1st metatarsal head is likely chronic. Moderate degenerative change of the 1st MTP joint. Mild midfoot osteoarthritis. Soft tissue edema of the foot. 1. Suspect acute chip fracture of the right lateral malleolus with adjacent soft tissue edema. 2. Acute fractures of the right 2nd, 3rd, and 4th metatarsal bases. XR CHEST PORTABLE    Result Date: 4/11/2022  EXAMINATION: ONE XRAY VIEW OF THE CHEST 4/11/2022 3:31 pm COMPARISON: 11/11/2019 HISTORY: ORDERING SYSTEM PROVIDED HISTORY: ? pneumonia TECHNOLOGIST PROVIDED HISTORY: Reason for exam:->? pneumonia FINDINGS: Increased interstitial opacities noted, increased considerably when compared to the previous exam.  More focal opacities are noted within the lower lungs. In the small bilateral pleural effusions appear to be present. The cardial pericardial silhouette is unremarkable. Increased interstitial opacity, with more focal opacities at the lung bases. In this case, interstitial edema and multifocal pneumonia are both considered. CTA PULMONARY W CONTRAST    Result Date: 4/12/2022  EXAMINATION: CTA OF THE CHEST, 4/12/2022 11:19 am TECHNIQUE: CTA of the chest was performed after the administration of intravenous contrast.  Multiplanar reformatted images are provided for review. MIP images are provided for review. Dose modulation, iterative reconstruction, and/or weight based adjustment of the mA/kV was utilized to reduce the radiation dose to as low as reasonably achievable. COMPARISON: 11/22/2019 HISTORY: ORDERING SYSTEM PROVIDED HISTORY:  Rule out PE TECHNOLOGIST PROVIDED HISTORY: Reason for Exam:  Rule out PE FINDINGS: Pulmonary Arteries:  The pulmonary arteries are adequately opacified. No filling defects are identified within the pulmonary arteries to suggest pulmonary embolism. Main pulmonary artery is normal in caliber. Mediastinum:  Thyroid is either atrophic or has been previously resected. There are multiple enlarged mediastinal lymph nodes identified. These have increased in size when compared to the previous exam.  For example, a right paratracheal/precarinal lymph node measures 1.4 cm in short axis (image 150), previously 0.9 cm. There is a small to moderate-sized hiatal hernia. The esophagus is otherwise unremarkable. Cardiac chambers unremarkable. No pericardial effusion. Thoracic aorta is normal in caliber without evidence of dissection. Lungs/pleura: There is increased interstitial opacity identified within the periphery the lungs associated with ground-glass opacity, similar when compared to the previous exam.  The pleural base mass-like opacities seen on the left are no longer detected. Small bilateral pleural effusions are noted, similar when compared to the previous exam.  Airways are patent. Upper Abdomen: Unremarkable. Soft Tissues/Bones: Visualized extrathoracic soft tissues unremarkable. No acute bony abnormality identified. No evidence of pulmonary embolism or aortic dissection. Irregular interstitial opacities within the periphery of the lungs bilaterally, associated with ground-glass opacity. Much or all of this likely relates to interstitial lung disease with fibrosis. With that said, inflammatory/infectious interstitial pneumonitis would be considered as well. A component of pulmonary edema would also be a consideration, especially given presence of small bilateral pleural effusions. There are enlarged mediastinal lymph nodes which have increased in size when compared to the previous exam.  While these could be reactive, neoplasm must be considered. Short-term follow-up in approximately 3 months is recommended to ensure resolution. Electronically signed by NGOC Garcia CNP on 4/25/2022 at 12:23 PM

## 2022-04-26 PROCEDURE — 6370000000 HC RX 637 (ALT 250 FOR IP): Performed by: NURSE PRACTITIONER

## 2022-04-26 PROCEDURE — 1200000002 HC SEMI PRIVATE SWING BED

## 2022-04-26 PROCEDURE — 97110 THERAPEUTIC EXERCISES: CPT

## 2022-04-26 PROCEDURE — 97116 GAIT TRAINING THERAPY: CPT

## 2022-04-26 PROCEDURE — 97535 SELF CARE MNGMENT TRAINING: CPT

## 2022-04-26 PROCEDURE — 6370000000 HC RX 637 (ALT 250 FOR IP): Performed by: FAMILY MEDICINE

## 2022-04-26 PROCEDURE — 6370000000 HC RX 637 (ALT 250 FOR IP): Performed by: INTERNAL MEDICINE

## 2022-04-26 RX ADMIN — RIVASTIGMINE TARTRATE 3 MG: 1.5 CAPSULE ORAL at 08:18

## 2022-04-26 RX ADMIN — ACETAMINOPHEN 500 MG: 500 TABLET ORAL at 08:18

## 2022-04-26 RX ADMIN — SERTRALINE 50 MG: 50 TABLET, FILM COATED ORAL at 08:20

## 2022-04-26 RX ADMIN — DICLOFENAC SODIUM 4 G: 10 GEL TOPICAL at 12:43

## 2022-04-26 RX ADMIN — APIXABAN 5 MG: 5 TABLET, FILM COATED ORAL at 20:00

## 2022-04-26 RX ADMIN — Medication 400 UNITS: at 08:18

## 2022-04-26 RX ADMIN — DICLOFENAC SODIUM 4 G: 10 GEL TOPICAL at 08:19

## 2022-04-26 RX ADMIN — METOPROLOL TARTRATE 12.5 MG: 25 TABLET, FILM COATED ORAL at 20:00

## 2022-04-26 RX ADMIN — DICLOFENAC SODIUM 4 G: 10 GEL TOPICAL at 19:55

## 2022-04-26 RX ADMIN — APIXABAN 5 MG: 5 TABLET, FILM COATED ORAL at 08:18

## 2022-04-26 RX ADMIN — ATORVASTATIN CALCIUM 20 MG: 20 TABLET, FILM COATED ORAL at 20:00

## 2022-04-26 RX ADMIN — ACETAMINOPHEN 500 MG: 500 TABLET ORAL at 20:00

## 2022-04-26 RX ADMIN — Medication 2000 UNITS: at 08:18

## 2022-04-26 RX ADMIN — LEVOTHYROXINE SODIUM 150 MCG: 0.15 TABLET ORAL at 06:39

## 2022-04-26 RX ADMIN — ACETAMINOPHEN 500 MG: 500 TABLET ORAL at 12:44

## 2022-04-26 RX ADMIN — DICLOFENAC SODIUM 4 G: 10 GEL TOPICAL at 17:53

## 2022-04-26 RX ADMIN — DIGOXIN 125 MCG: 125 TABLET ORAL at 08:18

## 2022-04-26 RX ADMIN — MEMANTINE 10 MG: 10 TABLET ORAL at 20:00

## 2022-04-26 RX ADMIN — MEMANTINE 10 MG: 10 TABLET ORAL at 08:18

## 2022-04-26 RX ADMIN — METOPROLOL TARTRATE 12.5 MG: 25 TABLET, FILM COATED ORAL at 08:18

## 2022-04-26 ASSESSMENT — PAIN DESCRIPTION - PAIN TYPE: TYPE: ACUTE PAIN

## 2022-04-26 ASSESSMENT — PAIN - FUNCTIONAL ASSESSMENT: PAIN_FUNCTIONAL_ASSESSMENT: ACTIVITIES ARE NOT PREVENTED

## 2022-04-26 ASSESSMENT — PAIN SCALES - GENERAL
PAINLEVEL_OUTOF10: 0
PAINLEVEL_OUTOF10: 1

## 2022-04-26 ASSESSMENT — PAIN DESCRIPTION - PROGRESSION
CLINICAL_PROGRESSION: GRADUALLY IMPROVING

## 2022-04-26 ASSESSMENT — PAIN DESCRIPTION - ORIENTATION: ORIENTATION: RIGHT

## 2022-04-26 ASSESSMENT — PAIN DESCRIPTION - ONSET: ONSET: ON-GOING

## 2022-04-26 ASSESSMENT — PAIN DESCRIPTION - LOCATION: LOCATION: FOOT

## 2022-04-26 ASSESSMENT — PAIN DESCRIPTION - FREQUENCY: FREQUENCY: INTERMITTENT

## 2022-04-26 ASSESSMENT — PAIN DESCRIPTION - DESCRIPTORS: DESCRIPTORS: ACHING

## 2022-04-26 NOTE — PROGRESS NOTES
Occupational Therapy    Occupational Therapy Treatment Note  Name: Mary Castellanos MRN: [id number] :   1935   Date:  2022   Admission Date: 2022 Room:  [address] [place] [address]   Restrictions/Precautions:  Restrictions/Precautions  Restrictions/Precautions: Weight Bearing  Required Braces or Orthoses?: Yes     Communication with other providers:   Cleared for treatment by Carlito Malhotra. Subjective:  Patient states:  \"I need help I'm soaking wet\"  Pain:   Location, Type, Intensity (0/10 to 10/10): No pain    Objective:    Observation:  Pt alert and oriented. Treatment, including education:  Transfers  Supine to sit :SBA  Sit to supine :SBA  Scooting :SBA  Sit to stand :SBA  Stand to sit :SBA  SPT:SBA  Toilet:SBA    Self Care Training:   Activities performed today included the following: Toileting  SBA with clothing management and toilet hygiene    Grooming  Sup to wash face    Bathing   Sup to wash self in bathroom after setup. UB Dress  Sup to don gown    LB Dress  Sup to don new brief. Therapeutic Activity Training: Pt SBA with functional mobility with RW in room. Safety Measures: Gait belt used, Left in bed, Pull/Bed Alarm activated and call light left in reach        Assessment / Impression:        Patient's tolerance of treatment: Good   Adverse Reaction: None  Significant change in status and impact:  None  Barriers to improvement:  Dec strength and enduracne    Plan for Next Session:    Continue with POC.     Time in:  0900  Time out:  0945  Total treatment time: 45  Billed Units: 3 ADL    Electronically signed by:    TAMERA Bruce Cera 2022, 12:13 PM    Previously filed values:     Short Term Goals  Time Frame for Short term goals: Until DC or goals met  Short Term Goal 1: Pt will complete trfs mod I w/o LOB  Short Term Goal 2: Pt will complete UE bathing/dressing with S  Short Term Goal 3: Pt will complete LE bathing/dressing with S  Short Term Goal 4: Pt will complete toileting mod I  Short Term Goal 5: Pt will complete 10+ min of standing/functional mobility during ADLs/therax with min increased SOB and no LOB

## 2022-04-26 NOTE — FLOWSHEET NOTE
Physical Therapy Treatment Note   Date: 2022 Room: [unfilled]   Name: Jen Bailon : 1935   MRN: [id number] Admission Date:2022    Primary Problem:   Past Medical History:   Diagnosis Date    Acquired hypothyroidism 2019    Patient is taking Synthroid    Chronic midline low back pain without sciatica 2019    Dementia without behavioral disturbance ([address] 10/22/2019    Patient has dementia. She is taking Namenda Patient is seeing urologist Dr. Kaelyn Christiansen Gait disturbance 10/22/2019    Patient uses walker to ambulate    History of TIA (transient ischemic attack) 2019    Patient has a history of TIA and also recently may have had TIA. Patient is on Eliquis. Sees neurologist Dr. Kaelyn Christiansen Major depressive disorder with single episode, in full remission ([facility] 2019    Patient is taking Zoloft and that is helping.  Mixed hyperlipidemia 2019    Patient is on simvastatin    Persistent atrial fibrillation ([address] 10/22/2019    A. fib on recently in 2019 . Patient is on metoprolol 25 mg twice a day and Eliquis    PFO (patent foramen ovale) 2019    PFO seen on echocardiogram in 2016    Pneumonia due to infectious organism 10/22/2019    Patient had pneumonia and pleural effusion during admission in 2019. A repeat CT scan of the chest will be done Patient is also seen pulmonologist     Past Surgical History:   Procedure Laterality Date    HYSTERECTOMY       Rehabilitation Diagnosis: impaired mobility   The primary encounter diagnosis was Urinary tract infection without hematuria, site unspecified. Diagnoses of Septicemia ([address] and Pneumonia of both lower lobes due to infectious organism were also pertinent to this visit.     Restrictions/Precautions: post op shoe on R foot when walking    Subjective:patient agreeable to PT  Initial Pain level:0/10 back    Goals:                   Short Term Goals  Time Frame for Short term goals: 1 week or until discharge:  Short term goal 1: Pt will demonstrate the ability to safely perform bed mobility with supervision. Short term goal 2: Pt will demonstrate the ability to safely transfer sit to stand and stand to sit from 3 different height surfaces with S and proper hand placement with VCs only. Short term goal 3: Pt will demonstrate the ability to safely ambulate 150' with a RW and S with no rest breaks or instances of letting go of the walker for nearby furniture. Plan of Care:             Times per week: Mon-Sat 5+/wk            Current Treatment Recommendations: Marcela Isbell Re-education,Cognitive Reorientation,Patient/Caregiver Education & Training,ROM,ADL/Self-care Training,Equipment Evaluation, Education, & procurement,Balance Training,IADL Training,Gait Training,Home Exercise Program,Functional Mobility Training,Cognitive/Perceptual Training,Stair training,Safety Education & Training,Positioning      Objective Findings:    Date: 4/18/22 Date: 4/19/22 Date:   4-20-22 Date:  4-21-22 Date:   4/22/2022 4/23/22 4/26/22   Bed mobility Mod I x        Sit to stand transfer CGA of 1 and VC's for safety SBA x5 throughout session Cg to initiate due to distracted Sba, impulsive, taking very short rest breaks and standing without therapist SBA  SBA SBA   Stand to sit transfer CGA of 1 and VC's for safety SBA x5 throughout session Cg and vc for walker safety Sba, cues for walker safety, side sits to chair due to fatigue SBA SBA SBA   Commode transfer CGA-Min A of 1 w/VC's for safety/sequencing x x x x  x   Standing tolerance For amb and georgette care For amb 1-2 mins for gait training 1-2 mins with dyn standing balance.  For ambulation  For amb   Ambulation W/std walker and CGA-Min A of 1 20ft and 10ft as pt is impulsive and was grabbing the end of the bed to walk instead of the walker and c/o her knee giving out on her as she amb to the bathroom  Amb w/std walker and CGA of 1, SBA of another for WC follow 114ft total w/4 rest breaks  Pt reports twice her right knee giving out on her due to pain Gait training with standard walker with cg 15 ft,standing rest break +15 ft,  seated rest break, +20 ft standing rest break +20 ft seated rest break. Pt reluctant to increase distance without whc behind. Standing rest breaks due to SOB and fatigue. States she can not change flexed posture. Gait training, standard walker 16+71+98+60+77. Seated rest breaks due to fatigue, sob and pain. encouraged to take longer breaks to increased ability to walk longer distance but impulsively stands back up after 30-45 seconds. Gait training with standard walker 47'+28'+01'+26'    Seated rest breaks due to fatigue. Patient very impulsive. Almost sitting before chair is behind her. Also pushes walker instead of lifting it at times.  Gait training with standard walker 33'+'+30'+10'x2 Amb w/STD walker and CGA-SBA of 1 85ft + 96ft       Stairs x x          Exercises:   Exercise/Equipment/Modalities  Date: 4/19/22 Date: 4-20-22   Date: 4/22/2022 Date: 4/23/22 4/26/22   Seated marches 2x10 B X 20 BLE      LAQ 2x10 B x20 BLE      Hip abd  2x10 reclined      SLR  2 x 10 reclined      NU step   X 8.5 mins =430 steps 10 min   600 steps 10 min   590steps 10' 600 steps                                               Modality/intervention used:   [x ] Therapeutic Exercise   [ ] Modalities:   [x ] Therapeutic Activity     [ ] Ultrasound   [ ] Elec Stim   [x ] Gait Training     [ ] Cervical Traction  [ ] Lumbar Traction   [ ] Neuromuscular Re-education   [ ] Cold/hotpack  [ ] Iontophoresis   [ ] Instruction in HEP     [ ] Tk Saber   [ ] Manual Therapy   [ ] Aquatic Therapy     Other Therapeutic Activities:  Propelled WC w/josé feet 50ft      Home Exercise Program/Education provided to patient:  x    Manual Treatments: x    Communication with other providers: nurse cleared for therapy    Adverse reactions to treatment:      Treatment/Activity Tolerance:   [  ] Patient limited by fatigue [ ] Patient limited by pain [ ] Patient limited by other medical complications [x ] Patient tolerated therapy well  [ ] Other:     Post Tx Pain Rating: does not rate /10     Safety Precautions:   [ ] left in bed  [x ] left in chair [x ] call light within reach  [ ] bed alarm on  [x ] personal alarm on  [ ] other staff present:    Plan:   [x ] Continue per plan of care [ ] Nina Garcia current plan   [ ] Plan of care initiated [ ] Hold pending MD visit [ ] Discharge     Plan for Next Session:     Time In:   36  Time Out:  1119   Total Treatment Minutes: 1te 1gt    Signed: Bobby Sarabia  4/26/2022, 11:21 AM

## 2022-04-26 NOTE — PROGRESS NOTES
V2.0  [facility] Hospitalist Progress Note      Name:  Jenise Bill /Age/Sex: 1935  (81 y.o. female)   MRN & CSN:  [id number] & [id number] Encounter Date/Time: 2022 7:33 AM EDT    Location:   PCP: Alix Lopez, [address] [facility] Day: 13    Assessment and Plan:   Jenise Bill is a 81 y.o. female  who presents with Physical debility      Plan:  1. Physical debility, patient is working well with physical and Occupational Therapy she is admitted to swing bed program on 2022 we will continue with current therapy regimen. 2. Chronic atrial fibrillation, [id number] score is 3, continue Eliquis, Lopressor, and digoxin. Last digoxin level was checked on 2/10/2022 it was 1.0 will recheck in a.m. 3. Heart failure with preserved ejection fraction/VHD, last echocardiogram was on 2022 EF was 55% mild LVH, moderate MR/AR, severe TR, severe pulmonary hypertension. We will continue to monitor for clinical symptoms of hyper bulimia, decompensation. Patient was on Lasix daily but this was discontinued per patient request on 2022. She did have an appointment yesterday with her cardiologist in [place]. 4. Hypothyroidism, continue Synthroid, last TSH 0.4  to 10/20/2022  5. Right metatarsal fracture, x-ray 329-second fourth metatarsal base fracture suspected acute chip fracture right lateral malleolus wear postop boot while out of bed and weightbearing. She followed up with Dr. Ashely Vargas yesterday. 6. Dementia, continue Namenda and Exelon  7. Depression continue Zoloft  8. Interstitial lung disease follows with pulmonology at [address];  Regular; Low Fat/Low Chol/High Fiber/2 gm Na   DVT Prophylaxis [] Lovenox, []  Heparin, [] SCDs, [x] Ambulation,  [x] Eliquis, [] Xarelto  [] Coumadin   Code Status DNR-CCA   Disposition From: Swing bed  Expected Disposition: Home  Estimated Date of Discharge: Undetermined  Patient requires continued admission due to debility   Surrogate Decision Maker/ POA      Subjective:     Chief Complaint: Rosario Chauhan is a 81 y.o. female who presents with debility, generalized weakness, she is working with physical and Occupational Therapy. Patient seen and examined today she has no complaints however she has misplaced her headband. She denies any complaints no chest pain no shortness of breath no nausea no vomiting no diarrhea, states her foot feels okay. Continue with current therapy plan         Review of Systems:    10 point review of systems has been completed and is negative except for as stated above      Objective:   No intake or output data in the 24 hours ending 04/26/22 0733     Vitals:   Vitals:    04/25/22 1930   BP: 122/68   Pulse: 75   Resp: 18   Temp: 97.8 °F (36.6 °C)   SpO2: 95%       Physical Exam:     General:  awake alert cooperative no acute distress  Eyes: EOMI  ENT: neck supple, trachea midline  Cardiovascular: Irregular rhythm, controlled rate, positive murmur  Respiratory: Clear to auscultation, symmetrical movement  Gastrointestinal: Soft, non tender, nondistended bowel sounds positive x4 quadrants  Genitourinary: no suprapubic tenderness  Musculoskeletal: No edema, no gross deformities  Skin: warm, dry, normal color no redness, there is ecchymosis to right dorsal foot at fracture site  Neuro: Alert. Oriented but is forgetful and does have some slight periods of confusion cranial nerves intact  Psych: Mood appropriate.      Medications:   Medications:    Vitamin D  2,000 Units Oral Daily    rivastigmine  3 mg Oral Daily    levothyroxine  150 mcg Oral Daily    sertraline  50 mg Oral Daily    vitamin E  400 Units Oral Daily    acetaminophen  500 mg Oral TID    apixaban  5 mg Oral BID    atorvastatin  20 mg Oral Nightly    diclofenac sodium  4 g Topical 4x Daily    digoxin  125 mcg Oral Daily    memantine  10 mg Oral BID    metoprolol tartrate  12.5 mg Oral BID    miconazole   Topical BID      Infusions:   PRN Meds: ondansetron, 4 mg, Q8H PRN  polyethylene glycol, 17 g, Daily PRN  aluminum & magnesium hydroxide-simethicone, 30 mL, Q6H PRN  acetaminophen, 650 mg, Q6H PRN   Or  acetaminophen, 650 mg, Q6H PRN            Electronically signed by NGOC Esparza NP on 4/26/2022 at 7:33 AM

## 2022-04-26 NOTE — CARE COORDINATION
CM into see pt to continue to discuss discharge plan. Pt shared that she has three local dtrs that she sees everyday. They will provide for her for groceries, transportation, appointments. She is receiving MOW. Please see CM notes. Pt is active with [facility]. Pt would like to continue with [address] [place] [address] Rd. CM called and confirmed with Mayco Teague that they will follow. Upon discharge pt will be going home with [address] [place] [address] Rd home care  Please fax H/P, D/S.  AVS, order, and face sheet to [phone]  Please call [phone] and inform of discharge    ASHIA called and spoke with Woo at [facility] for pts aide service and they do follow her.   Call [phone]

## 2022-04-26 NOTE — PLAN OF CARE
Safety precautions in place to prevent falls include: chair/bed alarm, wheels locked on bed, call light next to patient, non-skid socks on.    Problem: Falls - Risk of:  Goal: Will remain free from falls  Description: Will remain free from falls  Outcome: Progressing     Problem: Falls - Risk of:  Goal: Absence of physical injury  Description: Absence of physical injury  Outcome: Progressing     Problem: Safety:  Goal: Free from accidental physical injury  Description: Free from accidental physical injury  Outcome: Progressing

## 2022-04-27 PROCEDURE — 97110 THERAPEUTIC EXERCISES: CPT

## 2022-04-27 PROCEDURE — 1200000002 HC SEMI PRIVATE SWING BED

## 2022-04-27 PROCEDURE — 6370000000 HC RX 637 (ALT 250 FOR IP): Performed by: FAMILY MEDICINE

## 2022-04-27 PROCEDURE — 6370000000 HC RX 637 (ALT 250 FOR IP): Performed by: NURSE PRACTITIONER

## 2022-04-27 PROCEDURE — 97530 THERAPEUTIC ACTIVITIES: CPT

## 2022-04-27 PROCEDURE — 94761 N-INVAS EAR/PLS OXIMETRY MLT: CPT

## 2022-04-27 PROCEDURE — 6370000000 HC RX 637 (ALT 250 FOR IP): Performed by: INTERNAL MEDICINE

## 2022-04-27 RX ADMIN — LEVOTHYROXINE SODIUM 150 MCG: 0.15 TABLET ORAL at 06:40

## 2022-04-27 RX ADMIN — DICLOFENAC SODIUM 4 G: 10 GEL TOPICAL at 20:12

## 2022-04-27 RX ADMIN — APIXABAN 5 MG: 5 TABLET, FILM COATED ORAL at 08:04

## 2022-04-27 RX ADMIN — ATORVASTATIN CALCIUM 20 MG: 20 TABLET, FILM COATED ORAL at 20:09

## 2022-04-27 RX ADMIN — ACETAMINOPHEN 500 MG: 500 TABLET ORAL at 20:09

## 2022-04-27 RX ADMIN — ACETAMINOPHEN 500 MG: 500 TABLET ORAL at 08:04

## 2022-04-27 RX ADMIN — APIXABAN 5 MG: 5 TABLET, FILM COATED ORAL at 20:09

## 2022-04-27 RX ADMIN — SERTRALINE 50 MG: 50 TABLET, FILM COATED ORAL at 08:04

## 2022-04-27 RX ADMIN — DICLOFENAC SODIUM 4 G: 10 GEL TOPICAL at 17:56

## 2022-04-27 RX ADMIN — METOPROLOL TARTRATE 12.5 MG: 25 TABLET, FILM COATED ORAL at 20:09

## 2022-04-27 RX ADMIN — MEMANTINE 10 MG: 10 TABLET ORAL at 08:04

## 2022-04-27 RX ADMIN — METOPROLOL TARTRATE 12.5 MG: 25 TABLET, FILM COATED ORAL at 08:04

## 2022-04-27 RX ADMIN — RIVASTIGMINE TARTRATE 3 MG: 1.5 CAPSULE ORAL at 08:04

## 2022-04-27 RX ADMIN — Medication 400 UNITS: at 08:04

## 2022-04-27 RX ADMIN — DICLOFENAC SODIUM 4 G: 10 GEL TOPICAL at 13:35

## 2022-04-27 RX ADMIN — DICLOFENAC SODIUM 4 G: 10 GEL TOPICAL at 08:05

## 2022-04-27 RX ADMIN — Medication 2000 UNITS: at 08:04

## 2022-04-27 RX ADMIN — ACETAMINOPHEN 500 MG: 500 TABLET ORAL at 13:34

## 2022-04-27 RX ADMIN — MEMANTINE 10 MG: 10 TABLET ORAL at 20:09

## 2022-04-27 RX ADMIN — DIGOXIN 125 MCG: 125 TABLET ORAL at 08:04

## 2022-04-27 ASSESSMENT — PAIN SCALES - GENERAL
PAINLEVEL_OUTOF10: 0

## 2022-04-27 NOTE — FLOWSHEET NOTE
Physical Therapy Treatment Note   Date: 2022 Room: [unfilled]   Name: John Berry : 1935   MRN: [id number] Admission Date:2022    Primary Problem:   Past Medical History:   Diagnosis Date    Acquired hypothyroidism 2019    Patient is taking Synthroid    Chronic midline low back pain without sciatica 2019    Dementia without behavioral disturbance ([facility] 10/22/2019    Patient has dementia. She is taking Namenda Patient is seeing urologist Dr. Violet Roman Gait disturbance 10/22/2019    Patient uses walker to ambulate    History of TIA (transient ischemic attack) 2019    Patient has a history of TIA and also recently may have had TIA. Patient is on Eliquis. Sees neurologist Dr. Violet Roman Major depressive disorder with single episode, in full remission ([facility] 2019    Patient is taking Zoloft and that is helping.  Mixed hyperlipidemia 2019    Patient is on simvastatin    Persistent atrial fibrillation ([address] 10/22/2019    A. fib on recently in 2019 . Patient is on metoprolol 25 mg twice a day and Eliquis    PFO (patent foramen ovale) 2019    PFO seen on echocardiogram in 2016    Pneumonia due to infectious organism 10/22/2019    Patient had pneumonia and pleural effusion during admission in 2019. A repeat CT scan of the chest will be done Patient is also seen pulmonologist     Past Surgical History:   Procedure Laterality Date    HYSTERECTOMY       Rehabilitation Diagnosis: impaired mobility   The primary encounter diagnosis was Urinary tract infection without hematuria, site unspecified. Diagnoses of Septicemia ([address] and Pneumonia of both lower lobes due to infectious organism were also pertinent to this visit. Restrictions/Precautions: post op shoe on R foot when walking    Subjective: Pt refused PT initially reporting she was too tired to stand up right now. And she wanted to do PT at 10.  Pt called PT back and agreed to get up and walk and do NuStep. Initial Pain level:0/10 back    Goals:                   Short Term Goals  Time Frame for Short term goals: 1 week or until discharge:  Short term goal 1: Pt will demonstrate the ability to safely perform bed mobility with supervision. Short term goal 2: Pt will demonstrate the ability to safely transfer sit to stand and stand to sit from 3 different height surfaces with S and proper hand placement with VCs only. Short term goal 3: Pt will demonstrate the ability to safely ambulate 150' with a RW and S with no rest breaks or instances of letting go of the walker for nearby furniture. Plan of Care:             Times per week: Mon-Sat 5+/wk            Current Treatment Recommendations: Westly Coma Re-education,Cognitive Reorientation,Patient/Caregiver Education & Training,ROM,ADL/Self-care Training,Equipment Evaluation, Education, & procurement,Balance Training,IADL Training,Gait Training,Home Exercise Program,Functional Mobility Training,Cognitive/Perceptual Training,Stair training,Safety Education & Training,Positioning      Objective Findings:    4/26/22 Date: 4/27/22   Bed mobility     Sit to stand transfer SBA supervision   Stand to sit transfer SBA supervision   Commode transfer x Supervision pt performed georgette care. Standing tolerance For amb Not performed. Ambulation Amb w/STD walker and CGA-SBA of 1 85ft + 96ft     Pt ambulated with SW x 70 feet  Pt ambulated with RW x70'   Stairs  Pt encouraged to perform step, but refused this date.       Exercises:   Exercise/Equipment/Modalities  4/26/22 Date: 4/27/22   Seated marches     LAQ     Hip abd     SLR     NU step 10' 600 steps x10' S10 UE 10 Lvl 2                                Modality/intervention used:   [x ] Therapeutic Exercise   [ ] Modalities:   [x ] Therapeutic Activity     [ ] Ultrasound   [ ] Elec Stim   [x ] Gait Training     [ ] Cervical Traction  [ ] Lumbar Traction   [ ] Neuromuscular Re-education   [ ] Cold/hotpack  [ ] Iontophoresis   [ ] Instruction in HEP     [ ] Vasopneumatic   [ ] Manual Therapy   [ ] Aquatic Therapy     Other Therapeutic Activities:      Home Exercise Program/Education provided to patient:  x    Manual Treatments: x    Communication with other providers: nurse cleared for therapy    Adverse reactions to treatment:      Treatment/Activity Tolerance:   [  ] Patient limited by fatigue [ ] Patient limited by pain [ ] Patient limited by other medical complications [x ] Patient tolerated therapy well  [ ] Other:     Post Tx Pain Rating: does not rate /10     Safety Precautions:   [ ] left in bed  [x ] left in chair [x ] call light within reach  [ ] bed alarm on  [x ] personal alarm on  [ ] other staff present:    Plan:   [x ] Continue per plan of care [ ] Birtha Payment current plan   [ ] Plan of care initiated [ ] Hold pending MD visit [ ] Discharge     Plan for Next Session:     Time In:   11:33  Time Out:  12:13  Total Treatment Minutes: 40'/ 1 therapeutic activity, 2 therapeutic exercise     Signed: Vicki Palomo PT,DPT [id number]  4/27/2022, 11:48 AM

## 2022-04-27 NOTE — CARE COORDINATION
[facility] SWING BED WEEKLY TEAM SHEET     Gavino Richardson   4/27/2022   WEEK # 2    Care Management    Issues to be resolved  before discharge increase strength and mobility    Family Education: patient and staff to update    Discharge Plan: home on Friday with [facility] and [facility]  Patient/Family Adjustment     Goals of previous week:   [] 1st team   [] met   [x] partially met    [] not met             Why goals were not met: still has some weakness    Skilled Level of Care Remains   [x] yes   [] no    Estimated length of stay: d/c plan is Friday  Patient/Family Concerns/input: none    Patient/Family  Signature _________________  4/27/2022       Care Management Signature:  Aiyana Heath RN

## 2022-04-27 NOTE — CARE COORDINATION
Activity Visit    Visit with patient in room. Conversation about the flower seeds she choose to plant yesterday in her window. Enjoys flowers and looking forward to taking them home soon upon her discharge. Offered her the choice of a clutch bag with a note pad for her to write her notes on that she had asked for. She choose her favorite color and was very appreciative. Thanked me for the visit. Armen PINEDA

## 2022-04-27 NOTE — PROGRESS NOTES
Occupational Therapy    Occupational Therapy Treatment Note  Name: Ally Coppola MRN: [id number] :   1935   Date:  2022   Admission Date: 2022 Room:  [address] [place] [address]   Restrictions/Precautions:  Restrictions/Precautions  Restrictions/Precautions: Weight Bearing  Required Braces or Orthoses?: Yes     Communication with other providers:   Cleared for treatment by RN. Pt removed call light off wall in the restroom which was report to nursing and pt had to be moved to room 9 for safety reasons. Subjective:  Patient states:  \"I want to walk and get on the bike\"  Pain:   Location, Type, Intensity (0/10 to 10/10): No pain    Objective:    Observation:  Pt alert and oriented. Treatment, including education:  Transfers  Supine to sit :SBA  Sit to supine :SBA  Scooting :SBA  Sit to stand :SBA  Stand to sit :SBA  SPT:SBA  Toilet:SBA    Self Care Training:   Activities performed today included the following: Toileting  SBA with clothing management and toilet hygiene    Therapeutic Exercise:  Cues were given for technique, safety, recruitment, and rationale. Cues were verbal and/or tactile. Pt completed nustep on level 2 x 10 min x 475 steps. Therapeutic Activity Training: Pt SBA with functional mobility with RW in hallway x 50' x 3 with SBA and min verbal cues for walker safety. Safety Measures: Gait belt used, Left in bed, Pull/Bed Alarm activated and call light left in reach        Assessment / Impression:        Patient's tolerance of treatment: Good   Adverse Reaction: None  Significant change in status and impact:  None  Barriers to improvement:  Dec strength and endurance    Plan for Next Session:    Continue with POC.     Time in:  1520  Time out:  1545  Total treatment time: 25  Billed Units: 1 TA, 1 TE    Electronically signed by:    Aziza Abreu, [address]    2022, 3:21 PM    Previously filed values:     Short Term Goals  Time Frame for Short term goals: Until DC or goals met  Short Term Goal 1: Pt will complete trfs mod I w/o LOB  Short Term Goal 2: Pt will complete UE bathing/dressing with S  Short Term Goal 3: Pt will complete LE bathing/dressing with S  Short Term Goal 4: Pt will complete toileting mod I  Short Term Goal 5: Pt will complete 10+ min of standing/functional mobility during ADLs/therax with min increased SOB and no LOB

## 2022-04-27 NOTE — PLAN OF CARE
Discussed with pt mobility goals prior to discharge  Problem: Falls - Risk of:  Goal: Will remain free from falls  Description: Will remain free from falls  4/27/2022 0759 by Ventura Tamayo RN  Outcome: Progressing  4/26/2022 2311 by Mili Gardiner RN  Outcome: Progressing  4/26/2022 1844 by Donna Mayo RN  Outcome: Progressing  Goal: Absence of physical injury  Description: Absence of physical injury  4/27/2022 0759 by Ventura Tamayo RN  Outcome: Progressing  4/26/2022 2311 by Mili Gardiner RN  Outcome: Progressing  4/26/2022 1844 by Donna Mayo RN  Outcome: Progressing     Problem: Safety:  Goal: Free from accidental physical injury  Description: Free from accidental physical injury  4/27/2022 0759 by Ventura Tamayo RN  Outcome: Progressing  4/26/2022 2311 by Mili Gardiner RN  Outcome: Progressing  4/26/2022 1844 by Donna Mayo RN  Outcome: Progressing  Goal: Free from intentional harm  Description: Free from intentional harm  4/27/2022 0759 by Ventura Tamayo RN  Outcome: Progressing  4/26/2022 2311 by Mili Gardiner RN  Outcome: Progressing  4/26/2022 1844 by Donna Mayo RN  Outcome: Progressing     Problem: Pain:  Goal: Patient's pain/discomfort is manageable  Description: Patient's pain/discomfort is manageable  4/27/2022 0759 by Ventura Tamayo RN  Outcome: Progressing  4/26/2022 2311 by Mili Gardiner RN  Outcome: Progressing  4/26/2022 1844 by Donna Mayo RN  Outcome: Progressing  Goal: Pain level will decrease  Description: Pain level will decrease  4/27/2022 0759 by Ventura Tamayo RN  Outcome: Progressing  4/26/2022 2311 by Mili Gardiner RN  Outcome: Progressing  4/26/2022 1844 by Donna Mayo RN  Outcome: Progressing  Goal: Control of acute pain  Description: Control of acute pain  4/27/2022 0759 by Ventura Tamayo RN  Outcome: Progressing  4/26/2022 2311 by Mili Hinders, RN  Outcome: Progressing  4/26/2022 1844 by Donna Mayo RN  Outcome: Progressing Problem: Discharge Planning  Goal: Discharge to home or other facility with appropriate resources  4/27/2022 0759 by Modesto Dooley RN  Outcome: Progressing  4/26/2022 2311 by Natali Castle RN  Outcome: Progressing  4/26/2022 1844 by Luanne Taylor RN  Outcome: Progressing     Problem: ABCDS Injury Assessment  Goal: Absence of physical injury  4/27/2022 0759 by Modesto Dooley RN  Outcome: Progressing  4/26/2022 2311 by Natali Castle RN  Outcome: Progressing  4/26/2022 1844 by Luanne Taylor RN  Outcome: Progressing

## 2022-04-27 NOTE — CARE COORDINATION
Tawanda met with the patient at bedside and she states they have decided [facility]. She is in agreement with this plan.

## 2022-04-27 NOTE — PLAN OF CARE
Problem: Falls - Risk of:  Goal: Will remain free from falls  Description: Will remain free from falls  4/26/2022 2311 by Jeyson Drake RN  Outcome: Progressing  4/26/2022 1844 by Abimbola Rolon RN  Outcome: Progressing  Goal: Absence of physical injury  Description: Absence of physical injury  4/26/2022 2311 by Jeyson Drake RN  Outcome: Progressing  4/26/2022 1844 by Abimbola Rolon RN  Outcome: Progressing     Problem: Safety:  Goal: Free from accidental physical injury  Description: Free from accidental physical injury  4/26/2022 2311 by Jeyson Drake RN  Outcome: Progressing  4/26/2022 1844 by Abimbola Rolon RN  Outcome: Progressing  Goal: Free from intentional harm  Description: Free from intentional harm  4/26/2022 2311 by Jeyson Drake RN  Outcome: Progressing  4/26/2022 1844 by Abimbola Rolon RN  Outcome: Progressing     Problem: Daily Care:  Goal: Daily care needs are met  Description: Daily care needs are met  4/26/2022 2311 by Jeyson Drake RN  Outcome: Progressing  4/26/2022 1844 by Abimbola Rolon RN  Outcome: Progressing     Problem: Pain:  Description: Pain management should include both nonpharmacologic and pharmacologic interventions.   Goal: Patient's pain/discomfort is manageable  Description: Patient's pain/discomfort is manageable  4/26/2022 2311 by Jeyson Drake RN  Outcome: Progressing  4/26/2022 1844 by Abimbola Rolon RN  Outcome: Progressing  Goal: Pain level will decrease  Description: Pain level will decrease  4/26/2022 2311 by Jeyson Drake RN  Outcome: Progressing  4/26/2022 1844 by Abimbola Rolon RN  Outcome: Progressing  Goal: Control of acute pain  Description: Control of acute pain  4/26/2022 2311 by Jeyson Drake RN  Outcome: Progressing  4/26/2022 1844 by Abimbola Rolon RN  Outcome: Progressing     Problem: Discharge Planning  Goal: Discharge to home or other facility with appropriate resources  4/26/2022 2311 by Jeyson Drake RN  Outcome: Progressing  4/26/2022 1844 by Tamra Sanon RN  Outcome: Progressing     Problem: ABCDS Injury Assessment  Goal: Absence of physical injury  4/26/2022 2311 by Briseyda Mcgill RN  Outcome: Progressing  4/26/2022 1844 by Tamra Sanon RN  Outcome: Progressing

## 2022-04-28 LAB
ANION GAP SERPL CALCULATED.3IONS-SCNC: 9 MMOL/L (ref 4–16)
BUN BLDV-MCNC: 19 MG/DL (ref 6–23)
CALCIUM SERPL-MCNC: 9 MG/DL (ref 8.3–10.6)
CHLORIDE BLD-SCNC: 105 MMOL/L (ref 99–110)
CO2: 24 MMOL/L (ref 21–32)
CREAT SERPL-MCNC: 0.9 MG/DL (ref 0.6–1.1)
GFR AFRICAN AMERICAN: >60 ML/MIN/1.73M2
GFR NON-AFRICAN AMERICAN: 60 ML/MIN/1.73M2
GLUCOSE BLD-MCNC: 108 MG/DL (ref 70–99)
HCT VFR BLD CALC: 41.5 % (ref 37–47)
HEMOGLOBIN: 13.7 GM/DL (ref 12.5–16)
MCH RBC QN AUTO: 32.2 PG (ref 27–31)
MCHC RBC AUTO-ENTMCNC: 33 % (ref 32–36)
MCV RBC AUTO: 97.4 FL (ref 78–100)
PDW BLD-RTO: 13.6 % (ref 11.7–14.9)
PLATELET # BLD: 249 K/CU MM (ref 140–440)
PMV BLD AUTO: 10 FL (ref 7.5–11.1)
POTASSIUM SERPL-SCNC: 4.2 MMOL/L (ref 3.5–5.1)
RBC # BLD: 4.26 M/CU MM (ref 4.2–5.4)
SODIUM BLD-SCNC: 138 MMOL/L (ref 135–145)
WBC # BLD: 9.2 K/CU MM (ref 4–10.5)

## 2022-04-28 PROCEDURE — 80048 BASIC METABOLIC PNL TOTAL CA: CPT

## 2022-04-28 PROCEDURE — 97110 THERAPEUTIC EXERCISES: CPT

## 2022-04-28 PROCEDURE — 94761 N-INVAS EAR/PLS OXIMETRY MLT: CPT

## 2022-04-28 PROCEDURE — 6370000000 HC RX 637 (ALT 250 FOR IP): Performed by: NURSE PRACTITIONER

## 2022-04-28 PROCEDURE — 6370000000 HC RX 637 (ALT 250 FOR IP): Performed by: FAMILY MEDICINE

## 2022-04-28 PROCEDURE — 97116 GAIT TRAINING THERAPY: CPT

## 2022-04-28 PROCEDURE — 97530 THERAPEUTIC ACTIVITIES: CPT

## 2022-04-28 PROCEDURE — 1200000002 HC SEMI PRIVATE SWING BED

## 2022-04-28 PROCEDURE — 36415 COLL VENOUS BLD VENIPUNCTURE: CPT

## 2022-04-28 PROCEDURE — 6370000000 HC RX 637 (ALT 250 FOR IP): Performed by: INTERNAL MEDICINE

## 2022-04-28 PROCEDURE — 85027 COMPLETE CBC AUTOMATED: CPT

## 2022-04-28 RX ADMIN — ACETAMINOPHEN 500 MG: 500 TABLET ORAL at 08:15

## 2022-04-28 RX ADMIN — APIXABAN 5 MG: 5 TABLET, FILM COATED ORAL at 08:15

## 2022-04-28 RX ADMIN — METOPROLOL TARTRATE 12.5 MG: 25 TABLET, FILM COATED ORAL at 20:37

## 2022-04-28 RX ADMIN — MEMANTINE 10 MG: 10 TABLET ORAL at 20:37

## 2022-04-28 RX ADMIN — Medication 400 UNITS: at 08:14

## 2022-04-28 RX ADMIN — MEMANTINE 10 MG: 10 TABLET ORAL at 08:15

## 2022-04-28 RX ADMIN — ATORVASTATIN CALCIUM 20 MG: 20 TABLET, FILM COATED ORAL at 20:37

## 2022-04-28 RX ADMIN — SERTRALINE 50 MG: 50 TABLET, FILM COATED ORAL at 08:15

## 2022-04-28 RX ADMIN — DIGOXIN 125 MCG: 125 TABLET ORAL at 08:14

## 2022-04-28 RX ADMIN — APIXABAN 5 MG: 5 TABLET, FILM COATED ORAL at 20:37

## 2022-04-28 RX ADMIN — LEVOTHYROXINE SODIUM 150 MCG: 0.15 TABLET ORAL at 06:11

## 2022-04-28 RX ADMIN — ACETAMINOPHEN 500 MG: 500 TABLET ORAL at 20:37

## 2022-04-28 RX ADMIN — DICLOFENAC SODIUM 4 G: 10 GEL TOPICAL at 12:37

## 2022-04-28 RX ADMIN — Medication 2000 UNITS: at 08:15

## 2022-04-28 RX ADMIN — ACETAMINOPHEN 500 MG: 500 TABLET ORAL at 12:37

## 2022-04-28 RX ADMIN — METOPROLOL TARTRATE 12.5 MG: 25 TABLET, FILM COATED ORAL at 08:14

## 2022-04-28 RX ADMIN — DICLOFENAC SODIUM 4 G: 10 GEL TOPICAL at 20:38

## 2022-04-28 RX ADMIN — DICLOFENAC SODIUM 4 G: 10 GEL TOPICAL at 09:18

## 2022-04-28 RX ADMIN — RIVASTIGMINE TARTRATE 3 MG: 1.5 CAPSULE ORAL at 08:15

## 2022-04-28 ASSESSMENT — PAIN SCALES - GENERAL
PAINLEVEL_OUTOF10: 0
PAINLEVEL_OUTOF10: 2
PAINLEVEL_OUTOF10: 0
PAINLEVEL_OUTOF10: 0

## 2022-04-28 ASSESSMENT — PAIN DESCRIPTION - PROGRESSION
CLINICAL_PROGRESSION: GRADUALLY IMPROVING

## 2022-04-28 ASSESSMENT — PAIN SCALES - WONG BAKER
WONGBAKER_NUMERICALRESPONSE: 0

## 2022-04-28 NOTE — PROGRESS NOTES
V2.0  [facility] [place]      Name:  Liset Nuñez /Age/Sex: 1935  (81 y.o. female)   MRN & CSN:  [id number] & [id number] Encounter Date/Time: 2022 5:08 PM EDT    Location:  [id number] [facility]    Assessment and Plan:   Liset Nuñez is a 81 y.o. female  who presents with Physical debility      Plan:  1. Physical debility, patient is working well with physical and Occupational Therapy she is admitted to swing bed program on 2022. Plan is for discharge home tomorrow 2022 chronic atrial fibrillation, [id number] score is 3, continue Eliquis, Lopressor, and digoxin. 2. Heart failure with preserved ejection fraction/VHD, last echocardiogram was on 2022 EF was 55% mild LVH, moderate MR/AR, severe TR, severe pulmonary hypertension. We will continue to monitor for clinical symptoms of hyper bulimia, decompensation. Patient was on Lasix daily but this was discontinued per patient request on 2022. She did have an appointment yesterday with her cardiologist in [place]. 3. Hypothyroidism, continue Synthroid, last TSH 0.4  to 10/20/2022  4. Right metatarsal fracture, x-ray 329-second fourth metatarsal base fracture suspected acute chip fracture right lateral malleolus wear postop boot while out of bed and weightbearing. She followed up with Dr. Paul Pina on 2022.   5. Dementia, continue Namenda and Exelon  6. Depression continue Zoloft  7. Interstitial lung disease follows with pulmonology at [facility];  Regular; Low Fat/Low Chol/High Fiber/2 gm Na   DVT Prophylaxis [] Lovenox, []  Heparin, [] SCDs, [x] Ambulation,  [x] Eliquis, [] Xarelto  [] Coumadin   Code Status DNR-CCA   Disposition From: Home  Expected Disposition: Home  Estimated Date of Discharge: 2022  Patient requires continued admission due to debility   Surrogate Decision Maker/ POA  Vivienne Suarez daughter, [phone] Subjective:     Chief Complaint: Gato Romo is a 81 y.o. female who presents with debility, generalized weakness, she is working with physical and Occupational Therapy. Patient seen and examined today she has no complaints however she has misplaced her headband. She denies any complaints no chest pain no shortness of breath no nausea no vomiting no diarrhea, states her foot feels okay. Continue with current therapy plan. She tells me she is ready to go home. Plan is for discharge in a.m. Review of Systems:    10 point review of systems completed and is negative except as stated above    Objective: Intake/Output Summary (Last 24 hours) at 4/28/2022 1708  Last data filed at 4/28/2022 1506  Gross per 24 hour   Intake 600 ml   Output --   Net 600 ml        Vitals:   Vitals:    04/28/22 0817   BP: 125/63   Pulse: 75   Resp: 16   Temp: 98.2 °F (36.8 °C)   SpO2: 92%       Physical Exam:     General: NAD, hard of hearing  Eyes: EOMI  ENT: neck supple  Cardiovascular: Irregular rhythm controlled rate positive murmur  Respiratory: Clear to auscultation bilaterally throughout both lung fields on room air  Gastrointestinal: Soft, non tender, nondistended bowel sounds present all 4 quadrants  Genitourinary: no suprapubic tenderness  Musculoskeletal: No edema, no gross deformities  Skin: warm, dry, ecchymosis to right dorsal foot at fracture site  Neuro: Alert. Oriented but is forgetful   Psych: Mood appropriate.      Medications:   Medications:    Vitamin D  2,000 Units Oral Daily    rivastigmine  3 mg Oral Daily    levothyroxine  150 mcg Oral Daily    sertraline  50 mg Oral Daily    vitamin E  400 Units Oral Daily    acetaminophen  500 mg Oral TID    apixaban  5 mg Oral BID    atorvastatin  20 mg Oral Nightly    diclofenac sodium  4 g Topical 4x Daily    digoxin  125 mcg Oral Daily    memantine  10 mg Oral BID    metoprolol tartrate  12.5 mg Oral BID    miconazole   Topical BID      Infusions:   PRN Meds: ondansetron, 4 mg, Q8H PRN  polyethylene glycol, 17 g, Daily PRN  aluminum & magnesium hydroxide-simethicone, 30 mL, Q6H PRN  acetaminophen, 650 mg, Q6H PRN   Or  acetaminophen, 650 mg, Q6H PRN        Labs      Recent Results (from the past 24 hour(s))   CBC    Collection Time: 04/28/22  6:30 AM   Result Value Ref Range    WBC 9.2 4.0 - 10.5 K/CU MM    RBC 4.26 4.2 - 5.4 M/CU MM    Hemoglobin 13.7 12.5 - 16.0 GM/DL    Hematocrit 41.5 37 - 47 %    MCV 97.4 78 - 100 FL    MCH 32.2 (H) 27 - 31 PG    MCHC 33.0 32.0 - 36.0 %    RDW 13.6 11.7 - 14.9 %    Platelets 166 201 - 855 K/CU MM    MPV 10.0 7.5 - 11.1 FL   Basic Metabolic Panel w/ Reflex to MG    Collection Time: 04/28/22  6:30 AM   Result Value Ref Range    Sodium 138 135 - 145 MMOL/L    Potassium 4.2 3.5 - 5.1 MMOL/L    Chloride 105 99 - 110 mMol/L    CO2 24 21 - 32 MMOL/L    Anion Gap 9 4 - 16    BUN 19 6 - 23 MG/DL    CREATININE 0.9 0.6 - 1.1 MG/DL    Glucose 108 (H) 70 - 99 MG/DL    Calcium 9.0 8.3 - 10.6 MG/DL    GFR Non-African American 60 (L) >60 mL/min/1.73m2    GFR African American >60 >60 mL/min/1.73m2        Imaging/Diagnostics Last 24 Hours   No results found.     Electronically signed by NGOC Rosenberg NP on 4/28/2022 at 5:08 PM

## 2022-04-28 NOTE — FLOWSHEET NOTE
Physical Therapy Treatment Note   Date: 2022 Room: [unfilled]   Name: Lazara Duran : 1935   MRN: [id number] Admission Date:2022    Primary Problem:   Past Medical History:   Diagnosis Date    Acquired hypothyroidism 2019    Patient is taking Synthroid    Chronic midline low back pain without sciatica 2019    Dementia without behavioral disturbance ([facility] [address] 10/22/2019    Patient has dementia. She is taking Namenda Patient is seeing urologist Dr. Ryan Bess Gait disturbance 10/22/2019    Patient uses walker to ambulate    History of TIA (transient ischemic attack) 2019    Patient has a history of TIA and also recently may have had TIA. Patient is on Eliquis. Sees neurologist Dr. Ryan Bess Major depressive disorder with single episode, in full remission ([facility] [address] 2019    Patient is taking Zoloft and that is helping.  Mixed hyperlipidemia 2019    Patient is on simvastatin    Persistent atrial fibrillation ([address] 10/22/2019    A. fib on recently in 2019 . Patient is on metoprolol 25 mg twice a day and Eliquis    PFO (patent foramen ovale) 2019    PFO seen on echocardiogram in 2016    Pneumonia due to infectious organism 10/22/2019    Patient had pneumonia and pleural effusion during admission in 2019. A repeat CT scan of the chest will be done Patient is also seen pulmonologist     Past Surgical History:   Procedure Laterality Date    HYSTERECTOMY       Rehabilitation Diagnosis: impaired mobility   The primary encounter diagnosis was Urinary tract infection without hematuria, site unspecified. Diagnoses of Septicemia ([address] and Pneumonia of both lower lobes due to infectious organism were also pertinent to this visit. Restrictions/Precautions: post op shoe on R foot when walking    Subjective: agreeable to therapy and trying step today.     Initial Pain level:0/10 back    Goals:                   Short Term Goals  Time Frame for Short term goals: 1 week or until discharge:  Short term goal 1: Pt will demonstrate the ability to safely perform bed mobility with supervision. Short term goal 2: Pt will demonstrate the ability to safely transfer sit to stand and stand to sit from 3 different height surfaces with S and proper hand placement with VCs only. Short term goal 3: Pt will demonstrate the ability to safely ambulate 150' with a RW and S with no rest breaks or instances of letting go of the walker for nearby furniture. Plan of Care:             Times per week: Mon-Sat 5+/wk            Current Treatment Recommendations: Margaret Ream Re-education,Cognitive Reorientation,Patient/Caregiver Education & Training,ROM,ADL/Self-care Training,Equipment Evaluation, Education, & procurement,Balance Training,IADL Training,Gait Training,Home Exercise Program,Functional Mobility Training,Cognitive/Perceptual Training,Stair training,Safety Education & Training,Positioning      Objective Findings:    4/26/22 Date: 4/27/22 4-28-22   Bed mobility      Sit to stand transfer SBA supervision SUP   Stand to sit transfer SBA supervision SUP   Commode transfer x Supervision pt performed georgette care. Sup for vc to lift seat and lower pants. Standing tolerance For amb Not performed. 2-3 mins with transfer and gait, standing rest break. Ambulation Amb w/STD walker and CGA-SBA of 1 85ft + 96ft     Pt ambulated with SW x 70 feet  Pt ambulated with RW x70' Gait training with RW 53 ft, seated rest break, + 90 ft standing rest break, +68 with standing rest break. Limited by sob, complaints of RUE pain from leaning on walker but states \" I don't have to stand up straight or hold my head up. Stairs  Pt encouraged to perform step, but refused this date.   Up/down 4+6\" step in p// bars with SBA for up with LLE, down with RLE     Exercises:   Exercise/Equipment/Modalities  4/26/22 Date: 4/27/22 4-28-22   Seated clive      LAQ      Hip abd      SLR      NU step 10' 600 steps x10' S10 UE 10 Lvl 2   x15' S10 UE 10 Lvl 2                                   Modality/intervention used:   [x ] Therapeutic Exercise   [ ] Modalities:   [x ] Therapeutic Activity     [ ] Ultrasound   [ ] Elec Stim   [x ] Gait Training     [ ] Cervical Traction  [ ] Lumbar Traction   [ ] Neuromuscular Re-education   [ ] Cold/hotpack  [ ] Iontophoresis   [ ] Instruction in HEP     [ ] Vasopneumatic   [ ] Manual Therapy   [ ] Aquatic Therapy     Other Therapeutic Activities: x  Home Exercise Program/Education provided to patient:  x    Manual Treatments: x    Communication with other providers: nurse cleared for therapy    Adverse reactions to treatment:  Sob     Treatment/Activity Tolerance:   [ x ] Patient limited by fatigue [ ] Patient limited by pain [ ] Patient limited by other medical complications [x ] Patient tolerated therapy well  [ ] Other:     Post Tx Pain Rating: does not rate /10     Safety Precautions:   [ ] left in bed  [ ] left in chair [x ] call light within reach  [ ] bed alarm on  [ ] personal alarm on  [ ] other staff present:on toilet, aide notified, nurse approved ok to sit with light without staff.     Plan:   [x ] Continue per plan of care [ ] Eduardo Maria current plan   [ ] Plan of care initiated [ ] Hold pending MD visit [ ] Discharge     Plan for Next Session:     Time In: 0818  Time Out:  7599  Total Treatment Minutes: 55= 4/55= 2gt, 1 ta, 1 te    BARBARA Maier,[id number] 4/28/2022, 10:18 AM

## 2022-04-28 NOTE — PATIENT CARE CONFERENCE
SWING BED WEEKLY TEAM SHEET      John Berry   4/28/2022             WEEK# 2    PHYSICAL THERAPY       Ambulation: Distance:  114 ft    Device: RW    Assist: CGA     Wt bearing:  Cast shoe R foot    W/C skills:  Propulsion:  np     W/C parts management:     Stairs:  Amount/size: np      Assist:        Device:      Pain:     Transfers:   Sit to stand:  S  Stand to sit:      S        Bed Mobility:  Rolls right:     Rolls left:     Positioning:     Supine to sit: Mod I     Sit to supine: Mod I          Strength/ROM:      Balance:     Static sitting    [] P  [] F-   [] F    [] F+    [x] G               Dynamic Sitting           [] P  [] F-   [] F    [] F+    [x] G                     Static standing            [] P  [] F-   [] F    [x] F+    [] G   [x]  With  [] Without device Dynamic standing       [] P  [] F-   [] F    [x] F+    [] G   [x]  With  [] Without device  (P= poor   F= fair   G= good)    Issues to be resolved before discharge    Goals of previous week  [x] 1st team   [] met   [x] partially met    [] not met                                          Why the goals were not met     Goals:   Short term goal 1: Pt will demonstrate the ability to safely perform bed mobility with supervision. Short term goal 2: Pt will demonstrate the ability to safely transfer sit to stand and stand to sit from 3 different height surfaces with S and proper hand placement with VCs only. Short term goal 3: Pt will demonstrate the ability to safely ambulate 150' with a RW and S with no rest breaks or instances of letting go of the walker for nearby furniture.   Updated Goals:   Short term goal 2: Pt will demonstrate the ability to safely transfer sit to stand and stand to sit from 3 different height surfaces with I    Physical Therapy Signature:  Renaldo Shah, PT

## 2022-04-28 NOTE — PLAN OF CARE
Problem: Falls - Risk of:  Goal: Will remain free from falls  Description: Will remain free from falls  Outcome: Progressing  Goal: Absence of physical injury  Description: Absence of physical injury  Outcome: Progressing     Problem: Safety:  Goal: Free from accidental physical injury  Description: Free from accidental physical injury  Outcome: Progressing  Goal: Free from intentional harm  Description: Free from intentional harm  Outcome: Progressing     Problem: Daily Care:  Goal: Daily care needs are met  Description: Daily care needs are met  Outcome: Progressing     Problem: Pain:  Description: Pain management should include both nonpharmacologic and pharmacologic interventions.   Goal: Patient's pain/discomfort is manageable  Description: Patient's pain/discomfort is manageable  Outcome: Progressing  Goal: Pain level will decrease  Description: Pain level will decrease  Outcome: Progressing  Goal: Control of acute pain  Description: Control of acute pain  Outcome: Progressing     Problem: Discharge Planning  Goal: Discharge to home or other facility with appropriate resources  Outcome: Progressing     Problem: ABCDS Injury Assessment  Goal: Absence of physical injury  Outcome: Progressing

## 2022-04-28 NOTE — CARE COORDINATION
CM spoke with the patient regarding follow-up discussion regarding discharge planning. Patient stated that following discussion with the previous  she has decided to return home tomorrow (4/29/22). Patient advised that she would like to continue with PT/OT at home through [facility] who she currently receives skilled nursing services through. Patient will also continue receiving personal aide services through [facility] that she was receiving prior to admission. 10:35 AM  CM spoke with the patient's daughter Chase Robles to advise that the patient is agreeable to return home tomorrow. Stone Sessions voiced concern that the patient will not be compliant with wearing the orthopedic shoe while at home and was wondering if she could continue in the swing bed until her next orthopedic appointment near the junior of May. CM advised that that is not a valid reason for her continue receiving skilled therapy in the swing bed program.  CM advised Chase Robles that a referral would be made to [facility] for PT/OT in the home, Chase Robles voiced understanding. CM will follow. 11:26 AM  CM faxed the [facility] order and supporting documentation to [facility] to initiate the referral.  CM will follow. 11:30 AM  CM left a voicemail for the patient's PASSPORT  Adrianna Beal ([phone]) notifying her of the patient's anticipated discharge tomorrow (4/29/22) in order for her to re-start the patient's PASSPORT services. CM will follow.

## 2022-04-28 NOTE — PROGRESS NOTES
Occupational Therapy    Occupational Therapy Treatment Note  Name: Mabel Vick MRN: [id number] :   1935   Date:  2022   Admission Date: 2022 Room:  [address] [place] [address]   Restrictions/Precautions:  Restrictions/Precautions  Restrictions/Precautions: Weight Bearing  Required Braces or Orthoses?: Yes     Communication with other providers:   Cleared for treatment by RN. Pt removed call light off wall in the restroom which was report to nursing and pt had to be moved to room 9 for safety reasons. Subjective:  Patient states:  \"I want to walk and get on the bike\"  Pain:   Location, Type, Intensity (0/10 to 10/10): No pain    Objective:    Observation:  Pt alert and oriented. Treatment, including education:  Transfers  Supine to sit :SBA  Sit to supine :SBA  Scooting :SBA  Sit to stand :SBA  Stand to sit :SBA  SPT:SBA      Therapeutic Exercise:  Cues were given for technique, safety, recruitment, and rationale. Cues were verbal and/or tactile. Pt completed nustep on level 2 x 22 min x 1255 steps. Therapeutic Activity Training: Pt SBA with functional mobility with RW in hallway x 50' x 4 with SBA and min verbal cues for walker safety. Safety Measures: Gait belt used, Left in bed, Pull/Bed Alarm activated and call light left in reach        Assessment / Impression:        Patient's tolerance of treatment: Good   Adverse Reaction: None  Significant change in status and impact:  None  Barriers to improvement:  Dec strength and endurance    Plan for Next Session:    Continue with POC.     Time in:  1435  Time out:  1515  Total treatment time: 40  Billed Units: 1 TA, 2 TE    Electronically signed by:    Bro Mark [address]    2022, 3:21 PM    Previously filed values:     Short Term Goals  Time Frame for Short term goals: Until DC or goals met  Short Term Goal 1: Pt will complete trfs mod I w/o LOB  Short Term Goal 2: Pt will complete UE bathing/dressing with S  Short Term Goal 3: Pt will complete LE bathing/dressing with S  Short Term Goal 4: Pt will complete toileting mod I  Short Term Goal 5: Pt will complete 10+ min of standing/functional mobility during ADLs/therax with min increased SOB and no LOB

## 2022-04-28 NOTE — CARE COORDINATION
Activity Visit    Visit with Patient. Very talkative and expressed how she always enjoys the visits and the conversation. Offered large print word search page on [employer] as she had said she had seen him and really enjoyed all of his music. Also accepted cross word puzzle sheet. Upon entering she was watching [employer] game which is one of her favorite things to do. Reminisced on all of the [employer] she has gone to and how she had taken her grand children to [address] [place] [address]! Thanked me for visiting with her.     Rebecca PINEDA  4/28/2022

## 2022-04-29 ENCOUNTER — TELEPHONE (OUTPATIENT)
Dept: INTERNAL MEDICINE CLINIC | Age: 87
End: 2022-04-29

## 2022-04-29 VITALS
SYSTOLIC BLOOD PRESSURE: 130 MMHG | BODY MASS INDEX: 26.42 KG/M2 | RESPIRATION RATE: 16 BRPM | HEART RATE: 74 BPM | WEIGHT: 178.4 LBS | DIASTOLIC BLOOD PRESSURE: 76 MMHG | OXYGEN SATURATION: 96 % | HEIGHT: 69 IN | TEMPERATURE: 97.3 F

## 2022-04-29 PROCEDURE — 6370000000 HC RX 637 (ALT 250 FOR IP): Performed by: NURSE PRACTITIONER

## 2022-04-29 PROCEDURE — 97110 THERAPEUTIC EXERCISES: CPT

## 2022-04-29 PROCEDURE — 6370000000 HC RX 637 (ALT 250 FOR IP): Performed by: INTERNAL MEDICINE

## 2022-04-29 PROCEDURE — 97535 SELF CARE MNGMENT TRAINING: CPT

## 2022-04-29 PROCEDURE — 94761 N-INVAS EAR/PLS OXIMETRY MLT: CPT

## 2022-04-29 PROCEDURE — 97116 GAIT TRAINING THERAPY: CPT

## 2022-04-29 PROCEDURE — 6370000000 HC RX 637 (ALT 250 FOR IP): Performed by: FAMILY MEDICINE

## 2022-04-29 PROCEDURE — 97530 THERAPEUTIC ACTIVITIES: CPT

## 2022-04-29 RX ADMIN — ACETAMINOPHEN 500 MG: 500 TABLET ORAL at 09:05

## 2022-04-29 RX ADMIN — ACETAMINOPHEN 500 MG: 500 TABLET ORAL at 15:55

## 2022-04-29 RX ADMIN — Medication 400 UNITS: at 09:06

## 2022-04-29 RX ADMIN — METOPROLOL TARTRATE 12.5 MG: 25 TABLET, FILM COATED ORAL at 09:06

## 2022-04-29 RX ADMIN — SERTRALINE 50 MG: 50 TABLET, FILM COATED ORAL at 09:06

## 2022-04-29 RX ADMIN — RIVASTIGMINE TARTRATE 3 MG: 1.5 CAPSULE ORAL at 09:07

## 2022-04-29 RX ADMIN — APIXABAN 5 MG: 5 TABLET, FILM COATED ORAL at 09:06

## 2022-04-29 RX ADMIN — LEVOTHYROXINE SODIUM 150 MCG: 0.15 TABLET ORAL at 06:37

## 2022-04-29 RX ADMIN — MEMANTINE 10 MG: 10 TABLET ORAL at 09:20

## 2022-04-29 RX ADMIN — DIGOXIN 125 MCG: 125 TABLET ORAL at 09:10

## 2022-04-29 RX ADMIN — DICLOFENAC SODIUM 4 G: 10 GEL TOPICAL at 09:14

## 2022-04-29 RX ADMIN — Medication 2000 UNITS: at 09:14

## 2022-04-29 ASSESSMENT — PAIN DESCRIPTION - LOCATION
LOCATION: FOOT
LOCATION: BACK

## 2022-04-29 ASSESSMENT — PAIN DESCRIPTION - ORIENTATION
ORIENTATION: MID
ORIENTATION: RIGHT

## 2022-04-29 ASSESSMENT — PAIN DESCRIPTION - DESCRIPTORS: DESCRIPTORS: ACHING

## 2022-04-29 ASSESSMENT — PAIN SCALES - GENERAL
PAINLEVEL_OUTOF10: 2
PAINLEVEL_OUTOF10: 2

## 2022-04-29 NOTE — PROGRESS NOTES
Occupational Therapy    Occupational Therapy Treatment Note  Name: Daniel Ruiz MRN: [id number] :   1935   Date:  2022   Admission Date: 2022 Room:  [address] [place] [address]   Restrictions/Precautions:  Restrictions/Precautions  Restrictions/Precautions: Weight Bearing  Required Braces or Orthoses?: Yes     Communication with other providers:   Cleared for treatment by RN. Subjective:  Patient states:  \"I need to get cleaned up, I'm soaking wet\"  Pain:   Location, Type, Intensity (0/10 to 10/10): No pain    Objective:    Observation:  Pt alert and oriented. Treatment, including education:  Transfers  Supine to sit :Sup  Sit to supine :Sup  Scooting :Sup  Rolling :Sup  Sit to stand :Sup  Stand to sit :Sup  SPT:Sup      Self Care Training:   Activities performed today included the following:      Grooming  Mod I to wash face and brush hair. Bathing   Mod I for bath    UB Dress  Mod I to don pull over shirt    LB Dress  Mod I to don brief and pants. Therapeutic Activity Training: Pt completed functional mobility with RW in with Sup. Safety Measures: Gait belt used, Left in bed, Pull/Bed Alarm activated and call light left in reach        Assessment / Impression:        Patient's tolerance of treatment: Good   Adverse Reaction: None  Significant change in status and impact:  None  Barriers to improvement:  Dec strength and endurance    Plan for Next Session:    Continue with POC.     Time in:  940  Time out:  1020  Total treatment time:  40  Billed Units: 2 ADL, 1 TA  Electronically signed by:    TAMERA Flowers 2022, 12:41 PM    Previously filed values:     Short Term Goals  Time Frame for Short term goals: Until DC or goals met  Short Term Goal 1: Pt will complete trfs mod I w/o LOB  Short Term Goal 2: Pt will complete UE bathing/dressing with S  Short Term Goal 3: Pt will complete LE bathing/dressing with S  Short Term Goal 4: Pt will complete toileting mod I  Short Term Goal 5: Pt will complete 10+ min of standing/functional mobility during ADLs/therax with min increased SOB and no LOB

## 2022-04-29 NOTE — DISCHARGE SUMMARY
V2.0  Discharge Summary    Name:  Patricia Reyes /Age/Sex: 1935 (24 y.o. female)   Admit Date: 2022  Discharge Date: 22    MRN & CSN:  [id number] & [id number] Encounter Date and Time 22 10:46 AM EDT    Attending:  Ida Rudd MD Discharging Provider: NGOC Camarillo NP       Hospital Course:     Brief HPI:   Patricia Reyes is a 81 y.o.  female  Whose medical history include, hypothyroidism, Afib, hyperlipidemia, back pain, hard of hearing, TIA, Dementia,She was initially brought from an independent living facility. She was admitted for sepsis likely from pneumonia. She was then started on IV antibiotics, which have since then been discontinued due to x-ray further x-ray showing resolution of what appears to be more congestive heart failure pulmonary edema. Patient was treated with IV Lasix for this, she has improved. She does have baseline dementia. She did not have any further UTI urine culture was negative. Her oxygen were weaned off to 2 L. Patient was evaluated by PT and OT and they recommended further rehab. At that time the patient was transferred to swing bed, on 2022. Patient has progressed nicely she is worked well with physical and Occupational Therapy and will be discharged home today. Brief Problem Based Course:   1. Physical debility, patient is working well with physical and Occupational Therapy she is admitted to swing bed program on 2022. Plan is for discharge home tomorrow 2022  2.  Chronic atrial fibrillation, [id number] score is 3, continue Eliquis, Lopressor, and digoxin.    3. Heart failure with preserved ejection fraction/VHD, last echocardiogram was on 2022 EF was 55% mild LVH, moderate MR/AR, severe TR, severe pulmonary hypertension.  We will continue to monitor for clinical symptoms of hyper bulimia, decompensation.  Patient was on Lasix daily but this was discontinued per patient request on 2022.  She did have an appointment 4/27/2022 with her cardiologist in [place]. 4. Hypothyroidism, continue Synthroid, last TSH 0.4 87 on 10/20/2022  5. Right metatarsal fracture, x-ray 329-second fourth metatarsal base fracture suspected acute chip fracture right lateral malleolus wear postop boot while out of bed and weightbearing. She followed up with Dr. Pat Brandt on 4/27/2022. 6. Dementia, continue Namenda and Exelon  7. Depression continue Zoloft  8. Interstitial lung disease follows with pulmonology at [address] [place] [address]        The patient expressed appropriate understanding of, and agreement with the discharge recommendations, medications, and plan. Consults this admission:  IP CONSULT TO HOME CARE NEEDS    Discharge Diagnosis:   Physical debility        Discharge Instruction:   Follow up appointments:   Primary care physician: within 1 weeks  Diet: low fat, low cholesterol diet   Activity: activity as tolerated  Disposition: Discharged to:   [x]Home, [x][facility], []SNF, []Acute Rehab, []Hospice   Condition on discharge: Stable  Labs and Tests to be Followed up as an outpatient by PCP or Specialist: Will need follow-up with neurology for assessment and planning of dementia. Will also need follow-up with PCP for routine care. Can also follow-up outpatient with Ortho as needed.   Discharge Medications:        Medication List      CONTINUE taking these medications    apixaban 5 MG Tabs tablet  Commonly known as: Eliquis  TAKE 1 TABLET BY MOUTH 2 TIMES DAILY     diclofenac sodium 1 % Gel  Commonly known as: VOLTAREN  Apply 4 g topically 4 times daily     digoxin 125 MCG tablet  Commonly known as: LANOXIN  Take 1 tablet by mouth daily     levothyroxine 150 MCG tablet  Commonly known as: SYNTHROID  Take 1 tablet by mouth daily     memantine 10 MG tablet  Commonly known as: NAMENDA  Take 1 tablet by mouth 2 times daily     metoprolol tartrate 25 MG tablet  Commonly known as: LOPRESSOR  Take 0.5 tablets by mouth 2 times daily     rivastigmine 3 MG capsule  Commonly known as: EXELON  Take 1 capsule by mouth daily     sertraline 50 MG tablet  Commonly known as: ZOLOFT  Take 1 tablet by mouth daily     simvastatin 40 MG tablet  Commonly known as: ZOCOR  Take 1 tablet by mouth nightly     Vitamin D3 50 MCG (2000 UT) Tabs  Take 1 tablet by mouth daily     vitamin E 400 UNIT capsule  Take 1 capsule by mouth daily           Objective Findings at Discharge:   /76   Pulse 62   Temp 97.3 °F (36.3 °C) (Infrared)   Resp 16   Ht 5' 9.02\" (1.753 m)   Wt 178 lb 6.4 oz (80.9 kg)   SpO2 96%   BMI 26.33 kg/m²       Physical Exam:   General: NAD  Eyes: EOMI  ENT: neck supple  Cardiovascular: Regular rate. Respiratory: Clear to auscultation bilaterally  Gastrointestinal: Soft, non tender, nondistended, bowel sounds present all 4 quadrants  Genitourinary: no suprapubic tenderness  Musculoskeletal: No edema, no gross joint deformities patient is to wear foot boot whenever up  Skin: warm, dry  Neuro: Alert. Oriented, forgetful at times  Psych: Mood appropriate. Labs and Imaging   No results found.     CBC:   Recent Labs     04/28/22  0630   WBC 9.2   HGB 13.7        BMP:    Recent Labs     04/28/22  0630      K 4.2      CO2 24   BUN 19   CREATININE 0.9   GLUCOSE 108*     Lipids:   Lab Results   Component Value Date    CHOL 156 05/12/2021    HDL 35 03/02/2022    TRIG 214 05/12/2021     Hemoglobin A1C:   Lab Results   Component Value Date    LABA1C 5.7 05/15/2020     Troponin:   Lab Results   Component Value Date    TROPONINT <0.010 05/15/2020    TROPONINT <0.010 05/15/2020    TROPONINT <0.010 07/04/2014     UA:  Lab Results   Component Value Date    NITRU NEGATIVE 04/11/2022    COLORU YELLOW 04/11/2022    PHUR 6.0 04/05/2022    WBCUA NEGATIVE 04/11/2022    RBCUA NEGATIVE 04/11/2022    MUCUS NEGATIVE 07/03/2014    BACTERIA FEW 04/11/2022    CLARITYU SL HAZY 04/11/2022    SPECGRAV 1.020 04/11/2022    LEUKOCYTESUR NEGATIVE 04/11/2022 UROBILINOGEN 0.2 04/11/2022    BILIRUBINUR NEGATIVE 04/11/2022    BILIRUBINUR neg 04/05/2022    BLOODU NEGATIVE 04/11/2022    GLUCOSEU neg 04/05/2022    KETUA NEGATIVE 04/11/2022     Urine Cultures:   Lab Results   Component Value Date    LABURIN >100,000 CFU/ml 04/05/2022     Organism:   Lab Results   Component Value Date    ORG Escherichia coli 04/05/2022       Time Spent Discharging patient 35 minutes. Case and discharge plan has been discussed in detail with Dr. Johanny Foster and he is in agreement with this plan.     Electronically signed by NGOC Bermudez NP on 4/29/2022 at 10:46 AM

## 2022-04-29 NOTE — CARE COORDINATION
Activity Visit    Visit with patient in room today. Expressed her appreciation for the conversations and the \"busy work\" that had be provided to her. Visit today from [address] and she requested prayer. Appreciative of the book maxwell provided to her and the conversation with the . Looking forward to going home and provided her laminated pictures of calico cats that she enjoyed while here. D/C to home today. [address]

## 2022-04-29 NOTE — PLAN OF CARE
Problem: Falls - Risk of:  Goal: Will remain free from falls  Description: Will remain free from falls  Outcome: Adequate for Discharge  Goal: Absence of physical injury  Description: Absence of physical injury  Outcome: Adequate for Discharge     Problem: Safety:  Goal: Free from accidental physical injury  Description: Free from accidental physical injury  Outcome: Adequate for Discharge  Goal: Free from intentional harm  Description: Free from intentional harm  Outcome: Adequate for Discharge     Problem: Daily Care:  Goal: Daily care needs are met  Description: Daily care needs are met  Outcome: Adequate for Discharge     Problem: Pain:  Goal: Patient's pain/discomfort is manageable  Description: Patient's pain/discomfort is manageable  Outcome: Adequate for Discharge  Goal: Pain level will decrease  Description: Pain level will decrease  Outcome: Adequate for Discharge  Goal: Control of acute pain  Description: Control of acute pain  Outcome: Adequate for Discharge     Problem: Discharge Planning  Goal: Discharge to home or other facility with appropriate resources  Outcome: Adequate for Discharge     Problem: ABCDS Injury Assessment  Goal: Absence of physical injury  Outcome: Adequate for Discharge

## 2022-04-29 NOTE — CARE COORDINATION
CM faxed the discharge summary to [facility]. 12:03 PM  CM faxed the patient's discharge instructions to [facility]. ASHIA also spoke with Anna Kirkland at [facility] to notify of the patient's discharge.

## 2022-04-29 NOTE — FLOWSHEET NOTE
Physical Therapy Treatment Note   Date: 2022 Room: [unfilled]   Name: Gavino Richardson : 1935   MRN: [id number] Admission Date:2022    Primary Problem:   Past Medical History:   Diagnosis Date    Acquired hypothyroidism 2019    Patient is taking Synthroid    Chronic midline low back pain without sciatica 2019    Dementia without behavioral disturbance ([address] 10/22/2019    Patient has dementia. She is taking Namenda Patient is seeing urologist Dr. Edward Williamson Gait disturbance 10/22/2019    Patient uses walker to ambulate    History of TIA (transient ischemic attack) 2019    Patient has a history of TIA and also recently may have had TIA. Patient is on Eliquis. Sees neurologist Dr. Edward Williamson Major depressive disorder with single episode, in full remission ([facility] 2019    Patient is taking Zoloft and that is helping.  Mixed hyperlipidemia 2019    Patient is on simvastatin    Persistent atrial fibrillation ([address] 10/22/2019    A. fib on recently in 2019 . Patient is on metoprolol 25 mg twice a day and Eliquis    PFO (patent foramen ovale) 2019    PFO seen on echocardiogram in 2016    Pneumonia due to infectious organism 10/22/2019    Patient had pneumonia and pleural effusion during admission in 2019. A repeat CT scan of the chest will be done Patient is also seen pulmonologist     Past Surgical History:   Procedure Laterality Date    HYSTERECTOMY       Rehabilitation Diagnosis: impaired mobility   The primary encounter diagnosis was Urinary tract infection without hematuria, site unspecified. Diagnoses of Septicemia ([address] and Pneumonia of both lower lobes due to infectious organism were also pertinent to this visit.     Restrictions/Precautions: post op shoe on R foot when walking    Subjective: agreeable to therapy     Initial Pain level:0/10 back    Goals:                   Short Term Goals  Time Frame for Short term goals: 1 week or until discharge:  Short term goal 1: Pt will demonstrate the ability to safely perform bed mobility with supervision. Short term goal 2: Pt will demonstrate the ability to safely transfer sit to stand and stand to sit from 3 different height surfaces with S and proper hand placement with VCs only. Short term goal 3: Pt will demonstrate the ability to safely ambulate 150' with a RW and S with no rest breaks or instances of letting go of the walker for nearby furniture. Plan of Care:             Times per week: Mon-Sat 5+/wk            Current Treatment Recommendations: Jenise Saleem Re-education,Cognitive Reorientation,Patient/Caregiver Education & Training,ROM,ADL/Self-care Training,Equipment Evaluation, Education, & procurement,Balance Training,IADL Training,Gait Training,Home Exercise Program,Functional Mobility Training,Cognitive/Perceptual Training,Stair training,Safety Education & Training,Positioning      Objective Findings:    4/26/22 Date: 4/27/22 4-28-22 4/29/22   Bed mobility       Sit to stand transfer SBA supervision SUP SUP   Stand to sit transfer SBA supervision SUP SUP   Commode transfer x Supervision pt performed georgette care. Sup for vc to lift seat and lower pants. x   Standing tolerance For amb Not performed. 2-3 mins with transfer and gait, standing rest break. For amb   Ambulation Amb w/STD walker and CGA-SBA of 1 85ft + 96ft     Pt ambulated with SW x 70 feet  Pt ambulated with RW x70' Gait training with RW 53 ft, seated rest break, + 90 ft standing rest break, +68 with standing rest break. Limited by sob, complaints of RUE pain from leaning on walker but states \" I don't have to stand up straight or hold my head up. Amb w/RW and CGA of 1 68ft + 86ft  VC's to stay in the walker required   Stairs  Pt encouraged to perform step, but refused this date.   Up/down 4+6\" step in p// bars with SBA for up with LLE, down with RLE x Exercises:   Exercise/Equipment/Modalities  4/26/22 Date: 4/27/22 4-28-22 4/29/22   Seated clive       LAQ       Hip abd       SLR       NU step 10' 600 steps x10' S10 UE 10 Lvl 2   x15' S10 UE 10 Lvl 2 10'                                        Modality/intervention used:   [x ] Therapeutic Exercise   [ ] Modalities:   [x ] Therapeutic Activity     [ ] Ultrasound   [ ] Elec Stim   [x ] Gait Training     [ ] Cervical Traction  [ ] Lumbar Traction   [ ] Neuromuscular Re-education   [ ] Cold/hotpack  [ ] Iontophoresis   [ ] Instruction in HEP     [ ] Vasopneumatic   [ ] Manual Therapy   [ ] Aquatic Therapy     Other Therapeutic Activities: x  Home Exercise Program/Education provided to patient:  x    Manual Treatments: x    Communication with other providers: nurse cleared for therapy    Adverse reactions to treatment:  SOB     Treatment/Activity Tolerance:   [ x ] Patient limited by fatigue [ ] Patient limited by pain [ ] Patient limited by other medical complications [x ] Patient tolerated therapy well  [ ] Other:     Post Tx Pain Rating: does not rate /10     Safety Precautions:   [ ] left in bed  [x ] left in chair [x ] call light within reach  [ ] bed alarm on  [ ] personal alarm on  [ ] other staff present:     Plan:   [x ] Continue per plan of care [ ] Sheila Ross current plan   [ ] Plan of care initiated [ ] Manuel Dave pending MD visit [ ] Discharge     Plan for Next Session:     Time In: 1139  Time Out:  4168  Total Treatment Minutes:  30  1gt 1te    Signed: Jane Real 4/29/2022, 1:25 PM

## 2022-04-29 NOTE — TELEPHONE ENCOUNTER
Tommie Vance from [facility] contacted office. She reports that patient is being discharged from rehab today and would benefit from getting a toilet seat riser. She would like an order for this sent to [phone].

## 2022-04-29 NOTE — PROGRESS NOTES
Patient discharged home with family. Daughters came to get her and discharge [facility] All questions answered and patient was wheeled out to the car by family. No questions or concerns at time of discharge.

## 2022-05-02 ENCOUNTER — CARE COORDINATION (OUTPATIENT)
Dept: CASE MANAGEMENT | Age: 87
End: 2022-05-02

## 2022-05-02 NOTE — CARE COORDINATION
Samm [facility] Transitions Initial Follow Up Call    Call within 2 business days of discharge: Yes    Patient: Hortensia Jiang Patient : 1935   MRN: [id number]  Reason for Admission: Sepsis, PNA, UTI, Physical Debility, Generalized Weakness  Discharge Date: 22 RARS: Readmission Risk Score: 14 ( )      Last Discharge [facility]       Complaint Diagnosis Description Type Department Provider    22   Admission (Discharged) Miriam Lino MD          Facility: [facility]    1st attempt to contact patient for initial care transition follow up. Unable to reach patient's daughter Shima Dixon. Left message with contact information and request for call back. Contacted [facility]. Spoke with Trista Martin. She confirmed referral was received and patient is current with services.       Follow Up  Future Appointments   Date Time Provider Cleo Philip   2022  3:10 PM Sasha Ness DO [address] [place] [address]   2022  4:30 PM Colin Driscoll MD [address] [facility]   3/24/2023  9:00 AM SCHEDULE, [address] AW[facility] LPN [address] [facility]       Governor KING Arias

## 2022-05-03 ENCOUNTER — CARE COORDINATION (OUTPATIENT)
Dept: CASE MANAGEMENT | Age: 87
End: 2022-05-03

## 2022-05-03 ENCOUNTER — TELEPHONE (OUTPATIENT)
Dept: INTERNAL MEDICINE CLINIC | Age: 87
End: 2022-05-03

## 2022-05-03 DIAGNOSIS — R26.9 GAIT DISTURBANCE: Primary | ICD-10-CM

## 2022-05-03 DIAGNOSIS — R53.1 GENERALIZED WEAKNESS: ICD-10-CM

## 2022-05-03 NOTE — CARE COORDINATION
Samm [facility] Transitions Initial Follow Up Call    Call within 2 business days of discharge: Yes    Patient: Aris Nolen Patient : 1935   MRN: [id number]  Reason for Admission:  Sepsis, PNA, UTI, Physical Debility, Generalized Weakness  Discharge Date: 22 RARS: Readmission Risk Score: 14 ( )      Last Discharge [address] [address]       Complaint Diagnosis Description Type Department Provider    22   Admission (Discharged) Afshin Carrasco MD          Facility: [facility]    2nd attempt to contact patient for initial care transition follow up. Unable to reach patient. Left message with contact information and request for call back. Episode to be resolved and CTN to sign off if return call is not received from the patient. CTN sent secure email to CHEIKH Renee to inform her unable to reach patient. CTN signing off. Also sent secure email to Laurita Ramirez,  of [facility] Internal Medicine (Dr. Dinorah Morillo) to inform her that was unable to reach patient and patient will need one week hospital follow up appointment with provider.       Follow Up  Future Appointments   Date Time Provider Cleo Philip   2022  3:10 PM Dandre Phan DO [address] [place] [address]   2022  4:30 PM Dinorah Morillo MD [facility] NATALIYA IM TOMER   3/24/2023  9:00 AM SCHEDULE, [facility] AWV LPN [facility] ALEXANDRAB IM TOMER Gordon RN

## 2022-05-03 NOTE — TELEPHONE ENCOUNTER
Attempted to contact patient to schedule a hospital follow up appointment. No answer, message was left to contact office.

## 2022-05-09 ENCOUNTER — CARE COORDINATION (OUTPATIENT)
Dept: CARE COORDINATION | Age: 87
End: 2022-05-09

## 2022-05-09 SDOH — ECONOMIC STABILITY: TRANSPORTATION INSECURITY
IN THE PAST 12 MONTHS, HAS THE LACK OF TRANSPORTATION KEPT YOU FROM MEDICAL APPOINTMENTS OR FROM GETTING MEDICATIONS?: NO

## 2022-05-09 SDOH — ECONOMIC STABILITY: TRANSPORTATION INSECURITY
IN THE PAST 12 MONTHS, HAS LACK OF TRANSPORTATION KEPT YOU FROM MEETINGS, WORK, OR FROM GETTING THINGS NEEDED FOR DAILY LIVING?: NO

## 2022-05-09 SDOH — ECONOMIC STABILITY: INCOME INSECURITY: IN THE LAST 12 MONTHS, WAS THERE A TIME WHEN YOU WERE NOT ABLE TO PAY THE MORTGAGE OR RENT ON TIME?: NO

## 2022-05-09 SDOH — ECONOMIC STABILITY: HOUSING INSECURITY
IN THE LAST 12 MONTHS, WAS THERE A TIME WHEN YOU DID NOT HAVE A STEADY PLACE TO SLEEP OR SLEPT IN A SHELTER (INCLUDING NOW)?: NO

## 2022-05-09 SDOH — ECONOMIC STABILITY: HOUSING INSECURITY: IN THE LAST 12 MONTHS, HOW MANY PLACES HAVE YOU LIVED?: 1

## 2022-05-09 ASSESSMENT — SOCIAL DETERMINANTS OF HEALTH (SDOH)
IN A TYPICAL WEEK, HOW MANY TIMES DO YOU TALK ON THE PHONE WITH FAMILY, FRIENDS, OR NEIGHBORS?: MORE THAN THREE TIMES A WEEK
HOW OFTEN DO YOU ATTEND CHURCH OR RELIGIOUS SERVICES?: NEVER
HOW OFTEN DO YOU ATTENT MEETINGS OF THE CLUB OR ORGANIZATION YOU BELONG TO?: NEVER
DO YOU BELONG TO ANY CLUBS OR ORGANIZATIONS SUCH AS CHURCH GROUPS UNIONS, FRATERNAL OR ATHLETIC GROUPS, OR SCHOOL GROUPS?: NO
HOW OFTEN DO YOU GET TOGETHER WITH FRIENDS OR RELATIVES?: MORE THAN THREE TIMES A WEEK

## 2022-05-09 NOTE — CARE COORDINATION
Ambulatory Care Coordination Note    CM Risk Score: 8  Charlson 10 Year Mortality Risk Score: 79%     ACC: Reji Yin RN    Summary Note: Call to pt for acm fu, spoke with daughter. Reports pt Needs Toilet seat riser, has been working with cm from [facility] in regard to this. ACM will f/u. Daughter reports pt lives alone, has aide 5 Days/wk. Family takes turns visiting twice daily to help with food etc. Walking a little bit better. Walking with walker. She is getting home PT, coming today. Has SN thru [address] Meals on wheles daily. Wants to keep apptmt on 5/26 due to multiple other apptmts. Denies symptoms concerns at this time. Plan: acm will continue to follow to support and educate and [facility]. Care Coordination Interventions    Program Enrollment: Complex Care  Referral from Primary Care Provider: No  Suggested Interventions and [employer] on Wheels: Completed  Physical Therapy: Completed  Senior Services: Completed         Goals Addressed                 This Visit's Progress     Conditions and Symptoms        I will notify my provider of any barriers to my plan of care. I will notify my provider of any symptoms that indicate a worsening of my condition. Barriers: overwhelmed by complexity of regimen  Plan for overcoming my barriers: support from acm and pcp office/[facility] cm  Confidence: 9/10  Anticipated Goal Completion Date: 7/10/22              Prior to Admission medications    Medication Sig Start Date End Date Taking?  Authorizing Provider   diclofenac sodium (VOLTAREN) 1 % GEL Apply 4 g topically 4 times daily 3/18/22   Kacey Gillespie MD   Cholecalciferol (VITAMIN D3) 50 MCG (2000 UT) TABS Take 1 tablet by mouth daily 2/10/22   Kacey Gillespie MD   memantine [facility]) 10 MG tablet Take 1 tablet by mouth 2 times daily 2/10/22   Kacey Gillespie MD   rivastigmine (EXELON) 3 MG capsule Take 1 capsule by mouth daily 2/10/22   Kacey Gillespie MD   apixaban (ELIQUIS) 5 MG TABS tablet TAKE 1 TABLET BY MOUTH 2 TIMES DAILY 2/10/22   Allison Montoya MD   simvastatin (ZOCOR) 40 MG tablet Take 1 tablet by mouth nightly 2/10/22   Allison Montoya MD   digoxin [facility] TRANSPLANT [facility]) 125 MCG tablet Take 1 tablet by mouth daily 2/10/22   Allison Montoya MD   metoprolol tartrate (LOPRESSOR) 25 MG tablet Take 0.5 tablets by mouth 2 times daily 2/10/22   Allison Montoya MD   levothyroxine (SYNTHROID) 150 MCG tablet Take 1 tablet by mouth daily 2/10/22   Allison Montoya MD   sertraline (ZOLOFT) 50 MG tablet Take 1 tablet by mouth daily 2/10/22   Allison Montoya MD   vitamin E 400 UNIT capsule Take 1 capsule by mouth daily 2/10/22   Allison Montoya MD       Future Appointments   Date Time Provider Cleo Philip   5/25/2022  3:10 PM Jalne Lee DO [facility]   5/26/2022  4:30 PM Allison Montoya MD [facility] IM [facility]   3/24/2023  9:00 AM SCHEDULE, [facility] AWV LPN [facility]      and   General Assessment    Do you have any symptoms that are causing concern?: No

## 2022-05-10 ENCOUNTER — TELEPHONE (OUTPATIENT)
Dept: INTERNAL MEDICINE CLINIC | Age: 87
End: 2022-05-10

## 2022-05-10 NOTE — TELEPHONE ENCOUNTER
Antoinette with home care contacted office and stated patient has been discharge and wanted to confirm that doctor would be able to continue home care orders.

## 2022-05-10 NOTE — CARE COORDINATION
Call to [employer] and klever rg for cm covering for [facility] as she is out this week in regard to f/u on order for toilet seat riser for pt. Requested call back.

## 2022-05-11 ENCOUNTER — CARE COORDINATION (OUTPATIENT)
Dept: CARE COORDINATION | Age: 87
End: 2022-05-11

## 2022-05-11 PROBLEM — N39.0 URINARY TRACT INFECTION: Status: RESOLVED | Noted: 2022-04-11 | Resolved: 2022-05-11

## 2022-05-11 NOTE — CARE COORDINATION
Spoke with Venkatesh at [employer] who is covering for pt [employer] this week, advised that order for toilet seat was faxed. She will check that it was rcvd on their end and call back if further needs.

## 2022-05-11 NOTE — CARE COORDINATION
Left a message on patients  about starting Cipro today and also leaving a urine specimen. Asked that he return my call to confirm receipt. I also left a message with his nephew, Yung. Amy Montgomery RN    Called patient back yesterday and spoke with him to let him know to  and start taking the Cipro. He verbalized understanding and agrees with pla. Amy Montgomery RN       Request for toilet seat order to be faxed to [employer] from pcp office. Fax to [phone].

## 2022-05-20 ENCOUNTER — CARE COORDINATION (OUTPATIENT)
Dept: CARE COORDINATION | Age: 87
End: 2022-05-20

## 2022-05-20 NOTE — CARE COORDINATION
Ambulatory Care Coordination Note  5/20/2022  CM Risk Score: 8  Charlson 10 Year Mortality Risk Score: 79%     ACC: Loren Carney RN    Summary Note: Call to pt/daughter Jillian Camacho who reports pt is doing better. Pt has ov with pcp and ortho next week she reports and confirmed apptmt times w daughter. She confirmed pt has toilet seat riser and is very pleased ith it. Denies need for addl equipment at this time. Plan: will f/u with pt after md visits and work toward graduation if no new needs. Care Coordination Interventions    Program Enrollment: Complex Care  Referral from Primary Care Provider: No  Suggested Interventions and [employer]: Completed  Physical Therapy: Completed  Senior Services: Completed         Goals Addressed                 This Visit's Progress     Conditions and Symptoms   On track     I will notify my provider of any barriers to my plan of care. I will notify my provider of any symptoms that indicate a worsening of my condition. Barriers: overwhelmed by complexity of regimen  Plan for overcoming my barriers: support from acm and pcp office/passport cm  Confidence: 9/10  Anticipated Goal Completion Date: 7/10/22              Prior to Admission medications    Medication Sig Start Date End Date Taking?  Authorizing Provider   diclofenac sodium (VOLTAREN) 1 % GEL Apply 4 g topically 4 times daily 3/18/22   Aung Galindo MD   Cholecalciferol (VITAMIN D3) 50 MCG (2000 UT) TABS Take 1 tablet by mouth daily 2/10/22   Aung Galindo MD   memantine [facility]) 10 MG tablet Take 1 tablet by mouth 2 times daily 2/10/22   Aung Galindo MD   rivastigmine (EXELON) 3 MG capsule Take 1 capsule by mouth daily 2/10/22   Aung Galindo MD   apixaban (ELIQUIS) 5 MG TABS tablet TAKE 1 TABLET BY MOUTH 2 TIMES DAILY 2/10/22   Aung Galindo MD   simvastatin (ZOCOR) 40 MG tablet Take 1 tablet by mouth nightly 2/10/22   Aung Galindo MD   digoxin (LANOXIN) 125 MCG tablet Take 1 tablet by mouth daily 2/10/22   Alvaro Browning MD   metoprolol tartrate (LOPRESSOR) 25 MG tablet Take 0.5 tablets by mouth 2 times daily 2/10/22   Alvaro Browning MD   levothyroxine (SYNTHROID) 150 MCG tablet Take 1 tablet by mouth daily 2/10/22   Alvaro Browning MD   sertraline (ZOLOFT) 50 MG tablet Take 1 tablet by mouth daily 2/10/22   Alvaro Browning MD   vitamin E 400 UNIT capsule Take 1 capsule by mouth daily 2/10/22   Alvaro Browning MD       Future Appointments   Date Time Provider Cleo Philip   5/25/2022  3:10 PM Melinda Gerardo DO [facility] ORTHO MMA   5/26/2022  4:30 PM Alvaro Browning MD [facility] URB IM MMA   3/24/2023  9:00 AM SCHEDULE, [facility] LPN [facility] IM MMA      and   General Assessment    Do you have any symptoms that are causing concern?: No

## 2022-05-25 ENCOUNTER — OFFICE VISIT (OUTPATIENT)
Dept: ORTHOPEDIC SURGERY | Age: 87
End: 2022-05-25

## 2022-05-25 VITALS
HEIGHT: 69 IN | BODY MASS INDEX: 26.36 KG/M2 | HEART RATE: 65 BPM | WEIGHT: 178 LBS | OXYGEN SATURATION: 98 % | SYSTOLIC BLOOD PRESSURE: 126 MMHG | RESPIRATION RATE: 15 BRPM | DIASTOLIC BLOOD PRESSURE: 69 MMHG

## 2022-05-25 DIAGNOSIS — S92.324D CLOSED NONDISPLACED FRACTURE OF SECOND METATARSAL BONE OF RIGHT FOOT WITH ROUTINE HEALING, SUBSEQUENT ENCOUNTER: ICD-10-CM

## 2022-05-25 DIAGNOSIS — S92.344D CLOSED NONDISPLACED FRACTURE OF FOURTH METATARSAL BONE OF RIGHT FOOT WITH ROUTINE HEALING, SUBSEQUENT ENCOUNTER: ICD-10-CM

## 2022-05-25 DIAGNOSIS — S92.334D CLOSED NONDISPLACED FRACTURE OF THIRD METATARSAL BONE OF RIGHT FOOT WITH ROUTINE HEALING, SUBSEQUENT ENCOUNTER: Primary | ICD-10-CM

## 2022-05-25 PROCEDURE — G8400 PT W/DXA NO RESULTS DOC: HCPCS | Performed by: ORTHOPAEDIC SURGERY

## 2022-05-25 PROCEDURE — 99212 OFFICE O/P EST SF 10 MIN: CPT | Performed by: ORTHOPAEDIC SURGERY

## 2022-05-25 PROCEDURE — 1123F ACP DISCUSS/DSCN MKR DOCD: CPT | Performed by: ORTHOPAEDIC SURGERY

## 2022-05-25 PROCEDURE — 1036F TOBACCO NON-USER: CPT | Performed by: ORTHOPAEDIC SURGERY

## 2022-05-25 PROCEDURE — G8417 CALC BMI ABV UP PARAM F/U: HCPCS | Performed by: ORTHOPAEDIC SURGERY

## 2022-05-25 PROCEDURE — 1111F DSCHRG MED/CURRENT MED MERGE: CPT | Performed by: ORTHOPAEDIC SURGERY

## 2022-05-25 PROCEDURE — 1090F PRES/ABSN URINE INCON ASSESS: CPT | Performed by: ORTHOPAEDIC SURGERY

## 2022-05-25 PROCEDURE — G8428 CUR MEDS NOT DOCUMENT: HCPCS | Performed by: ORTHOPAEDIC SURGERY

## 2022-05-25 ASSESSMENT — ENCOUNTER SYMPTOMS
EYE REDNESS: 0
BACK PAIN: 0
CHEST TIGHTNESS: 0
ABDOMINAL PAIN: 0
COLOR CHANGE: 0

## 2022-05-25 NOTE — PROGRESS NOTES
Patient returns to the office today for Fu of the right foot fx 2nd-4th DOI 3/29/22. Pt states she has been working with physical therapy at home to help with her balance.  Pt states pain today is a 0/10 she has been wearing her post op shoe which is going well

## 2022-05-25 NOTE — PROGRESS NOTES
Subjective:      Patient ID: Mary Castellanos is a 81 y.o. female. Patient returns to the office today for Fu of the right foot fx 2nd-4th DOI 3/29/22. Pt states she has been working with physical therapy at home to help with her balance. Pt states pain today is a 0/10 she has been wearing her post op shoe which is going well    She comes in today for 6-week recheck of her right foot injury. She states that since she saw Bobbi Jutsice 6 weeks ago she has been doing much better. She has been wearing her postop shoe and has been able to bear weight on the right lower extremity. She states that she is no longer having any pain in the right foot but she does continue to have some bruising and swelling. Patient denies any new injury to the involved extremity/ joint, denies numbness or tingling in the involved extremity and denies fever or chills. Review of Systems   Constitutional: Negative for activity change, chills and fever. HENT: Negative for congestion and drooling. Eyes: Negative for redness. Respiratory: Negative for chest tightness. Cardiovascular: Negative for chest pain. Gastrointestinal: Negative for abdominal pain. Endocrine: Negative for cold intolerance and heat intolerance. Musculoskeletal: Positive for joint swelling. Negative for arthralgias, back pain, gait problem and myalgias. Skin: Negative for color change, pallor, rash and wound. Neurological: Negative for weakness and numbness. Psychiatric/Behavioral: Negative for confusion. Past Medical History:   Diagnosis Date    Acquired hypothyroidism 12/9/2019    Patient is taking Synthroid    Chronic midline low back pain without sciatica 11/20/2019    Dementia without behavioral disturbance ([facility] [address] 10/22/2019    Patient has dementia.   She is taking Namenda Patient is seeing urologist Dr. Boyd Abel Gait disturbance 10/22/2019    Patient uses walker to ambulate    History of TIA (transient ischemic attack) 12/9/2019    Patient has a history of TIA and also recently may have had TIA. Patient is on Eliquis. Sees neurologist Dr. Donna Rangel Major depressive disorder with single episode, in full remission ([facility] [address] 12/9/2019    Patient is taking Zoloft and that is helping.  Mixed hyperlipidemia 12/9/2019    Patient is on simvastatin    Persistent atrial fibrillation ([address] 10/22/2019    A. fib on recently in October 2019 . Patient is on metoprolol 25 mg twice a day and Eliquis    PFO (patent foramen ovale) 12/9/2019    PFO seen on echocardiogram in 2016    Pneumonia due to infectious organism 10/22/2019    Patient had pneumonia and pleural effusion during admission in October 2019. A repeat CT scan of the chest will be done Patient is also seen pulmonologist       Objective:   Physical Exam  Constitutional:       Appearance: She is well-developed. HENT:      Head: Normocephalic and atraumatic. Eyes:      Pupils: Pupils are equal, round, and reactive to light. Pulmonary:      Effort: Pulmonary effort is normal.   Musculoskeletal:         General: Swelling present. No tenderness or deformity. Normal range of motion. Cervical back: Normal range of motion. Right knee: Normal.      Left knee: Normal.      Right ankle: Swelling present. No deformity, ecchymosis or lacerations. No tenderness. No lateral malleolus, medial malleolus, AITF ligament or proximal fibula tenderness. Normal range of motion. Normal pulse. Right Achilles Tendon: Normal.      Left ankle: No swelling, deformity, ecchymosis or lacerations. No tenderness. No lateral malleolus, medial malleolus, AITF ligament or proximal fibula tenderness. Normal range of motion. Normal pulse. Left Achilles Tendon: Normal.   Skin:     General: Skin is warm and dry. Capillary Refill: Capillary refill takes less than 2 seconds. Coloration: Skin is not pale. Findings: No erythema or rash.    Neurological:      Mental Status: She is alert and oriented to person, place, and time. Right foot-skin intact, mild ecchymosis, mild swelling. Dorsiflexion 20  Plantarflexion 40    XR FOOT RIGHT (MIN 3 VIEWS)    Result Date: 5/25/2022  XRAY X-ray 3 views of the right foot obtained and reviewed by me today in the office demonstrates age appropriate bone density throughout with moderate diffuse osteopenia with continued minimally displaced fractures through the bases of the second, third and fourth metatarsals which are extra-articular, there has been no new displacement or angulation compared to prior x-rays with normal interval callus formation. Impression: Stable right second, third, fourth metatarsal base fractures with routine healing. Assessment:      Right second, third, fourth metatarsal fractures, 6 weeks      Plan:      I discussed with her today her x-ray findings. I reassured her that her fractures remain in good alignment and are healing well. I discussed with her today that she is continuing to progress extremely well. I discussed with the patient today that their are symptoms are normal and should improve with time. At this point she can discontinue the postop shoe. She can resume all activities with no restrictions. Continue weight-bearing as tolerated. Continue range of motion exercises as instructed. Ice and elevate as needed. Tylenol or Motrin for pain. Follow up as needed.             Bereket Whelan, DO

## 2022-05-26 ENCOUNTER — OFFICE VISIT (OUTPATIENT)
Dept: INTERNAL MEDICINE CLINIC | Age: 87
End: 2022-05-26
Payer: MEDICARE

## 2022-05-26 VITALS
TEMPERATURE: 97 F | OXYGEN SATURATION: 93 % | WEIGHT: 174.6 LBS | RESPIRATION RATE: 16 BRPM | DIASTOLIC BLOOD PRESSURE: 60 MMHG | HEART RATE: 68 BPM | BODY MASS INDEX: 25.78 KG/M2 | SYSTOLIC BLOOD PRESSURE: 120 MMHG

## 2022-05-26 DIAGNOSIS — E03.9 ACQUIRED HYPOTHYROIDISM: ICD-10-CM

## 2022-05-26 DIAGNOSIS — R26.9 GAIT DISTURBANCE: ICD-10-CM

## 2022-05-26 DIAGNOSIS — D72.820 LYMPHOCYTOSIS: ICD-10-CM

## 2022-05-26 DIAGNOSIS — J84.9 INTERSTITIAL LUNG DISEASE (HCC): ICD-10-CM

## 2022-05-26 DIAGNOSIS — R91.8 PULMONARY NODULES: ICD-10-CM

## 2022-05-26 DIAGNOSIS — E78.2 MIXED HYPERLIPIDEMIA: ICD-10-CM

## 2022-05-26 DIAGNOSIS — F03.90 DEMENTIA WITHOUT BEHAVIORAL DISTURBANCE, UNSPECIFIED DEMENTIA TYPE: ICD-10-CM

## 2022-05-26 DIAGNOSIS — Z86.73 HISTORY OF TIA (TRANSIENT ISCHEMIC ATTACK): ICD-10-CM

## 2022-05-26 DIAGNOSIS — F32.5 MAJOR DEPRESSIVE DISORDER WITH SINGLE EPISODE, IN FULL REMISSION (HCC): ICD-10-CM

## 2022-05-26 DIAGNOSIS — I48.19 PERSISTENT ATRIAL FIBRILLATION (HCC): Primary | ICD-10-CM

## 2022-05-26 PROBLEM — R53.1 GENERALIZED WEAKNESS: Status: RESOLVED | Noted: 2022-04-15 | Resolved: 2022-05-26

## 2022-05-26 PROBLEM — R53.81 PHYSICAL DEBILITY: Status: RESOLVED | Noted: 2022-04-14 | Resolved: 2022-05-26

## 2022-05-26 PROCEDURE — 1111F DSCHRG MED/CURRENT MED MERGE: CPT | Performed by: INTERNAL MEDICINE

## 2022-05-26 PROCEDURE — 99214 OFFICE O/P EST MOD 30 MIN: CPT | Performed by: INTERNAL MEDICINE

## 2022-05-26 PROCEDURE — G8428 CUR MEDS NOT DOCUMENT: HCPCS | Performed by: INTERNAL MEDICINE

## 2022-05-26 PROCEDURE — G8400 PT W/DXA NO RESULTS DOC: HCPCS | Performed by: INTERNAL MEDICINE

## 2022-05-26 PROCEDURE — G8417 CALC BMI ABV UP PARAM F/U: HCPCS | Performed by: INTERNAL MEDICINE

## 2022-05-26 PROCEDURE — 1036F TOBACCO NON-USER: CPT | Performed by: INTERNAL MEDICINE

## 2022-05-26 PROCEDURE — 1090F PRES/ABSN URINE INCON ASSESS: CPT | Performed by: INTERNAL MEDICINE

## 2022-05-26 PROCEDURE — 1123F ACP DISCUSS/DSCN MKR DOCD: CPT | Performed by: INTERNAL MEDICINE

## 2022-05-26 NOTE — PROGRESS NOTES
Jen Bailon  Patient's  is 1935  Seen in office on 2022      SUBJECTIVE:  [place] are[place] 81 y. o.year old female presents today   Chief Complaint   Patient presents with    Follow-up    Other     saw Dr Erin Jeffries yesterday for right foot, healing     Patient is here with her daughter [place] allis. Admitted on 22 and came home on 22   Patient was initially admitted for shortness of breath/sepsis and pneumonia and was treated initially with IV antibiotics. Did refill the symptoms are due to congestive heart failure and she was treated for such. Patient improved with Lasix. She was feeling weak therefore transferred to swing bed for physical therapy and Occupational Therapy. She did well and her physical debility improved. Now patient is feeling better. She is ambulating with the help of walker. No falls recently. No chest pain. No shortness of breath at rest no cough or sputum production. No abdominal pain. No nausea vomiting or diarrhea  Patient has chronic atrial fibrillation but denies any dizziness. Heart failure has improved. Patient also had right metatarsal fracture and orthopedics saw the patient and treated with boot      Taking medications regularly. No side effects noted. Review of Systems  Review of system normal except as in HPI  OBJECTIVE: /60   Pulse 68   Temp 97 °F (36.1 °C) (Temporal)   Resp 16   Wt 174 lb 9.6 oz (79.2 kg)   SpO2 93%   BMI 25.78 kg/m²     Wt Readings from Last 3 Encounters:   22 174 lb 9.6 oz (79.2 kg)   22 178 lb (80.7 kg)   22 178 lb 6.4 oz (80.9 kg)      GENERAL:  Alert, oriented, pleasant, in no apparent distress. HEENT:  Conjunctiva pink, no scleral icterus. ENT clear. NECK: Supple. No jugular venous distention noted. No masses felt,  CARDIOVASCULAR:  Normal S1 and S2    PULMONARY:  No respiratory distress. No wheezes or rales. ABDOMEN:  Soft and non-tender,no masses  ororganomegaly.   EXTREMITIES:  No cyanosis, clubbing, or significant edema. SKIN: Skin is warm and dry. NEUROLOGICAL:  Cranial nerves II through XII are grossly intact. IMPRESSION:    Encounter Diagnoses   Name Primary?  Persistent atrial fibrillation (HCC) Yes    Gait disturbance     Dementia without behavioral disturbance, unspecified dementia type ([facility] [address]     Acquired hypothyroidism     Mixed hyperlipidemia     History of TIA (transient ischemic attack)     Major depressive disorder with single episode, in full remission ([facility] [address]     Pulmonary nodules     Interstitial lung disease (HCC)     Lymphocytosis        ASSESSMENT/PLAN:  .  Congestive heart failure has improved continue current medication  . Physical debility also improved  . Chronic atrial fibrillation. On Eliquis Lopressor and digoxin  . Hypothyroidism on Synthroid  . Right metatarsal fracture. Healing nicely  . Dementia patient is on Namenda and Exelon  . Depression controlled with Zoloft  . Interstitial lung disease. Patient sees pulmonology at [facility]    No problem-specific [address] [place] [address] notes found for this encounter. HHC : nurse every 2 weeks     Mediations reviewed with the patient. Continue current medications. Appropriate prescriptions are addressed. After visit summeryprovided. Follow up as directed sooner if needed. Questions answered and patient verbalizes understanding. Call for any problems, questions, or concerns.        No Known Allergies  Current Outpatient Medications   Medication Sig Dispense Refill    diclofenac sodium (VOLTAREN) 1 % GEL Apply 4 g topically 4 times daily 100 g 1    Cholecalciferol (VITAMIN D3) 50 MCG (2000 UT) TABS Take 1 tablet by mouth daily 90 tablet 1    memantine (NAMENDA) 10 MG tablet Take 1 tablet by mouth 2 times daily 180 tablet 1    rivastigmine (EXELON) 3 MG capsule Take 1 capsule by mouth daily 90 capsule 1    apixaban (ELIQUIS) 5 MG TABS tablet TAKE 1 TABLET BY MOUTH 2 TIMES DAILY 180 tablet 1    simvastatin (ZOCOR) 40 MG tablet Take 1 tablet by mouth nightly 90 tablet 1    digoxin (LANOXIN) 125 MCG tablet Take 1 tablet by mouth daily 90 tablet 1    metoprolol tartrate (LOPRESSOR) 25 MG tablet Take 0.5 tablets by mouth 2 times daily 90 tablet 1    levothyroxine (SYNTHROID) 150 MCG tablet Take 1 tablet by mouth daily 90 tablet 1    sertraline (ZOLOFT) 50 MG tablet Take 1 tablet by mouth daily 90 tablet 1    vitamin E 400 UNIT capsule Take 1 capsule by mouth daily 90 capsule 1     No current facility-administered medications for this visit. Past Medical History:   Diagnosis Date    Acquired hypothyroidism 12/9/2019    Patient is taking Synthroid    Chronic midline low back pain without sciatica 11/20/2019    Dementia without behavioral disturbance ([facility] [address] 10/22/2019    Patient has dementia. She is taking Namenda Patient is seeing urologist Dr. Mavis Cotter Gait disturbance 10/22/2019    Patient uses walker to ambulate    History of TIA (transient ischemic attack) 12/9/2019    Patient has a history of TIA and also recently may have had TIA. Patient is on Eliquis. Sees neurologist Dr. Mavis Cotter Major depressive disorder with single episode, in full remission ([facility] [address] 12/9/2019    Patient is taking Zoloft and that is helping.  Mixed hyperlipidemia 12/9/2019    Patient is on simvastatin    Persistent atrial fibrillation ([facility] [address] 10/22/2019    A. fib on recently in October 2019 . Patient is on metoprolol 25 mg twice a day and Eliquis    PFO (patent foramen ovale) 12/9/2019    PFO seen on echocardiogram in 2016    Pneumonia due to infectious organism 10/22/2019    Patient had pneumonia and pleural effusion during admission in October 2019.  A repeat CT scan of the chest will be done Patient is also seen pulmonologist     Past Surgical History:   Procedure Laterality Date    HYSTERECTOMY       Social History     Tobacco Use    Smoking status: Never Smoker    Smokeless tobacco: Never Used Substance Use Topics    Alcohol use: No       LAB REVIEW:  CBC:   Lab Results   Component Value Date    WBC 9.2 04/28/2022    HGB 13.7 04/28/2022    HCT 41.5 04/28/2022     04/28/2022     Lipids:   Lab Results   Component Value Date    HDL 35 (L) 03/02/2022    LDLCALC 71 03/02/2022    LDLDIRECT 100 (H) 01/03/2020    TRIGLYCFAST 199 (H) 03/02/2022    CHOLFAST 146 03/02/2022     Renal:   Lab Results   Component Value Date    BUN 19 04/28/2022    CREATININE 0.9 04/28/2022     04/28/2022    K 4.2 04/28/2022    ALT 13 04/14/2022    AST 25 04/14/2022    GLUCOSE 108 04/28/2022    GLUF 134 07/03/2014     PT/INR:   Lab Results   Component Value Date    INR 1.83 04/12/2022     A1C:   Lab Results   Component Value Date    LABA1C 5.7 05/15/2020           Randy Cabral MD, 5/26/2022 , 4:39 PM

## 2022-06-07 ENCOUNTER — CARE COORDINATION (OUTPATIENT)
Dept: CARE COORDINATION | Age: 87
End: 2022-06-07

## 2022-06-07 NOTE — CARE COORDINATION
Ambulatory Care Coordination Note  CM Risk Score: 0  Charlson 10 Year Mortality Risk Score: 79%     ACC: Lars Guerrero RN    Summary Note: Call to pt/daughter for acm f/u. Reports she is Doing good. Denies symptoms or needs at this time. Uses walker. Will graduate at this time. Has pcp f/u scheduled. Advised daughter to contact if any needs in the future and provided acm contact info. Lab Results     None          Care Coordination Interventions    Program Enrollment: Complex Care  Referral from Primary Care Provider: No  Suggested Interventions and [employer]: Completed  Physical Therapy: Completed  Senior Services: Completed         Goals Addressed                 This Visit's Progress     Conditions and Symptoms   On track     I will notify my provider of any barriers to my plan of care. I will notify my provider of any symptoms that indicate a worsening of my condition. Barriers: overwhelmed by complexity of regimen  Plan for overcoming my barriers: support from acm and pcp office/passport cm  Confidence: 9/10  Anticipated Goal Completion Date: 7/10/22              Prior to Admission medications    Medication Sig Start Date End Date Taking?  Authorizing Provider   diclofenac sodium (VOLTAREN) 1 % GEL Apply 4 g topically 4 times daily 3/18/22   Shai Munroe MD   Cholecalciferol (VITAMIN D3) 50 MCG (2000 UT) TABS Take 1 tablet by mouth daily 2/10/22   Shai Munroe MD   memantine [facility]) 10 MG tablet Take 1 tablet by mouth 2 times daily 2/10/22   Shai Munroe MD   rivastigmine (EXELON) 3 MG capsule Take 1 capsule by mouth daily 2/10/22   Shai Munroe MD   apixaban (ELIQUIS) 5 MG TABS tablet TAKE 1 TABLET BY MOUTH 2 TIMES DAILY 2/10/22   Shai Munroe MD   simvastatin (ZOCOR) 40 MG tablet Take 1 tablet by mouth nightly 2/10/22   Shai Munroe MD   digoxin (LANOXIN) 125 MCG tablet Take 1 tablet by mouth daily 2/10/22   Shai Munroe MD   metoprolol tartrate (LOPRESSOR) 25 MG tablet Take 0.5 tablets by mouth 2 times daily 2/10/22   Abdirashid Nation MD   levothyroxine (SYNTHROID) 150 MCG tablet Take 1 tablet by mouth daily 2/10/22   Abdirashid Nation MD   sertraline (ZOLOFT) 50 MG tablet Take 1 tablet by mouth daily 2/10/22   Abdirashid Nation MD   vitamin E 400 UNIT capsule Take 1 capsule by mouth daily 2/10/22   Abdirashid Nation MD       Future Appointments   Date Time Provider Cleo Philip   8/31/2022  4:00 PM Abdirashid Nation MD [facility] U[facility] IM MMA   3/24/2023  9:00 AM SCHEDULE, [facility] AW[facility] LPN [facility] U[facility] IM MMA      and   General Assessment    Do you have any symptoms that are causing concern?: No

## 2022-06-08 ENCOUNTER — HOSPITAL ENCOUNTER (OUTPATIENT)
Age: 87
Setting detail: SPECIMEN
Discharge: HOME | End: 2022-06-08
Payer: MEDICARE

## 2022-06-08 LAB
BASOPHILS ABSOLUTE: 0.1 K/CU MM
BASOPHILS RELATIVE PERCENT: 0.8 % (ref 0–1)
DIFFERENTIAL TYPE: ABNORMAL
EOSINOPHILS ABSOLUTE: 0.3 K/CU MM
EOSINOPHILS RELATIVE PERCENT: 4.3 % (ref 0–3)
HCT VFR BLD CALC: 43.8 % (ref 37–47)
HEMOGLOBIN: 14.4 GM/DL (ref 12.5–16)
IMMATURE NEUTROPHIL %: 0.3 % (ref 0–0.43)
LYMPHOCYTES ABSOLUTE: 3.4 K/CU MM
LYMPHOCYTES RELATIVE PERCENT: 42.6 % (ref 24–44)
MCH RBC QN AUTO: 31.5 PG (ref 27–31)
MCHC RBC AUTO-ENTMCNC: 32.9 % (ref 32–36)
MCV RBC AUTO: 95.8 FL (ref 78–100)
MONOCYTES ABSOLUTE: 0.7 K/CU MM
MONOCYTES RELATIVE PERCENT: 9.2 % (ref 0–4)
PDW BLD-RTO: 12.9 % (ref 11.7–14.9)
PLATELET # BLD: 210 K/CU MM (ref 140–440)
PMV BLD AUTO: 9.8 FL (ref 7.5–11.1)
RBC # BLD: 4.57 M/CU MM (ref 4.2–5.4)
SEGMENTED NEUTROPHILS ABSOLUTE COUNT: 3.4 K/CU MM
SEGMENTED NEUTROPHILS RELATIVE PERCENT: 42.8 % (ref 36–66)
TOTAL IMMATURE NEUTOROPHIL: 0.02 K/CU MM
WBC # BLD: 7.9 K/CU MM (ref 4–10.5)

## 2022-06-08 PROCEDURE — 85025 COMPLETE CBC W/AUTO DIFF WBC: CPT

## 2022-06-23 RX ORDER — NYSTATIN 100000 [USP'U]/G
POWDER TOPICAL
Qty: 1 EACH | Refills: 3 | Status: SHIPPED | OUTPATIENT
Start: 2022-06-23

## 2022-06-23 NOTE — TELEPHONE ENCOUNTER
Medication:   Requested Prescriptions     Pending Prescriptions Disp Refills    nystatin (MYCOSTATIN) 028214 UNIT/GM powder 1 each 3     Sig: Apply 3 times daily.        Last Filled:      Patient Phone Number: [phone] (home)     Last appt: 5/26/2022   Next appt: 8/31/2022    Last Labs DM:   Lab Results   Component Value Date    LABA1C 5.7 05/15/2020     Last Lipid:   Lab Results   Component Value Date    CHOL 156 05/12/2021    TRIG 214 05/12/2021    HDL 35 03/02/2022    LDLCALC 71 03/02/2022     Last PSA: No results found for: PSA  Last Thyroid:   Lab Results   Component Value Date    T4FREE 1.68 10/04/2011

## 2022-07-20 ENCOUNTER — TELEPHONE (OUTPATIENT)
Dept: INTERNAL MEDICINE CLINIC | Age: 87
End: 2022-07-20

## 2022-07-20 NOTE — TELEPHONE ENCOUNTER
Linda Lewis from [facility]  seen patient today and she has had diarea for 2 weeks and her daughter is giving here OTC Imodium  and Linda Lewis told her to make sure she follows up with

## 2022-08-17 NOTE — TELEPHONE ENCOUNTER
Medication:   Requested Prescriptions     Pending Prescriptions Disp Refills    metoprolol tartrate (LOPRESSOR) 25 MG tablet [Pharmacy Med Name: METOPROLOL TART 25MG TAB CA 25 Tablet] 90 tablet 1     Sig: TAKE ONE HALF TABLET BY MOUTH TWO TIMES A DAY       Last Filled:      Patient Phone Number: [phone] (home)     Last appt: 5/26/2022   Next appt: 8/31/2022    Last Labs DM:   Lab Results   Component Value Date/Time    LABA1C 5.7 05/15/2020 05:45 PM     Last Lipid:   Lab Results   Component Value Date/Time    CHOL 156 05/12/2021 09:20 AM    TRIG 214 05/12/2021 09:20 AM    HDL 35 03/02/2022 09:20 AM    LDLCALC 71 03/02/2022 09:20 AM     Last PSA: No results found for: PSA  Last Thyroid:   Lab Results   Component Value Date/Time    T4FREE 1.68 10/04/2011 10:01 AM

## 2022-08-18 ENCOUNTER — CARE COORDINATION (OUTPATIENT)
Dept: CARE COORDINATION | Age: 87
End: 2022-08-18

## 2022-08-30 DIAGNOSIS — I48.19 PERSISTENT ATRIAL FIBRILLATION (HCC): ICD-10-CM

## 2022-08-31 ENCOUNTER — OFFICE VISIT (OUTPATIENT)
Dept: INTERNAL MEDICINE CLINIC | Age: 87
End: 2022-08-31
Payer: MEDICARE

## 2022-08-31 VITALS
OXYGEN SATURATION: 96 % | SYSTOLIC BLOOD PRESSURE: 120 MMHG | BODY MASS INDEX: 25.25 KG/M2 | DIASTOLIC BLOOD PRESSURE: 70 MMHG | WEIGHT: 171 LBS | HEART RATE: 72 BPM | TEMPERATURE: 97 F | RESPIRATION RATE: 16 BRPM

## 2022-08-31 DIAGNOSIS — F03.90 DEMENTIA WITHOUT BEHAVIORAL DISTURBANCE, UNSPECIFIED DEMENTIA TYPE: ICD-10-CM

## 2022-08-31 DIAGNOSIS — F32.5 MAJOR DEPRESSIVE DISORDER WITH SINGLE EPISODE, IN FULL REMISSION (HCC): ICD-10-CM

## 2022-08-31 DIAGNOSIS — I48.19 PERSISTENT ATRIAL FIBRILLATION (HCC): Primary | ICD-10-CM

## 2022-08-31 DIAGNOSIS — E78.2 MIXED HYPERLIPIDEMIA: ICD-10-CM

## 2022-08-31 DIAGNOSIS — Z86.73 HISTORY OF TIA (TRANSIENT ISCHEMIC ATTACK): ICD-10-CM

## 2022-08-31 DIAGNOSIS — E03.9 ACQUIRED HYPOTHYROIDISM: ICD-10-CM

## 2022-08-31 DIAGNOSIS — D72.820 LYMPHOCYTOSIS: ICD-10-CM

## 2022-08-31 DIAGNOSIS — G89.29 CHRONIC MIDLINE LOW BACK PAIN WITHOUT SCIATICA: ICD-10-CM

## 2022-08-31 DIAGNOSIS — Q21.12 PFO (PATENT FORAMEN OVALE): ICD-10-CM

## 2022-08-31 DIAGNOSIS — M54.50 CHRONIC MIDLINE LOW BACK PAIN WITHOUT SCIATICA: ICD-10-CM

## 2022-08-31 DIAGNOSIS — R26.9 GAIT DISTURBANCE: ICD-10-CM

## 2022-08-31 PROCEDURE — 1036F TOBACCO NON-USER: CPT | Performed by: INTERNAL MEDICINE

## 2022-08-31 PROCEDURE — G8417 CALC BMI ABV UP PARAM F/U: HCPCS | Performed by: INTERNAL MEDICINE

## 2022-08-31 PROCEDURE — 1090F PRES/ABSN URINE INCON ASSESS: CPT | Performed by: INTERNAL MEDICINE

## 2022-08-31 PROCEDURE — 1123F ACP DISCUSS/DSCN MKR DOCD: CPT | Performed by: INTERNAL MEDICINE

## 2022-08-31 PROCEDURE — 99214 OFFICE O/P EST MOD 30 MIN: CPT | Performed by: INTERNAL MEDICINE

## 2022-08-31 PROCEDURE — G8400 PT W/DXA NO RESULTS DOC: HCPCS | Performed by: INTERNAL MEDICINE

## 2022-08-31 PROCEDURE — G8427 DOCREV CUR MEDS BY ELIG CLIN: HCPCS | Performed by: INTERNAL MEDICINE

## 2022-08-31 RX ORDER — CHOLECALCIFEROL (VITAMIN D3) 125 MCG
CAPSULE ORAL
Qty: 90 TABLET | Refills: 1 | Status: SHIPPED | OUTPATIENT
Start: 2022-08-31

## 2022-08-31 ASSESSMENT — PATIENT HEALTH QUESTIONNAIRE - PHQ9
SUM OF ALL RESPONSES TO PHQ QUESTIONS 1-9: 0
2. FEELING DOWN, DEPRESSED OR HOPELESS: 0
SUM OF ALL RESPONSES TO PHQ QUESTIONS 1-9: 0
1. LITTLE INTEREST OR PLEASURE IN DOING THINGS: 0
SUM OF ALL RESPONSES TO PHQ9 QUESTIONS 1 & 2: 0
SUM OF ALL RESPONSES TO PHQ QUESTIONS 1-9: 0
SUM OF ALL RESPONSES TO PHQ QUESTIONS 1-9: 0

## 2022-08-31 NOTE — PROGRESS NOTES
Tricia Schmidt  Patient's  is 1935  Seen in office on 2022      SUBJECTIVE:  Nani mcgee 81 y. o.year old female presents today   Chief Complaint   Patient presents with    Follow-up    Other     1)discuss pneumonia vaccine  2)Would like xray of right foot, still painful     Patient is here with her daughter  Patient is here for follow-up of atrial fibrillation, hypothyroidism, hyperlipidemia, mild dementia  Patient states she is feeling well. She had injury to the right foot several months ago still has some pain on the lateral side of the foot when she is in bed. She denies any pain while she is walking. Patient denies any chest pain. No shortness of breath no cough or sputum production. No abdominal pain. No nausea vomiting or diarrhea  She has atrial fibrillation and sees cardiologist.  She has cognitive impairment and sees neurologist.  Patient states Dr. Peewee Rubio is retiring and she is going to see new neurologist probably Dr. Roselyn Quinn  Taking medications regularly. No side effects noted. Patient denies any falls. She is using wheeled walker    Review of Systems    Review of system normal except as in HPI    OBJECTIVE: /70   Pulse 72   Temp 97 °F (36.1 °C) (Temporal)   Resp 16   Wt 171 lb (77.6 kg)   SpO2 96%   BMI 25.25 kg/m²     Wt Readings from Last 3 Encounters:   22 171 lb (77.6 kg)   22 174 lb 9.6 oz (79.2 kg)   22 178 lb (80.7 kg)      GENERAL: - Alert, oriented, pleasant, in no apparent distress. HEENT: - Conjunctiva pink, no scleral icterus. ENT clear. NECK: -Supple. No jugular venous distention noted. No masses felt,  CARDIOVASCULAR: - Normal S1 and S2    PULMONARY: - No respiratory distress. No wheezes or rales. ABDOMEN: - Soft and non-tender,no masses  ororganomegaly. EXTREMITIES: - No cyanosis, clubbing, or significant edema. SKIN: Skin is warm and dry. NEUROLOGICAL: - Cranial nerves II through XII are grossly intact.     Right foot examination shows no swelling of the right foot especially right side of the foot looks normal.  There is no tenderness on palpation. Flexion and extension of the ankle is normal    IMPRESSION:    Encounter Diagnoses   Name Primary? Persistent atrial fibrillation (HCC) Yes    Gait disturbance     Dementia without behavioral disturbance, unspecified dementia type (HCC)     Chronic midline low back pain without sciatica     Acquired hypothyroidism     Mixed hyperlipidemia     History of TIA (transient ischemic attack)     Major depressive disorder with single episode, in full remission (HCC)     PFO (patent foramen ovale)        ASSESSMENT/PLAN:    1. Persistent atrial fibrillation (HCC)  Overview:   Dr. Irena Hilliard. October 2019 . Patient is on metoprolol 12.5 mg twice a day and Eliquis, patient is on digoxin also. Assessment & Plan:    Patient is stable. Continue current treatment. No palpitations  2. Gait disturbance  Overview:  Patient uses walker to ambulate  Assessment & Plan:    Patient is stable. Continue current treatment. 3. Dementia without behavioral disturbance, unspecified dementia type ([facility] [address]  Overview:  Patient has dementia. She is taking Namenda and Exelon  Patient is seeing neurologist Dr. Small 18  Pt is going to see new neurologist   Assessment & Plan:    Patient is stable. Continue current treatment. 4. 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 Jimenez Arnold. She is taking Tylenol for pain control  Assessment & Plan:    Patient is stable. Continue current treatment. 5. Acquired hypothyroidism  Overview:  Patient is taking Synthroid  Assessment & Plan:    Patient is stable. Continue current treatment. 6. Mixed hyperlipidemia  Overview:  Patient is on simvastatin 40 mg daily   Assessment & Plan:    Patient is stable. Continue current treatment.     Orders:  - Lipid, Fasting; Future  -     Comprehensive Metabolic Panel; Future  7. History of TIA (transient ischemic attack)  Overview:  Patient has a history of TIA and also recently may have had TIA. Patient is on Eliquis. Sees neurologist Dr. Natali Miner:   . Patient is doing well. Has no residual effect. 8. Major depressive disorder with single episode, in full remission [facility])  Overview:  Patient is taking Zoloft and that is helping. Assessment & Plan:   . Depression controlled continue Zoloft  9. PFO (patent foramen ovale)  Overview:  PFO seen on echocardiogram in 2016  MRI showed no PFO    10. Patient has right foot pain. There is no tenderness to palpation. Patient has pain only in the night. Advised patient to use Voltaren gel. Patient stated that helps. If the pain increases will do EMG etc.       Return to office in 3 months. Orders Placed This Encounter   Procedures    Lipid, Fasting    Comprehensive Metabolic Panel         Mediations reviewed with the patient. Continue current medications. Appropriate prescriptions are addressed. After visit summeryprovided. Follow up as directed sooner if needed. Questions answered and patient verbalizes understanding. Call for any problems, questions, or concerns. No Known Allergies  Current Outpatient Medications   Medication Sig Dispense Refill    metoprolol tartrate (LOPRESSOR) 25 MG tablet TAKE ONE HALF TABLET BY MOUTH TWO TIMES A DAY 90 tablet 1    nystatin (MYCOSTATIN) 073342 UNIT/GM powder Apply 3 times daily.  1 each 3    diclofenac sodium (VOLTAREN) 1 % GEL Apply 4 g topically 4 times daily 100 g 1    Cholecalciferol (VITAMIN D3) 50 MCG (2000 UT) TABS Take 1 tablet by mouth daily 90 tablet 1    memantine (NAMENDA) 10 MG tablet Take 1 tablet by mouth 2 times daily 180 tablet 1    rivastigmine (EXELON) 3 MG capsule Take 1 capsule by mouth daily 90 capsule 1    apixaban (ELIQUIS) 5 MG TABS tablet TAKE 1 TABLET BY MOUTH 2 TIMES DAILY 180 tablet 1    simvastatin (ZOCOR) 40 MG tablet Take 1 tablet by mouth nightly 90 tablet 1    digoxin (LANOXIN) 125 MCG tablet Take 1 tablet by mouth daily 90 tablet 1    levothyroxine (SYNTHROID) 150 MCG tablet Take 1 tablet by mouth daily 90 tablet 1    sertraline (ZOLOFT) 50 MG tablet Take 1 tablet by mouth daily 90 tablet 1    vitamin E 400 UNIT capsule Take 1 capsule by mouth daily 90 capsule 1     No current facility-administered medications for this visit. Past Medical History:   Diagnosis Date    Acquired hypothyroidism 12/9/2019    Patient is taking Synthroid    Chronic midline low back pain without sciatica 11/20/2019    Dementia without behavioral disturbance ([address] 10/22/2019    Patient has dementia. She is taking Namenda Patient is seeing urologist Dr. Cathy Gilford    Gait disturbance 10/22/2019    Patient uses walker to ambulate    History of TIA (transient ischemic attack) 12/9/2019    Patient has a history of TIA and also recently may have had TIA. Patient is on Eliquis. Sees neurologist Dr. Cathy Gilford    Major depressive disorder with single episode, in full remission ([facility] [address] 12/9/2019    Patient is taking Zoloft and that is helping. Mixed hyperlipidemia 12/9/2019    Patient is on simvastatin    Persistent atrial fibrillation ([address] 10/22/2019    A. fib on recently in October 2019 . Patient is on metoprolol 25 mg twice a day and Eliquis    PFO (patent foramen ovale) 12/9/2019    PFO seen on echocardiogram in 2016    Pneumonia due to infectious organism 10/22/2019    Patient had pneumonia and pleural effusion during admission in October 2019.  A repeat CT scan of the chest will be done Patient is also seen pulmonologist     Past Surgical History:   Procedure Laterality Date    HYSTERECTOMY (CERVIX STATUS UNKNOWN)       Social History     Tobacco Use    Smoking status: Never    Smokeless tobacco: Never   Substance Use Topics    Alcohol use: No       LAB REVIEW:  CBC:   Lab Results   Component Value Date/Time    WBC 7.9 06/08/2022 12:00 PM    HGB 14.4 06/08/2022 12:00 PM    HCT 43.8 06/08/2022 12:00 PM     06/08/2022 12:00 PM     Lipids:   Lab Results   Component Value Date    HDL 35 (L) 03/02/2022    LDLCALC 71 03/02/2022    LDLDIRECT 100 (H) 01/03/2020    TRIGLYCFAST 199 (H) 03/02/2022    CHOLFAST 146 03/02/2022     Renal:   Lab Results   Component Value Date/Time    BUN 19 04/28/2022 06:30 AM    CREATININE 0.9 04/28/2022 06:30 AM     04/28/2022 06:30 AM    K 4.2 04/28/2022 06:30 AM    ALT 13 04/14/2022 06:10 AM    AST 25 04/14/2022 06:10 AM    GLUCOSE 108 04/28/2022 06:30 AM    GLUF 134 07/03/2014 10:20 PM     PT/INR:   Lab Results   Component Value Date/Time    INR 1.83 04/12/2022 06:15 AM     A1C:   Lab Results   Component Value Date    LABA1C 5.7 05/15/2020           Yadi Jiang MD, 8/31/2022 , 4:19 PM

## 2022-09-07 ENCOUNTER — HOSPITAL ENCOUNTER (EMERGENCY)
Age: 87
Discharge: HOME | End: 2022-09-07
Attending: EMERGENCY MEDICINE
Payer: MEDICARE

## 2022-09-07 VITALS
SYSTOLIC BLOOD PRESSURE: 111 MMHG | DIASTOLIC BLOOD PRESSURE: 60 MMHG | OXYGEN SATURATION: 95 % | TEMPERATURE: 97.9 F | HEART RATE: 68 BPM | RESPIRATION RATE: 18 BRPM

## 2022-09-07 DIAGNOSIS — K21.00 GASTROESOPHAGEAL REFLUX DISEASE WITH ESOPHAGITIS WITHOUT HEMORRHAGE: Primary | ICD-10-CM

## 2022-09-07 DIAGNOSIS — K22.2 STRICTURE OF ESOPHAGUS: ICD-10-CM

## 2022-09-07 PROCEDURE — 99283 EMERGENCY DEPT VISIT LOW MDM: CPT

## 2022-09-07 RX ORDER — OMEPRAZOLE 20 MG/1
20 CAPSULE, DELAYED RELEASE ORAL
Qty: 30 CAPSULE | Refills: 2 | Status: SHIPPED | OUTPATIENT
Start: 2022-09-07

## 2022-09-08 ENCOUNTER — TELEPHONE (OUTPATIENT)
Dept: INTERNAL MEDICINE CLINIC | Age: 87
End: 2022-09-08

## 2022-09-08 ENCOUNTER — TELEPHONE (OUTPATIENT)
Dept: GASTROENTEROLOGY | Age: 87
End: 2022-09-08

## 2022-09-08 ASSESSMENT — ENCOUNTER SYMPTOMS
EYES NEGATIVE: 1
GASTROINTESTINAL NEGATIVE: 1
RESPIRATORY NEGATIVE: 1

## 2022-09-08 NOTE — TELEPHONE ENCOUNTER
Pt. Daughter Carla Dus called in stating her mother was in the [facility] ED because she has been unable to eat or drink anything and they informed her she would need to have her throat stretched. Antwon Arias wanted us to potentially put the pt on for an EGD w/ dr. Familia Gutiérrez today 9/8/22 but I informed her we would not be able to do that however she could take the pt to the [facility] and the GI on call would be able to do an emergent EGD if that was needed. She stated understanding and denied further questions.

## 2022-09-08 NOTE — TELEPHONE ENCOUNTER
Contacted Dr. Savi Duarte's office to see if they could get patient in as an urgent same day appointment. They are unable to do this as Dr. Savi Parisi is at the hospital all day today.     Per doctor Malcolm, patient should return back to the hospital.

## 2022-09-08 NOTE — TELEPHONE ENCOUNTER
Daughter notified and will take her to the ER. Daughter said she prefers to take her to [facility] to be seen for this visit. Daughter will contact office to schedule a follow up appointment when she is discharged.

## 2022-09-08 NOTE — TELEPHONE ENCOUNTER
Daughter contacted office and reported she had to take the patient to the ER yesterday due to not being able to eat or drink anything because she is not able to swallow anything. ER did not admit her but referred her to GI to get her throat stretched. She states they referred [facility] to [facility]. Daughter reports she is unable to get a hold of the office and that she is not sure what to do. She states that ER told her if they could not get her in to bring her back to the ER.     Sonya Brown can be contacted at [phone]

## 2022-09-08 NOTE — ED PROVIDER NOTES
The history is provided by the patient. Patient presents to the emergency department with a chief complaint of difficulty swallowing. The patient states that she feels as if food gets stopped in her throat and then will come back up. The patient states this has been an ongoing issue/problem for her for many years she states that her last episode that was really bad was approximately 2 or 3 months ago but she states that over time it will simply improve and then will resolve. The patient states that she will have difficulty with swallowing items such as meat and sometimes it has progressed to difficulty with even swallowing liquids. Patient states that she is not drooling she is able to handle her own secretions. Patient has not been having any fevers chills nausea vomiting or diarrhea. Patient is also not been having any abdominal pain. Patient denies any strokelike symptoms or any deficits. The patient does have difficulty with hearing and is hard of hearing but otherwise she does not have any other focal neurological deficits. She gives a history which is consistent with that of GERD although she does not take any type of medication for this and she has not seen any type of gastroenterologist in the past.  Patient states that she will have burning sensations in her stomach but this is not all the time. The patient states that sometimes this is worse when she lays flat she will actually feel as if the stomach acid is in the back of her throat and sometimes will get into her mouth. She has had some episodes where she will be coughing.   She reports that she will use 2-3 Tums at a time in order to help calm her stomach down sometimes she only has to do this once or twice a week sometimes she will have to do this 3 or 4 times a week  She has had no episode of choking on any meat no episode of choking on any food is just that tonight she was having difficulty with both swallowing food and with swallowing liquids so she came to the emergency department  Review of Systems   Constitutional: Negative. HENT: Negative. Eyes: Negative. Respiratory: Negative. Cardiovascular: Negative. Gastrointestinal: Negative. Genitourinary: Negative. Musculoskeletal: Negative. Skin: Negative. Neurological: Negative. All other systems reviewed and are negative. Family History   Problem Relation Age of Onset    Alcohol Abuse Mother     Obesity Mother     No Known Problems Father      Social History     Socioeconomic History    Marital status:      Spouse name: Not on file    Number of children: Not on file    Years of education: Not on file    Highest education level: Not on file   Occupational History    Not on file   Tobacco Use    Smoking status: Never    Smokeless tobacco: Never   Vaping Use    Vaping Use: Never used   Substance and Sexual Activity    Alcohol use: No    Drug use: No    Sexual activity: Not on file   Other Topics Concern    Not on file   Social History Narrative    Not on file     Social Determinants of Health     Financial Resource Strain: Low Risk     Difficulty of Paying Living Expenses: Not hard at all   Food Insecurity: No Food Insecurity    Worried About Running Out of Food in the Last Year: Never true    [address] in the Last Year: Never true   Transportation Needs: No Transportation Needs    Lack of Transportation (Medical): No    Lack of Transportation (Non-Medical):  No   Physical Activity: Insufficiently Active    Days of Exercise per Week: 7 days    Minutes of Exercise per Session: 20 min   Stress: Not on file   Social Connections: Socially Isolated    Frequency of Communication with Friends and Family: More than three times a week    Frequency of Social Gatherings with Friends and Family: More than three times a week    Attends Jew Services: Never    Active Member of Clubs or Organizations: No    Attends Club or Organization Meetings: Never    Marital Status:    Intimate Partner Violence: Not on file   Housing Stability: Low Risk     Unable to Pay for Housing in the Last Year: No    Number of Places Lived in the Last Year: 1    Unstable Housing in the Last Year: No     Past Surgical History:   Procedure Laterality Date    HYSTERECTOMY (CERVIX STATUS UNKNOWN)       Past Medical History:   Diagnosis Date    Acquired hypothyroidism 12/9/2019    Patient is taking Synthroid    Chronic midline low back pain without sciatica 11/20/2019    Dementia without behavioral disturbance ([facility] [address] 10/22/2019    Patient has dementia. She is taking Namenda Patient is seeing urologist Dr. Radu Garcia    Gait disturbance 10/22/2019    Patient uses walker to ambulate    History of TIA (transient ischemic attack) 12/9/2019    Patient has a history of TIA and also recently may have had TIA. Patient is on Eliquis. Sees neurologist Dr. Radu Garcia    Major depressive disorder with single episode, in full remission ([facility] [address] 12/9/2019    Patient is taking Zoloft and that is helping. Mixed hyperlipidemia 12/9/2019    Patient is on simvastatin    Persistent atrial fibrillation ([facility] [address] 10/22/2019    A. fib on recently in October 2019 . Patient is on metoprolol 25 mg twice a day and Eliquis    PFO (patent foramen ovale) 12/9/2019    PFO seen on echocardiogram in 2016    Pneumonia due to infectious organism 10/22/2019    Patient had pneumonia and pleural effusion during admission in October 2019. A repeat CT scan of the chest will be done Patient is also seen pulmonologist    [facility] 4/11/2022     No Known Allergies  Prior to Admission medications    Medication Sig Start Date End Date Taking?  Authorizing Provider   omeprazole (PRILOSEC) 20 MG delayed release capsule Take 1 capsule by mouth every morning (before breakfast) 9/7/22  Yes Raul García,    Cholecalciferol (VITAMIN D3) 50 MCG (2000 UT) TABS TAKE ONE TABLET BY MOUTH EVERY DAY 8/31/22   Castillo Rincon MD   apixaban Gabe Zayas) 5 MG TABS tablet TAKE 1 TABLET BY MOUTH 2 TIMES DAILY 8/31/22   Peter Silverman MD   metoprolol tartrate (LOPRESSOR) 25 MG tablet TAKE ONE HALF TABLET BY MOUTH TWO TIMES A DAY 8/17/22   Peter Silverman MD   nystatin (MYCOSTATIN) 099003 UNIT/GM powder Apply 3 times daily. 6/23/22   Peter Silverman MD   diclofenac sodium (VOLTAREN) 1 % GEL Apply 4 g topically 4 times daily 3/18/22   Peter Silverman MD   memantine [facility]) 10 MG tablet Take 1 tablet by mouth 2 times daily 2/10/22   Peter Silverman MD   rivastigmine (EXELON) 3 MG capsule Take 1 capsule by mouth daily 2/10/22   Peter Silverman MD   simvastatin (ZOCOR) 40 MG tablet Take 1 tablet by mouth nightly 2/10/22   Peter Silverman MD   digoxin [facility]) 125 MCG tablet Take 1 tablet by mouth daily 2/10/22   Peter Silverman MD   levothyroxine (SYNTHROID) 150 MCG tablet Take 1 tablet by mouth daily 2/10/22   Peter Silverman MD   sertraline (ZOLOFT) 50 MG tablet Take 1 tablet by mouth daily 2/10/22   Peter Silverman MD   vitamin E 400 UNIT capsule Take 1 capsule by mouth daily 2/10/22   Peter Silverman MD       /60   Pulse 68   Temp 97.9 °F (36.6 °C) (Oral)   Resp 18   SpO2 95%     Physical Exam  Vitals and nursing note reviewed. Constitutional:       Appearance: She is well-developed. HENT:      Head: Normocephalic and atraumatic. Right Ear: External ear normal.      Left Ear: External ear normal.      Nose: Nose normal.   Eyes:      Conjunctiva/sclera: Conjunctivae normal.      Pupils: Pupils are equal, round, and reactive to light. Cardiovascular:      Rate and Rhythm: Normal rate and regular rhythm. Heart sounds: Normal heart sounds. Pulmonary:      Effort: Pulmonary effort is normal.      Breath sounds: Normal breath sounds. Abdominal:      General: Bowel sounds are normal. There is no distension. Palpations: Abdomen is soft. Tenderness: There is no abdominal tenderness. There is no guarding or rebound.    Musculoskeletal:         General: Normal range of motion. Cervical back: Normal range of motion and neck supple. Skin:     General: Skin is warm and dry. Neurological:      General: No focal deficit present. Mental Status: She is alert and oriented to person, place, and time. Mental status is at baseline. GCS: GCS eye subscore is 4. GCS verbal subscore is 5. GCS motor subscore is 6. Cranial Nerves: Cranial nerve deficit present. Sensory: No sensory deficit. Motor: No weakness. Comments: Patient NIH score at presentation is 0 patient is alert oriented besides being hard of hearing patient is a excellent historian   Psychiatric:         Mood and Affect: Mood normal.         Behavior: Behavior normal.         Thought Content: Thought content normal.         Judgment: Judgment normal.       MDM:    Labs Reviewed - No data to display    No orders to display      Patient's presentation to the emergency department is fairly classic for that of GERD symptomatology as well as possibility of Schatzki's rings and/or esophageal strictures. The patient states that she will take 2 or 3 Tums every 2 or 3 days before she goes to bed because laying flat in bed she has increased GERD symptoms burning sensation in the back of her throat she will feel the stomach acid go all the way up into the back of her throat will cause her to have coughing episodes she states that the Tums will make it feel better. In the patient's presentation to the emergency department with burning and difficulty swallowing especially solid foods is very consistent with that. I discussed the patient's case with Dr. Phillip Mccurdy who is on-call for gastroenterology. The patient is to call their office in the morning and they are going to work the patient in she most likely will need to undergo an esophageal dilatation.   The patient had difficulty with swallowing in the emergency department she stated that the water that she had drank was causing her to feel like that

## 2022-09-08 NOTE — TELEPHONE ENCOUNTER
Contacted Dr. Gabriella Apley office as recommended by Dr. Cresencio Baltazar. Dr. Dheeraj Rangel is currently out of the country and will not be back until next week. Contacted the [facility] to see if they are able to get patient in today. They also have no way to get her in to be seen today. Please advise.

## 2022-09-13 ENCOUNTER — TELEPHONE (OUTPATIENT)
Dept: INTERNAL MEDICINE CLINIC | Age: 87
End: 2022-09-13

## 2022-09-13 NOTE — TELEPHONE ENCOUNTER
[facility] Transitions Initial Follow Up Call    Outreach made within 2 business days of discharge: Yes    Patient: Nyla Urrutia Patient : 1935   MRN: [id number]  Reason for Admission: There are no discharge diagnoses documented for the most recent discharge. Discharge Date: 22       Spoke with: patient's daughter Nicci Waddell    Discharge department/facility: [address] [place] [address] Patient Contact:  Was patient able to fill all prescriptions: Yes  Was patient instructed to bring all medications to the follow-up visit: Yes  Is patient taking all medications as directed in the discharge summary?  Yes  Does patient understand their discharge instructions: Yes  Does patient have questions or concerns that need addressed prior to 7-14 day follow up office visit: no  Daughter stated she was doing very well and felt like she did not need to fu at this time will call if anything changes  Scheduled appointment with PCP within 7-14 days    Follow Up  Future Appointments   Date Time Provider Cleo Philip   2022  4:00 PM Marcus Keane MD [facility] URSOHAIL IM TOMER   3/24/2023  9:00 AM [facility] FPS AWV LPN [facility] URB IM TOMER Ferrera MA

## 2022-10-12 DIAGNOSIS — F03.90 DEMENTIA WITHOUT BEHAVIORAL DISTURBANCE (HCC): ICD-10-CM

## 2022-10-12 DIAGNOSIS — E03.9 ACQUIRED HYPOTHYROIDISM: ICD-10-CM

## 2022-10-12 DIAGNOSIS — F03.918 DEMENTIA WITH OTHER BEHAVIORAL DISTURBANCE, UNSPECIFIED DEMENTIA SEVERITY, UNSPECIFIED DEMENTIA TYPE: ICD-10-CM

## 2022-10-12 DIAGNOSIS — I48.19 PERSISTENT ATRIAL FIBRILLATION (HCC): ICD-10-CM

## 2022-10-12 RX ORDER — DIGOXIN 125 MCG
125 TABLET ORAL DAILY
Qty: 90 TABLET | Refills: 1 | Status: SHIPPED | OUTPATIENT
Start: 2022-10-12

## 2022-10-12 RX ORDER — LEVOTHYROXINE SODIUM 0.15 MG/1
TABLET ORAL
Qty: 90 TABLET | Refills: 1 | Status: SHIPPED | OUTPATIENT
Start: 2022-10-12

## 2022-10-12 RX ORDER — MEMANTINE HYDROCHLORIDE 10 MG/1
10 TABLET ORAL 2 TIMES DAILY
Qty: 180 TABLET | Refills: 1 | Status: SHIPPED | OUTPATIENT
Start: 2022-10-12

## 2022-10-26 DIAGNOSIS — E78.2 MIXED HYPERLIPIDEMIA: ICD-10-CM

## 2022-10-26 RX ORDER — SIMVASTATIN 40 MG
40 TABLET ORAL NIGHTLY
Qty: 90 TABLET | Refills: 1 | Status: SHIPPED | OUTPATIENT
Start: 2022-10-26

## 2022-10-26 RX ORDER — SIMVASTATIN 40 MG
TABLET ORAL
Qty: 90 TABLET | Refills: 1 | OUTPATIENT
Start: 2022-10-26

## 2022-11-16 ENCOUNTER — HOSPITAL ENCOUNTER (OUTPATIENT)
Age: 87
Setting detail: SPECIMEN
Discharge: HOME | End: 2022-11-16
Payer: MEDICARE

## 2022-11-16 LAB
ALBUMIN SERPL-MCNC: 3.7 GM/DL (ref 3.4–5)
ALP BLD-CCNC: 80 IU/L (ref 40–129)
ALT SERPL-CCNC: 8 U/L (ref 10–40)
ANION GAP SERPL CALCULATED.3IONS-SCNC: 10 MMOL/L (ref 4–16)
AST SERPL-CCNC: 14 IU/L (ref 15–37)
BILIRUB SERPL-MCNC: 0.7 MG/DL (ref 0–1)
BUN BLDV-MCNC: 15 MG/DL (ref 6–23)
CALCIUM SERPL-MCNC: 8.9 MG/DL (ref 8.3–10.6)
CHLORIDE BLD-SCNC: 105 MMOL/L (ref 99–110)
CHOLESTEROL: 113 MG/DL
CO2: 25 MMOL/L (ref 21–32)
CREAT SERPL-MCNC: 0.7 MG/DL (ref 0.6–1.1)
DIGOXIN LEVEL: 0.9 NG/ML (ref 0.8–2)
DOSE AMOUNT: NORMAL
DOSE TIME: NORMAL
GFR SERPL CREATININE-BSD FRML MDRD: >60 ML/MIN/1.73M2
GLUCOSE BLD-MCNC: 106 MG/DL (ref 70–99)
HDLC SERPL-MCNC: 29 MG/DL
LDL CHOLESTEROL CALCULATED: 57 MG/DL
POTASSIUM SERPL-SCNC: 4 MMOL/L (ref 3.5–5.1)
SODIUM BLD-SCNC: 140 MMOL/L (ref 135–145)
TOTAL PROTEIN: 6.2 GM/DL (ref 6.4–8.2)
TRIGL SERPL-MCNC: 136 MG/DL

## 2022-11-16 PROCEDURE — 80053 COMPREHEN METABOLIC PANEL: CPT

## 2022-11-16 PROCEDURE — 80061 LIPID PANEL: CPT

## 2022-11-16 PROCEDURE — 80162 ASSAY OF DIGOXIN TOTAL: CPT

## 2022-11-30 ENCOUNTER — TELEPHONE (OUTPATIENT)
Dept: INTERNAL MEDICINE CLINIC | Age: 87
End: 2022-11-30

## 2022-11-30 NOTE — TELEPHONE ENCOUNTER
Pt. Daughter returned call, advised her that Dr. Duncan Rubio wanted us to make her an appt. At walk in to be seen, she states she will talk to her sister and call us back today or tomorrow morning to let us know if they want to make her an appt.

## 2022-11-30 NOTE — TELEPHONE ENCOUNTER
Bhavna Henson [place] [facility] [place] that [place] had a fall yesterday with no injury. Pt. Also has a cold, wheezing R lower lobe, no fever and 02 is 95%.  She is requesting a call back at [phone]

## 2022-11-30 NOTE — TELEPHONE ENCOUNTER
Per Dr. Taryn Quach pt. Needs appt at walk in clinic. Called and advised Homecare nurse, also called pt. To schedule appt, no answer, LVM to call back.

## 2023-02-08 ENCOUNTER — HOSPITAL ENCOUNTER (OUTPATIENT)
Age: 88
Setting detail: SPECIMEN
Discharge: HOME | End: 2023-02-08
Payer: MEDICARE

## 2023-02-08 LAB
BASOPHILS ABSOLUTE: 0 K/CU MM
BASOPHILS RELATIVE PERCENT: 0.6 % (ref 0–1)
DIFFERENTIAL TYPE: ABNORMAL
EOSINOPHILS ABSOLUTE: 0.2 K/CU MM
EOSINOPHILS RELATIVE PERCENT: 3 % (ref 0–3)
HCT VFR BLD CALC: 46.2 % (ref 37–47)
HEMOGLOBIN: 15.2 GM/DL (ref 12.5–16)
IMMATURE NEUTROPHIL %: 0.1 % (ref 0–0.43)
LYMPHOCYTES ABSOLUTE: 3.7 K/CU MM
LYMPHOCYTES RELATIVE PERCENT: 53.7 % (ref 24–44)
MCH RBC QN AUTO: 31.2 PG (ref 27–31)
MCHC RBC AUTO-ENTMCNC: 32.9 % (ref 32–36)
MCV RBC AUTO: 94.9 FL (ref 78–100)
MONOCYTES ABSOLUTE: 0.8 K/CU MM
MONOCYTES RELATIVE PERCENT: 11.3 % (ref 0–4)
PDW BLD-RTO: 13.1 % (ref 11.7–14.9)
PLATELET # BLD: 227 K/CU MM (ref 140–440)
PMV BLD AUTO: 9.7 FL (ref 7.5–11.1)
RBC # BLD: 4.87 M/CU MM (ref 4.2–5.4)
SEGMENTED NEUTROPHILS ABSOLUTE COUNT: 2.2 K/CU MM
SEGMENTED NEUTROPHILS RELATIVE PERCENT: 31.3 % (ref 36–66)
TOTAL IMMATURE NEUTOROPHIL: 0.01 K/CU MM
WBC # BLD: 6.9 K/CU MM (ref 4–10.5)

## 2023-02-08 PROCEDURE — 85025 COMPLETE CBC W/AUTO DIFF WBC: CPT

## 2023-02-23 RX ORDER — PANTOPRAZOLE SODIUM 40 MG/1
40 TABLET, DELAYED RELEASE ORAL DAILY
Qty: 30 TABLET | Refills: 2 | Status: SHIPPED | OUTPATIENT
Start: 2023-02-23

## 2023-02-23 RX ORDER — PANTOPRAZOLE SODIUM 40 MG/1
40 TABLET, DELAYED RELEASE ORAL DAILY
COMMUNITY
Start: 2022-10-18 | End: 2023-02-23 | Stop reason: SDUPTHER

## 2023-03-08 ENCOUNTER — TELEPHONE (OUTPATIENT)
Dept: INTERNAL MEDICINE CLINIC | Age: 88
End: 2023-03-08

## 2023-03-08 RX ORDER — CHOLECALCIFEROL (VITAMIN D3) 125 MCG
CAPSULE ORAL
Qty: 90 TABLET | Refills: 1 | Status: SHIPPED | OUTPATIENT
Start: 2023-03-08

## 2023-03-09 ENCOUNTER — OFFICE VISIT (OUTPATIENT)
Dept: INTERNAL MEDICINE CLINIC | Age: 88
End: 2023-03-09

## 2023-03-09 VITALS
WEIGHT: 161.6 LBS | DIASTOLIC BLOOD PRESSURE: 64 MMHG | HEART RATE: 72 BPM | BODY MASS INDEX: 23.86 KG/M2 | OXYGEN SATURATION: 97 % | TEMPERATURE: 97.4 F | RESPIRATION RATE: 16 BRPM | SYSTOLIC BLOOD PRESSURE: 112 MMHG

## 2023-03-09 DIAGNOSIS — E03.9 ACQUIRED HYPOTHYROIDISM: ICD-10-CM

## 2023-03-09 DIAGNOSIS — F32.5 MAJOR DEPRESSIVE DISORDER WITH SINGLE EPISODE, IN FULL REMISSION (HCC): ICD-10-CM

## 2023-03-09 DIAGNOSIS — E78.2 MIXED HYPERLIPIDEMIA: Primary | ICD-10-CM

## 2023-03-09 DIAGNOSIS — Z86.73 HISTORY OF TIA (TRANSIENT ISCHEMIC ATTACK): ICD-10-CM

## 2023-03-09 DIAGNOSIS — J84.9 INTERSTITIAL LUNG DISEASE (HCC): ICD-10-CM

## 2023-03-09 DIAGNOSIS — F03.918 DEMENTIA WITH OTHER BEHAVIORAL DISTURBANCE, UNSPECIFIED DEMENTIA SEVERITY, UNSPECIFIED DEMENTIA TYPE: ICD-10-CM

## 2023-03-09 DIAGNOSIS — R26.9 GAIT DISTURBANCE: ICD-10-CM

## 2023-03-09 DIAGNOSIS — I48.19 PERSISTENT ATRIAL FIBRILLATION (HCC): ICD-10-CM

## 2023-03-09 DIAGNOSIS — F03.90 DEMENTIA WITHOUT BEHAVIORAL DISTURBANCE (HCC): ICD-10-CM

## 2023-03-09 DIAGNOSIS — D72.820 LYMPHOCYTOSIS: ICD-10-CM

## 2023-03-09 RX ORDER — VITAMIN E 268 MG
400 CAPSULE ORAL DAILY
Qty: 90 CAPSULE | Refills: 1 | Status: SHIPPED | OUTPATIENT
Start: 2023-03-09

## 2023-03-09 RX ORDER — LEVOTHYROXINE SODIUM 0.15 MG/1
TABLET ORAL
Qty: 90 TABLET | Refills: 1 | Status: SHIPPED | OUTPATIENT
Start: 2023-03-09

## 2023-03-09 RX ORDER — DIGOXIN 125 MCG
125 TABLET ORAL DAILY
Qty: 90 TABLET | Refills: 1 | Status: SHIPPED | OUTPATIENT
Start: 2023-03-09

## 2023-03-09 RX ORDER — MEMANTINE HYDROCHLORIDE 10 MG/1
10 TABLET ORAL 2 TIMES DAILY
Qty: 180 TABLET | Refills: 1 | Status: SHIPPED | OUTPATIENT
Start: 2023-03-09

## 2023-03-09 RX ORDER — SIMVASTATIN 40 MG
40 TABLET ORAL NIGHTLY
Qty: 90 TABLET | Refills: 1 | Status: SHIPPED | OUTPATIENT
Start: 2023-03-09

## 2023-03-09 RX ORDER — LOPERAMIDE HYDROCHLORIDE 2 MG/1
2 CAPSULE ORAL 4 TIMES DAILY PRN
COMMUNITY

## 2023-03-09 RX ORDER — PANTOPRAZOLE SODIUM 40 MG/1
40 TABLET, DELAYED RELEASE ORAL DAILY
Qty: 30 TABLET | Refills: 2 | Status: SHIPPED | OUTPATIENT
Start: 2023-03-09

## 2023-03-09 NOTE — PROGRESS NOTES
[place]  Patient's  is 1935  Seen in office on 3/9/2023      SUBJECTIVE:  Loco mcgee 81 y. o.year old female presents today   Chief Complaint   Patient presents with    Follow-up    Other     Had esophagus dilated in January    Medication Refill     Patient is here with her daughter  Pt is here for f/u of COPD, HTN , ILD  Pt states she had dysphagia and had EGD and dilatation done in 2023  Patient states she sees pulmonologist, cardiologist and gastroenterologist  Pt has no complaints. Patient states she is feeling very well. She is happy. She has no complaints. She is breathing well. No cough or sputum production. Patient has hypertension. Taking medications No headaches, no chest pain, no palpitations and no dizziness. No cough or sputum production. No wheezing. Using inhalers    Taking medications regularly. No side effects noted. Review of Systems  Review of system normal except as in HPI  OBJECTIVE: /64   Pulse 72   Temp 97.4 °F (36.3 °C) (Temporal)   Resp 16   Wt 161 lb 9.6 oz (73.3 kg)   SpO2 97%   BMI 23.86 kg/m²     Wt Readings from Last 3 Encounters:   23 161 lb 9.6 oz (73.3 kg)   22 171 lb (77.6 kg)   22 174 lb 9.6 oz (79.2 kg)      GENERAL: - Alert, oriented, pleasant, in no apparent distress. HEENT: - Conjunctiva pink, no scleral icterus. ENT clear. NECK: -Supple. No jugular venous distention noted. No masses felt,  CARDIOVASCULAR: - Normal S1 and S2    PULMONARY: - No respiratory distress. No wheezes or rales. ABDOMEN: - Soft and non-tender,no masses  ororganomegaly. EXTREMITIES: - No cyanosis, clubbing, or significant edema. SKIN: Skin is warm and dry. NEUROLOGICAL: - Cranial nerves II through XII are grossly intact. IMPRESSION:    Encounter Diagnoses   Name Primary?     Mixed hyperlipidemia Yes    Dementia without behavioral disturbance ([facility] [address]     Acquired hypothyroidism     Dementia with other behavioral disturbance, unspecified dementia severity, unspecified dementia type     Persistent atrial fibrillation (HCC)     Gait disturbance     History of TIA (transient ischemic attack)     Major depressive disorder with single episode, in full remission ([facility] [address]     Interstitial lung disease (HCC)     Lymphocytosis        ASSESSMENT/PLAN:    1. Mixed hyperlipidemia  Overview:   LDL in November 2022 was good it was 62  Patient is on simvastatin 40 mg daily. Continue that follow low-cholesterol diet  Orders:  -     simvastatin (ZOCOR) 40 MG tablet; Take 1 tablet by mouth nightly, Disp-90 tablet, R-1Normal    2. Dementia without behavioral disturbance ([facility] [address]  Overview:  Patient has dementia. She is taking Namenda and Exelon  Patient is seeing neurologist Dr. Leonlea Maher but she has retired  Pt is going to see new neurologist   Orders:  -     sertraline (ZOLOFT) 50 MG tablet; TAKE ONE TABLET BY MOUTH EVERY DAY, Disp-90 tablet, R-1Normal    3. Acquired hypothyroidism  Overview:  Patient is taking Synthroid  Orders:  -     levothyroxine (SYNTHROID) 150 MCG tablet; TAKE ONE TABLET BY MOUTH EVERY DAY, Disp-90 tablet, R-1Normal    4. Dementia with other behavioral disturbance, unspecified dementia severity, unspecified dementia type  -     memantine (NAMENDA) 10 MG tablet; Take 1 tablet by mouth 2 times daily, Disp-180 tablet, R-1Normal    5. Persistent atrial fibrillation (HCC)  Overview:   Dr. Leonora Bach. October 2019 . Patient is on metoprolol 12.5 mg twice a day and Eliquis, patient is on digoxin also. Orders:  -     digoxin (LANOXIN) 125 MCG tablet; Take 1 tablet by mouth daily, Disp-90 tablet, R-1Normal  -     apixaban (ELIQUIS) 5 MG TABS tablet; TAKE 1 TABLET BY MOUTH 2 TIMES DAILY, Disp-180 tablet, R-1Normal  6. Gait disturbance  Overview:  Patient uses walker to ambulate  7. History of TIA (transient ischemic attack)  Overview:  Patient has a history of TIA and also recently may have had TIA. Patient is on Eliquis.   Sees neurologist Dr. Nusrat Villasenor NP      8. Major depressive disorder with single episode, in full remission [facility])  Overview:  Patient is taking Zoloft and that is helping. 9. Interstitial lung disease [facility])  Overview:  Pt sees Dr Elissa Copeland pulmonologist at [facility] : highest concern for IPF  Patient states he is following her and he wants another CT scan done in October 2021  10. Lymphocytosis  Overview:  Relative  Lymphocytosis. Repeat CBC shows normal white count and lymphocyte percentage. CBC in June 2022 with stable lymphocytosis improved  2/2023 Lymphocytosis     No orders of the defined types were placed in this encounter. Return to office in 3 months. Mediations reviewed with the patient. Continue current medications. Appropriate prescriptions are addressed. After visit summeryprovided. Follow up as directed sooner if needed. Questions answered and patient verbalizes understanding. Call for any problems, questions, or concerns.        No Known Allergies  Current Outpatient Medications   Medication Sig Dispense Refill    loperamide (IMODIUM) 2 MG capsule Take 2 mg by mouth 4 times daily as needed for Diarrhea      Cholecalciferol (VITAMIN D3) 50 MCG (2000 UT) TABS TAKE ONE TABLET BY MOUTH EVERY DAY 90 tablet 1    pantoprazole (PROTONIX) 40 MG tablet Take 1 tablet by mouth daily 30 tablet 2    metoprolol tartrate (LOPRESSOR) 25 MG tablet TAKE ONE HALF TABLET BY MOUTH TWO TIMES A DAY 90 tablet 1    simvastatin (ZOCOR) 40 MG tablet Take 1 tablet by mouth nightly 90 tablet 1    sertraline (ZOLOFT) 50 MG tablet TAKE ONE TABLET BY MOUTH EVERY DAY 90 tablet 1    levothyroxine (SYNTHROID) 150 MCG tablet TAKE ONE TABLET BY MOUTH EVERY DAY 90 tablet 1    memantine (NAMENDA) 10 MG tablet Take 1 tablet by mouth 2 times daily 180 tablet 1    digoxin (LANOXIN) 125 MCG tablet Take 1 tablet by mouth daily 90 tablet 1    apixaban (ELIQUIS) 5 MG TABS tablet TAKE 1 TABLET BY MOUTH 2 TIMES DAILY 180 tablet 1    nystatin (MYCOSTATIN) 130510 UNIT/GM powder Apply 3 times daily. 1 each 3    rivastigmine (EXELON) 3 MG capsule Take 1 capsule by mouth daily 90 capsule 1    vitamin E 400 UNIT capsule Take 1 capsule by mouth daily 90 capsule 1    omeprazole (PRILOSEC) 20 MG delayed release capsule Take 1 capsule by mouth every morning (before breakfast) (Patient not taking: Reported on 3/9/2023) 30 capsule 2    diclofenac sodium (VOLTAREN) 1 % GEL Apply 4 g topically 4 times daily 100 g 1     No current facility-administered medications for this visit. Past Medical History:   Diagnosis Date    Acquired hypothyroidism 12/9/2019    Patient is taking Synthroid    Chronic midline low back pain without sciatica 11/20/2019    Dementia without behavioral disturbance ([facility] [address] 10/22/2019    Patient has dementia. She is taking Namenda Patient is seeing urologist Dr. Pravin Song    Gait disturbance 10/22/2019    Patient uses walker to ambulate    History of TIA (transient ischemic attack) 12/9/2019    Patient has a history of TIA and also recently may have had TIA. Patient is on Eliquis. Sees neurologist Dr. Pravin Song    Major depressive disorder with single episode, in full remission ([facility] [address] 12/9/2019    Patient is taking Zoloft and that is helping. Mixed hyperlipidemia 12/9/2019    Patient is on simvastatin    Persistent atrial fibrillation ([address] 10/22/2019    A. fib on recently in October 2019 . Patient is on metoprolol 25 mg twice a day and Eliquis    PFO (patent foramen ovale) 12/9/2019    PFO seen on echocardiogram in 2016    Pneumonia due to infectious organism 10/22/2019    Patient had pneumonia and pleural effusion during admission in October 2019.  A repeat CT scan of the chest will be done Patient is also seen pulmonologist    Sepsis ([facility] [address] 4/11/2022     Past Surgical History:   Procedure Laterality Date    HYSTERECTOMY (CERVIX STATUS UNKNOWN)       Social History     Tobacco Use    Smoking status: Never    Smokeless tobacco: Never   Substance Use Topics Alcohol use: No       LAB REVIEW:  CBC:   Lab Results   Component Value Date/Time    WBC 6.9 02/08/2023 10:50 AM    HGB 15.2 02/08/2023 10:50 AM    HCT 46.2 02/08/2023 10:50 AM     02/08/2023 10:50 AM     Lipids:   Lab Results   Component Value Date    HDL 29 (L) 11/16/2022    LDLCALC 57 11/16/2022    LDLDIRECT 100 (H) 01/03/2020    TRIGLYCFAST 199 (H) 03/02/2022    CHOLFAST 146 03/02/2022     Renal:   Lab Results   Component Value Date/Time    BUN 15 11/16/2022 09:10 AM    CREATININE 0.7 11/16/2022 09:10 AM     11/16/2022 09:10 AM    K 4.0 11/16/2022 09:10 AM    ALT 8 11/16/2022 09:10 AM    AST 14 11/16/2022 09:10 AM    GLUCOSE 106 11/16/2022 09:10 AM    GLUF 134 07/03/2014 10:20 PM     PT/INR:   Lab Results   Component Value Date/Time    INR 1.83 04/12/2022 06:15 AM     A1C:   Lab Results   Component Value Date    LABA1C 5.7 05/15/2020           Abel Fish MD, 3/9/2023 , 5:05 PM

## 2023-03-24 ENCOUNTER — APPOINTMENT (OUTPATIENT)
Dept: GENERAL RADIOLOGY | Age: 88
DRG: 065 | End: 2023-03-24
Payer: MEDICARE

## 2023-03-24 ENCOUNTER — APPOINTMENT (OUTPATIENT)
Dept: CT IMAGING | Age: 88
DRG: 065 | End: 2023-03-24
Payer: MEDICARE

## 2023-03-24 ENCOUNTER — HOSPITAL ENCOUNTER (INPATIENT)
Age: 88
LOS: 1 days | Discharge: ANOTHER HOSPITAL | DRG: 065 | End: 2023-03-25
Attending: STUDENT IN AN ORGANIZED HEALTH CARE EDUCATION/TRAINING PROGRAM | Admitting: STUDENT IN AN ORGANIZED HEALTH CARE EDUCATION/TRAINING PROGRAM
Payer: MEDICARE
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
## 2023-03-24 DIAGNOSIS — S09.90XA INJURY OF HEAD, INITIAL ENCOUNTER: ICD-10-CM

## 2023-03-24 DIAGNOSIS — S01.81XA FACIAL LACERATION, INITIAL ENCOUNTER: ICD-10-CM

## 2023-03-24 PROBLEM — R55 SYNCOPE AND COLLAPSE: Status: ACTIVE | Noted: 2023-03-24

## 2023-03-24 LAB
ALBUMIN SERPL-MCNC: 3.9 GM/DL (ref 3.4–5)
ALP BLD-CCNC: 85 IU/L (ref 40–129)
ALT SERPL-CCNC: 20 U/L (ref 10–40)
AMMONIA: 24 UMOL/L (ref 11–51)
ANION GAP SERPL CALCULATED.3IONS-SCNC: 12 MMOL/L (ref 4–16)
APTT: 33 SECONDS (ref 25.1–37.1)
AST SERPL-CCNC: 28 IU/L (ref 15–37)
BASOPHILS ABSOLUTE: 0 K/CU MM
BASOPHILS RELATIVE PERCENT: 0.6 % (ref 0–1)
BILIRUB SERPL-MCNC: 0.4 MG/DL (ref 0–1)
BUN SERPL-MCNC: 13 MG/DL (ref 6–23)
CALCIUM SERPL-MCNC: 9 MG/DL (ref 8.3–10.6)
CHLORIDE BLD-SCNC: 104 MMOL/L (ref 99–110)
CO2: 25 MMOL/L (ref 21–32)
CREAT SERPL-MCNC: 0.8 MG/DL (ref 0.6–1.1)
DIFFERENTIAL TYPE: ABNORMAL
EKG ATRIAL RATE: 104 BPM
EKG DIAGNOSIS: NORMAL
EKG Q-T INTERVAL: 434 MS
EKG QRS DURATION: 128 MS
EKG QTC CALCULATION (BAZETT): 471 MS
EKG R AXIS: 48 DEGREES
EKG T AXIS: -20 DEGREES
EKG VENTRICULAR RATE: 71 BPM
EOSINOPHILS ABSOLUTE: 0 K/CU MM
EOSINOPHILS RELATIVE PERCENT: 0.5 % (ref 0–3)
GFR SERPL CREATININE-BSD FRML MDRD: >60 ML/MIN/1.73M2
GLUCOSE SERPL-MCNC: 117 MG/DL (ref 70–99)
HCT VFR BLD CALC: 40.6 % (ref 37–47)
HEMOGLOBIN: 13.5 GM/DL (ref 12.5–16)
IMMATURE NEUTROPHIL %: 0.3 % (ref 0–0.43)
INR BLD: 1.45 INDEX
LIPASE: 12 IU/L (ref 13–60)
LYMPHOCYTES ABSOLUTE: 1.8 K/CU MM
LYMPHOCYTES RELATIVE PERCENT: 27.1 % (ref 24–44)
MAGNESIUM: 1.7 MG/DL (ref 1.8–2.4)
MCH RBC QN AUTO: 31.6 PG (ref 27–31)
MCHC RBC AUTO-ENTMCNC: 33.3 % (ref 32–36)
MCV RBC AUTO: 95.1 FL (ref 78–100)
MONOCYTES ABSOLUTE: 1.2 K/CU MM
MONOCYTES RELATIVE PERCENT: 17.8 % (ref 0–4)
PDW BLD-RTO: 13.5 % (ref 11.7–14.9)
PLATELET # BLD: 177 K/CU MM (ref 140–440)
PMV BLD AUTO: 9.8 FL (ref 7.5–11.1)
POTASSIUM SERPL-SCNC: 4.1 MMOL/L (ref 3.5–5.1)
PRO-BNP: 4873 PG/ML
PROTHROMBIN TIME: 17.7 SECONDS (ref 11.7–14.5)
RBC # BLD: 4.27 M/CU MM (ref 4.2–5.4)
SEGMENTED NEUTROPHILS ABSOLUTE COUNT: 3.5 K/CU MM
SEGMENTED NEUTROPHILS RELATIVE PERCENT: 53.7 % (ref 36–66)
SODIUM BLD-SCNC: 141 MMOL/L (ref 135–145)
TOTAL IMMATURE NEUTOROPHIL: 0.02 K/CU MM
TOTAL PROTEIN: 6.2 GM/DL (ref 6.4–8.2)
TROPONIN T: <0.01 NG/ML
TROPONIN T: <0.01 NG/ML
TSH SERPL DL<=0.005 MIU/L-ACNC: 0.04 UIU/ML (ref 0.27–4.2)
WBC # BLD: 6.5 K/CU MM (ref 4–10.5)

## 2023-03-24 PROCEDURE — 85730 THROMBOPLASTIN TIME PARTIAL: CPT

## 2023-03-24 PROCEDURE — 80162 ASSAY OF DIGOXIN TOTAL: CPT

## 2023-03-24 PROCEDURE — 83735 ASSAY OF MAGNESIUM: CPT

## 2023-03-24 PROCEDURE — 83690 ASSAY OF LIPASE: CPT

## 2023-03-24 PROCEDURE — 85025 COMPLETE CBC W/AUTO DIFF WBC: CPT

## 2023-03-24 PROCEDURE — 6370000000 HC RX 637 (ALT 250 FOR IP): Performed by: NURSE PRACTITIONER

## 2023-03-24 PROCEDURE — 83880 ASSAY OF NATRIURETIC PEPTIDE: CPT

## 2023-03-24 PROCEDURE — 90471 IMMUNIZATION ADMIN: CPT | Performed by: STUDENT IN AN ORGANIZED HEALTH CARE EDUCATION/TRAINING PROGRAM

## 2023-03-24 PROCEDURE — 84443 ASSAY THYROID STIM HORMONE: CPT

## 2023-03-24 PROCEDURE — 0HQ0XZZ REPAIR SCALP SKIN, EXTERNAL APPROACH: ICD-10-PCS | Performed by: STUDENT IN AN ORGANIZED HEALTH CARE EDUCATION/TRAINING PROGRAM

## 2023-03-24 PROCEDURE — 84425 ASSAY OF VITAMIN B-1: CPT

## 2023-03-24 PROCEDURE — 96374 THER/PROPH/DIAG INJ IV PUSH: CPT

## 2023-03-24 PROCEDURE — 84145 PROCALCITONIN (PCT): CPT

## 2023-03-24 PROCEDURE — 90715 TDAP VACCINE 7 YRS/> IM: CPT | Performed by: STUDENT IN AN ORGANIZED HEALTH CARE EDUCATION/TRAINING PROGRAM

## 2023-03-24 PROCEDURE — 82746 ASSAY OF FOLIC ACID SERUM: CPT

## 2023-03-24 PROCEDURE — 6370000000 HC RX 637 (ALT 250 FOR IP): Performed by: STUDENT IN AN ORGANIZED HEALTH CARE EDUCATION/TRAINING PROGRAM

## 2023-03-24 PROCEDURE — 1200000000 HC SEMI PRIVATE

## 2023-03-24 PROCEDURE — 85610 PROTHROMBIN TIME: CPT

## 2023-03-24 PROCEDURE — 80053 COMPREHEN METABOLIC PANEL: CPT

## 2023-03-24 PROCEDURE — 82607 VITAMIN B-12: CPT

## 2023-03-24 PROCEDURE — 6360000002 HC RX W HCPCS: Performed by: STUDENT IN AN ORGANIZED HEALTH CARE EDUCATION/TRAINING PROGRAM

## 2023-03-24 PROCEDURE — 2580000003 HC RX 258: Performed by: NURSE PRACTITIONER

## 2023-03-24 PROCEDURE — 71045 X-RAY EXAM CHEST 1 VIEW: CPT

## 2023-03-24 PROCEDURE — 99285 EMERGENCY DEPT VISIT HI MDM: CPT

## 2023-03-24 PROCEDURE — 12013 RPR F/E/E/N/L/M 2.6-5.0 CM: CPT

## 2023-03-24 PROCEDURE — 83036 HEMOGLOBIN GLYCOSYLATED A1C: CPT

## 2023-03-24 PROCEDURE — 84484 ASSAY OF TROPONIN QUANT: CPT

## 2023-03-24 PROCEDURE — 93005 ELECTROCARDIOGRAM TRACING: CPT | Performed by: STUDENT IN AN ORGANIZED HEALTH CARE EDUCATION/TRAINING PROGRAM

## 2023-03-24 PROCEDURE — 70450 CT HEAD/BRAIN W/O DYE: CPT

## 2023-03-24 PROCEDURE — 2580000003 HC RX 258: Performed by: STUDENT IN AN ORGANIZED HEALTH CARE EDUCATION/TRAINING PROGRAM

## 2023-03-24 PROCEDURE — 96375 TX/PRO/DX INJ NEW DRUG ADDON: CPT

## 2023-03-24 PROCEDURE — 73130 X-RAY EXAM OF HAND: CPT

## 2023-03-24 PROCEDURE — 72125 CT NECK SPINE W/O DYE: CPT

## 2023-03-24 PROCEDURE — 70486 CT MAXILLOFACIAL W/O DYE: CPT

## 2023-03-24 PROCEDURE — 82140 ASSAY OF AMMONIA: CPT

## 2023-03-24 RX ORDER — SODIUM CHLORIDE 0.9 % (FLUSH) 0.9 %
5-40 SYRINGE (ML) INJECTION PRN
Status: DISCONTINUED | OUTPATIENT
Start: 2023-03-24 | End: 2023-03-25 | Stop reason: HOSPADM

## 2023-03-24 RX ORDER — 0.9 % SODIUM CHLORIDE 0.9 %
250 INTRAVENOUS SOLUTION INTRAVENOUS ONCE
Status: COMPLETED | OUTPATIENT
Start: 2023-03-24 | End: 2023-03-24

## 2023-03-24 RX ORDER — DIGOXIN 125 MCG
125 TABLET ORAL DAILY
Status: DISCONTINUED | OUTPATIENT
Start: 2023-03-25 | End: 2023-03-25 | Stop reason: HOSPADM

## 2023-03-24 RX ORDER — SODIUM CHLORIDE 0.9 % (FLUSH) 0.9 %
5-40 SYRINGE (ML) INJECTION EVERY 12 HOURS SCHEDULED
Status: DISCONTINUED | OUTPATIENT
Start: 2023-03-24 | End: 2023-03-25 | Stop reason: HOSPADM

## 2023-03-24 RX ORDER — MEMANTINE HYDROCHLORIDE 10 MG/1
10 TABLET ORAL 2 TIMES DAILY
Status: DISCONTINUED | OUTPATIENT
Start: 2023-03-24 | End: 2023-03-25 | Stop reason: HOSPADM

## 2023-03-24 RX ORDER — PANTOPRAZOLE SODIUM 40 MG/1
40 TABLET, DELAYED RELEASE ORAL
Status: DISCONTINUED | OUTPATIENT
Start: 2023-03-25 | End: 2023-03-25 | Stop reason: HOSPADM

## 2023-03-24 RX ORDER — RIVASTIGMINE TARTRATE 1.5 MG/1
3 CAPSULE ORAL DAILY
Status: DISCONTINUED | OUTPATIENT
Start: 2023-03-25 | End: 2023-03-25 | Stop reason: HOSPADM

## 2023-03-24 RX ORDER — SODIUM CHLORIDE 9 MG/ML
INJECTION, SOLUTION INTRAVENOUS PRN
Status: DISCONTINUED | OUTPATIENT
Start: 2023-03-24 | End: 2023-03-25 | Stop reason: HOSPADM

## 2023-03-24 RX ORDER — FENTANYL CITRATE 50 UG/ML
25 INJECTION, SOLUTION INTRAMUSCULAR; INTRAVENOUS ONCE
Status: COMPLETED | OUTPATIENT
Start: 2023-03-24 | End: 2023-03-24

## 2023-03-24 RX ORDER — POLYETHYLENE GLYCOL 3350 17 G/17G
17 POWDER, FOR SOLUTION ORAL DAILY PRN
Status: DISCONTINUED | OUTPATIENT
Start: 2023-03-24 | End: 2023-03-25 | Stop reason: HOSPADM

## 2023-03-24 RX ORDER — ONDANSETRON 2 MG/ML
4 INJECTION INTRAMUSCULAR; INTRAVENOUS ONCE
Status: COMPLETED | OUTPATIENT
Start: 2023-03-24 | End: 2023-03-24

## 2023-03-24 RX ORDER — ACETAMINOPHEN 325 MG/1
650 TABLET ORAL EVERY 6 HOURS PRN
Status: DISCONTINUED | OUTPATIENT
Start: 2023-03-24 | End: 2023-03-25 | Stop reason: HOSPADM

## 2023-03-24 RX ORDER — ATORVASTATIN CALCIUM 20 MG/1
20 TABLET, FILM COATED ORAL DAILY
Status: DISCONTINUED | OUTPATIENT
Start: 2023-03-25 | End: 2023-03-25 | Stop reason: HOSPADM

## 2023-03-24 RX ORDER — MECLIZINE HCL 12.5 MG/1
12.5 TABLET ORAL 3 TIMES DAILY PRN
Status: DISCONTINUED | OUTPATIENT
Start: 2023-03-24 | End: 2023-03-25 | Stop reason: HOSPADM

## 2023-03-24 RX ORDER — LEVOTHYROXINE SODIUM 0.15 MG/1
150 TABLET ORAL
Status: DISCONTINUED | OUTPATIENT
Start: 2023-03-25 | End: 2023-03-25

## 2023-03-24 RX ORDER — VITAMIN E 268 MG
400 CAPSULE ORAL DAILY
Status: DISCONTINUED | OUTPATIENT
Start: 2023-03-25 | End: 2023-03-25 | Stop reason: HOSPADM

## 2023-03-24 RX ORDER — MECLIZINE HYDROCHLORIDE 25 MG/1
25 TABLET ORAL ONCE
Status: COMPLETED | OUTPATIENT
Start: 2023-03-24 | End: 2023-03-24

## 2023-03-24 RX ORDER — VITAMIN B COMPLEX
1 TABLET ORAL DAILY
Status: DISCONTINUED | OUTPATIENT
Start: 2023-03-25 | End: 2023-03-25 | Stop reason: HOSPADM

## 2023-03-24 RX ORDER — ACETAMINOPHEN 650 MG/1
650 SUPPOSITORY RECTAL EVERY 6 HOURS PRN
Status: DISCONTINUED | OUTPATIENT
Start: 2023-03-24 | End: 2023-03-25 | Stop reason: HOSPADM

## 2023-03-24 RX ADMIN — APIXABAN 5 MG: 5 TABLET, FILM COATED ORAL at 23:46

## 2023-03-24 RX ADMIN — MECLIZINE HYDROCHLORIDE 25 MG: 25 TABLET ORAL at 20:26

## 2023-03-24 RX ADMIN — ONDANSETRON 4 MG: 2 INJECTION INTRAMUSCULAR; INTRAVENOUS at 15:38

## 2023-03-24 RX ADMIN — Medication 3 ML: at 16:21

## 2023-03-24 RX ADMIN — SODIUM CHLORIDE 250 ML: 9 INJECTION, SOLUTION INTRAVENOUS at 15:39

## 2023-03-24 RX ADMIN — FENTANYL CITRATE 25 MCG: 50 INJECTION, SOLUTION INTRAMUSCULAR; INTRAVENOUS at 15:41

## 2023-03-24 RX ADMIN — SODIUM CHLORIDE, PRESERVATIVE FREE 10 ML: 5 INJECTION INTRAVENOUS at 23:46

## 2023-03-24 RX ADMIN — MEMANTINE 10 MG: 10 TABLET ORAL at 23:46

## 2023-03-24 RX ADMIN — TETANUS TOXOID, REDUCED DIPHTHERIA TOXOID AND ACELLULAR PERTUSSIS VACCINE, ADSORBED 0.5 ML: 5; 2.5; 8; 8; 2.5 SUSPENSION INTRAMUSCULAR at 15:43

## 2023-03-24 ASSESSMENT — PAIN DESCRIPTION - LOCATION: LOCATION: HEAD

## 2023-03-24 ASSESSMENT — PAIN SCALES - GENERAL: PAINLEVEL_OUTOF10: 6

## 2023-03-24 NOTE — ED NOTES
Pt depends changed, pericare given, assisted pt to dress, pt attempting to get into wheelchair and pt became dizzy. Pt tried to go home but states she feels too weak and wants to get back into bed.   Pt assisted back to bed and Dr. Yanci Quinones informed     Clara Daly, RN  03/24/23 [id number]

## 2023-03-24 NOTE — DISCHARGE INSTRUCTIONS
Fever primary care as soon as possible  Take OTC medication as needed for pain  Return to the ED 5 to 7 days for suture removal.

## 2023-03-24 NOTE — ED PROVIDER NOTES
separately billable procedures and family discussion time. MDM:    History is from patient/daughter  History limitations: None    81 y.o. female that presents to the ED following a fall at home. Patient states she fell in the bathroom sustaining some head injury in the process. Patient also endorses a history of nausea following the fall but no vomiting. Of note patient states she felt dizzy last night and a little bit this morning. Patient is on blood thinners. Patient denies any history of loss of consciousness. No history of chest pain, shortness of breath, abdominal pain, back pain or any focal neurologic deficits. Primary Survey  - Airway intact  - Bilateral breath sounds  - Bilateral pulses; radial/femoral/dorsalis pedis  - GCS 15, PERRLA    Secondary Survey   -3 cm laceration in the left eyebrow  - Bruise at the dorsal aspect of the left hand. Differentials include, but not limited to:   -Dizziness/  -Intracranial injury  -Fracture in the left hand  -Cardiac arrhythmia  -Pelvic fracture  -Rib fracture    Medication    Patient is given a tetanus shot IM, pain medications fentanyl IV, IV Zofran, IV fluid, and LET topical lidocaine for laceration repair. Patient also given meclizine. Laboratory investigation  Considered: CBC, CMP, magnesium, coagulation profile, troponin, lipase  Done: Same  Significant findings: Unremarkable labs      Imaging  Considered: CT scan of the head, CT scan of the cervical spine, CT scan of the facial bones, and x-ray of the left, x-ray of the left hand  Done: Same  Significant findings: Unremarkable CT evaluation    Consults/Case discussed with   None    Procedure  Procedure Note - Laceration repair:  Questions were sought and answered and verbal consent was given by patient for the procedure. The area was prepped and draped in standard bedside fashion. The wound area was anesthetized with topical LET.  The wound was cleansed and then explored with No foreign

## 2023-03-25 ENCOUNTER — HOSPITAL ENCOUNTER (INPATIENT)
Age: 88
LOS: 5 days | Discharge: NURSING, REHAB OR LONG-TERM CARE | DRG: 068 | End: 2023-03-30
Attending: HOSPITALIST
Payer: MEDICARE

## 2023-03-25 ENCOUNTER — APPOINTMENT (OUTPATIENT)
Dept: MRI IMAGING | Age: 88
DRG: 065 | End: 2023-03-25
Payer: MEDICARE

## 2023-03-25 ENCOUNTER — APPOINTMENT (OUTPATIENT)
Dept: GENERAL RADIOLOGY | Age: 88
DRG: 068 | End: 2023-03-25
Attending: HOSPITALIST
Payer: MEDICARE

## 2023-03-25 VITALS
RESPIRATION RATE: 16 BRPM | DIASTOLIC BLOOD PRESSURE: 55 MMHG | WEIGHT: 165.7 LBS | BODY MASS INDEX: 24.54 KG/M2 | SYSTOLIC BLOOD PRESSURE: 113 MMHG | HEART RATE: 79 BPM | TEMPERATURE: 100 F | HEIGHT: 69 IN | OXYGEN SATURATION: 95 %

## 2023-03-25 DIAGNOSIS — I65.21 STENOSIS OF RIGHT CAROTID ARTERY: Primary | ICD-10-CM

## 2023-03-25 PROBLEM — I63.9 ACUTE CVA (CEREBROVASCULAR ACCIDENT) (HCC): Status: ACTIVE | Noted: 2023-03-25

## 2023-03-25 LAB
APTT: 34.8 SECONDS (ref 25.1–37.1)
BASOPHILS ABSOLUTE: 0 K/CU MM
BASOPHILS RELATIVE PERCENT: 0.4 % (ref 0–1)
DIFFERENTIAL TYPE: ABNORMAL
DIGOXIN LEVEL: 1.3 NG/ML (ref 0.8–2)
DOSE AMOUNT: NORMAL
DOSE TIME: NORMAL
EKG ATRIAL RATE: 89 BPM
EKG DIAGNOSIS: NORMAL
EKG Q-T INTERVAL: 418 MS
EKG QRS DURATION: 132 MS
EKG QTC CALCULATION (BAZETT): 460 MS
EKG R AXIS: 72 DEGREES
EKG T AXIS: -20 DEGREES
EKG VENTRICULAR RATE: 73 BPM
EOSINOPHILS ABSOLUTE: 0 K/CU MM
EOSINOPHILS RELATIVE PERCENT: 0.1 % (ref 0–3)
ESTIMATED AVERAGE GLUCOSE: 120 MG/DL
FOLATE SERPL-MCNC: 12.8 NG/ML (ref 3.1–17.5)
HBA1C MFR BLD: 5.8 % (ref 4.2–6.3)
HCT VFR BLD CALC: 39.1 % (ref 37–47)
HEMOGLOBIN: 12.8 GM/DL (ref 12.5–16)
IMMATURE NEUTROPHIL %: 0.3 % (ref 0–0.43)
INR BLD: 1.61 INDEX
LACTIC ACID, SEPSIS: 1.9 MMOL/L (ref 0.5–1.9)
LYMPHOCYTES ABSOLUTE: 2.5 K/CU MM
LYMPHOCYTES RELATIVE PERCENT: 34 % (ref 24–44)
MCH RBC QN AUTO: 31.4 PG (ref 27–31)
MCHC RBC AUTO-ENTMCNC: 32.7 % (ref 32–36)
MCV RBC AUTO: 95.8 FL (ref 78–100)
MONOCYTES ABSOLUTE: 1.3 K/CU MM
MONOCYTES RELATIVE PERCENT: 18.2 % (ref 0–4)
PDW BLD-RTO: 13.6 % (ref 11.7–14.9)
PLATELET # BLD: 155 K/CU MM (ref 140–440)
PMV BLD AUTO: 10 FL (ref 7.5–11.1)
PROTHROMBIN TIME: 19.7 SECONDS (ref 11.7–14.5)
RBC # BLD: 4.08 M/CU MM (ref 4.2–5.4)
SEGMENTED NEUTROPHILS ABSOLUTE COUNT: 3.4 K/CU MM
SEGMENTED NEUTROPHILS RELATIVE PERCENT: 47 % (ref 36–66)
TOTAL IMMATURE NEUTOROPHIL: 0.02 K/CU MM
TROPONIN T: <0.01 NG/ML
VITAMIN B-12: 357.2 PG/ML (ref 211–911)
WBC # BLD: 7.3 K/CU MM (ref 4–10.5)

## 2023-03-25 PROCEDURE — 6370000000 HC RX 637 (ALT 250 FOR IP): Performed by: NURSE PRACTITIONER

## 2023-03-25 PROCEDURE — 93005 ELECTROCARDIOGRAM TRACING: CPT | Performed by: NURSE PRACTITIONER

## 2023-03-25 PROCEDURE — 71045 X-RAY EXAM CHEST 1 VIEW: CPT

## 2023-03-25 PROCEDURE — 85025 COMPLETE CBC W/AUTO DIFF WBC: CPT

## 2023-03-25 PROCEDURE — 87040 BLOOD CULTURE FOR BACTERIA: CPT

## 2023-03-25 PROCEDURE — 2580000003 HC RX 258: Performed by: NURSE PRACTITIONER

## 2023-03-25 PROCEDURE — 84484 ASSAY OF TROPONIN QUANT: CPT

## 2023-03-25 PROCEDURE — 94761 N-INVAS EAR/PLS OXIMETRY MLT: CPT

## 2023-03-25 PROCEDURE — 87507 IADNA-DNA/RNA PROBE TQ 12-25: CPT

## 2023-03-25 PROCEDURE — 85730 THROMBOPLASTIN TIME PARTIAL: CPT

## 2023-03-25 PROCEDURE — 70551 MRI BRAIN STEM W/O DYE: CPT

## 2023-03-25 PROCEDURE — 83605 ASSAY OF LACTIC ACID: CPT

## 2023-03-25 PROCEDURE — 85610 PROTHROMBIN TIME: CPT

## 2023-03-25 PROCEDURE — 1200000000 HC SEMI PRIVATE

## 2023-03-25 RX ORDER — SODIUM CHLORIDE 9 MG/ML
INJECTION, SOLUTION INTRAVENOUS CONTINUOUS
Status: DISCONTINUED | OUTPATIENT
Start: 2023-03-25 | End: 2023-03-25 | Stop reason: HOSPADM

## 2023-03-25 RX ORDER — MEMANTINE HYDROCHLORIDE 10 MG/1
10 TABLET ORAL 2 TIMES DAILY
Status: DISCONTINUED | OUTPATIENT
Start: 2023-03-25 | End: 2023-03-30 | Stop reason: HOSPADM

## 2023-03-25 RX ORDER — DIGOXIN 125 MCG
125 TABLET ORAL DAILY
Status: DISCONTINUED | OUTPATIENT
Start: 2023-03-26 | End: 2023-03-30 | Stop reason: HOSPADM

## 2023-03-25 RX ORDER — POLYETHYLENE GLYCOL 3350 17 G/17G
17 POWDER, FOR SOLUTION ORAL DAILY PRN
Status: DISCONTINUED | OUTPATIENT
Start: 2023-03-25 | End: 2023-03-30 | Stop reason: HOSPADM

## 2023-03-25 RX ORDER — MEMANTINE HYDROCHLORIDE 10 MG/1
10 TABLET ORAL 2 TIMES DAILY
Status: CANCELLED | OUTPATIENT
Start: 2023-03-25

## 2023-03-25 RX ORDER — PANTOPRAZOLE SODIUM 40 MG/1
40 TABLET, DELAYED RELEASE ORAL
Status: DISCONTINUED | OUTPATIENT
Start: 2023-03-26 | End: 2023-03-30 | Stop reason: HOSPADM

## 2023-03-25 RX ORDER — SODIUM CHLORIDE 0.9 % (FLUSH) 0.9 %
5-40 SYRINGE (ML) INJECTION PRN
Status: CANCELLED | OUTPATIENT
Start: 2023-03-25

## 2023-03-25 RX ORDER — LEVOTHYROXINE SODIUM 0.12 MG/1
125 TABLET ORAL
Status: DISCONTINUED | OUTPATIENT
Start: 2023-03-26 | End: 2023-03-25 | Stop reason: HOSPADM

## 2023-03-25 RX ORDER — ACETAMINOPHEN 325 MG/1
650 TABLET ORAL EVERY 6 HOURS PRN
Status: DISCONTINUED | OUTPATIENT
Start: 2023-03-25 | End: 2023-03-30 | Stop reason: HOSPADM

## 2023-03-25 RX ORDER — RIVASTIGMINE TARTRATE 1.5 MG/1
3 CAPSULE ORAL DAILY
Status: CANCELLED | OUTPATIENT
Start: 2023-03-26

## 2023-03-25 RX ORDER — ACETAMINOPHEN 650 MG/1
650 SUPPOSITORY RECTAL EVERY 6 HOURS PRN
Status: DISCONTINUED | OUTPATIENT
Start: 2023-03-25 | End: 2023-03-30 | Stop reason: HOSPADM

## 2023-03-25 RX ORDER — SODIUM CHLORIDE 9 MG/ML
INJECTION, SOLUTION INTRAVENOUS CONTINUOUS
Status: CANCELLED | OUTPATIENT
Start: 2023-03-25 | End: 2023-03-26

## 2023-03-25 RX ORDER — ATORVASTATIN CALCIUM 20 MG/1
20 TABLET, FILM COATED ORAL DAILY
Status: CANCELLED | OUTPATIENT
Start: 2023-03-26

## 2023-03-25 RX ORDER — SODIUM CHLORIDE 9 MG/ML
INJECTION, SOLUTION INTRAVENOUS PRN
Status: DISCONTINUED | OUTPATIENT
Start: 2023-03-25 | End: 2023-03-30 | Stop reason: HOSPADM

## 2023-03-25 RX ORDER — ACETAMINOPHEN 325 MG/1
650 TABLET ORAL EVERY 6 HOURS PRN
Status: CANCELLED | OUTPATIENT
Start: 2023-03-25

## 2023-03-25 RX ORDER — VITAMIN E 268 MG
400 CAPSULE ORAL DAILY
Status: CANCELLED | OUTPATIENT
Start: 2023-03-26

## 2023-03-25 RX ORDER — SODIUM CHLORIDE 9 MG/ML
INJECTION, SOLUTION INTRAVENOUS PRN
Status: CANCELLED | OUTPATIENT
Start: 2023-03-25

## 2023-03-25 RX ORDER — SODIUM CHLORIDE 0.9 % (FLUSH) 0.9 %
5-40 SYRINGE (ML) INJECTION EVERY 12 HOURS SCHEDULED
Status: DISCONTINUED | OUTPATIENT
Start: 2023-03-26 | End: 2023-03-30 | Stop reason: HOSPADM

## 2023-03-25 RX ORDER — VITAMIN B COMPLEX
1 TABLET ORAL DAILY
Status: CANCELLED | OUTPATIENT
Start: 2023-03-26

## 2023-03-25 RX ORDER — MECLIZINE HCL 12.5 MG/1
12.5 TABLET ORAL 3 TIMES DAILY PRN
Status: CANCELLED | OUTPATIENT
Start: 2023-03-25

## 2023-03-25 RX ORDER — VITAMIN B COMPLEX
1 TABLET ORAL DAILY
Status: DISCONTINUED | OUTPATIENT
Start: 2023-03-26 | End: 2023-03-30 | Stop reason: HOSPADM

## 2023-03-25 RX ORDER — LANOLIN ALCOHOL/MO/W.PET/CERES
400 CREAM (GRAM) TOPICAL DAILY
Status: DISCONTINUED | OUTPATIENT
Start: 2023-03-25 | End: 2023-03-25 | Stop reason: HOSPADM

## 2023-03-25 RX ORDER — ACETAMINOPHEN 650 MG/1
650 SUPPOSITORY RECTAL EVERY 6 HOURS PRN
Status: CANCELLED | OUTPATIENT
Start: 2023-03-25

## 2023-03-25 RX ORDER — PANTOPRAZOLE SODIUM 40 MG/1
40 TABLET, DELAYED RELEASE ORAL
Status: CANCELLED | OUTPATIENT
Start: 2023-03-26

## 2023-03-25 RX ORDER — MECLIZINE HCL 12.5 MG/1
12.5 TABLET ORAL 3 TIMES DAILY PRN
Status: DISCONTINUED | OUTPATIENT
Start: 2023-03-25 | End: 2023-03-30 | Stop reason: HOSPADM

## 2023-03-25 RX ORDER — LANOLIN ALCOHOL/MO/W.PET/CERES
400 CREAM (GRAM) TOPICAL DAILY
Status: DISCONTINUED | OUTPATIENT
Start: 2023-03-26 | End: 2023-03-30 | Stop reason: HOSPADM

## 2023-03-25 RX ORDER — LEVOTHYROXINE SODIUM 0.12 MG/1
125 TABLET ORAL
Status: DISCONTINUED | OUTPATIENT
Start: 2023-03-26 | End: 2023-03-30 | Stop reason: HOSPADM

## 2023-03-25 RX ORDER — SODIUM CHLORIDE 0.9 % (FLUSH) 0.9 %
5-40 SYRINGE (ML) INJECTION PRN
Status: DISCONTINUED | OUTPATIENT
Start: 2023-03-25 | End: 2023-03-30 | Stop reason: HOSPADM

## 2023-03-25 RX ORDER — RIVASTIGMINE TARTRATE 1.5 MG/1
3 CAPSULE ORAL DAILY
Status: DISCONTINUED | OUTPATIENT
Start: 2023-03-26 | End: 2023-03-30 | Stop reason: HOSPADM

## 2023-03-25 RX ORDER — ATORVASTATIN CALCIUM 10 MG/1
20 TABLET, FILM COATED ORAL DAILY
Status: DISCONTINUED | OUTPATIENT
Start: 2023-03-26 | End: 2023-03-30 | Stop reason: HOSPADM

## 2023-03-25 RX ORDER — POLYETHYLENE GLYCOL 3350 17 G/17G
17 POWDER, FOR SOLUTION ORAL DAILY PRN
Status: CANCELLED | OUTPATIENT
Start: 2023-03-25

## 2023-03-25 RX ORDER — LANOLIN ALCOHOL/MO/W.PET/CERES
400 CREAM (GRAM) TOPICAL DAILY
Status: CANCELLED | OUTPATIENT
Start: 2023-03-26

## 2023-03-25 RX ORDER — SODIUM CHLORIDE 0.9 % (FLUSH) 0.9 %
5-40 SYRINGE (ML) INJECTION EVERY 12 HOURS SCHEDULED
Status: CANCELLED | OUTPATIENT
Start: 2023-03-25

## 2023-03-25 RX ORDER — VITAMIN E 268 MG
400 CAPSULE ORAL DAILY
Status: DISCONTINUED | OUTPATIENT
Start: 2023-03-26 | End: 2023-03-30 | Stop reason: HOSPADM

## 2023-03-25 RX ORDER — SODIUM CHLORIDE 9 MG/ML
INJECTION, SOLUTION INTRAVENOUS CONTINUOUS
Status: DISPENSED | OUTPATIENT
Start: 2023-03-25 | End: 2023-03-26

## 2023-03-25 RX ORDER — LEVOTHYROXINE SODIUM 0.12 MG/1
125 TABLET ORAL
Status: CANCELLED | OUTPATIENT
Start: 2023-03-26

## 2023-03-25 RX ORDER — DIGOXIN 125 MCG
125 TABLET ORAL DAILY
Status: CANCELLED | OUTPATIENT
Start: 2023-03-26

## 2023-03-25 RX ADMIN — METOPROLOL TARTRATE 12.5 MG: 25 TABLET, FILM COATED ORAL at 21:02

## 2023-03-25 RX ADMIN — SODIUM CHLORIDE, PRESERVATIVE FREE 10 ML: 5 INJECTION INTRAVENOUS at 15:28

## 2023-03-25 RX ADMIN — SODIUM CHLORIDE: 9 INJECTION, SOLUTION INTRAVENOUS at 15:27

## 2023-03-25 RX ADMIN — METOPROLOL TARTRATE 12.5 MG: 25 TABLET, FILM COATED ORAL at 08:42

## 2023-03-25 RX ADMIN — ACETAMINOPHEN 650 MG: 325 TABLET ORAL at 16:27

## 2023-03-25 RX ADMIN — ACETAMINOPHEN 650 MG: 325 TABLET ORAL at 08:43

## 2023-03-25 RX ADMIN — RIVASTIGMINE TARTRATE 3 MG: 1.5 CAPSULE ORAL at 08:43

## 2023-03-25 RX ADMIN — MEMANTINE 10 MG: 10 TABLET ORAL at 21:02

## 2023-03-25 RX ADMIN — Medication 400 MG: at 15:29

## 2023-03-25 RX ADMIN — ATORVASTATIN CALCIUM 20 MG: 20 TABLET, FILM COATED ORAL at 08:43

## 2023-03-25 RX ADMIN — Medication 400 UNITS: at 08:43

## 2023-03-25 RX ADMIN — MEMANTINE 10 MG: 10 TABLET ORAL at 08:43

## 2023-03-25 RX ADMIN — DIGOXIN 125 MCG: 125 TABLET ORAL at 08:43

## 2023-03-25 RX ADMIN — LEVOTHYROXINE SODIUM 150 MCG: 150 TABLET ORAL at 05:41

## 2023-03-25 RX ADMIN — CHOLECALCIFEROL TAB 25 MCG (1000 UNIT) 1000 UNITS: 25 TAB at 08:43

## 2023-03-25 RX ADMIN — SODIUM CHLORIDE: 9 INJECTION, SOLUTION INTRAVENOUS at 20:56

## 2023-03-25 RX ADMIN — APIXABAN 5 MG: 5 TABLET, FILM COATED ORAL at 21:02

## 2023-03-25 RX ADMIN — SERTRALINE 50 MG: 50 TABLET, FILM COATED ORAL at 08:43

## 2023-03-25 RX ADMIN — PANTOPRAZOLE SODIUM 40 MG: 40 TABLET, DELAYED RELEASE ORAL at 05:41

## 2023-03-25 ASSESSMENT — ENCOUNTER SYMPTOMS: NAUSEA: 1

## 2023-03-25 ASSESSMENT — PAIN SCALES - GENERAL: PAINLEVEL_OUTOF10: 0

## 2023-03-25 NOTE — PROGRESS NOTES
Report called to Kingsley Rodríguez on 3E. Transport is in the process of being set up. This nurse will notify Kingsley Rodríguez when transportation has been finalized. [facility], patient's daughter was called and updated on room number. Transport will be here to get patient at [phone]. Roxana MALIK updated and [facility], patient's daughter.

## 2023-03-25 NOTE — PLAN OF CARE
Problem: Discharge Planning  Goal: Discharge to home or other facility with appropriate resources  3/25/2023 1800 by Thania Schneider RN  Outcome: Completed  3/25/2023 1034 by Thania Schneider RN  Outcome: Progressing     Problem: Safety - Adult  Goal: Free from fall injury  3/25/2023 1800 by Thania Schneider RN  Outcome: Completed  3/25/2023 1034 by Thania Schneider RN  Outcome: Progressing     Problem: ABCDS Injury Assessment  Goal: Absence of physical injury  3/25/2023 1800 by Thania Schneider RN  Outcome: Completed  3/25/2023 1034 by Thania Schneider RN  Outcome: Progressing     Problem: Cardiovascular - Adult  Goal: Absence of cardiac dysrhythmias or at baseline  3/25/2023 1800 by Thania Schneider RN  Outcome: Completed  3/25/2023 1034 by Thania Schneider RN  Outcome: Progressing     Problem: Skin/Tissue Integrity - Adult  Goal: Skin integrity remains intact  3/25/2023 1800 by Thania Schneider RN  Outcome: Completed  3/25/2023 1034 by Thania Schneider RN  Outcome: Progressing  Goal: Incisions, wounds, or drain sites healing without S/S of infection  3/25/2023 1800 by Thania Schneider RN  Outcome: Completed  3/25/2023 1034 by Thania Schneider RN  Outcome: Progressing     Problem: Musculoskeletal - Adult  Goal: Return mobility to safest level of function  3/25/2023 1800 by Thania Schneider RN  Outcome: Completed  3/25/2023 1034 by Thania Schneider RN  Outcome: Progressing  Goal: Return ADL status to a safe level of function  3/25/2023 1800 by Thania Schneider RN  Outcome: Completed  3/25/2023 1034 by Thania Schneider RN  Outcome: Progressing

## 2023-03-25 NOTE — PROGRESS NOTES
4 Eyes Skin Assessment     NAME:  Alexis Shane  YOB: 1935  MEDICAL RECORD NUMBER:  [id number]    The patient is being assessed for  Admission    I agree that One RN has performed a thorough Head to Toe Skin Assessment on the patient. ALL assessment sites listed below have been assessed. Areas assessed by both nurses:    Head, Face, Ears, Shoulders, Back, Chest, Arms, Elbows, Hands, Sacrum. Buttock, Coccyx, Ischium, and Legs. Feet and Heels        Does the Patient have a Wound? Yes wound(s) were present on assessment.  LDA wound assessment was Initiated and completed by RN       Jose Prevention initiated by RN: NORMA   Wound Care Orders initiated by RN: NA    Pressure Injury (Stage 3,4, Unstageable, DTI, NWPT, and Complex wounds) if present, place referral order by RN under : NA    New and Established Ostomies, if present place, referral order under : NORMA      Nurse 1 eSignature: Electronically signed by Mikey Yang RN on 3/24/23 at 11:52 PM EDT    **SHARE this note so that the co-signing nurse can place an eSignature**    Nurse 2 eSignature: Electronically signed by Gale Jordan RN on 3/24/23 at 11:53 PM EDT

## 2023-03-25 NOTE — PLAN OF CARE
Problem: Discharge Planning  Goal: Discharge to home or other facility with appropriate resources  Outcome: Progressing     Problem: Safety - Adult  Goal: Free from fall injury  Outcome: Progressing     Problem: ABCDS Injury Assessment  Goal: Absence of physical injury  Outcome: Progressing     Problem: Cardiovascular - Adult  Goal: Absence of cardiac dysrhythmias or at baseline  Outcome: Progressing     Problem: Skin/Tissue Integrity - Adult  Goal: Skin integrity remains intact  Outcome: Progressing  Goal: Incisions, wounds, or drain sites healing without S/S of infection  Outcome: Progressing     Problem: Musculoskeletal - Adult  Goal: Return mobility to safest level of function  Outcome: Progressing  Goal: Return ADL status to a safe level of function  Outcome: Progressing

## 2023-03-25 NOTE — PLAN OF CARE
Problem: Discharge Planning  Goal: Discharge to home or other facility with appropriate resources  3/25/2023 1034 by Stacey Valderrama RN  Outcome: Progressing  3/24/2023 2339 by Marcus Garcia RN  Outcome: Progressing     Problem: Safety - Adult  Goal: Free from fall injury  3/25/2023 1034 by Stacey Valderrama RN  Outcome: Progressing  3/24/2023 2339 by Marcus Garcia RN  Outcome: Progressing     Problem: ABCDS Injury Assessment  Goal: Absence of physical injury  3/25/2023 1034 by Stacey Valderrama RN  Outcome: Progressing  3/24/2023 2339 by Marcus Garcia RN  Outcome: Progressing     Problem: Cardiovascular - Adult  Goal: Absence of cardiac dysrhythmias or at baseline  3/25/2023 1034 by Stacey Valderrama RN  Outcome: Progressing  3/24/2023 2339 by Marcus Garcia RN  Outcome: Progressing     Problem: Skin/Tissue Integrity - Adult  Goal: Skin integrity remains intact  3/25/2023 1034 by Stacey Valderrama RN  Outcome: Progressing  3/24/2023 2339 by Marcus Garcia RN  Outcome: Progressing  Goal: Incisions, wounds, or drain sites healing without S/S of infection  3/25/2023 1034 by Stacey Valderrama RN  Outcome: Progressing  3/24/2023 2339 by Marcus Garcia RN  Outcome: Progressing     Problem: Musculoskeletal - Adult  Goal: Return mobility to safest level of function  3/25/2023 1034 by Stacey Valderrama RN  Outcome: Progressing  3/24/2023 2339 by Marcus Garcia RN  Outcome: Progressing  Goal: Return ADL status to a safe level of function  3/25/2023 1034 by Stacey Valderrama RN  Outcome: Progressing  3/24/2023 2339 by Marcus Garcia RN  Outcome: Progressing

## 2023-03-25 NOTE — H&P
V2.0  History and Physical      Name:  Jacki Esquivel /Age/Sex: 1935  (81 y.o. female)   MRN & CSN:  [id number] & [id number] Encounter Date/Time: 3/25/2023 7:42 PM EDT   Location:  9158/2828-Z PCP: Cinthia Llanes MD       Hospital Day: 1    Assessment and Plan:   Jacki Esquivel is a 81 y.o. female with a pmh of persistent atrial fibrillation, hypothyroidism, ambulatory dysfunction, dementia who presents with acute CVA    Acute CVA  Acute 3 mm infarct within the subcortical white matter of the right frontal lobe  MRI brain without contrast 3/25/2023  NIHSS 0 ;R [place] [place] 115 time 3/24 am   Echocardiogram pending; has history of PFO  Consult neurology  On atorvastatin, continue  PT OT    Near syncope and collapse  Patient denies lightheadedness or dizziness or palpitations prior to fall  Fall precautions    Febrile episode  101.7 at [facility]  Blood cultures and urine cultures pending  No focus of infection so far  Procalcitonin pending  Watch off antibiotics for now    Persistent atrial fibrillation  On digoxin and metoprolol, continue  Yazmin Vascor 3  On Eliquis at home    History of hypothyroidism  On Synthroid, continued    Dementia  On Namenda and Exelon, continue    Depression  On Zoloft, continue      Disposition:   Current Living situation: Home  Expected Disposition: To be determined  Estimated D/C: 2 to 3 days    Diet No diet orders on file   DVT Prophylaxis [] Lovenox, []  Heparin, [] SCDs, [] Ambulation,  [x] Eliquis, [] Xarelto, [] Coumadin   Code Status Prior   Surrogate Decision Maker/ POA      History from:     patient    History of Present Illness:     Chief Complaint: <principal problem not specified>  Jacki Esquivel is a 81 y.o. female with pmh of atrial fibrillation, dementia, who presents with acute CVA. Patient was in usual state of health. She had near syncope and collapse on 3/24/2023 while going to bathroom using her walker. She hit her head on the ground and had pain.   She started screaming which got attention of her neighbor. EMS was called and she was taken to [facility] ED. Work-up at [facility] ED revealed unremarkable CT head without contrast, CT C-spine without contrast showed multilevel degenerative changes with no acute fracture or traumatic malalignment. She underwent MRI brain without contrast on 3/25/2023 which showed 3 mm acute infarct on frontal lobe and hence was transferred to our facility for neuro evaluation. During my assessment, patient is alert and oriented and able to provide good history. Expresses overall weakness, denies any focal weakness, dizziness, lightheadedness, vertigo. She uses depends due to urinary incontinence. Denies burning or dysuria. Denies abdominal pain, nausea or vomiting. Review of Systems: Need 10 Elements   Review of Systems  As above       Objective:   No intake or output data in the 24 hours ending 03/25/23 1942   Vitals:   Vitals:    03/25/23 1843   BP: (!) 114/46   Temp: 98.5 °F (36.9 °C)   TempSrc: Oral   SpO2: 93%       Medications Prior to Admission     Prior to Admission medications    Medication Sig Start Date End Date Taking?  Authorizing Provider   loperamide (IMODIUM) 2 MG capsule Take 2 mg by mouth 4 times daily as needed for Diarrhea    Historical Provider, MD   pantoprazole (PROTONIX) 40 MG tablet Take 1 tablet by mouth daily 3/9/23   Lissette Mcdonald MD   simvastatin (ZOCOR) 40 MG tablet Take 1 tablet by mouth nightly 3/9/23   Lissette Mcdonald MD   sertraline (ZOLOFT) 50 MG tablet TAKE ONE TABLET BY MOUTH EVERY DAY 3/9/23   Lissette Mcdonald MD   levothyroxine (SYNTHROID) 150 MCG tablet TAKE ONE TABLET BY MOUTH EVERY DAY 3/9/23   Lissette Mcdonald MD   memantine (NAMENDA) 10 MG tablet Take 1 tablet by mouth 2 times daily 3/9/23   Lissette Mcdonald MD   digoxin (LANOXIN) 125 MCG tablet Take 1 tablet by mouth daily 3/9/23   Lissette Mcdonald MD   apixaban (ELIQUIS) 5 MG TABS tablet TAKE 1 TABLET BY MOUTH 2 TIMES DAILY 3/9/23   Lissette Mcdonald MD   vitamin E 400 UNIT

## 2023-03-25 NOTE — PROGRESS NOTES
Hospitalist Progress Note      Name:  France Lew /Age/Sex: 1935  (81 y.o. female)   MRN & CSN:  [id number] & [id number] Admission Date/Time: 3/24/2023  2:13 PM   Location:   PCP: Elle Alex MD         Hospital Day: 2                                               Attending Physician Dr Catherine Guy and Plan:   France Lew is a 81 y.o.  female  who presents with Syncope and collapse      Near Syncope/ Persistent Dizziness    Possible BPPV but cannot exclude CVA/TIA given previous history and risk factors. LKW 3/23. NIHSS 0   CT head (3/24/2023) nonacute-possible sinusitis   Telemetry/Troponin trend   Echo/carotids   Neuro checks   Orthostatics   Meclizine trial   Will obtain MRI   PT/OT evaluation   Screening labs for further evaluation and monitoring of secondary risk factors   Statin already on medication regimen     Febrile episode (101.7)   Pending culture/screening labs for infectious etiology   Pending UA    Hypomagnesemia (1.7)   Replete and trend    Persistent atrial fibrillation   Continue digoxin/metoprolol   Pending digoxin level   [id number] Score: 3   On long-term anticoagulation-Eliquis     Hypothyroidism-last TSH 0.040. Continue Synthroid but reducing dose to 125 mcg daily   Will need outpatient follow-up     Fall with head injury  Ambulatory dysfunction   S/p laceration repair left forehead (3/20/2023)   Daily wound care   Fall precautions     Dementia   Continue Namenda/Exelon    Diet ADULT DIET;  Regular; Low Fat/Low Chol/High Fiber/JEFF   DVT Prophylaxis [x] Lovenox, []  Heparin, [] SCDs, [] Ambulation   GI Prophylaxis [x] PPI,  [] H2 Blocker,  [] Carafate,  [] Diet/Tube Feeds   Code Status DNR-CCA   Disposition Patient requires continued admission due to evaluation and testing related to syncopal episode      -Patient assessment and plan discussed with supervising physician-  Subjective 3/25/2023     France Lew is a 81 y.o.  female, who atrophy, white matter microvascular degenerative disease, atherosclerotic calcification distal internal carotid arteries of uncertain hemodynamic significance. Findings consistent with inflammatory sinus disease which may have acute and chronic components worst in the right maxillary sinus. CT FACIAL BONES WO CONTRAST    Result Date: 3/24/2023  EXAMINATION: CT OF THE FACE WITHOUT CONTRAST  3/24/2023 3:12 pm TECHNIQUE: CT of the face was performed without the administration of intravenous contrast. Multiplanar reformatted images are provided for review. Automated exposure control, iterative reconstruction, and/or weight based adjustment of the mA/kV was utilized to reduce the radiation dose to as low as reasonably achievable. COMPARISON: None HISTORY: ORDERING SYSTEM PROVIDED HISTORY: fall, left eye injury, r/o fracture of orbital bones TECHNOLOGIST PROVIDED HISTORY: Reason for exam:->fall, left eye injury, r/o fracture of orbital bones Decision Support Exception - unselect if not a suspected or confirmed emergency medical condition->Emergency Medical Condition (MA) FINDINGS: FACIAL BONES: The frontal sinuses, orbital walls, maxilla, pterygoid plates, zygomatic arches, hard palate, nasal bones and mandible are intact. The temporomandibular joints are aligned. ORBITAL CONTENTS: The globes appear intact. The extraocular muscles, optic nerve sheath complexes and lacrimal glands appear unremarkable. No retrobulbar hematoma or mass is seen. SINUSES: Small left mastoid effusion. There is mucosal thickening of the right maxillary sinus and mild mucosal thickening of the left maxillary sinus and left sphenoid sinus. Frothy material is seen in the left sphenoid sinus. SOFT TISSUES: No appreciable superficial facial soft tissue swelling. No acute facial bone trauma.  Secretions are seen within the right maxillary sinus and left sphenoid sinus, which could represent acute sinusitis in the appropriate clinical

## 2023-03-25 NOTE — H&P
OF THE CERVICAL SPINE WITHOUT CONTRAST 3/24/2023 3:13 pm TECHNIQUE: CT of the cervical spine was performed without the administration of intravenous contrast. Multiplanar reformatted images are provided for review. Automated exposure control, iterative reconstruction, and/or weight based adjustment of the mA/kV was utilized to reduce the radiation dose to as low as reasonably achievable. COMPARISON: None. HISTORY: ORDERING SYSTEM PROVIDED HISTORY: fall TECHNOLOGIST PROVIDED HISTORY: Reason for exam:->fall Decision Support Exception - unselect if not a suspected or confirmed emergency medical condition->Emergency Medical Condition (MA) FINDINGS: BONES/ALIGNMENT: Cervical vertebral body heights and alignment are normal. Facet joints are normally aligned. There is no acute fracture or traumatic malalignment. DEGENERATIVE CHANGES: There is degenerative disc disease with disc space narrowing and spondylosis at all levels of the cervical spine. SOFT TISSUES: There is no prevertebral soft tissue swelling. Bulky calcified plaque bilateral carotid bifurcations. Multilevel degenerative changes with no acute fracture or traumatic malalignment. Bulky calcified plaque in the bilateral carotid bifurcations. XR CHEST PORTABLE    Result Date: 3/24/2023  EXAMINATION: ONE XRAY VIEW OF THE CHEST 3/24/2023 3:12 pm COMPARISON: 04/13/2022, chest CT 04/12/2022 HISTORY: ORDERING SYSTEM PROVIDED HISTORY: fall TECHNOLOGIST PROVIDED HISTORY: Reason for exam:->fall Reason for Exam: fall, dizzy Additional signs and symptoms: fall, dizzy Relevant Medical/Surgical History: fall, dizzy FINDINGS: Diffuse interstitial and airspace bronchial pulmonary marking prominence similar to prior studies and consistent with chronic airspace and interstitial lung disease. Diffuse bronchitis/pneumonitis or atypical pneumonia which may have recurrent component cannot be completely excluded.  No large areas of pulmonary consolidation, pneumothorax, pulmonary vascular congestion/edema, detectable pleural effusion or mediastinal widening. Stable cardiomegaly noted. No definite acute osseous abnormality however, dedicated study of any clinically suspicious area of acute osseous injury suggested. Underlying skeletal osteopenia suggested. Findings most consistent with diffuse chronic lung disease similar to prior studies. Stable cardiomegaly without radiographic evidence of pulmonary edema/vascular congestion. Otherwise, radiographically nonacute portable chest. Subjective skeletal osteopenia without definite acute osseous injury. Dedicated study of any clinically suspicious area of acute osseous injury suggested.            EKG this visit:  Reviewed         Electronically signed by NGOC Gonzáles CNP on 3/24/2023 at 8:52 PM

## 2023-03-25 NOTE — PROGRESS NOTES
[facility] here to get patient. Daughter Gabriele Just brought patient's second hearing aid. Transport notified to keep an eye on her hearing aids, as they fall out easily. Roxana RN called and updated on patient's status of fever around 1630 with administration of tylenol and bouts of diarrhea. Stool sample was sent for GI panel. Roxana also notified that both hearing aids being sent.

## 2023-03-25 NOTE — PROGRESS NOTES
Pt arrived to unit. Pt alert with some confusion. Pt vss at this time. Pt oriented to room and call light.

## 2023-03-25 NOTE — DISCHARGE SUMMARY
the cervical spine was performed without the administration of intravenous contrast. Multiplanar reformatted images are provided for review. Automated exposure control, iterative reconstruction, and/or weight based adjustment of the mA/kV was utilized to reduce the radiation dose to as low as reasonably achievable. COMPARISON: None. HISTORY: ORDERING SYSTEM PROVIDED HISTORY: fall TECHNOLOGIST PROVIDED HISTORY: Reason for exam:->fall Decision Support Exception - unselect if not a suspected or confirmed emergency medical condition->Emergency Medical Condition (MA) FINDINGS: BONES/ALIGNMENT: Cervical vertebral body heights and alignment are normal. Facet joints are normally aligned. There is no acute fracture or traumatic malalignment. DEGENERATIVE CHANGES: There is degenerative disc disease with disc space narrowing and spondylosis at all levels of the cervical spine. SOFT TISSUES: There is no prevertebral soft tissue swelling. Bulky calcified plaque bilateral carotid bifurcations. Multilevel degenerative changes with no acute fracture or traumatic malalignment. Bulky calcified plaque in the bilateral carotid bifurcations. XR CHEST PORTABLE    Result Date: 3/24/2023  EXAMINATION: ONE XRAY VIEW OF THE CHEST 3/24/2023 3:12 pm COMPARISON: 04/13/2022, chest CT 04/12/2022 HISTORY: ORDERING SYSTEM PROVIDED HISTORY: fall TECHNOLOGIST PROVIDED HISTORY: Reason for exam:->fall Reason for Exam: fall, dizzy Additional signs and symptoms: fall, dizzy Relevant Medical/Surgical History: fall, dizzy FINDINGS: Diffuse interstitial and airspace bronchial pulmonary marking prominence similar to prior studies and consistent with chronic airspace and interstitial lung disease. Diffuse bronchitis/pneumonitis or atypical pneumonia which may have recurrent component cannot be completely excluded.  No large areas of pulmonary consolidation, pneumothorax, pulmonary vascular congestion/edema, detectable pleural effusion or mediastinal voids. ORBITS: Limited evaluation of the orbits is unremarkable. SINUSES: Mild mucosal thickening is present within the paranasal sinuses. The paranasal sinuses and mastoid air cells are otherwise clear. BONES/SOFT TISSUES: Bone marrow signal intensity is normal.     1. Acute 3 mm infarct within the subcortical white matter of the right frontal lobe. 2. No acute intracranial hemorrhage identified. 3. Moderate chronic small vessel ischemic changes. The findings were sent to the Radiology Results [id number] at 1:25 pm on 3/25/2023 to be communicated to a licensed caregiver.          Discharge Time of 45 minutes    Electronically signed by NGOC Graham CNP on 3/25/2023 at 1:54 PM

## 2023-03-25 NOTE — PROGRESS NOTES
Physical Therapy  PT on hold - waiting for imaging results; Roselia Duncan, PT, 3/25/2023,  11:16 AM

## 2023-03-26 ENCOUNTER — APPOINTMENT (OUTPATIENT)
Dept: CT IMAGING | Age: 88
DRG: 068 | End: 2023-03-26
Attending: HOSPITALIST
Payer: MEDICARE

## 2023-03-26 ENCOUNTER — APPOINTMENT (OUTPATIENT)
Dept: ULTRASOUND IMAGING | Age: 88
DRG: 068 | End: 2023-03-26
Attending: HOSPITALIST
Payer: MEDICARE
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
## 2023-03-26 LAB
ANION GAP SERPL CALCULATED.3IONS-SCNC: 11 MMOL/L (ref 4–16)
ASTROVIRUS PCR: NOT DETECTED
BUN SERPL-MCNC: 15 MG/DL (ref 6–23)
C CAYETANENSIS DNA SPEC QL NAA+PROBE: NOT DETECTED
C DIFF AG + TOXIN: NORMAL
CALCIUM SERPL-MCNC: 8 MG/DL (ref 8.3–10.6)
CAMPY SP DNA.DIARRHEA STL QL NAA+PROBE: NOT DETECTED
CHLORIDE BLD-SCNC: 105 MMOL/L (ref 99–110)
CHOLEST SERPL-MCNC: 113 MG/DL
CO2: 23 MMOL/L (ref 21–32)
CREAT SERPL-MCNC: 0.8 MG/DL (ref 0.6–1.1)
CRYPTOSP DNA SPEC QL NAA+PROBE: NOT DETECTED
CULTURE: NORMAL
CULTURE: NORMAL
E COLI DNA SPEC QL NAA+PROBE: NOT DETECTED
E COLI ENTEROAGGREGATIVE PCR: NOT DETECTED
E COLI ENTEROPATHOGENIC PCR: NOT DETECTED
E COLI ENTEROTOXIGENIC PCR: NOT DETECTED
E COLI O157H7 DNA SPEC QL NAA+PROBE: NOT DETECTED
E HISTOLYT DNA SPEC QL NAA+PROBE: NOT DETECTED
EC STX1+STX2 + H7 FLIC SPEC NAA+PROBE: NOT DETECTED
ESTIMATED AVERAGE GLUCOSE: 120 MG/DL
G LAMBLIA DNA SPEC QL NAA+PROBE: NOT DETECTED
GFR SERPL CREATININE-BSD FRML MDRD: >60 ML/MIN/1.73M2
GLUCOSE SERPL-MCNC: 98 MG/DL (ref 70–99)
HADV DNA SPEC QL NAA+PROBE: NOT DETECTED
HBA1C MFR BLD: 5.8 % (ref 4.2–6.3)
HCT VFR BLD CALC: 40.1 % (ref 37–47)
HDLC SERPL-MCNC: 24 MG/DL
HEMOGLOBIN: 12.9 GM/DL (ref 12.5–16)
LDLC SERPL CALC-MCNC: 64 MG/DL
Lab: NORMAL
Lab: NORMAL
MAGNESIUM: 1.8 MG/DL (ref 1.8–2.4)
MCH RBC QN AUTO: 31.3 PG (ref 27–31)
MCHC RBC AUTO-ENTMCNC: 32.2 % (ref 32–36)
MCV RBC AUTO: 97.3 FL (ref 78–100)
NOROVIRUS RNA SPEC QL NAA+PROBE: NOT DETECTED
P SHIGELLOIDES DNA STL QL NAA+PROBE: NOT DETECTED
PDW BLD-RTO: 13.5 % (ref 11.7–14.9)
PLATELET # BLD: 147 K/CU MM (ref 140–440)
PMV BLD AUTO: 9.7 FL (ref 7.5–11.1)
POTASSIUM SERPL-SCNC: 4.2 MMOL/L (ref 3.5–5.1)
PROCALCITONIN SERPL-MCNC: 0.07 NG/ML
PROCALCITONIN SERPL-MCNC: 0.09 NG/ML
RBC # BLD: 4.12 M/CU MM (ref 4.2–5.4)
RV RNA SPEC QL NAA+PROBE: NOT DETECTED
SALMONELLA DNA SPEC QL NAA+PROBE: NOT DETECTED
SAPOVIRUS PCR: NOT DETECTED
SODIUM BLD-SCNC: 139 MMOL/L (ref 135–145)
SOURCE: NORMAL
SPECIMEN: NORMAL
SPECIMEN: NORMAL
TRIGL SERPL-MCNC: 124 MG/DL
V CHOLERAE DNA SPEC QL NAA+PROBE: NOT DETECTED
VIBRIO DNA SPEC NAA+PROBE: NOT DETECTED
WBC # BLD: 5.9 K/CU MM (ref 4–10.5)
YERSINIA DNA SPEC NAA+PROBE: NOT DETECTED

## 2023-03-26 PROCEDURE — G0378 HOSPITAL OBSERVATION PER HR: HCPCS

## 2023-03-26 PROCEDURE — 6370000000 HC RX 637 (ALT 250 FOR IP): Performed by: INTERNAL MEDICINE

## 2023-03-26 PROCEDURE — 87449 NOS EACH ORGANISM AG IA: CPT

## 2023-03-26 PROCEDURE — 2580000003 HC RX 258: Performed by: NURSE PRACTITIONER

## 2023-03-26 PROCEDURE — 70450 CT HEAD/BRAIN W/O DYE: CPT

## 2023-03-26 PROCEDURE — 36415 COLL VENOUS BLD VENIPUNCTURE: CPT

## 2023-03-26 PROCEDURE — 6370000000 HC RX 637 (ALT 250 FOR IP): Performed by: NURSE PRACTITIONER

## 2023-03-26 PROCEDURE — 80048 BASIC METABOLIC PNL TOTAL CA: CPT

## 2023-03-26 PROCEDURE — 85027 COMPLETE CBC AUTOMATED: CPT

## 2023-03-26 PROCEDURE — 6360000004 HC RX CONTRAST MEDICATION: Performed by: STUDENT IN AN ORGANIZED HEALTH CARE EDUCATION/TRAINING PROGRAM

## 2023-03-26 PROCEDURE — 1200000000 HC SEMI PRIVATE

## 2023-03-26 PROCEDURE — 87150 DNA/RNA AMPLIFIED PROBE: CPT

## 2023-03-26 PROCEDURE — 80061 LIPID PANEL: CPT

## 2023-03-26 PROCEDURE — 84145 PROCALCITONIN (PCT): CPT

## 2023-03-26 PROCEDURE — 97530 THERAPEUTIC ACTIVITIES: CPT

## 2023-03-26 PROCEDURE — 83036 HEMOGLOBIN GLYCOSYLATED A1C: CPT

## 2023-03-26 PROCEDURE — 97162 PT EVAL MOD COMPLEX 30 MIN: CPT

## 2023-03-26 PROCEDURE — 87324 CLOSTRIDIUM AG IA: CPT

## 2023-03-26 PROCEDURE — 83735 ASSAY OF MAGNESIUM: CPT

## 2023-03-26 PROCEDURE — 70498 CT ANGIOGRAPHY NECK: CPT

## 2023-03-26 PROCEDURE — 94761 N-INVAS EAR/PLS OXIMETRY MLT: CPT

## 2023-03-26 PROCEDURE — 87040 BLOOD CULTURE FOR BACTERIA: CPT

## 2023-03-26 PROCEDURE — 99223 1ST HOSP IP/OBS HIGH 75: CPT | Performed by: STUDENT IN AN ORGANIZED HEALTH CARE EDUCATION/TRAINING PROGRAM

## 2023-03-26 PROCEDURE — 93880 EXTRACRANIAL BILAT STUDY: CPT

## 2023-03-26 RX ORDER — LOPERAMIDE HYDROCHLORIDE 2 MG/1
2 CAPSULE ORAL 4 TIMES DAILY PRN
Status: DISCONTINUED | OUTPATIENT
Start: 2023-03-26 | End: 2023-03-30 | Stop reason: HOSPADM

## 2023-03-26 RX ORDER — BENZONATATE 100 MG/1
100 CAPSULE ORAL 3 TIMES DAILY PRN
Status: DISCONTINUED | OUTPATIENT
Start: 2023-03-26 | End: 2023-03-30 | Stop reason: HOSPADM

## 2023-03-26 RX ORDER — LACTOBACILLUS RHAMNOSUS GG 10B CELL
1 CAPSULE ORAL
Status: DISCONTINUED | OUTPATIENT
Start: 2023-03-27 | End: 2023-03-30 | Stop reason: HOSPADM

## 2023-03-26 RX ADMIN — IOPAMIDOL 80 ML: 755 INJECTION, SOLUTION INTRAVENOUS at 16:26

## 2023-03-26 RX ADMIN — RIVASTIGMINE TARTRATE 3 MG: 1.5 CAPSULE ORAL at 10:44

## 2023-03-26 RX ADMIN — SERTRALINE HYDROCHLORIDE 50 MG: 50 TABLET ORAL at 10:43

## 2023-03-26 RX ADMIN — APIXABAN 5 MG: 5 TABLET, FILM COATED ORAL at 10:43

## 2023-03-26 RX ADMIN — BENZONATATE 100 MG: 100 CAPSULE ORAL at 13:11

## 2023-03-26 RX ADMIN — METOPROLOL TARTRATE 12.5 MG: 25 TABLET, FILM COATED ORAL at 10:43

## 2023-03-26 RX ADMIN — ACETAMINOPHEN 650 MG: 325 TABLET ORAL at 02:44

## 2023-03-26 RX ADMIN — LEVOTHYROXINE SODIUM 125 MCG: 0.12 TABLET ORAL at 06:11

## 2023-03-26 RX ADMIN — Medication 1000 UNITS: at 10:44

## 2023-03-26 RX ADMIN — PANTOPRAZOLE SODIUM 40 MG: 40 TABLET, DELAYED RELEASE ORAL at 06:11

## 2023-03-26 RX ADMIN — Medication 400 MG: at 10:43

## 2023-03-26 RX ADMIN — Medication 400 UNITS: at 10:47

## 2023-03-26 RX ADMIN — SODIUM CHLORIDE, PRESERVATIVE FREE 10 ML: 5 INJECTION INTRAVENOUS at 10:45

## 2023-03-26 RX ADMIN — APIXABAN 5 MG: 5 TABLET, FILM COATED ORAL at 20:55

## 2023-03-26 RX ADMIN — SODIUM CHLORIDE, PRESERVATIVE FREE 10 ML: 5 INJECTION INTRAVENOUS at 20:56

## 2023-03-26 RX ADMIN — METOPROLOL TARTRATE 12.5 MG: 25 TABLET, FILM COATED ORAL at 20:55

## 2023-03-26 RX ADMIN — MEMANTINE 10 MG: 10 TABLET ORAL at 10:44

## 2023-03-26 RX ADMIN — MEMANTINE 10 MG: 10 TABLET ORAL at 20:55

## 2023-03-26 RX ADMIN — LOPERAMIDE HYDROCHLORIDE 2 MG: 2 CAPSULE ORAL at 15:43

## 2023-03-26 RX ADMIN — DIGOXIN 125 MCG: 125 TABLET ORAL at 10:43

## 2023-03-26 RX ADMIN — ATORVASTATIN CALCIUM 20 MG: 10 TABLET, FILM COATED ORAL at 10:44

## 2023-03-26 RX ADMIN — SODIUM CHLORIDE: 9 INJECTION, SOLUTION INTRAVENOUS at 06:02

## 2023-03-26 ASSESSMENT — PAIN SCALES - GENERAL
PAINLEVEL_OUTOF10: 3
PAINLEVEL_OUTOF10: 0

## 2023-03-26 ASSESSMENT — ENCOUNTER SYMPTOMS
BLOOD IN STOOL: 0
COUGH: 0
VOMITING: 0
DIARRHEA: 0
NAUSEA: 0
CHOKING: 0
ABDOMINAL DISTENTION: 0
CHEST TIGHTNESS: 0
WHEEZING: 0
SHORTNESS OF BREATH: 0
ABDOMINAL PAIN: 0
BACK PAIN: 0
CONSTIPATION: 0
SORE THROAT: 1
EYE PAIN: 0
STRIDOR: 0

## 2023-03-26 ASSESSMENT — PAIN DESCRIPTION - DESCRIPTORS: DESCRIPTORS: ACHING;DISCOMFORT

## 2023-03-26 ASSESSMENT — PAIN DESCRIPTION - LOCATION: LOCATION: BACK

## 2023-03-26 ASSESSMENT — PAIN SCALES - WONG BAKER: WONGBAKER_NUMERICALRESPONSE: 0

## 2023-03-26 ASSESSMENT — PAIN DESCRIPTION - ORIENTATION: ORIENTATION: LEFT

## 2023-03-26 NOTE — CONSULTS
[facility], 1935, 3012/[id number], 3/26/2023    History  [place]:  There were no encounter diagnoses. Patient  has a past medical history of Acquired hypothyroidism, Chronic midline low back pain without sciatica, Dementia without behavioral disturbance ([address] Gait disturbance, History of TIA (transient ischemic attack), Major depressive disorder with single episode, in full remission ([address] Mixed hyperlipidemia, Persistent atrial fibrillation ([address] PFO (patent foramen ovale), Pneumonia due to infectious organism, and Sepsis ([address] Patient  has a past surgical history that includes Hysterectomy. Subjective:  Patient states:  \"I am not dumb. I know they are short staffed. \" Pt perseverating on nursing staff being short staffed although RN has told pt many times they are not. Pain:  denies pain at rest.    Communication with other providers:  Handoff to RN  Restrictions: general precautions, fall risk, contact isolation    Home Setup/Prior level of function   Lives With: Alone  Type of Home: Apartment  Home Layout: One level  Home Access: Level entry  Bathroom Shower/Tub: Tub/Shower unit  Bathroom Toilet: Standard  Bathroom Equipment: Hand-held shower,Grab bars in shower,Tub transfer bench  Home Equipment: [address] bed (pt and daughter report the 4 [address] gets away from her and she is not supposed to use it.  She reports she uses it to get to the mailbox.)  Receives Help From: Family  ADL Assistance: Independent  Homemaking Assistance: Needs assistance  Homemaking Responsibilities: Yes  Meal Prep Responsibility: Secondary (pt reports she gets meals on wheels and her daughters get her tea and toast and small things)  Laundry Responsibility: No (pt reports she has an aide daily)  Cleaning Responsibility: No (pt reports she has an aide daily)  Shopping Responsibility: No (pt daughter or performing bed mobility max A, sitting balance mod A, and unable to transfer or ambulate at this time. Pt is presenting with decreased endurance, impaired transfers, impaired gait, decreased strength, impaired cognition, impaired balance, and impaired bed mobility. Pt would benefit from continued acute care PT as well as SNF placement upon discharge to continue to address impairments. Complexity: moderate    Prognosis: Good, no significant barriers to participation at this time.      General Plan: 3-5 times per week/week     Equipment: TBD at next level fo care    Goals:  Short Term Goals  Time Frame for Short Term Goals: 1 week  Short Term Goal 1: Pt to complete all bed mobility min A  Short Term Goal 2: pt to sit EOB 10 minutes SBA with no LOB  Short Term Goal 3: pt to complete STS transfers to/from bed, commode, and chair CGA  Short Term Goal 4: pt to ambulate 25' with LRAD min A       Treatment plan:  Bed mobility, transfers, balance, gait, TA, TX    Recommendations for NURSING mobility: DANNY    Time:   Time in: 1318  Time out: 1354  Timed treatment minutes: 25  Total time: 36    Electronically signed by:    Jorge Manley PT  3/26/2023, 2:41 PM

## 2023-03-26 NOTE — PROGRESS NOTES
Vitamin D  1 tablet Oral Daily    digoxin  125 mcg Oral Daily    memantine  10 mg Oral BID    pantoprazole  40 mg Oral QAM AC    rivastigmine  3 mg Oral Daily    sertraline  50 mg Oral Daily    atorvastatin  20 mg Oral Daily    vitamin E  400 Units Oral Daily    sodium chloride flush  5-40 mL IntraVENous 2 times per day    metoprolol tartrate  12.5 mg Oral BID    levothyroxine  125 mcg Oral QAM AC    magnesium oxide  400 mg Oral Daily         Labs and Imaging Studies   Laboratory findings:  XR HAND LEFT (MIN 3 VIEWS)    Result Date: 3/24/2023  EXAMINATION: THREE XRAY VIEWS OF THE LEFT HAND 3/24/2023 4:45 pm COMPARISON: None. HISTORY: ORDERING SYSTEM PROVIDED HISTORY: trauma TECHNOLOGIST PROVIDED HISTORY: Reason for exam:->trauma Reason for Exam: trauma Additional signs and symptoms: trauma Relevant Medical/Surgical History: trauma FINDINGS: Bones are diffusely demineralized. There is no evidence of an acute fracture or dislocation. There are no significant degenerative changes, unusual for a patient of this age. There is ulnar carpal joint chondrocalcinosis. No evidence of an acute fracture or dislocation. Ulnar carpal joint chondrocalcinosis. CT Head W/O Contrast    Result Date: 3/24/2023  EXAMINATION: CT OF THE HEAD WITHOUT CONTRAST  3/24/2023 3:12 pm TECHNIQUE: CT of the head was performed without the administration of intravenous contrast. Automated exposure control, iterative reconstruction, and/or weight based adjustment of the mA/kV was utilized to reduce the radiation dose to as low as reasonably achievable. COMPARISON: None. HISTORY: ORDERING SYSTEM PROVIDED HISTORY: fall, head injury TECHNOLOGIST PROVIDED HISTORY: Reason for exam:->fall, head injury Has a \"code stroke\" or \"stroke alert\" been called? ->No Decision Support Exception - unselect if not a suspected or confirmed emergency medical condition->Emergency Medical Condition (MA) FINDINGS: BRAIN/VENTRICLES: There is no acute intracranial Collection Time: 03/26/23  3:22 AM   Result Value Ref Range    Hemoglobin A1C 5.8 4.2 - 6.3 %    eAG 120 mg/dL           Electronically signed by Lilliana Virk MD on 3/26/2023 at 9:39 AM

## 2023-03-26 NOTE — CONSULTS
Neurology Service Consult Note  [address]   Patient Name: Alma Leon  : 1935        Subjective:   Reason for consult:   81 y.o. -handed [facility] [address] with a known history of hypothyroid, sciatica, dementia, TIA, depression, HLD, A-fib, PFO, sepsis. Reports to ED for fall due to increased dizziness with head injury. Presents to ED with dizziness that started approximately 3/23/2023 prior to going to bed. Patient reports she was lying down when dizziness started, denies any blurry vision but did report nausea. Does not appreciate dizziness worsening with turning head or changing position. Subsequently she sustained a fall hitting her head on the sink followed by weakness with inability to stand, she denies any loss of consciousness. She does live at home alone. She is on Eliquis for her A-fib. NIH stroke scale was 0 on admission. CT head was negative for any acute abnormalities. She was found to be slightly hypotensive on admission blood pressure 114/46. Labs including pneumonia, B12, folate, TSH all unremarkable. Patient is able to tell us today that she has had history of falls. She does hold onto the walls as well as furniture at home whenever she is walking, uses a walker. She has been on Eliquis however she states that she is not sure if she has been taking it. She reports that she lives alone currently she denies any new focal or lateralized weakness. We did discuss that her MRI shows a tiny area of stroke, not likely contributing to her symptoms and not likely contributing to her falls. She denies any previous history of peripheral neuropathy, or diabetes. Past Medical History:   Diagnosis Date    Acquired hypothyroidism 2019    Patient is taking Synthroid    Chronic midline low back pain without sciatica 2019    Dementia without behavioral disturbance ([facility] 10/22/2019    Patient has dementia.   She is 161 lb 9.6 oz (73.3 kg)   08/31/22 171 lb (77.6 kg)     Temp Readings from Last 3 Encounters:   03/26/23 99 °F (37.2 °C) (Oral)   03/25/23 100 °F (37.8 °C) (Oral)   03/09/23 97.4 °F (36.3 °C) (Temporal)     BP Readings from Last 3 Encounters:   03/26/23 (!) 119/49   03/25/23 (!) 113/55   03/09/23 112/64     Pulse Readings from Last 3 Encounters:   03/25/23 77   03/25/23 79   03/09/23 72        Gen: A&O to person, place, incorrect on date, NAD, cooperative  HEENT: Left upper eyebrow laceration, EOMI, PERRL, mmm, no carotid bruits, neck supple, no meningeal signs; Heart: RRR, S1S2  Lungs: CTAB  Ext: no edema, no calf tenderness b/l  Psych: normal mood and affect  Skin: no rashes or lesions    NEUROLOGIC EXAM:    Mental Status: A&O to person, place, incorrect on date, NAD, speech clear, language fluent, repetition and naming intact, follows commands appropriately    Cranial Nerve Exam:   CN II-XII: PERRL, VFF, no nystagmus, no gaze paresis, sensation V1-V3 intact b/l, muscles of facial expression symmetric; very hard of hearing, palate elevates symmetrically, shoulder elevation symmetric and tongue protrudes midline with movement side to side.     Motor Exam:       Strength 5/5 UE's/LE's b/l  Tone and bulk normal   No pronator drift    Deep Tendon Reflexes: 1/4 biceps, triceps, brachioradialis, patellar, and 0/4 achilles b/l; flexor plantar responses b/l    Sensation: Moderate to severe reduction of vibratory sensation distal lower extremities improving proximally, stocking distribution sensation loss to pin    Coordination/Cerebellum:       Tremors--none      Finger-to-Nose: no dysmetria b/l    Gait and stance:      Gait: deferred      LABS:     Recent Labs     03/24/23  1500 03/25/23  0457 03/26/23  0322   WBC 6.5 7.3 5.9     --  139   K 4.1  --  4.2     --  105   CO2 25  --  23   BUN 13  --  15   CREATININE 0.8  --  0.8   GLUCOSE 117*  --  98   INR 1.45 1.61  --    KYLE@

## 2023-03-27 LAB
ANION GAP SERPL CALCULATED.3IONS-SCNC: 8 MMOL/L (ref 4–16)
BASOPHILS ABSOLUTE: 0 K/CU MM
BASOPHILS RELATIVE PERCENT: 0.3 % (ref 0–1)
BUN SERPL-MCNC: 11 MG/DL (ref 6–23)
CALCIUM SERPL-MCNC: 7.8 MG/DL (ref 8.3–10.6)
CHLORIDE BLD-SCNC: 105 MMOL/L (ref 99–110)
CO2: 25 MMOL/L (ref 21–32)
CREAT SERPL-MCNC: 0.8 MG/DL (ref 0.6–1.1)
DIFFERENTIAL TYPE: ABNORMAL
EKG ATRIAL RATE: 104 BPM
EKG DIAGNOSIS: NORMAL
EKG DIAGNOSIS: NORMAL
EKG Q-T INTERVAL: 418 MS
EKG Q-T INTERVAL: 434 MS
EKG QRS DURATION: 128 MS
EKG QRS DURATION: 132 MS
EKG QTC CALCULATION (BAZETT): 460 MS
EKG QTC CALCULATION (BAZETT): 471 MS
EKG R AXIS: 48 DEGREES
EKG R AXIS: 72 DEGREES
EKG T AXIS: -20 DEGREES
EKG T AXIS: -20 DEGREES
EKG VENTRICULAR RATE: 71 BPM
EKG VENTRICULAR RATE: 73 BPM
EOSINOPHILS ABSOLUTE: 0.1 K/CU MM
EOSINOPHILS RELATIVE PERCENT: 1 % (ref 0–3)
GFR SERPL CREATININE-BSD FRML MDRD: >60 ML/MIN/1.73M2
GLUCOSE SERPL-MCNC: 84 MG/DL (ref 70–99)
HCT VFR BLD CALC: 39.2 % (ref 37–47)
HEMOGLOBIN: 12.7 GM/DL (ref 12.5–16)
IMMATURE NEUTROPHIL %: 0.3 % (ref 0–0.43)
LV EF: 55 %
LVEF MODALITY: NORMAL
LYMPHOCYTES ABSOLUTE: 3.1 K/CU MM
LYMPHOCYTES RELATIVE PERCENT: 50.7 % (ref 24–44)
MAGNESIUM: 1.8 MG/DL (ref 1.8–2.4)
MCH RBC QN AUTO: 31.4 PG (ref 27–31)
MCHC RBC AUTO-ENTMCNC: 32.4 % (ref 32–36)
MCV RBC AUTO: 96.8 FL (ref 78–100)
MONOCYTES ABSOLUTE: 0.7 K/CU MM
MONOCYTES RELATIVE PERCENT: 11.3 % (ref 0–4)
NUCLEATED RBC %: 0 %
PDW BLD-RTO: 13.3 % (ref 11.7–14.9)
PHOSPHORUS: 3.2 MG/DL (ref 2.5–4.9)
PLATELET # BLD: 137 K/CU MM (ref 140–440)
PMV BLD AUTO: 10.1 FL (ref 7.5–11.1)
POTASSIUM SERPL-SCNC: 3.9 MMOL/L (ref 3.5–5.1)
RBC # BLD: 4.05 M/CU MM (ref 4.2–5.4)
SEGMENTED NEUTROPHILS ABSOLUTE COUNT: 2.2 K/CU MM
SEGMENTED NEUTROPHILS RELATIVE PERCENT: 36.4 % (ref 36–66)
SODIUM BLD-SCNC: 138 MMOL/L (ref 135–145)
TOTAL IMMATURE NEUTOROPHIL: 0.02 K/CU MM
TOTAL NUCLEATED RBC: 0 K/CU MM
WBC # BLD: 6.1 K/CU MM (ref 4–10.5)

## 2023-03-27 PROCEDURE — 6370000000 HC RX 637 (ALT 250 FOR IP): Performed by: NURSE PRACTITIONER

## 2023-03-27 PROCEDURE — 93010 ELECTROCARDIOGRAM REPORT: CPT | Performed by: INTERNAL MEDICINE

## 2023-03-27 PROCEDURE — 93306 TTE W/DOPPLER COMPLETE: CPT

## 2023-03-27 PROCEDURE — G0378 HOSPITAL OBSERVATION PER HR: HCPCS

## 2023-03-27 PROCEDURE — 6370000000 HC RX 637 (ALT 250 FOR IP): Performed by: INTERNAL MEDICINE

## 2023-03-27 PROCEDURE — 80048 BASIC METABOLIC PNL TOTAL CA: CPT

## 2023-03-27 PROCEDURE — 2580000003 HC RX 258: Performed by: NURSE PRACTITIONER

## 2023-03-27 PROCEDURE — 83735 ASSAY OF MAGNESIUM: CPT

## 2023-03-27 PROCEDURE — 1200000000 HC SEMI PRIVATE

## 2023-03-27 PROCEDURE — 97166 OT EVAL MOD COMPLEX 45 MIN: CPT

## 2023-03-27 PROCEDURE — 85025 COMPLETE CBC W/AUTO DIFF WBC: CPT

## 2023-03-27 PROCEDURE — 84100 ASSAY OF PHOSPHORUS: CPT

## 2023-03-27 PROCEDURE — 36415 COLL VENOUS BLD VENIPUNCTURE: CPT

## 2023-03-27 PROCEDURE — 94761 N-INVAS EAR/PLS OXIMETRY MLT: CPT

## 2023-03-27 PROCEDURE — 92610 EVALUATE SWALLOWING FUNCTION: CPT | Performed by: SPEECH-LANGUAGE PATHOLOGIST

## 2023-03-27 RX ADMIN — Medication 400 UNITS: at 11:10

## 2023-03-27 RX ADMIN — DIGOXIN 125 MCG: 125 TABLET ORAL at 11:11

## 2023-03-27 RX ADMIN — MEMANTINE 10 MG: 10 TABLET ORAL at 11:11

## 2023-03-27 RX ADMIN — APIXABAN 5 MG: 5 TABLET, FILM COATED ORAL at 21:42

## 2023-03-27 RX ADMIN — APIXABAN 5 MG: 5 TABLET, FILM COATED ORAL at 11:11

## 2023-03-27 RX ADMIN — Medication 1000 UNITS: at 11:11

## 2023-03-27 RX ADMIN — METOPROLOL TARTRATE 12.5 MG: 25 TABLET, FILM COATED ORAL at 21:42

## 2023-03-27 RX ADMIN — Medication 400 MG: at 11:11

## 2023-03-27 RX ADMIN — ATORVASTATIN CALCIUM 20 MG: 10 TABLET, FILM COATED ORAL at 11:11

## 2023-03-27 RX ADMIN — SERTRALINE HYDROCHLORIDE 50 MG: 50 TABLET ORAL at 11:11

## 2023-03-27 RX ADMIN — RIVASTIGMINE TARTRATE 3 MG: 1.5 CAPSULE ORAL at 11:10

## 2023-03-27 RX ADMIN — MEMANTINE 10 MG: 10 TABLET ORAL at 21:42

## 2023-03-27 RX ADMIN — SODIUM CHLORIDE, PRESERVATIVE FREE 10 ML: 5 INJECTION INTRAVENOUS at 11:12

## 2023-03-27 RX ADMIN — Medication 1 CAPSULE: at 11:15

## 2023-03-27 RX ADMIN — LEVOTHYROXINE SODIUM 125 MCG: 0.12 TABLET ORAL at 05:55

## 2023-03-27 RX ADMIN — PANTOPRAZOLE SODIUM 40 MG: 40 TABLET, DELAYED RELEASE ORAL at 05:55

## 2023-03-27 RX ADMIN — SODIUM CHLORIDE, PRESERVATIVE FREE 10 ML: 5 INJECTION INTRAVENOUS at 21:43

## 2023-03-27 ASSESSMENT — ENCOUNTER SYMPTOMS
VOMITING: 0
SORE THROAT: 1
CHEST TIGHTNESS: 0
WHEEZING: 0
BLOOD IN STOOL: 0
ABDOMINAL DISTENTION: 0
SHORTNESS OF BREATH: 0
BACK PAIN: 0
CONSTIPATION: 0
EYE PAIN: 0
STRIDOR: 0
DIARRHEA: 0
COUGH: 0
CHOKING: 0
NAUSEA: 0
ABDOMINAL PAIN: 0

## 2023-03-27 ASSESSMENT — PAIN SCALES - GENERAL
PAINLEVEL_OUTOF10: 0
PAINLEVEL_OUTOF10: 0

## 2023-03-27 NOTE — PROGRESS NOTES
for all 3 meals and up to [facility] for all toileting needs    Time:   Time in: 0850  Time out: 0905  Timed treatment minutes: 0 minutes  Total time: 15 minutes    Electronically signed by:    Shankar MORENO/L [id number]  9:13 AM,3/27/2023

## 2023-03-27 NOTE — CARE COORDINATION
Case Management Assessment  Initial Evaluation    Date/Time of Evaluation: 3/27/2023 12:37 PM  Assessment Completed by: Debra Gaxiola    If patient is discharged prior to next notation, then this note serves as note for discharge by case management. Patient Name: Tricia Schmidt                   YOB: 1935  Diagnosis: Acute CVA (cerebrovascular accident) [facility]) [I63.9]  Fall at home, initial encounter [W19. Dayday Caputo, Y26.929]                   Date / Time: 3/25/2023  6:41 PM    Patient Admission Status: Observation   Readmission Risk (Low < 19, Mod (19-27), High > 27): Readmission Risk Score: 17.3    Current PCP: Norbert Clemons MD  PCP verified by CM? Yes    Chart Reviewed: Yes      History Provided by: Patient, Medical Record  Patient Orientation: Alert and Oriented    Patient Cognition: Alert    Hospitalization in the last 30 days (Readmission):  No    If yes, Readmission Assessment in  Navigator will be completed. Advance Directives:      Code Status: DNR-CCA   Patient's Primary Decision Maker is: Legal Next of Kin    Primary Decision Maker: Shantal Hadley - Child - [phone]    Secondary Decision Maker: JOSE MELGAR Child - [phone]    Secondary Decision Maker: Mikal Vora Child - [phone]    Discharge Planning:    Patient lives with:   Type of Home:    Primary Care Giver: Self  Patient Support Systems include: Children, Family Members   Current Financial resources: Medicare  Current community resources: None  Current services prior to admission:              Current DME:              Type of Home Care services:       ADLS  Prior functional level: Assistance with the following:  Current functional level: Assistance with the following:    PT AM-PAC: 8 /24  OT AM-PAC: 14 /24    Family can provide assistance at DC: Other (comment) (unknown)  Would you like Case Management to discuss the discharge plan with any other family members/significant others, and if so, who?  Yes (daughters)  Plans to Return to Present Housing: No  Other Identified Issues/Barriers to RETURNING to current housing: none at this time  Potential Assistance needed at discharge:              Potential DME:    Patient expects to discharge to:    Plan for transportation at discharge:      Financial    Payor: MEDICARE / Plan: MEDICARE PART A AND B / Product Type: *No Product type* /     Does insurance require precert for SNF: No    Potential assistance Purchasing Medications:    Meds-to-Beds request: No      [facility] - [place], [place] - [address] [phone]  [address]  [facility] [address]  Phone: [phone] Fax: [phone]      Notes:    Factors facilitating achievement of predicted outcomes: Family support    Barriers to discharge: Decreased endurance    Additional Case Management Notes: Chart reviewed and pt seen in room. Pt plan is SNF. Pt and family are reviewing SNF list to make decision. CM will follow up with them about SNF decision. The Plan for Transition of Care is related to the following treatment goals of Acute CVA (cerebrovascular accident) [facility]) [I63.9]  Fall at home, initial encounter [W19. Caridad Alcaraz, M68.224]    IF APPLICABLE: The Patient and/or patient representative Concha Garcia and her family were provided with a choice of provider and agrees with the discharge plan.  Freedom of choice list with basic dialogue that supports the patient's individualized plan of care/goals and shares the quality data associated with the providers was provided to: Concha Garcia and her family    The Patient and/or Patient Representative Agree with the Discharge Plan?  yes    Tawana Left  Case Management Department  Ph: [phone] Fax: [phone]

## 2023-03-27 NOTE — PROGRESS NOTES
identified. ONSET DATE: 3/25/2023     Recent Chest Xray/CT of Chest:  see chart, does not appear to be an acute process per/report    Date of Eval: 3/27/2023  Evaluating Therapist: [facility]    Current Diet level:  Current Diet : Regular    Primary Complaint  Patient Complaint: c/o cannot chew hard solids    Pain:  Pain Assessment  Pain Assessment: None - Denies Pain  Pain Level: 0  Espinoza-Baker Pain Rating: No hurt  Pain Location: Back  Pain Orientation: Left  Pain Descriptors: Aching, Discomfort  Response to Pain Intervention: Patient satisfied    Reason for Referral  Shavonne Olivares was referred for a bedside swallow evaluation to assess the efficiency of her swallow function, identify signs and symptoms of aspiration and make recommendations regarding safe dietary consistencies, effective compensatory strategies, and safe eating environment. Treatment Plan  Requires SLP Intervention: No  Duration of Treatment: n/a  D/C Recommendations: No follow up therapy recommended post discharge     Recommended Diet and Intervention  Recommended Form of Meds: PO  Recommendations: Assistance with meals     Compensatory Swallowing Strategies  Compensatory Swallowing Strategies : Upright as possible for all oral intake    Treatment/Goals  Short-term Goals  Timeframe for Short-term Goals: n/a  Long-term Goals  Timeframe for Long-term Goals: n/a    General  Chart Reviewed: Yes  Subjective  Subjective: irritable  Behavior/Cognition: Alert; Cooperative  Respiratory Status: Room air  Communication Observation: Functional  Follows Directions: Complex  Dentition: Some missing teeth  Patient Positioning: Upright in chair  Baseline Vocal Quality: Normal  Volitional Cough: Strong  Prior Dysphagia History: h/o esophageal dilatation  Consistencies Administered: Regular;Pureed; Thin - straw; Thin - cup    Vision/Hearing  Vision  Vision: Within Functional Limits (for participation)  Hearing  Hearing: Exceptions to

## 2023-03-27 NOTE — CARE COORDINATION
Pt and family made a decision of [facility]. Referral has been made to [place] [facility]. Pt has slightly dementia and is changing her mind frequently and is being uncooperative at times in conversation. Keep pt's daughters in the loop on pt's care. Pt's Daughter Jessica Guan is the POA. Pt has a  through [employer] named Kiara Doyle [phone]. Pt has passport services. Discharge plan is to [facility].

## 2023-03-27 NOTE — PROGRESS NOTES
Brief neurology note:  Consult has been placed for outpatient follow-up with Dr. Yuly Espinoza, neuro intervention for CTA findings of right ICA stenosis. No further recommendations.   Marylou Garg, APRN - CNP  Neurology  03/27/23

## 2023-03-27 NOTE — PROGRESS NOTES
possibly need intervention for stenosis - Neuro to see if patient can be referred to Dr. Brodie Jackson - sent perfectserve     Near syncope and collapse with mild Closed head injury with Lt. Scalp laceration 2/2 Weakness and Peripheral neuropathy   Patient denies lightheadedness or dizziness or palpitations prior to fall  Fall precautions  Does have sensory deficits B/L LE     Febrile episode  101.7 at [facility]   Blood cultures 1/4 bottle +ve for streptococcus - likely a contaminant   C diff -ve  No focus of infection so far  Procalcitonin -ve  Watch off antibiotics for now     Persistent atrial fibrillation  On digoxin and metoprolol (with parameters), continue  Chads Vasc- 3  On Eliquis 5mg BID     History of hypothyroidism  On Synthroid, continued     Dementia  On Namenda and Exelon, continue     Depression  On Zoloft, continue    Chronic Diarrhea   Ongoing for 1 year. On and off  GI panel -ve  C diff -ve  Continue probiotics   Loperamide PRN    Sorethroat  Ongoing for 3 weeks. It is getting better but still present   Tessalon pearls       # Peptic ulcer prophylaxis: Pantoprazole   # DVT Prophylaxis: apixaban       Current living situation: Home  Expected Disposition: SNF  Estimated discharge date: Ready for discharge to SNF. Personally reviewed Lab Studies and Imaging     Discussed with CM - CM to help with choices for SNF. Discussed with Neuro - Dr. Joey Bar - she will see if pt. Will likely need to be seen by Dr. Brodie Jackson as outpatient for her Rt ICA stenosis - Defer to neurology    Review of System     Review of Systems   Constitutional:  Negative for chills, fatigue, fever and unexpected weight change. HENT:  Positive for sore throat. Hard of hearing   Eyes:  Negative for pain and visual disturbance. Respiratory:  Negative for cough, choking, chest tightness, shortness of breath, wheezing and stridor. Cardiovascular:  Negative for chest pain, palpitations and leg swelling.    Gastrointestinal:  Negative acute sinusitis in the appropriate clinical setting. CT CSpine W/O Contrast    Result Date: 3/24/2023  EXAMINATION: CT OF THE CERVICAL SPINE WITHOUT CONTRAST 3/24/2023 3:13 pm TECHNIQUE: CT of the cervical spine was performed without the administration of intravenous contrast. Multiplanar reformatted images are provided for review. Automated exposure control, iterative reconstruction, and/or weight based adjustment of the mA/kV was utilized to reduce the radiation dose to as low as reasonably achievable. COMPARISON: None. HISTORY: ORDERING SYSTEM PROVIDED HISTORY: fall TECHNOLOGIST PROVIDED HISTORY: Reason for exam:->fall Decision Support Exception - unselect if not a suspected or confirmed emergency medical condition->Emergency Medical Condition (MA) FINDINGS: BONES/ALIGNMENT: Cervical vertebral body heights and alignment are normal. Facet joints are normally aligned. There is no acute fracture or traumatic malalignment. DEGENERATIVE CHANGES: There is degenerative disc disease with disc space narrowing and spondylosis at all levels of the cervical spine. SOFT TISSUES: There is no prevertebral soft tissue swelling. Bulky calcified plaque bilateral carotid bifurcations. Multilevel degenerative changes with no acute fracture or traumatic malalignment. Bulky calcified plaque in the bilateral carotid bifurcations. XR CHEST PORTABLE    Result Date: 3/25/2023  EXAMINATION: ONE XRAY VIEW OF THE CHEST 3/25/2023 8:15 pm COMPARISON: Chest x-ray March 24, 2023; chest radiograph April 13, 2022. HISTORY: ORDERING SYSTEM PROVIDED HISTORY: fever, elevated BNP TECHNOLOGIST PROVIDED HISTORY: Reason for exam:->fever, elevated BNP Reason for Exam: fever, elevated BNP Additional signs and symptoms: Acute CVA (cerebrovascular accident) FINDINGS: Cardiac silhouette appears grossly stable in appearance. Redemonstration of chronic interstitial changes of the lungs similar to previous exams. No definite superimposed pulmonary

## 2023-03-28 LAB
ANION GAP SERPL CALCULATED.3IONS-SCNC: 11 MMOL/L (ref 4–16)
BASOPHILS ABSOLUTE: 0 K/CU MM
BASOPHILS RELATIVE PERCENT: 0.3 % (ref 0–1)
BUN SERPL-MCNC: 10 MG/DL (ref 6–23)
CALCIUM SERPL-MCNC: 8.1 MG/DL (ref 8.3–10.6)
CHLORIDE BLD-SCNC: 105 MMOL/L (ref 99–110)
CO2: 25 MMOL/L (ref 21–32)
CREAT SERPL-MCNC: 0.6 MG/DL (ref 0.6–1.1)
DIFFERENTIAL TYPE: ABNORMAL
EOSINOPHILS ABSOLUTE: 0.2 K/CU MM
EOSINOPHILS RELATIVE PERCENT: 3 % (ref 0–3)
GFR SERPL CREATININE-BSD FRML MDRD: >60 ML/MIN/1.73M2
GLUCOSE SERPL-MCNC: 84 MG/DL (ref 70–99)
HCT VFR BLD CALC: 39.4 % (ref 37–47)
HEMOGLOBIN: 13.2 GM/DL (ref 12.5–16)
IMMATURE NEUTROPHIL %: 0.3 % (ref 0–0.43)
LYMPHOCYTES ABSOLUTE: 3 K/CU MM
LYMPHOCYTES RELATIVE PERCENT: 52.7 % (ref 24–44)
MAGNESIUM: 1.7 MG/DL (ref 1.8–2.4)
MCH RBC QN AUTO: 31.5 PG (ref 27–31)
MCHC RBC AUTO-ENTMCNC: 33.5 % (ref 32–36)
MCV RBC AUTO: 94 FL (ref 78–100)
MONOCYTES ABSOLUTE: 0.5 K/CU MM
MONOCYTES RELATIVE PERCENT: 9.2 % (ref 0–4)
NUCLEATED RBC %: 0 %
PDW BLD-RTO: 13.1 % (ref 11.7–14.9)
PHOSPHORUS: 3 MG/DL (ref 2.5–4.9)
PLATELET # BLD: 141 K/CU MM (ref 140–440)
PMV BLD AUTO: 9.7 FL (ref 7.5–11.1)
POTASSIUM SERPL-SCNC: 3.6 MMOL/L (ref 3.5–5.1)
RBC # BLD: 4.19 M/CU MM (ref 4.2–5.4)
SEGMENTED NEUTROPHILS ABSOLUTE COUNT: 2 K/CU MM
SEGMENTED NEUTROPHILS RELATIVE PERCENT: 34.5 % (ref 36–66)
SODIUM BLD-SCNC: 141 MMOL/L (ref 135–145)
TOTAL IMMATURE NEUTOROPHIL: 0.02 K/CU MM
TOTAL NUCLEATED RBC: 0 K/CU MM
WBC # BLD: 5.7 K/CU MM (ref 4–10.5)

## 2023-03-28 PROCEDURE — 36415 COLL VENOUS BLD VENIPUNCTURE: CPT

## 2023-03-28 PROCEDURE — 6370000000 HC RX 637 (ALT 250 FOR IP): Performed by: NURSE PRACTITIONER

## 2023-03-28 PROCEDURE — 6370000000 HC RX 637 (ALT 250 FOR IP): Performed by: INTERNAL MEDICINE

## 2023-03-28 PROCEDURE — 97535 SELF CARE MNGMENT TRAINING: CPT

## 2023-03-28 PROCEDURE — B24BZZ4 ULTRASONOGRAPHY OF HEART WITH AORTA, TRANSESOPHAGEAL: ICD-10-PCS | Performed by: INTERNAL MEDICINE

## 2023-03-28 PROCEDURE — 97116 GAIT TRAINING THERAPY: CPT

## 2023-03-28 PROCEDURE — 1200000000 HC SEMI PRIVATE

## 2023-03-28 PROCEDURE — 84100 ASSAY OF PHOSPHORUS: CPT

## 2023-03-28 PROCEDURE — 83735 ASSAY OF MAGNESIUM: CPT

## 2023-03-28 PROCEDURE — 97530 THERAPEUTIC ACTIVITIES: CPT

## 2023-03-28 PROCEDURE — 85025 COMPLETE CBC W/AUTO DIFF WBC: CPT

## 2023-03-28 PROCEDURE — 94761 N-INVAS EAR/PLS OXIMETRY MLT: CPT

## 2023-03-28 PROCEDURE — G0378 HOSPITAL OBSERVATION PER HR: HCPCS

## 2023-03-28 PROCEDURE — 99211 OFF/OP EST MAY X REQ PHY/QHP: CPT

## 2023-03-28 PROCEDURE — 2580000003 HC RX 258: Performed by: NURSE PRACTITIONER

## 2023-03-28 PROCEDURE — 80048 BASIC METABOLIC PNL TOTAL CA: CPT

## 2023-03-28 RX ORDER — GINSENG 100 MG
CAPSULE ORAL 2 TIMES DAILY
Status: DISCONTINUED | OUTPATIENT
Start: 2023-03-28 | End: 2023-03-30 | Stop reason: HOSPADM

## 2023-03-28 RX ADMIN — PANTOPRAZOLE SODIUM 40 MG: 40 TABLET, DELAYED RELEASE ORAL at 06:35

## 2023-03-28 RX ADMIN — Medication 1000 UNITS: at 10:08

## 2023-03-28 RX ADMIN — METOPROLOL TARTRATE 12.5 MG: 25 TABLET, FILM COATED ORAL at 10:09

## 2023-03-28 RX ADMIN — DIGOXIN 125 MCG: 125 TABLET ORAL at 10:08

## 2023-03-28 RX ADMIN — APIXABAN 5 MG: 5 TABLET, FILM COATED ORAL at 20:28

## 2023-03-28 RX ADMIN — SODIUM CHLORIDE, PRESERVATIVE FREE 10 ML: 5 INJECTION INTRAVENOUS at 10:09

## 2023-03-28 RX ADMIN — LEVOTHYROXINE SODIUM 125 MCG: 0.12 TABLET ORAL at 06:34

## 2023-03-28 RX ADMIN — METOPROLOL TARTRATE 12.5 MG: 25 TABLET, FILM COATED ORAL at 20:28

## 2023-03-28 RX ADMIN — ATORVASTATIN CALCIUM 20 MG: 10 TABLET, FILM COATED ORAL at 10:08

## 2023-03-28 RX ADMIN — RIVASTIGMINE TARTRATE 3 MG: 1.5 CAPSULE ORAL at 10:12

## 2023-03-28 RX ADMIN — SERTRALINE HYDROCHLORIDE 50 MG: 50 TABLET ORAL at 10:09

## 2023-03-28 RX ADMIN — MEMANTINE 10 MG: 10 TABLET ORAL at 20:29

## 2023-03-28 RX ADMIN — MEMANTINE 10 MG: 10 TABLET ORAL at 10:09

## 2023-03-28 RX ADMIN — APIXABAN 5 MG: 5 TABLET, FILM COATED ORAL at 10:08

## 2023-03-28 RX ADMIN — Medication 400 MG: at 10:08

## 2023-03-28 RX ADMIN — SODIUM CHLORIDE, PRESERVATIVE FREE 10 ML: 5 INJECTION INTRAVENOUS at 20:30

## 2023-03-28 RX ADMIN — ACETAMINOPHEN 650 MG: 325 TABLET ORAL at 11:12

## 2023-03-28 RX ADMIN — Medication 400 UNITS: at 10:10

## 2023-03-28 RX ADMIN — Medication 1 CAPSULE: at 10:08

## 2023-03-28 RX ADMIN — ACETAMINOPHEN 650 MG: 325 TABLET ORAL at 20:40

## 2023-03-28 ASSESSMENT — PAIN SCALES - GENERAL
PAINLEVEL_OUTOF10: 3
PAINLEVEL_OUTOF10: 3
PAINLEVEL_OUTOF10: 0

## 2023-03-28 ASSESSMENT — PAIN DESCRIPTION - LOCATION
LOCATION: HEAD
LOCATION: LEG

## 2023-03-28 ASSESSMENT — PAIN DESCRIPTION - DESCRIPTORS
DESCRIPTORS: ACHING
DESCRIPTORS: ACHING

## 2023-03-28 ASSESSMENT — PAIN DESCRIPTION - ORIENTATION: ORIENTATION: RIGHT;LEFT

## 2023-03-28 NOTE — CARE COORDINATION
JEREMÍASW called Jovan Mckinney with [facility] to check on referral given to her yesterday. JEREMÍASW had to leave a message asking for a return call.

## 2023-03-28 NOTE — DISCHARGE INSTR - COC
Isolation/Infection:   Isolation            No Isolation          Patient Infection Status       Infection Onset Added Last Indicated Last Indicated By Review Planned Expiration Resolved Resolved By    None active    Resolved    C-diff Rule Out 03/26/23 03/26/23 03/26/23 Clostridium Difficile Toxin/Antigen (Ordered)   03/26/23 Rule-Out Test Resulted    COVID-19 (Rule Out) 04/11/22 04/11/22 04/11/22 COVID-19, Rapid (Ordered)   04/11/22 Rule-Out Test Resulted            Nurse Assessment:  Last Vital Signs: BP (!) 103/50   Pulse 79   Temp 97.7 °F (36.5 °C) (Oral)   Resp 26   Wt 163 lb 2.3 oz (74 kg)   SpO2 94%   BMI 24.09 kg/m²     Last documented pain score (0-10 scale): Pain Level: 0  Last Weight:   Wt Readings from Last 1 Encounters:   03/28/23 163 lb 2.3 oz (74 kg)     Mental Status:  oriented and alert    IV Access:  - None    Nursing Mobility/ADLs:  Walking   Assisted  Transfer  Assisted  Bathing  Assisted  Dressing  Assisted  Toileting  Assisted  Feeding  Independent  Med Admin  Independent  Med Delivery   whole    Wound Care Documentation and Therapy:        Elimination:  Continence: Bowel: Yes  Bladder: Yes  Urinary Catheter: None   Colostomy/Ileostomy/Ileal Conduit: No       Date of Last BM: 3/29/23    No intake or output data in the 24 hours ending 03/28/23 0912  No intake/output data recorded. Safety Concerns:     History of Falls (last 30 days)    Impairments/Disabilities:      None      Patient's personal belongings (please select all that are sent with patient):  Hearing Aides bilateral, Dentures upper and lower    RN SIGNATURE:  Electronically signed by Deepali Young RN on 3/30/23 at 2:07 PM EDT        PHYSICIAN SECTION    Prognosis: Fair    Condition at Discharge: Stable    Rehab Potential (if transferring to Rehab):  Fair    Recommended Labs or Other Treatments After Discharge: CBC/ CMP/ Mag/ Phos    Nutrition Therapy:  Current Nutrition Therapy:   - Oral Diet:  Regular solids and thin liquids, Cardiac diet- as requested per pt and family    Routes of Feeding: Oral  Liquids: No Restrictions  Daily Fluid Restriction: no  Last Modified Barium Swallow with Video (Video Swallowing Test): not done    Treatments at the Time of Hospital Discharge:   Respiratory Treatments: N/A  Oxygen Therapy:  is not on home oxygen therapy. Ventilator:    - No ventilator support    Rehab Therapies: Physical Therapy, Occupational Therapy, and Speech/Language Therapy  Weight Bearing Status/Restrictions: No weight bearing restrictions  Other Medical Equipment (for information only, NOT a DME order):  wheelchair, crutches, cane and walker. Fall precautions/ PT/OT  Other Treatments: Please c/w Elliquis Bid. Pt needs follow up routine labs after 2 days. Patient needs to follow up w PCP/ Neuro-Surgery Dr Dewey Soulier and Neurology. Physician Certification: I certify the above information and transfer of Beny Bruce  is necessary for the continuing treatment of the diagnosis listed and that she requires [place] for greater 30 days.      Update Admission H&P: No change in H&P    PHYSICIAN SIGNATURE:  Electronically signed by Rosa Castellanos MD on 3/30/23 at 12:40 PM EDT

## 2023-03-28 NOTE — CARE COORDINATION
JEREMÍASW spoke with Eugenio Ambrose with [facility] and they can take pt once medically stable. JEREMÍASW PS the doctor and informed him of this approval.  PASS completed and packet started. Pt will need a rapid COVID on day of discharge. Cm will need KEEGAN completed by RN and doctor. If pt is discharged after hours please complete the following. ... Call report to  [phone]  Fax completed AVS with both KEEGAN on the AVS and any written Rx [phone] . Set up transportation [facility]  and call family.

## 2023-03-28 NOTE — PROGRESS NOTES
Or  acetaminophen, 650 mg, Q6H PRN  meclizine, 12.5 mg, TID PRN        Labs      Recent Results (from the past 24 hour(s))   CBC with Auto Differential    Collection Time: 03/28/23  4:14 AM   Result Value Ref Range    WBC 5.7 4.0 - 10.5 K/CU MM    RBC 4.19 (L) 4.2 - 5.4 M/CU MM    Hemoglobin 13.2 12.5 - 16.0 GM/DL    Hematocrit 39.4 37 - 47 %    MCV 94.0 78 - 100 FL    MCH 31.5 (H) 27 - 31 PG    MCHC 33.5 32.0 - 36.0 %    RDW 13.1 11.7 - 14.9 %    Platelets 805 901 - 772 K/CU MM    MPV 9.7 7.5 - 11.1 FL    Differential Type AUTOMATED DIFFERENTIAL     Segs Relative 34.5 (L) 36 - 66 %    Lymphocytes % 52.7 (H) 24 - 44 %    Monocytes % 9.2 (H) 0 - 4 %    Eosinophils % 3.0 0 - 3 %    Basophils % 0.3 0 - 1 %    Segs Absolute 2.0 K/CU MM    Lymphocytes Absolute 3.0 K/CU MM    Monocytes Absolute 0.5 K/CU MM    Eosinophils Absolute 0.2 K/CU MM    Basophils Absolute 0.0 K/CU MM    Nucleated RBC % 0.0 %    Total Nucleated RBC 0.0 K/CU MM    Total Immature Neutrophil 0.02 K/CU MM    Immature Neutrophil % 0.3 0 - 0.43 %   Basic Metabolic Panel    Collection Time: 03/28/23  4:14 AM   Result Value Ref Range    Sodium 141 135 - 145 MMOL/L    Potassium 3.6 3.5 - 5.1 MMOL/L    Chloride 105 99 - 110 mMol/L    CO2 25 21 - 32 MMOL/L    Anion Gap 11 4 - 16    BUN 10 6 - 23 MG/DL    Creatinine 0.6 0.6 - 1.1 MG/DL    Est, Glom Filt Rate >60 >60 mL/min/1.73m2    Glucose 84 70 - 99 MG/DL    Calcium 8.1 (L) 8.3 - 10.6 MG/DL   Magnesium    Collection Time: 03/28/23  4:14 AM   Result Value Ref Range    Magnesium 1.7 (L) 1.8 - 2.4 mg/dl   Phosphorus    Collection Time: 03/28/23  4:14 AM   Result Value Ref Range    Phosphorus 3.0 2.5 - 4.9 MG/DL        Imaging/Diagnostics Last 24 Hours   CT HEAD WO CONTRAST    Result Date: 3/26/2023  EXAMINATION: CT OF THE HEAD WITHOUT CONTRAST; CTA OF THE HEAD AND NECK WITH CONTRAST 3/26/2023 4:31 pm; 3/26/2023 4:30 pm: TECHNIQUE: CT of the head was performed without the administration of intravenous contrast. acute abnormality of the salivary and thyroid glands. BONES: No acute osseous abnormality. CTA HEAD: ANTERIOR CIRCULATION: Diffuse intracranial atherosclerosis with luminal irregularity and mild-to-moderate narrowing. No significant stenosis of the intracranial internal carotid, anterior cerebral, or middle cerebral arteries. No aneurysm. POSTERIOR CIRCULATION: Severe short segment stenosis in the left P2 segment. No significant stenosis of the vertebral, basilar, or right posterior cerebral arteries. No aneurysm. OTHER: No dural venous sinus thrombosis on this non-dedicated study. 1. Age related involutional changes with no acute intracranial abnormality. 2. Right maxillary sinus disease. 3. There is 70% stenosis in the proximal right ICA and 50% stenosis in the proximal left ICA. 4. Severe stenosis in the origin of dominant left vertebral artery. 5. Diffuse intracranial atherosclerosis with luminal irregularity and mild-to-moderate narrowing. Severe short segment stenosis is seen in the left P2 segment. 6. Mediastinal lymphadenopathy, nonspecific. CTA HEAD NECK W CONTRAST    Result Date: 3/26/2023  EXAMINATION: CT OF THE HEAD WITHOUT CONTRAST; CTA OF THE HEAD AND NECK WITH CONTRAST 3/26/2023 4:31 pm; 3/26/2023 4:30 pm: TECHNIQUE: CT of the head was performed without the administration of intravenous contrast. Automated exposure control, iterative reconstruction, and/or weight based adjustment of the mA/kV was utilized to reduce the radiation dose to as low as reasonably achievable.; CTA of the head and neck was performed with the administration of intravenous contrast. Multiplanar reformatted images are provided for review. MIP images are provided for review. Stenosis of the internal carotid arteries measured using NASCET criteria.  Automated exposure control, iterative reconstruction, and/or weight based adjustment of the mA/kV was utilized to reduce the radiation dose to as low as reasonably

## 2023-03-28 NOTE — CONSULTS
[address] [id number] Continence Nurse  Consult Note       Jason Mixon  AGE: 81 y.o. GENDER: female  : 1935  TODAY'S DATE:  3/28/2023    Subjective:     Reason for CWOCN Evaluation and Assessment: wound assessment      Jason Mixon is a 81 y.o. female referred by:   [x] Physician  [] Nursing  [] Other:     Wound Identification:  Wound Type: traumatic  Contributing Factors: chronic pressure and decreased mobility        PAST MEDICAL HISTORY        Diagnosis Date    Acquired hypothyroidism 2019    Patient is taking Synthroid    Chronic midline low back pain without sciatica 2019    Dementia without behavioral disturbance ([facility] [address] 10/22/2019    Patient has dementia. She is taking Namenda Patient is seeing urologist Dr. Stephany Rodney    Gait disturbance 10/22/2019    Patient uses walker to ambulate    History of TIA (transient ischemic attack) 2019    Patient has a history of TIA and also recently may have had TIA. Patient is on Eliquis. Sees neurologist Dr. Stephany Rodney    Major depressive disorder with single episode, in full remission ([facility] [address] 2019    Patient is taking Zoloft and that is helping. Mixed hyperlipidemia 2019    Patient is on simvastatin    Persistent atrial fibrillation ([facility] [address] 10/22/2019    A. fib on recently in 2019 . Patient is on metoprolol 25 mg twice a day and Eliquis    PFO (patent foramen ovale) 2019    PFO seen on echocardiogram in 2016    Pneumonia due to infectious organism 10/22/2019    Patient had pneumonia and pleural effusion during admission in 2019.  A repeat CT scan of the chest will be done Patient is also seen pulmonologist    Sepsis ([facility] [address] 2022       PAST SURGICAL HISTORY    Past Surgical History:   Procedure Laterality Date    HYSTERECTOMY (CERVIX STATUS UNKNOWN)         FAMILY HISTORY    Family History   Problem Relation Age of Onset    Alcohol Abuse Mother     Obesity Mother     No Known Problems Father        SOCIAL HISTORY    Social History     Tobacco Use    Smoking status: Never    Smokeless tobacco: Never   Vaping Use    Vaping Use: Never used   Substance Use Topics    Alcohol use: No    Drug use: No       ALLERGIES    No Known Allergies    MEDICATIONS    No current facility-administered medications on file prior to encounter. Current Outpatient Medications on File Prior to Encounter   Medication Sig Dispense Refill    loperamide (IMODIUM) 2 MG capsule Take 2 mg by mouth 4 times daily as needed for Diarrhea      pantoprazole (PROTONIX) 40 MG tablet Take 1 tablet by mouth daily 30 tablet 2    simvastatin (ZOCOR) 40 MG tablet Take 1 tablet by mouth nightly 90 tablet 1    sertraline (ZOLOFT) 50 MG tablet TAKE ONE TABLET BY MOUTH EVERY DAY 90 tablet 1    levothyroxine (SYNTHROID) 150 MCG tablet TAKE ONE TABLET BY MOUTH EVERY DAY 90 tablet 1    memantine (NAMENDA) 10 MG tablet Take 1 tablet by mouth 2 times daily 180 tablet 1    digoxin (LANOXIN) 125 MCG tablet Take 1 tablet by mouth daily 90 tablet 1    apixaban (ELIQUIS) 5 MG TABS tablet TAKE 1 TABLET BY MOUTH 2 TIMES DAILY 180 tablet 1    vitamin E 400 UNIT capsule Take 1 capsule by mouth daily 90 capsule 1    Cholecalciferol (VITAMIN D3) 50 MCG (2000 UT) TABS TAKE ONE TABLET BY MOUTH EVERY DAY 90 tablet 1    metoprolol tartrate (LOPRESSOR) 25 MG tablet TAKE ONE HALF TABLET BY MOUTH TWO TIMES A DAY (Patient taking differently: Take 12.5 mg by mouth 2 times daily) 90 tablet 1    omeprazole (PRILOSEC) 20 MG delayed release capsule Take 1 capsule by mouth every morning (before breakfast) (Patient not taking: No sig reported) 30 capsule 2    nystatin (MYCOSTATIN) 934026 UNIT/GM powder Apply 3 times daily.  1 each 3    diclofenac sodium (VOLTAREN) 1 % GEL Apply 4 g topically 4 times daily 100 g 1    rivastigmine (EXELON) 3 MG capsule Take 1 capsule by mouth daily 90 capsule 1         Objective:      BP (!) 109/59   Pulse 59   Temp 97.6 °F (36.4 °C) (Oral)   Resp 17   Wt 163

## 2023-03-28 NOTE — PLAN OF CARE
Problem: Safety - Adult  Goal: Free from fall injury  3/28/2023 0238 by Beto Parham RN  Outcome: Progressing  3/27/2023 2135 by Gino Llnaes. Betty Msitry RN  Outcome: Progressing     Problem: Discharge Planning  Goal: Discharge to home or other facility with appropriate resources  3/28/2023 0238 by Beto Parham RN  Outcome: Progressing  3/27/2023 2135 by Gino Llanes. Betty Mistry RN  Outcome: Progressing     Problem: Skin/Tissue Integrity  Goal: Absence of new skin breakdown  Description: 1. Monitor for areas of redness and/or skin breakdown  2. Assess vascular access sites hourly  3. Every 4-6 hours minimum:  Change oxygen saturation probe site  4. Every 4-6 hours:  If on nasal continuous positive airway pressure, respiratory therapy assess nares and determine need for appliance change or resting period. 3/28/2023 0238 by Beto Parham RN  Outcome: Progressing  3/27/2023 2135 by Gino Llanes. Betty Mistry RN  Outcome: Progressing     Problem: Chronic Conditions and Co-morbidities  Goal: Patient's chronic conditions and co-morbidity symptoms are monitored and maintained or improved  3/28/2023 0238 by Beto Parham RN  Outcome: Progressing  3/27/2023 2135 by Gino Llanes. Betty Mistry RN  Outcome: Progressing     Problem: ABCDS Injury Assessment  Goal: Absence of physical injury  3/28/2023 0238 by Beto Parham RN  Outcome: Progressing  3/27/2023 2135 by Gino Llanes. Betty Mistry RN  Outcome: Progressing     Problem: Neurosensory - Adult  Goal: Achieves stable or improved neurological status  3/28/2023 0238 by Beto Parham RN  Outcome: Progressing  3/27/2023 2135 by Gino Llanes. Betty Mistry RN  Outcome: Progressing  Goal: Achieves maximal functionality and self care  3/28/2023 0238 by Beto Parham RN  Outcome: Progressing  3/27/2023 2135 by Gino Llanes.  Betty Mistry RN  Outcome: Progressing     Problem: Cardiovascular - Adult  Goal: Maintains optimal cardiac output and hemodynamic stability  3/28/2023 0238 by Beto Parham RN  Outcome: Progressing  3/27/2023 2135 by Mahamed Malin. Glen Garza RN  Outcome: Progressing  Goal: Absence of cardiac dysrhythmias or at baseline  3/28/2023 0238 by Corinna Ruggiero RN  Outcome: Progressing  3/27/2023 2135 by Mahamed Malin. Glen Garza RN  Outcome: Progressing     Problem: Musculoskeletal - Adult  Goal: Return mobility to safest level of function  3/28/2023 0238 by Corinna Ruggiero RN  Outcome: Progressing  3/27/2023 2135 by Mahamed Malin.  Glen Garza RN  Outcome: Progressing

## 2023-03-29 ENCOUNTER — ANESTHESIA (OUTPATIENT)
Dept: NON INVASIVE DIAGNOSTICS | Age: 88
DRG: 068 | End: 2023-03-29
Payer: MEDICARE

## 2023-03-29 ENCOUNTER — ANESTHESIA EVENT (OUTPATIENT)
Dept: NON INVASIVE DIAGNOSTICS | Age: 88
DRG: 068 | End: 2023-03-29
Payer: MEDICARE

## 2023-03-29 LAB
CULTURE: ABNORMAL
CULTURE: ABNORMAL
LV EF: 55 %
LVEF MODALITY: NORMAL
Lab: ABNORMAL
SPECIMEN: ABNORMAL

## 2023-03-29 PROCEDURE — 6370000000 HC RX 637 (ALT 250 FOR IP): Performed by: INTERNAL MEDICINE

## 2023-03-29 PROCEDURE — 6360000002 HC RX W HCPCS

## 2023-03-29 PROCEDURE — 2580000003 HC RX 258

## 2023-03-29 PROCEDURE — 99223 1ST HOSP IP/OBS HIGH 75: CPT | Performed by: INTERNAL MEDICINE

## 2023-03-29 PROCEDURE — 1200000000 HC SEMI PRIVATE

## 2023-03-29 PROCEDURE — 7100000000 HC PACU RECOVERY - FIRST 15 MIN

## 2023-03-29 PROCEDURE — 97116 GAIT TRAINING THERAPY: CPT

## 2023-03-29 PROCEDURE — 97110 THERAPEUTIC EXERCISES: CPT

## 2023-03-29 PROCEDURE — 94761 N-INVAS EAR/PLS OXIMETRY MLT: CPT

## 2023-03-29 PROCEDURE — 80048 BASIC METABOLIC PNL TOTAL CA: CPT

## 2023-03-29 PROCEDURE — 6370000000 HC RX 637 (ALT 250 FOR IP): Performed by: STUDENT IN AN ORGANIZED HEALTH CARE EDUCATION/TRAINING PROGRAM

## 2023-03-29 PROCEDURE — 2580000003 HC RX 258: Performed by: NURSE PRACTITIONER

## 2023-03-29 PROCEDURE — 93312 ECHO TRANSESOPHAGEAL: CPT | Performed by: INTERNAL MEDICINE

## 2023-03-29 PROCEDURE — 6370000000 HC RX 637 (ALT 250 FOR IP): Performed by: NURSE PRACTITIONER

## 2023-03-29 PROCEDURE — 97530 THERAPEUTIC ACTIVITIES: CPT

## 2023-03-29 PROCEDURE — 93312 ECHO TRANSESOPHAGEAL: CPT

## 2023-03-29 PROCEDURE — 84100 ASSAY OF PHOSPHORUS: CPT

## 2023-03-29 PROCEDURE — 7100000001 HC PACU RECOVERY - ADDTL 15 MIN

## 2023-03-29 PROCEDURE — 93320 DOPPLER ECHO COMPLETE: CPT | Performed by: INTERNAL MEDICINE

## 2023-03-29 PROCEDURE — 2500000003 HC RX 250 WO HCPCS

## 2023-03-29 PROCEDURE — 83735 ASSAY OF MAGNESIUM: CPT

## 2023-03-29 RX ORDER — PROPOFOL 10 MG/ML
INJECTION, EMULSION INTRAVENOUS PRN
Status: DISCONTINUED | OUTPATIENT
Start: 2023-03-29 | End: 2023-03-29 | Stop reason: SDUPTHER

## 2023-03-29 RX ORDER — LIDOCAINE HYDROCHLORIDE 20 MG/ML
INJECTION, SOLUTION EPIDURAL; INFILTRATION; INTRACAUDAL; PERINEURAL PRN
Status: DISCONTINUED | OUTPATIENT
Start: 2023-03-29 | End: 2023-03-29 | Stop reason: SDUPTHER

## 2023-03-29 RX ORDER — SODIUM CHLORIDE 9 MG/ML
INJECTION, SOLUTION INTRAVENOUS CONTINUOUS PRN
Status: DISCONTINUED | OUTPATIENT
Start: 2023-03-29 | End: 2023-03-29 | Stop reason: SDUPTHER

## 2023-03-29 RX ADMIN — LEVOTHYROXINE SODIUM 125 MCG: 0.12 TABLET ORAL at 08:30

## 2023-03-29 RX ADMIN — PROPOFOL 80 MG: 10 INJECTION, EMULSION INTRAVENOUS at 13:00

## 2023-03-29 RX ADMIN — METOPROLOL TARTRATE 12.5 MG: 25 TABLET, FILM COATED ORAL at 08:57

## 2023-03-29 RX ADMIN — APIXABAN 5 MG: 5 TABLET, FILM COATED ORAL at 08:32

## 2023-03-29 RX ADMIN — LOPERAMIDE HYDROCHLORIDE 2 MG: 2 CAPSULE ORAL at 17:32

## 2023-03-29 RX ADMIN — SODIUM CHLORIDE, PRESERVATIVE FREE 5 ML: 5 INJECTION INTRAVENOUS at 08:39

## 2023-03-29 RX ADMIN — DIGOXIN 125 MCG: 125 TABLET ORAL at 08:32

## 2023-03-29 RX ADMIN — MEMANTINE 10 MG: 10 TABLET ORAL at 08:30

## 2023-03-29 RX ADMIN — RIVASTIGMINE TARTRATE 3 MG: 1.5 CAPSULE ORAL at 08:33

## 2023-03-29 RX ADMIN — LIDOCAINE HYDROCHLORIDE 100 MG: 20 INJECTION, SOLUTION EPIDURAL; INFILTRATION; INTRACAUDAL; PERINEURAL at 13:00

## 2023-03-29 RX ADMIN — MEMANTINE 10 MG: 10 TABLET ORAL at 21:57

## 2023-03-29 RX ADMIN — BACITRACIN 1 APPLICATION: 500 OINTMENT TOPICAL at 08:59

## 2023-03-29 RX ADMIN — Medication 1000 UNITS: at 08:34

## 2023-03-29 RX ADMIN — ACETAMINOPHEN 650 MG: 325 TABLET ORAL at 22:00

## 2023-03-29 RX ADMIN — Medication 400 MG: at 08:31

## 2023-03-29 RX ADMIN — SERTRALINE HYDROCHLORIDE 50 MG: 50 TABLET ORAL at 08:31

## 2023-03-29 RX ADMIN — SODIUM CHLORIDE, PRESERVATIVE FREE 10 ML: 5 INJECTION INTRAVENOUS at 21:57

## 2023-03-29 RX ADMIN — ATORVASTATIN CALCIUM 20 MG: 10 TABLET, FILM COATED ORAL at 08:30

## 2023-03-29 RX ADMIN — PANTOPRAZOLE SODIUM 40 MG: 40 TABLET, DELAYED RELEASE ORAL at 08:31

## 2023-03-29 RX ADMIN — APIXABAN 5 MG: 5 TABLET, FILM COATED ORAL at 21:57

## 2023-03-29 RX ADMIN — Medication 400 UNITS: at 09:02

## 2023-03-29 RX ADMIN — SODIUM CHLORIDE: 9 INJECTION, SOLUTION INTRAVENOUS at 12:59

## 2023-03-29 RX ADMIN — Medication 1 CAPSULE: at 08:32

## 2023-03-29 ASSESSMENT — PAIN SCALES - GENERAL: PAINLEVEL_OUTOF10: 3

## 2023-03-29 NOTE — PROGRESS NOTES
Physical Therapy  Name: Shavonne Olivares MRN: [id number] :   1935   Date:  3/29/2023   Admission Date: 3/25/2023 Room:  [address] [place] [address]   Restrictions/Precautions:         general precautions, fall risk  Communication with other providers:  Romy Graham RN states pt is ok to see for therapy  Subjective:  Patient states:  '' I thought I was going to go home today ''   Pain:   Location, Type, Intensity (0/10 to 10/10):  does not quantify, c/o pain in R leg  Objective:    Observation:  pt seated in recliner. DR present but leaving. Daughters present. Treatment, including education/measures:  Pt agreeable to therapy. Upset due to having to wait for procedure, and not being able to go home today. Discussed treatment plan with patient. She reported she needed to use the toilet. Pt requested pull ups instead of depends. Pull ups brought to patient. She was able to stand to wipe herself with fair balance, using therapists arm with 1UE to steady herself. She was able to sit and don pull ups, then standing and pulling them up with SBA and AD. She required Yg to rise from toilet, but SBA for all other surfaces. During gait, she required one rest break, and during second bout c/o SOB, and required 2 standing rest breaks to recover. Upon return to room she performed all AROM sitting in recliner. Requested to keep recliner by the window and not by the bed, where the call light is within reach. Pt left with both daughters present and aware than call light does not reach to where the patient is currently sitting. All other needs met. Sitting Exercises: Ankle pumps x 20  Heel raises x 20  LAQ's x 20  Marching x 20   Clams x 20  Hip Abd x 20    Standing exercises:  Marching x 10    Therapeutic Exercise:  Therapeutic exercises were instructed today. Cues were given for technique, safety, recruitment, and rationale. Cues were verbal and/or tactile.     Transfers   Scooting :SBA  Sit to stand :Yg (low surfaces) SBA (higher surfaces)  Stand to sit :SBA  SPT:SBA    Gait:  Pt amb with 2WW for 12 ft + 30FT +65ft with SBA assist  Pt needed VC's for directional chances, pacing, and breathing to recover from SOB  Gait Comments: with VC's' and TC's for B LE placement, walker placement and sequence throughout ambulation; with VC's and TC's to maintain upright posture in order to avoid COM shifting outside of ALTHEA; with VC's for PLB throughout ambulation    Safety  Patient left safely in the recliner, with call light/phone in reach with alarm applied. Gait belt was used for transfers and gait.     Assessment / Impression:     Patient's tolerance of treatment:  fair   Adverse Reaction: SOB with gait  Significant change in status and impact:  no  Barriers to improvement:  fatigue, strength, SOB    Plan for Next Session:    Will cont to work towards pt's goals per patient  tolerance  Time in:  10:06  Time out:  10:51  Timed treatment minutes:  45  Total treatment time:  45  Previously filed items:  Social/Functional History  Lives With: Alone  Short Term Goals  Time Frame for Short Term Goals: 1 week  Short Term Goal 1: Pt to complete all bed mobility min A  Short Term Goal 2: pt to sit EOB 10 minutes SBA with no LOB  Short Term Goal 3: pt to complete STS transfers to/from bed, commode, and chair CGA  Short Term Goal 4: pt to ambulate 22' with LRAD min A     Electronically signed by:    Aleyda Chappell PTA PTA  3/29/2023, 10:54 AM

## 2023-03-29 NOTE — PROGRESS NOTES
03/29/23 0855   Encounter Summary   Encounter Overview/Reason  Spiritual/Emotional Needs   Service Provided For: Patient and family together   Referral/Consult From: Anupama;Family   Julian Hortencia   Last Encounter  03/29/23  (Had prayer with patient who wants to refuse care. Spoke to family outside of room having a hard time with mothers decissions to reffuse care. Emotional support for family as well)   Complexity of Encounter Moderate   Begin Time 0835   End Time  0858   Total Time Calculated 23 min   Encounter    Type Initial Screen/Assessment   Crisis   Type Family Care   Spiritual/Emotional needs   Type Emotional Distress;Spiritual Support   Grief, Loss, and Adjustments   Type Adjustment to illness   Assessment/Intervention/Outcome   Assessment Concerns with suffering   Intervention Active listening;Discussed belief system/Episcopal practices/teresa;Discussed illness injury and its impact; Discussed relationship with God;Empowerment;Nurtured Hope;Prayer (assurance of)/Friona;Sustaining Presence/Ministry of presence   Outcome Engaged in conversation;Expressed feelings, needs, and concerns; Restored Hope   Plan and Referrals   Plan/Referrals Continue Support (comment)  (as needed)

## 2023-03-29 NOTE — CONSULTS
INPATIENT CARDIOLOGY CONSULT NOTE         Reason for consultation:  Request for HEBER        History of present illness:Rupal is a 81 y. o.year old who is admitted with dizziness/vertigo/stroke. Cardiology consulted to evaluate thromboembolic cause for stroke with a HEBER    Patient has prior medical history significant for atrial fibrillation and is maintained on metoprolol Eliquis and digoxin. She has history of hypertension hyperlipidemia dementia depression    Patient [facility] up at [facility] for cardiac needs  As per medical recommendation she has a history of A-fib ataxia dementia depression history of prior strokes      EKG shows persistent atrial fibrillation, right bundle branch block     CT head     Impression   1. Age related involutional changes with no acute intracranial abnormality. 2. Right maxillary sinus disease. 3. There is 70% stenosis in the proximal right ICA and 50% stenosis in the   proximal left ICA. 4. Severe stenosis in the origin of dominant left vertebral artery. 5. Diffuse intracranial atherosclerosis with luminal irregularity and   mild-to-moderate narrowing. Severe short segment stenosis is seen in the   left P2 segment. 6. Mediastinal lymphadenopathy, nonspecific. Pertinent Lab Personally Review     Recent Labs     03/28/23 0414   WBC 5.7   HGB 13.2   HCT 39.4         Recent Labs     03/28/23 0414      K 3.6      CO2 25   PHOS 3.0   BUN 10   CREATININE 0.6     No results for input(s): AST, ALT, ALB, BILIDIR, BILITOT, ALKPHOS in the last 72 hours. No results for input(s): TROPONINT in the last 72 hours.   Lab Results   Component Value Date    PROBNP 4,873 (H) 03/24/2023    PROBNP 1,569 (H) 04/14/2022    PROBNP 1,512 (H) 04/13/2022         Past medical history:    has a past medical history of Acquired hypothyroidism, Chronic midline low back pain without sciatica, Dementia without behavioral disturbance ([address] Gait disturbance, History of TIA 03/29/23 0857    meclizine (ANTIVERT) tablet 12.5 mg  12.5 mg Oral TID PRN Shameka Lemon, APRN - CNP        levothyroxine (SYNTHROID) tablet 125 mcg  125 mcg Oral QAM AC Brenda Lemon, APRN - CNP   125 mcg at 03/29/23 0830    magnesium oxide (MAG-OX) tablet 400 mg  400 mg Oral Daily Shameka Lemon, APRN - CNP   400 mg at 03/29/23 0831         Review of Systems:   Patient is a very poor historian and does not want to engage in conversations. Denies any chest pain shortness of breath or palpitation. Physical Examination:      Vitals:    03/29/23 0857   BP: (!) 116/51   Pulse: 68   Resp:    Temp:    SpO2:       Wt Readings from Last 3 Encounters:   03/29/23 163 lb 2.3 oz (74 kg)   03/24/23 165 lb 11.2 oz (75.2 kg)   03/09/23 161 lb 9.6 oz (73.3 kg)     Body mass index is 24.09 kg/m². General Appearance:  No distress  Constitutional:  Well developed, Well nourished  HEENT:  Normocephalic, Atraumatic, Oropharynx moist Nose normal   Eyes:  Conjunctiva normal, No discharge. Respiratory:    Normal breath sounds,  No respiratory distress,  No wheezing    No chest Tenderness  Cardiovascular: S1-S2 IRIR no murmurs auscultated. Pedal pulses are normal. No pedal edema  GI:  Soft Non tender, non distended. Musculoskeletal:   No tenderness, No cyanosis, No clubbing. Integument:  Warm, Dry, No erythema, No rash. Lymphatic:  No lymphadenopathy noted. Neurologic: Agitated  Psychiatric: Agitated               All labs, images, EKGs were personally reviewed      Assessment: 81 y. o.year old with PMH of  has a past medical history of Acquired hypothyroidism, Chronic midline low back pain without sciatica, Dementia without behavioral disturbance ([address] Gait disturbance, History of TIA (transient ischemic attack), Major depressive disorder with single episode, in full remission ([address] Mixed hyperlipidemia, Persistent atrial fibrillation ([address] PFO (patent foramen ovale), Pneumonia due to infectious organism, and Sepsis ([address]

## 2023-03-29 NOTE — PROGRESS NOTES
V2.0  [facility] [place]      Name:  Virginie Piper /Age/Sex: 1935  (81 y.o. female)   MRN & CSN:  [id number] & [id number] Encounter Date/Time: 3/29/2023 4:17 PM EDT    Location:  0715/7856-X PCP: Delilah Travis MD       Hospital Day: 5    Assessment and Plan:   Virginie Piper is a 81 y.o. female with pmh of atrial fibrillation, PFO, depression, hypothyroidism, ambulatory dysfunction and dementia who presents with Acute CVA (cerebrovascular accident) ([facility]      Plan:  Acute CVA:  Right ICA 70% stenosis:  -Last well-known 3/23/2 2023, woke up with dizziness 3/24 and not feeling well, acute 3 mm infarct within the subcortical white matter of the right frontal lobe per MRI brain without contrast 3/25  -TTE with EF 55%, moderate AR, moderate MR, severe TR, RVSP 60 mmHg  -Cardiology consulted for HEBER  -CTA head and neck with 70% stenosis of the right ICA, severe stenosis of the left vertebral artery origin and diffuse intracranial atherosclerosis with mild-moderate narrowing, severe short segment stenosis  -Neurology on board, as per neurology, patient to follow as outpatient with  for neuro intervention  -Neurochecks  -PT/OT  -Atorvastatin  Near syncope and collapse with mild closed head injury with left scalp laceration secondary to weakness and peripheral neuropathy:  -Fall precautions,  Febrile episode:  -Temperature 1 1.7F at [place], blood cultures 1/ bottle positive for Streptococcus Viridans, likely contaminant  -No focus of infection so far  -Pro-Salazar negative  -Currently watching off antibiotics  Persistent atrial fibrillation:  -Digoxin and metoprolol, Eliquis  Hypothyroidism:  -Synthyroid  Dementia:  -Namenda and Exelon  Depression:  -Zoloft  Chronic diarrhea: Ongoing for 1 year, GI panel negative, C. difficile negative, continue, continue probiotics, continue probiotics and loperamide as needed    Diet Diet NPO   DVT Prophylaxis [] Lovenox, []  Heparin, [] SCDs, [] thyroid glands. BONES: No acute osseous abnormality. CTA HEAD: ANTERIOR CIRCULATION: Diffuse intracranial atherosclerosis with luminal irregularity and mild-to-moderate narrowing. No significant stenosis of the intracranial internal carotid, anterior cerebral, or middle cerebral arteries. No aneurysm. POSTERIOR CIRCULATION: Severe short segment stenosis in the left P2 segment. No significant stenosis of the vertebral, basilar, or right posterior cerebral arteries. No aneurysm. OTHER: No dural venous sinus thrombosis on this non-dedicated study. 1. Age related involutional changes with no acute intracranial abnormality. 2. Right maxillary sinus disease. 3. There is 70% stenosis in the proximal right ICA and 50% stenosis in the proximal left ICA. 4. Severe stenosis in the origin of dominant left vertebral artery. 5. Diffuse intracranial atherosclerosis with luminal irregularity and mild-to-moderate narrowing. Severe short segment stenosis is seen in the left P2 segment. 6. Mediastinal lymphadenopathy, nonspecific.        Electronically signed by Linda Gomez MD on 3/29/2023 at 12:56 PM

## 2023-03-29 NOTE — ANESTHESIA PRE PROCEDURE
walker to ambulate    History of TIA (transient ischemic attack) 12/9/2019    Patient has a history of TIA and also recently may have had TIA. Patient is on Eliquis. Sees neurologist Dr. Tess Terrell Major depressive disorder with single episode, in full remission ([facility] 12/9/2019    Patient is taking Zoloft and that is helping.  Mixed hyperlipidemia 12/9/2019    Patient is on simvastatin    Persistent atrial fibrillation ([facility] 10/22/2019    A. fib on recently in October 2019 . Patient is on metoprolol 25 mg twice a day and Eliquis    PFO (patent foramen ovale) 12/9/2019    PFO seen on echocardiogram in 2016    Pneumonia due to infectious organism 10/22/2019    Patient had pneumonia and pleural effusion during admission in October 2019. A repeat CT scan of the chest will be done Patient is also seen pulmonologist    Sepsis ([facility] 4/11/2022       Past Surgical History:        Procedure Laterality Date    HYSTERECTOMY (CERVIX STATUS UNKNOWN)         Social History:    Social History     Tobacco Use    Smoking status: Never    Smokeless tobacco: Never   Substance Use Topics    Alcohol use: No                                Counseling given: Not Answered      Vital Signs (Current):   Vitals:    03/29/23 0221 03/29/23 0819 03/29/23 0857 03/29/23 1138   BP: (!) 117/44 (!) 116/51 (!) 116/51 (!) 108/52   Pulse: 96 68 68 67   Resp: 16 18 21   Temp: 36.4 °C (97.6 °F) 36.6 °C (97.9 °F)     TempSrc: Oral Oral     SpO2: 97% 94%  96%   Weight: 163 lb 2.3 oz (74 kg)                                                 BP Readings from Last 3 Encounters:   03/29/23 (!) 108/52   03/25/23 (!) 113/55   03/09/23 112/64       NPO Status:                                                                                 BMI:   Wt Readings from Last 3 Encounters:   03/29/23 163 lb 2.3 oz (74 kg)   03/24/23 165 lb 11.2 oz (75.2 kg)   03/09/23 161 lb 9.6 oz (73.3 kg)     Body mass index is 24.09 kg/m².     CBC:   Lab Results   Component

## 2023-03-29 NOTE — PROCEDURES
Preliminary HEBER report:  ASA Mallampati 2/2  Procedure performed with anesthesia support    Full report to follow      Normal valvular heart disease normal left atrial appendage, no thrombus noted  + PFO noted

## 2023-03-29 NOTE — PROGRESS NOTES
Called Daughter Zulema Luz [phone] and informed her that her mother was refusing blood work and any further testing. Pt is alert and oriented and states she has her own doctors and she wants to follow up with them. Nicole Aguirre stated that her and her sister plan on coming to the hospital before 0800. She stated they will talk with her.

## 2023-03-29 NOTE — CARE COORDINATION
LSW was approached by pt's dghts who stated pt is now refusing to go to SNF for rehab. LSW spoke with pt and encouraged her to go for a short stay to get stronger. Pt stated \"okay I give up, I will surrender. I will agree to go to [facility]. \"  Pt stated to this LSW that she was ready to go. She does not want to take her medicine, she does not want a IV nor does she want her meds. Pt stated that all of the meds test and procedures are keeping her here and she is ready to go. LSW spoke with pt that if she is ready and and no longer wants to take her meds or have any procedure then would she be interested in having Hospice after rehab if she still feels the same way. Pt stated she does not need Hospice she is not terminal.  Pt stated she is wiling to go to [facility] she is not interested in Hospice. Pt will need a rapid COVID on day of discharge. Cm will need KEEGAN completed by RN and doctor. If pt is discharged after hours please complete the following. ... Call report to  [phone]  Fax completed AVS with both KEEGAN on the AVS and any written Rx [phone] . Set up transportation [facility]  and call family. PASS completed and packet started.

## 2023-03-29 NOTE — ANESTHESIA POSTPROCEDURE EVALUATION
Department of Anesthesiology  Postprocedure Note    Patient: Oskar Lipoma  MRN: [id number]  YOB: 1935  Date of evaluation: 3/29/2023      Procedure Summary     Date: 03/29/23 Room / Location: [facility] Stress Lab    Anesthesia Start: 3242 Anesthesia Stop: 2401    Procedure: TRANSESOPHAGEAL ECHOCARDIOGRAM Diagnosis:     Scheduled Providers:  Responsible Provider: Cole Lester DO    Anesthesia Type: MAC ASA Status: 3          Anesthesia Type: No value filed.     Tangela Phase I: Tangela Score: 10    Tangela Phase II:        Anesthesia Post Evaluation    Patient location during evaluation: bedside  Patient participation: complete - patient participated  Level of consciousness: awake  Pain score: 0  Airway patency: patent  Nausea & Vomiting: no nausea and no vomiting  Complications: no  Cardiovascular status: blood pressure returned to baseline and hemodynamically stable  Respiratory status: acceptable and room air  Hydration status: euvolemic

## 2023-03-30 VITALS
BODY MASS INDEX: 24.71 KG/M2 | SYSTOLIC BLOOD PRESSURE: 131 MMHG | RESPIRATION RATE: 16 BRPM | TEMPERATURE: 98.6 F | DIASTOLIC BLOOD PRESSURE: 68 MMHG | HEART RATE: 62 BPM | OXYGEN SATURATION: 95 % | WEIGHT: 167.33 LBS

## 2023-03-30 LAB
CULTURE: NORMAL
CULTURE: NORMAL
Lab: NORMAL
Lab: NORMAL
SARS-COV-2 RDRP RESP QL NAA+PROBE: NOT DETECTED
SPECIMEN: NORMAL
SPECIMEN: NORMAL
VIT B1 PYROPHOSHATE BLD-SCNC: 168 NMOL/L (ref 70–180)

## 2023-03-30 PROCEDURE — 99232 SBSQ HOSP IP/OBS MODERATE 35: CPT | Performed by: INTERNAL MEDICINE

## 2023-03-30 PROCEDURE — 6370000000 HC RX 637 (ALT 250 FOR IP): Performed by: INTERNAL MEDICINE

## 2023-03-30 PROCEDURE — 97116 GAIT TRAINING THERAPY: CPT

## 2023-03-30 PROCEDURE — APPNB30 APP NON BILLABLE TIME 0-30 MINS: Performed by: NURSE PRACTITIONER

## 2023-03-30 PROCEDURE — 87635 SARS-COV-2 COVID-19 AMP PRB: CPT

## 2023-03-30 PROCEDURE — 6370000000 HC RX 637 (ALT 250 FOR IP): Performed by: NURSE PRACTITIONER

## 2023-03-30 PROCEDURE — 97110 THERAPEUTIC EXERCISES: CPT

## 2023-03-30 PROCEDURE — 92610 EVALUATE SWALLOWING FUNCTION: CPT

## 2023-03-30 RX ORDER — LACTOBACILLUS RHAMNOSUS GG 10B CELL
1 CAPSULE ORAL
Qty: 30 CAPSULE | Refills: 0 | Status: SHIPPED | OUTPATIENT
Start: 2023-03-31 | End: 2023-04-30

## 2023-03-30 RX ORDER — LANOLIN ALCOHOL/MO/W.PET/CERES
400 CREAM (GRAM) TOPICAL DAILY
Qty: 20 TABLET | Refills: 0 | Status: SHIPPED | OUTPATIENT
Start: 2023-03-31 | End: 2023-04-20

## 2023-03-30 RX ORDER — MECLIZINE HCL 12.5 MG/1
12.5 TABLET ORAL 3 TIMES DAILY PRN
Qty: 20 TABLET | Refills: 0 | Status: SHIPPED | OUTPATIENT
Start: 2023-03-30 | End: 2023-04-09

## 2023-03-30 RX ADMIN — MEMANTINE 10 MG: 10 TABLET ORAL at 08:46

## 2023-03-30 RX ADMIN — SERTRALINE HYDROCHLORIDE 50 MG: 50 TABLET ORAL at 08:46

## 2023-03-30 RX ADMIN — Medication 400 UNITS: at 08:46

## 2023-03-30 RX ADMIN — LEVOTHYROXINE SODIUM 125 MCG: 0.12 TABLET ORAL at 06:05

## 2023-03-30 RX ADMIN — Medication 1000 UNITS: at 08:46

## 2023-03-30 RX ADMIN — PANTOPRAZOLE SODIUM 40 MG: 40 TABLET, DELAYED RELEASE ORAL at 06:05

## 2023-03-30 RX ADMIN — ATORVASTATIN CALCIUM 20 MG: 10 TABLET, FILM COATED ORAL at 08:46

## 2023-03-30 RX ADMIN — Medication 1 CAPSULE: at 08:46

## 2023-03-30 RX ADMIN — APIXABAN 5 MG: 5 TABLET, FILM COATED ORAL at 08:46

## 2023-03-30 RX ADMIN — Medication 400 MG: at 08:46

## 2023-03-30 RX ADMIN — DIGOXIN 125 MCG: 125 TABLET ORAL at 08:46

## 2023-03-30 NOTE — CARE COORDINATION
LSW received discharge orders. Rapid COVID is negative. [facility] to  pt at 2:30 PM.  RN, Pt and both dght informed of  time. LSW called Susan Turpin with [facility] and she is also aware. LSW placed copy of AVS with both KEEGAN in packet. LSW completed both sides of the [facility] paperwork (pt has medicare and Medicaid.) [facility] paperwork placed on packet.

## 2023-03-30 NOTE — PROGRESS NOTES
My name is Kaia Toney. I am a [address] [address] and I will be working with this patient today 3/30/2023.

## 2023-03-30 NOTE — PROGRESS NOTES
Physical Therapy  Name: Yamileth Tolliver MRN: [id number] :   1935   Date:  3/30/2023   Admission Date: 3/25/2023 Room:  [address] [place], [place] [address]   Restrictions/Precautions:         general precautions, fall risk  Communication with other providers:  Nasir Bourne RN states pt is ok to see for therapy  Subjective:  Patient states:  I need things done a certain way, I have rode my bike until I was [de-identified] all across [place], also did a 10K  Pain:   Location, Type, Intensity (0/10 to 10/10):  0/10  Objective:    Observation:  pt was in the chair with her daughter visiting  Treatment, including education/measures:  Transfers with line management of none  Scooting :SBA  Sit to stand :SBA  Stand to sit :SBA  Gait:  Pt amb with RW for 65 ft and 100 ft with CGA and 3 standing rests for sob  Pt needed VC's for pathway, posture and PLB   Sitting Exercises: Ankle pumps x 30  LAQ's x 25  Marching x 25   Hip Abd x 25  Therapeutic Exercise:  Therapeutic exercises were instructed today. Cues were given for technique, safety, recruitment, and rationale. Cues were verbal and/or tactile. Safety  Patient left safely in the chair, with call light/phone in reach with alarm applied. Gait belt was used for transfers and gait.   Assessment / Impression:     Patient's tolerance of treatment:  good, very motivated to return home   Adverse Reaction: none  Significant change in status and impact:  none  Barriers to improvement:  sob with gt  Plan for Next Session:    Will cont to work towards pt's goals per her tolerance  Time in:  0930  Time out:  1103  Timed treatment minutes:  33  Total treatment time:  33  Previously filed items:  Social/Functional History  Lives With: Alone  Short Term Goals  Time Frame for Short Term Goals: 1 week  Short Term Goal 1: Pt to complete all bed mobility min A  Short Term Goal 2: pt to sit EOB 10 minutes SBA with no LOB  Short Term Goal 3: pt to complete STS transfers to/from bed, commode, and chair CGA  Short Term Goal 4: pt to ambulate 22' with LRAD min A     Electronically signed by:     Juan Solis PTA  3/30/2023, 10:03 AM

## 2023-03-30 NOTE — DISCHARGE SUMMARY
appropriate. Labs and Imaging   XR HAND LEFT (MIN 3 VIEWS)    Result Date: 3/24/2023  EXAMINATION: THREE XRAY VIEWS OF THE LEFT HAND 3/24/2023 4:45 pm COMPARISON: None. HISTORY: ORDERING SYSTEM PROVIDED HISTORY: trauma TECHNOLOGIST PROVIDED HISTORY: Reason for exam:->trauma Reason for Exam: trauma Additional signs and symptoms: trauma Relevant Medical/Surgical History: trauma FINDINGS: Bones are diffusely demineralized. There is no evidence of an acute fracture or dislocation. There are no significant degenerative changes, unusual for a patient of this age. There is ulnar carpal joint chondrocalcinosis. No evidence of an acute fracture or dislocation. Ulnar carpal joint chondrocalcinosis. CT HEAD WO CONTRAST    Result Date: 3/26/2023  EXAMINATION: CT OF THE HEAD WITHOUT CONTRAST; CTA OF THE HEAD AND NECK WITH CONTRAST 3/26/2023 4:31 pm; 3/26/2023 4:30 pm: TECHNIQUE: CT of the head was performed without the administration of intravenous contrast. Automated exposure control, iterative reconstruction, and/or weight based adjustment of the mA/kV was utilized to reduce the radiation dose to as low as reasonably achievable.; CTA of the head and neck was performed with the administration of intravenous contrast. Multiplanar reformatted images are provided for review. MIP images are provided for review. Stenosis of the internal carotid arteries measured using NASCET criteria. Automated exposure control, iterative reconstruction, and/or weight based adjustment of the mA/kV was utilized to reduce the radiation dose to as low as reasonably achievable. Noncontrast CT of the head with reconstructed 2-D images are also provided for review. COMPARISON: None. HISTORY: Reason for Exam: acute stroke on MRI FINDINGS: CT HEAD: BRAIN/VENTRICLES:  No acute intracranial hemorrhage or extraaxial fluid collection. Grey-white differentiation is maintained. No evidence of mass, mass effect or midline shift.   No evidence

## 2023-03-30 NOTE — PROGRESS NOTES
Cardiology Progress Note     Today's Plan sign off  Will be available if needed    Admit Date:  3/25/2023    Consult reason/ Seen today for: HEBER    Subjective and  Overnight Events:  up in chair -reports she is feeling better and ready to go home. She denies palpitations    Telemetry: NA    Assessment / Plan:     HEBER completed   + PFO  No thrombus  She would like to follow with her primary cardiologist  - disk / copy of HEBER /Echo given to patient    CVA- acute   CT of head no acute changes   MRI of head acute 3mm infarct within the subcortical white matter of the right frontal love   Moderate chronic small vessel ischemic changes   CTA head 70 % stenosis in the proximal right ICA and 50 % stenosis in the proximal left ICA  Severe vertebral artery stenosis  Neurology on board      Atrial fib    Chronic - noted on ekg 2022  Rate is controlled   On lanoxin and metoprolol with holding parameters   HEBER no atrial thrombus   Continue eliquis         History of Presenting Illness:    Chief complain on admission : 81 y. o.year old who is admitted forNo chief complaint on file. Past medical history:    has a past medical history of Acquired hypothyroidism, Chronic midline low back pain without sciatica, Dementia without behavioral disturbance ([address] Gait disturbance, History of TIA (transient ischemic attack), Major depressive disorder with single episode, in full remission ([address] Mixed hyperlipidemia, Persistent atrial fibrillation ([address] PFO (patent foramen ovale), Pneumonia due to infectious organism, and Sepsis ([address] Past surgical history:   has a past surgical history that includes Hysterectomy. Social History:   reports that she has never smoked. She has never used smokeless tobacco. She reports that she does not drink alcohol and does not use drugs. Family history:  family history includes Alcohol Abuse in her mother; No Known Problems in her father; Obesity in her mother.     No Known Problems in her father; Obesity in her mother. No Known Allergies    Review of Systems:   All 14 systems were reviewed and are negative  Except for the positive findings which are documented     /68   Pulse 62   Temp 98.6 °F (37 °C) (Oral)   Resp 16   Wt 167 lb 5.3 oz (75.9 kg)   SpO2 95%   BMI 24.71 kg/m²     Intake/Output Summary (Last 24 hours) at 3/30/2023 1017  Last data filed at 3/30/2023 0610  Gross per 24 hour   Intake 308 ml   Output 550 ml   Net -242 ml       Physical Exam:  Physical Exam  Vitals reviewed. HENT:      Head:      Comments: Resolving ecchymosis and suture line noted to left eyebrow      Mouth/Throat:      Mouth: Mucous membranes are moist.   Eyes:      Extraocular Movements: Extraocular movements intact. Cardiovascular:      Rate and Rhythm: Normal rate. Heart sounds: Normal heart sounds. Pulmonary:      Effort: Pulmonary effort is normal.   Abdominal:      General: There is no distension. Tenderness: There is no abdominal tenderness. Musculoskeletal:      Left lower leg: No edema. Skin:     General: Skin is warm. Capillary Refill: Capillary refill takes less than 2 seconds. Neurological:      General: No focal deficit present. Mental Status: She is oriented to person, place, and time.         Medications:    bacitracin   Topical BID    lactobacillus  1 capsule Oral Daily with breakfast    apixaban  5 mg Oral BID    Vitamin D  1 tablet Oral Daily    digoxin  125 mcg Oral Daily    memantine  10 mg Oral BID    pantoprazole  40 mg Oral QAM AC    rivastigmine  3 mg Oral Daily    sertraline  50 mg Oral Daily    atorvastatin  20 mg Oral Daily    vitamin E  400 Units Oral Daily    sodium chloride flush  5-40 mL IntraVENous 2 times per day    metoprolol tartrate  12.5 mg Oral BID    levothyroxine  125 mcg Oral QAM AC    magnesium oxide  400 mg Oral Daily      sodium chloride       benzonatate, loperamide, sodium chloride flush, sodium chloride,

## 2023-03-30 NOTE — PROGRESS NOTES
V2.0  [facility] Hospitalist Progress Note      Name:  Alma An /Age/Sex: 1935  (81 y.o. female)   MRN & CSN:  [id number] & [id number] Encounter Date/Time: 3/30/2023 4:17 PM EDT    Location:  [facility] PCP: Nasir Bryson MD       Hospital Day: 6    Assessment and Plan:   Alma An is a 81 y.o. female with pmh of atrial fibrillation, PFO, depression, hypothyroidism, ambulatory dysfunction and dementia who presents with Acute CVA (cerebrovascular accident) ([facility]      Plan:  Acute CVA:  Right ICA 70% stenosis:  -Last well-known 3/23/2 2023, woke up with dizziness 3/24 and not feeling well, acute 3 mm infarct within the subcortical white matter of the right frontal lobe per MRI brain without contrast 3/25  -TTE with EF 55%, moderate AR, moderate MR, severe TR, RVSP 60 mmHg  -Cardiology consulted for HEBER  -CTA head and neck with 70% stenosis of the right ICA, severe stenosis of the left vertebral artery origin and diffuse intracranial atherosclerosis with mild-moderate narrowing, severe short segment stenosis  -Neurology on board, as per neurology, patient to follow as outpatient with  for neuro intervention  -Neurochecks  -PT/OT  -Atorvastatin  -HEBER e Normal valvular heart disease normal left atrial appendage, no thrombus noted + PFO noted  Near syncope and collapse with mild closed head injury with left scalp laceration secondary to weakness and peripheral neuropathy:  -Fall precautions,  Febrile episode:  -Temperature 1 1.7F at [facility], blood cultures 1/ bottle positive for Streptococcus Viridans, likely contaminant  -No focus of infection so far  -Pro-Salazar negative  -Currently watching off antibiotics  Persistent atrial fibrillation:  -Digoxin and metoprolol, Eliquis  Hypothyroidism:  -Synthyroid  Dementia:  -Namenda and Exelon  Depression:  -Zoloft  Chronic diarrhea: Ongoing for 1 year, GI panel negative, C. difficile negative, continue, continue probiotics, continue probiotics and the left P2 segment. 6. Mediastinal lymphadenopathy, nonspecific. CTA HEAD NECK W CONTRAST    Result Date: 3/26/2023  EXAMINATION: CT OF THE HEAD WITHOUT CONTRAST; CTA OF THE HEAD AND NECK WITH CONTRAST 3/26/2023 4:31 pm; 3/26/2023 4:30 pm: TECHNIQUE: CT of the head was performed without the administration of intravenous contrast. Automated exposure control, iterative reconstruction, and/or weight based adjustment of the mA/kV was utilized to reduce the radiation dose to as low as reasonably achievable.; CTA of the head and neck was performed with the administration of intravenous contrast. Multiplanar reformatted images are provided for review. MIP images are provided for review. Stenosis of the internal carotid arteries measured using NASCET criteria. Automated exposure control, iterative reconstruction, and/or weight based adjustment of the mA/kV was utilized to reduce the radiation dose to as low as reasonably achievable. Noncontrast CT of the head with reconstructed 2-D images are also provided for review. COMPARISON: None. HISTORY: Reason for Exam: acute stroke on MRI FINDINGS: CT HEAD: BRAIN/VENTRICLES:  No acute intracranial hemorrhage or extraaxial fluid collection. Grey-white differentiation is maintained. No evidence of mass, mass effect or midline shift. No evidence of hydrocephalus. Mild chronic small vessel ischemic disease and mild-to-moderate generalized atrophy. ORBITS: The visualized portion of the orbits demonstrate no acute abnormality. SINUSES:  Mucosal thickening and air-fluid level in the right maxillary sinus. Remainder of the visualized paranasal sinuses and mastoid air cells demonstrate no acute abnormality. SOFT TISSUES/SKULL: No acute abnormality of the visualized skull or soft tissues. CTA NECK: AORTIC ARCH/ARCH VESSELS: No dissection or arterial injury. No significant stenosis of the brachiocephalic or subclavian arteries.  CAROTID ARTERIES: Atherosclerosis in carotid

## 2023-03-30 NOTE — PROGRESS NOTES
Phase  Oral Phase: WFL     Indicators of Pharyngeal Phase Dysfunction        Prognosis       Education  Patient Education: results and recommendations  Patient Education Response: Demonstrated understanding             Therapy Time  SLP Individual Minutes  Time In: 1706  Time Out: 1250  Minutes: [address] [facility] [address], [facility], [address] [facility] [place], [facility], 3/30/2023

## 2023-03-30 NOTE — PROGRESS NOTES
03/30/23 1108   Encounter Summary   Encounter Overview/Reason  Spiritual/Emotional Needs   Service Provided For: Patient and family together   Referral/Consult From: Gilbert Marquez   Last Encounter  03/30/23  (Had prayer per patient and family request. Patient doing much better and offered to receive care and rehab.)   Complexity of Encounter Low   Begin Time 1100   End Time  1109   Total Time Calculated 9 min   Encounter    Type Follow up   Crisis   Type Family Care   Spiritual/Emotional needs   Type Spiritual Support   Assessment/Intervention/Outcome   Assessment Peaceful; Hopeful   Intervention Active listening;Empowerment;Nurtured Hope;Prayer (assurance of)/[place];Sustaining Presence/Ministry of presence   Outcome Encouraged;Engaged in conversation;Expressed feelings of Elizabeth, Peace and/or Love;Expressed Gratitude   Plan and Referrals   Plan/Referrals Continue Support (comment)  (as needed)

## 2023-03-31 LAB
CULTURE: NORMAL
Lab: NORMAL
SPECIMEN: NORMAL

## 2023-04-05 NOTE — PROGRESS NOTES
Documentation Reviewer    PROVIDER RESPONSE TEXT:    This patient has fall, dizziness and imbalance due to Peripheral neuropathy.     Query created by: Stephanie Hennessy on 4/5/2023 1:31 PM      Electronically signed by:  Ronnie Montague MD 4/5/2023 2:38 PM

## 2023-04-18 ENCOUNTER — CARE COORDINATION (OUTPATIENT)
Dept: CASE MANAGEMENT | Age: 88
End: 2023-04-18

## 2023-04-18 NOTE — TELEPHONE ENCOUNTER
Controlled Substance Refill Request for Fioricet  Problem List Complete:  No      Last Written Prescription Date:  5/9/19  Last Fill Quantity: 40,   # refills: 0      Last Office Visit with [facility] primary care provider: 9/24/18        Controlled substance agreement:   Encounter-Level CSA:    There are no encounter-level csa.      Patient-Level CSA:    There are no patient-level csa.            Last Urine Drug Screen: No results found for: CDAUT, No results found for: COMDAT, No results found for: THC13, PCP13, COC13, MAMP13, OPI13, AMP13, BZO13, TCA13, MTD13, BAR13, OXY13, PPX13, BUP13      checked in past 3 months?  Yes 3/12/19, no concerns     Tangela MAYON, RN                         Make f/u appointment
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
## 2023-04-21 ENCOUNTER — CARE COORDINATION (OUTPATIENT)
Dept: CASE MANAGEMENT | Age: 88
End: 2023-04-21

## 2023-04-21 NOTE — CARE COORDINATION
Call    Was this an external facility discharge? Yes, 4/15  Discharge Facility: [facility]    Challenges to be reviewed by the provider   Additional needs identified to be addressed with provider: No  none             Method of communication with provider : none    Advance Care Planning:   Does patient have an Advance Directive: reviewed and current. Care Transition Nurse contacted the family by telephone to perform post hospital discharge assessment. Verified name and  with family as identifiers. Provided introduction to self, and explanation of the CTN role. CTN reviewed discharge instructions, medical action plan and red flags with family who verbalized understanding. Family given an opportunity to ask questions and does not have any further questions or concerns at this time. Were discharge instructions available to patient? Yes. Reviewed appropriate site of care based on symptoms and resources available to patient including: PCP. The family agrees to contact the PCP office for questions related to their healthcare. Medication reconciliation was performed with family, who verbalizes understanding of administration of home medications. Advised obtaining a 90-day supply of all daily and as-needed medications. Was patient discharged with a pulse oximeter? no    CTN provided contact information. No further follow-up call indicated based on severity of symptoms and risk factors.   Plan for next call:  family states needs met      KIMBERLEY Pete, RN   [address]  [facility] Transition Nurse  [phone]

## 2023-04-27 ENCOUNTER — OFFICE VISIT (OUTPATIENT)
Dept: INTERNAL MEDICINE CLINIC | Age: 88
End: 2023-04-27
Payer: MEDICARE

## 2023-04-27 VITALS
DIASTOLIC BLOOD PRESSURE: 70 MMHG | HEIGHT: 69 IN | HEART RATE: 62 BPM | OXYGEN SATURATION: 98 % | SYSTOLIC BLOOD PRESSURE: 118 MMHG | TEMPERATURE: 97.2 F | WEIGHT: 159 LBS | RESPIRATION RATE: 16 BRPM | BODY MASS INDEX: 23.55 KG/M2

## 2023-04-27 DIAGNOSIS — F03.918 DEMENTIA WITH OTHER BEHAVIORAL DISTURBANCE, UNSPECIFIED DEMENTIA SEVERITY, UNSPECIFIED DEMENTIA TYPE (HCC): ICD-10-CM

## 2023-04-27 DIAGNOSIS — Z86.73 HISTORY OF TIA (TRANSIENT ISCHEMIC ATTACK): ICD-10-CM

## 2023-04-27 DIAGNOSIS — E03.9 ACQUIRED HYPOTHYROIDISM: ICD-10-CM

## 2023-04-27 DIAGNOSIS — F03.90 DEMENTIA WITHOUT BEHAVIORAL DISTURBANCE (HCC): ICD-10-CM

## 2023-04-27 DIAGNOSIS — R26.9 GAIT DISTURBANCE: Primary | ICD-10-CM

## 2023-04-27 DIAGNOSIS — I48.19 PERSISTENT ATRIAL FIBRILLATION (HCC): ICD-10-CM

## 2023-04-27 DIAGNOSIS — E78.2 MIXED HYPERLIPIDEMIA: ICD-10-CM

## 2023-04-27 PROCEDURE — 99214 OFFICE O/P EST MOD 30 MIN: CPT | Performed by: INTERNAL MEDICINE

## 2023-04-27 PROCEDURE — G8420 CALC BMI NORM PARAMETERS: HCPCS | Performed by: INTERNAL MEDICINE

## 2023-04-27 PROCEDURE — 1090F PRES/ABSN URINE INCON ASSESS: CPT | Performed by: INTERNAL MEDICINE

## 2023-04-27 PROCEDURE — 1036F TOBACCO NON-USER: CPT | Performed by: INTERNAL MEDICINE

## 2023-04-27 PROCEDURE — 1111F DSCHRG MED/CURRENT MED MERGE: CPT | Performed by: INTERNAL MEDICINE

## 2023-04-27 PROCEDURE — 1123F ACP DISCUSS/DSCN MKR DOCD: CPT | Performed by: INTERNAL MEDICINE

## 2023-04-27 PROCEDURE — G8427 DOCREV CUR MEDS BY ELIG CLIN: HCPCS | Performed by: INTERNAL MEDICINE

## 2023-04-27 RX ORDER — VITAMIN E 268 MG
400 CAPSULE ORAL DAILY
Qty: 90 CAPSULE | Refills: 1 | Status: SHIPPED | OUTPATIENT
Start: 2023-04-27

## 2023-04-27 RX ORDER — LACTOBACILLUS RHAMNOSUS GG 10B CELL
1 CAPSULE ORAL
Qty: 30 CAPSULE | Refills: 0 | Status: CANCELLED | OUTPATIENT
Start: 2023-04-27 | End: 2023-05-27

## 2023-04-27 RX ORDER — LEVOTHYROXINE SODIUM 0.15 MG/1
TABLET ORAL
Qty: 90 TABLET | Refills: 1 | Status: SHIPPED | OUTPATIENT
Start: 2023-04-27

## 2023-04-27 RX ORDER — CHOLECALCIFEROL (VITAMIN D3) 125 MCG
CAPSULE ORAL
Qty: 90 TABLET | Refills: 1 | Status: SHIPPED | OUTPATIENT
Start: 2023-04-27

## 2023-04-27 RX ORDER — LANOLIN ALCOHOL/MO/W.PET/CERES
400 CREAM (GRAM) TOPICAL DAILY
Qty: 20 TABLET | Refills: 0 | Status: CANCELLED | OUTPATIENT
Start: 2023-04-27 | End: 2023-05-17

## 2023-04-27 RX ORDER — MECLIZINE HCL 12.5 MG/1
12.5 TABLET ORAL DAILY PRN
Qty: 30 TABLET | Refills: 1 | Status: SHIPPED | OUTPATIENT
Start: 2023-04-27

## 2023-04-27 RX ORDER — LOPERAMIDE HYDROCHLORIDE 2 MG/1
2 CAPSULE ORAL 4 TIMES DAILY PRN
Status: CANCELLED | OUTPATIENT
Start: 2023-04-27

## 2023-04-27 RX ORDER — DIGOXIN 125 MCG
125 TABLET ORAL DAILY
Qty: 90 TABLET | Refills: 1 | Status: SHIPPED | OUTPATIENT
Start: 2023-04-27

## 2023-04-27 RX ORDER — SIMVASTATIN 40 MG
40 TABLET ORAL NIGHTLY
Qty: 90 TABLET | Refills: 1 | Status: SHIPPED | OUTPATIENT
Start: 2023-04-27

## 2023-04-27 RX ORDER — NYSTATIN 100000 [USP'U]/G
POWDER TOPICAL
Qty: 1 EACH | Refills: 3 | Status: SHIPPED | OUTPATIENT
Start: 2023-04-27

## 2023-04-27 RX ORDER — VITAMIN E 268 MG
CAPSULE ORAL DAILY
COMMUNITY
Start: 2023-03-31 | End: 2023-04-27 | Stop reason: SDUPTHER

## 2023-04-27 RX ORDER — MEMANTINE HYDROCHLORIDE 10 MG/1
10 TABLET ORAL 2 TIMES DAILY
Qty: 180 TABLET | Refills: 1 | Status: SHIPPED | OUTPATIENT
Start: 2023-04-27

## 2023-04-27 RX ORDER — MECLIZINE HCL 12.5 MG/1
12.5 TABLET ORAL 3 TIMES DAILY PRN
COMMUNITY
End: 2023-04-27 | Stop reason: SDUPTHER

## 2023-04-27 RX ORDER — PANTOPRAZOLE SODIUM 40 MG/1
40 TABLET, DELAYED RELEASE ORAL DAILY
Qty: 30 TABLET | Refills: 2 | Status: SHIPPED | OUTPATIENT
Start: 2023-04-27

## 2023-04-27 SDOH — ECONOMIC STABILITY: FOOD INSECURITY: WITHIN THE PAST 12 MONTHS, YOU WORRIED THAT YOUR FOOD WOULD RUN OUT BEFORE YOU GOT MONEY TO BUY MORE.: NEVER TRUE

## 2023-04-27 SDOH — ECONOMIC STABILITY: INCOME INSECURITY: HOW HARD IS IT FOR YOU TO PAY FOR THE VERY BASICS LIKE FOOD, HOUSING, MEDICAL CARE, AND HEATING?: NOT HARD AT ALL

## 2023-04-27 SDOH — ECONOMIC STABILITY: FOOD INSECURITY: WITHIN THE PAST 12 MONTHS, THE FOOD YOU BOUGHT JUST DIDN'T LAST AND YOU DIDN'T HAVE MONEY TO GET MORE.: NEVER TRUE

## 2023-04-27 ASSESSMENT — PATIENT HEALTH QUESTIONNAIRE - PHQ9
SUM OF ALL RESPONSES TO PHQ9 QUESTIONS 1 & 2: 0
2. FEELING DOWN, DEPRESSED OR HOPELESS: 0
1. LITTLE INTEREST OR PLEASURE IN DOING THINGS: 0
SUM OF ALL RESPONSES TO PHQ QUESTIONS 1-9: 0

## 2023-06-13 NOTE — ED NOTES
Given water and instructed to take small sips (less than 15cc's)  After 1st sip patient complained of pain with swallowing and the need to spit water out but very little came back     Talita Terry RN  09/07/22 6756
Started last night with some difficulty swallowing. States things only go so far down and comes back out. Been ongoing since last night.        Shandra Jernigan RN  09/07/22 2059
Hide Include Location In Plan Question?: No
Detail Level: Zone
Include Location In Plan?: Yes

## 2023-06-14 ENCOUNTER — TELEPHONE (OUTPATIENT)
Dept: INTERNAL MEDICINE CLINIC | Age: 88
End: 2023-06-14

## 2023-06-26 ENCOUNTER — TELEPHONE (OUTPATIENT)
Dept: INTERNAL MEDICINE CLINIC | Age: 88
End: 2023-06-26

## 2023-07-26 ENCOUNTER — OFFICE VISIT (OUTPATIENT)
Dept: INTERNAL MEDICINE CLINIC | Age: 88
End: 2023-07-26
Payer: MEDICARE

## 2023-07-26 VITALS
BODY MASS INDEX: 23.45 KG/M2 | WEIGHT: 158.8 LBS | TEMPERATURE: 97.6 F | RESPIRATION RATE: 16 BRPM | OXYGEN SATURATION: 96 % | HEART RATE: 68 BPM | SYSTOLIC BLOOD PRESSURE: 134 MMHG | DIASTOLIC BLOOD PRESSURE: 68 MMHG

## 2023-07-26 DIAGNOSIS — I65.23 BILATERAL CAROTID ARTERY STENOSIS: ICD-10-CM

## 2023-07-26 DIAGNOSIS — F32.5 MAJOR DEPRESSIVE DISORDER WITH SINGLE EPISODE, IN FULL REMISSION (HCC): ICD-10-CM

## 2023-07-26 DIAGNOSIS — K22.2 ESOPHAGEAL STRICTURE: ICD-10-CM

## 2023-07-26 DIAGNOSIS — G89.29 CHRONIC MIDLINE LOW BACK PAIN WITHOUT SCIATICA: ICD-10-CM

## 2023-07-26 DIAGNOSIS — R26.9 GAIT DISTURBANCE: ICD-10-CM

## 2023-07-26 DIAGNOSIS — F03.90 DEMENTIA WITHOUT BEHAVIORAL DISTURBANCE (HCC): ICD-10-CM

## 2023-07-26 DIAGNOSIS — I48.19 PERSISTENT ATRIAL FIBRILLATION (HCC): ICD-10-CM

## 2023-07-26 DIAGNOSIS — E78.2 MIXED HYPERLIPIDEMIA: ICD-10-CM

## 2023-07-26 DIAGNOSIS — M54.50 CHRONIC MIDLINE LOW BACK PAIN WITHOUT SCIATICA: ICD-10-CM

## 2023-07-26 DIAGNOSIS — Z86.73 HISTORY OF CVA (CEREBROVASCULAR ACCIDENT): ICD-10-CM

## 2023-07-26 DIAGNOSIS — D72.820 LYMPHOCYTOSIS: ICD-10-CM

## 2023-07-26 DIAGNOSIS — Z01.818 PRE-OP EXAM: Primary | ICD-10-CM

## 2023-07-26 DIAGNOSIS — M17.11 PRIMARY OSTEOARTHRITIS OF RIGHT KNEE: ICD-10-CM

## 2023-07-26 DIAGNOSIS — E03.9 ACQUIRED HYPOTHYROIDISM: ICD-10-CM

## 2023-07-26 DIAGNOSIS — J84.9 INTERSTITIAL LUNG DISEASE (HCC): ICD-10-CM

## 2023-07-26 PROBLEM — J18.9 PNEUMONIA DUE TO INFECTIOUS ORGANISM: Status: RESOLVED | Noted: 2019-10-22 | Resolved: 2023-07-26

## 2023-07-26 PROCEDURE — 1036F TOBACCO NON-USER: CPT | Performed by: INTERNAL MEDICINE

## 2023-07-26 PROCEDURE — 99214 OFFICE O/P EST MOD 30 MIN: CPT | Performed by: INTERNAL MEDICINE

## 2023-07-26 PROCEDURE — 1123F ACP DISCUSS/DSCN MKR DOCD: CPT | Performed by: INTERNAL MEDICINE

## 2023-07-26 PROCEDURE — 1090F PRES/ABSN URINE INCON ASSESS: CPT | Performed by: INTERNAL MEDICINE

## 2023-07-26 PROCEDURE — G8427 DOCREV CUR MEDS BY ELIG CLIN: HCPCS | Performed by: INTERNAL MEDICINE

## 2023-07-26 PROCEDURE — G8420 CALC BMI NORM PARAMETERS: HCPCS | Performed by: INTERNAL MEDICINE

## 2023-07-26 RX ORDER — POLYMYXIN B SULFATE AND TRIMETHOPRIM 1; 10000 MG/ML; [USP'U]/ML
SOLUTION OPHTHALMIC
COMMUNITY
Start: 2023-07-05

## 2023-07-26 RX ORDER — PREDNISOLONE ACETATE 10 MG/ML
SUSPENSION/ DROPS OPHTHALMIC
COMMUNITY
Start: 2023-07-05

## 2023-07-26 RX ORDER — BROMFENAC SODIUM 0.7 MG/ML
SOLUTION/ DROPS OPHTHALMIC
COMMUNITY
Start: 2023-07-05

## 2023-07-27 ENCOUNTER — TELEPHONE (OUTPATIENT)
Dept: INTERNAL MEDICINE CLINIC | Age: 88
End: 2023-07-27

## 2023-07-27 ENCOUNTER — HOSPITAL ENCOUNTER (OUTPATIENT)
Age: 88
Discharge: HOME | End: 2023-07-27
Payer: MEDICARE

## 2023-07-27 LAB
ANION GAP SERPL CALCULATED.3IONS-SCNC: 9 MMOL/L (ref 4–16)
BASOPHILS ABSOLUTE: 0.1 K/CU MM
BASOPHILS RELATIVE PERCENT: 0.9 % (ref 0–1)
BUN SERPL-MCNC: 18 MG/DL (ref 6–23)
CALCIUM SERPL-MCNC: 9.5 MG/DL (ref 8.3–10.6)
CHLORIDE BLD-SCNC: 105 MMOL/L (ref 99–110)
CO2: 28 MMOL/L (ref 21–32)
CREAT SERPL-MCNC: 0.9 MG/DL (ref 0.6–1.1)
DIFFERENTIAL TYPE: ABNORMAL
EOSINOPHILS ABSOLUTE: 0.4 K/CU MM
EOSINOPHILS RELATIVE PERCENT: 5.1 % (ref 0–3)
GFR SERPL CREATININE-BSD FRML MDRD: >60 ML/MIN/1.73M2
GLUCOSE SERPL-MCNC: 98 MG/DL (ref 70–99)
HCT VFR BLD CALC: 42.6 % (ref 37–47)
HEMOGLOBIN: 14.3 GM/DL (ref 12.5–16)
IMMATURE NEUTROPHIL %: 0.1 % (ref 0–0.43)
LYMPHOCYTES ABSOLUTE: 3.8 K/CU MM
LYMPHOCYTES RELATIVE PERCENT: 50.5 % (ref 24–44)
MCH RBC QN AUTO: 31.5 PG (ref 27–31)
MCHC RBC AUTO-ENTMCNC: 33.6 % (ref 32–36)
MCV RBC AUTO: 93.8 FL (ref 78–100)
MONOCYTES ABSOLUTE: 0.9 K/CU MM
MONOCYTES RELATIVE PERCENT: 11.2 % (ref 0–4)
PDW BLD-RTO: 13 % (ref 11.7–14.9)
PLATELET # BLD: 231 K/CU MM (ref 140–440)
PMV BLD AUTO: 9.4 FL (ref 7.5–11.1)
POTASSIUM SERPL-SCNC: 4.2 MMOL/L (ref 3.5–5.1)
RBC # BLD: 4.54 M/CU MM (ref 4.2–5.4)
SEGMENTED NEUTROPHILS ABSOLUTE COUNT: 2.5 K/CU MM
SEGMENTED NEUTROPHILS RELATIVE PERCENT: 32.2 % (ref 36–66)
SODIUM BLD-SCNC: 142 MMOL/L (ref 135–145)
TOTAL IMMATURE NEUTOROPHIL: 0.01 K/CU MM
WBC # BLD: 7.6 K/CU MM (ref 4–10.5)

## 2023-07-27 PROCEDURE — 36415 COLL VENOUS BLD VENIPUNCTURE: CPT

## 2023-07-27 PROCEDURE — 80048 BASIC METABOLIC PNL TOTAL CA: CPT

## 2023-07-27 PROCEDURE — 85025 COMPLETE CBC W/AUTO DIFF WBC: CPT

## 2023-07-28 ENCOUNTER — TELEPHONE (OUTPATIENT)
Dept: INTERNAL MEDICINE CLINIC | Age: 88
End: 2023-07-28

## 2023-07-28 NOTE — TELEPHONE ENCOUNTER
Gayle Jensen called back regarding kade lab results. Voiced an understanding. Did not want a referral for hematologist at this time. Wanted to wait till her 3 month f/u appt to talk more depth about this. Please advise. Thank you .

## 2023-07-31 ENCOUNTER — TELEPHONE (OUTPATIENT)
Dept: INTERNAL MEDICINE CLINIC | Age: 88
End: 2023-07-31

## 2023-07-31 NOTE — TELEPHONE ENCOUNTER
[address] contacted office and states to get approved for her Pre OP - We would need to addend her office assessment note to state she is cleared by our office with the exception of her clearance needed from her cardiologist, pulmonologist, and neurologist.

## 2023-07-31 NOTE — TELEPHONE ENCOUNTER
Eye center contacted office and reports that if they can get this addended today then they would still be able to do the cataract surgery tomorrow.

## 2023-07-31 NOTE — TELEPHONE ENCOUNTER
Daughter Phi Patrick  called stated that mother is cleared now for surgery disregard message from previously

## 2023-07-31 NOTE — TELEPHONE ENCOUNTER
Notes re-faxed per Dr. Dennis Ferguson request to [facility] at [phone] with fax confirmation.  (Scanned)

## 2023-07-31 NOTE — TELEPHONE ENCOUNTER
Patient will not be having cataract surgery - wants to know if she should start back eliquis tomorrow

## 2023-08-02 DIAGNOSIS — D72.820 LYMPHOCYTOSIS: Primary | ICD-10-CM

## 2023-08-09 RX ORDER — MECLIZINE HCL 12.5 MG/1
12.5 TABLET ORAL DAILY PRN
Qty: 30 TABLET | Refills: 1 | Status: SHIPPED | OUTPATIENT
Start: 2023-08-09

## 2023-08-25 PROBLEM — Z01.818 PRE-OP EXAM: Status: RESOLVED | Noted: 2023-07-26 | Resolved: 2023-08-25

## 2023-09-27 ENCOUNTER — HOSPITAL ENCOUNTER (OUTPATIENT)
Dept: INFUSION THERAPY | Age: 88
Discharge: HOME | End: 2023-09-27
Payer: MEDICARE

## 2023-09-27 ENCOUNTER — INITIAL CONSULT (OUTPATIENT)
Dept: ONCOLOGY | Age: 88
End: 2023-09-27
Payer: MEDICARE

## 2023-09-27 VITALS
OXYGEN SATURATION: 98 % | HEIGHT: 69 IN | TEMPERATURE: 97.8 F | WEIGHT: 162 LBS | HEART RATE: 78 BPM | BODY MASS INDEX: 23.99 KG/M2 | SYSTOLIC BLOOD PRESSURE: 108 MMHG | DIASTOLIC BLOOD PRESSURE: 59 MMHG | RESPIRATION RATE: 16 BRPM

## 2023-09-27 DIAGNOSIS — D72.820 LYMPHOCYTOSIS: Primary | ICD-10-CM

## 2023-09-27 DIAGNOSIS — D72.820 LYMPHOCYTOSIS: ICD-10-CM

## 2023-09-27 LAB
BASOPHILS ABSOLUTE: 0 K/CU MM
BASOPHILS RELATIVE PERCENT: 0.2 % (ref 0–1)
DIFFERENTIAL TYPE: ABNORMAL
EOSINOPHILS ABSOLUTE: 0.3 K/CU MM
EOSINOPHILS RELATIVE PERCENT: 2.8 % (ref 0–3)
ERYTHROCYTE SEDIMENTATION RATE: 4 MM/HR (ref 0–30)
HCT VFR BLD CALC: 43.7 % (ref 37–47)
HEMOGLOBIN: 14.4 GM/DL (ref 12.5–16)
LACTATE DEHYDROGENASE: 213 IU/L (ref 120–246)
LYMPHOCYTES ABSOLUTE: 3.8 K/CU MM
LYMPHOCYTES RELATIVE PERCENT: 41.4 % (ref 24–44)
MCH RBC QN AUTO: 31.2 PG (ref 27–31)
MCHC RBC AUTO-ENTMCNC: 33 % (ref 32–36)
MCV RBC AUTO: 94.8 FL (ref 78–100)
MONOCYTES ABSOLUTE: 1.2 K/CU MM
MONOCYTES RELATIVE PERCENT: 13.2 % (ref 0–4)
PDW BLD-RTO: 13.4 % (ref 11.7–14.9)
PLATELET # BLD: 203 K/CU MM (ref 140–440)
PMV BLD AUTO: 9.7 FL (ref 7.5–11.1)
RBC # BLD: 4.61 M/CU MM (ref 4.2–5.4)
SEGMENTED NEUTROPHILS ABSOLUTE COUNT: 3.9 K/CU MM
SEGMENTED NEUTROPHILS RELATIVE PERCENT: 42.4 % (ref 36–66)
WBC # BLD: 9.3 K/CU MM (ref 4–10.5)

## 2023-09-27 PROCEDURE — 99211 OFF/OP EST MAY X REQ PHY/QHP: CPT

## 2023-09-27 PROCEDURE — G8427 DOCREV CUR MEDS BY ELIG CLIN: HCPCS | Performed by: INTERNAL MEDICINE

## 2023-09-27 PROCEDURE — 1090F PRES/ABSN URINE INCON ASSESS: CPT | Performed by: INTERNAL MEDICINE

## 2023-09-27 PROCEDURE — G8420 CALC BMI NORM PARAMETERS: HCPCS | Performed by: INTERNAL MEDICINE

## 2023-09-27 PROCEDURE — 86038 ANTINUCLEAR ANTIBODIES: CPT

## 2023-09-27 PROCEDURE — 36415 COLL VENOUS BLD VENIPUNCTURE: CPT

## 2023-09-27 PROCEDURE — 83615 LACTATE (LD) (LDH) ENZYME: CPT

## 2023-09-27 PROCEDURE — 85652 RBC SED RATE AUTOMATED: CPT

## 2023-09-27 PROCEDURE — 1123F ACP DISCUSS/DSCN MKR DOCD: CPT | Performed by: INTERNAL MEDICINE

## 2023-09-27 PROCEDURE — 99204 OFFICE O/P NEW MOD 45 MIN: CPT | Performed by: INTERNAL MEDICINE

## 2023-09-27 PROCEDURE — 85025 COMPLETE CBC W/AUTO DIFF WBC: CPT

## 2023-09-27 PROCEDURE — 1036F TOBACCO NON-USER: CPT | Performed by: INTERNAL MEDICINE

## 2023-09-27 NOTE — PROGRESS NOTES
Patient Name:  Soledad Osullivan  Patient :  1935  Patient MRN:  [id number]     Primary Oncologist: Bob Bar MD  Referring Provider: Alyssa Wharton MD     Date of Service: 2023      Reason for Consult: To evaluate the patient with lymphocytosis. Chief Complaint:    Chief Complaint   Patient presents with    New Patient     Patient Active Problem List:     Gait disturbance     Persistent atrial fibrillation ([address])     Dementia without behavioral disturbance (HCC)     Chronic midline low back pain without sciatica     Acquired hypothyroidism     Mixed hyperlipidemia     History of TIA (transient ischemic attack)     Major depressive disorder with single episode, in full remission ([address])     PFO (patent foramen ovale)     Pulmonary nodules     Interstitial lung disease ([address])     Lymphocytosis     Syncope and collapse     History of CVA (cerebrovascular accident)     Bilateral carotid artery stenosis     Esophageal stricture    HPI:   Soledad Osullivan is a 81 y.o. female with medical history significant for hyperlipidemia, hypothyroidism, atrial fibrillation, dementia, h/o TIA, depression, interstitial lung disease, referred to me on 23 for evaluation of her lymphocytosis. She was noted to have relative lymphocytosis (50.7, ALC 3.1) on 3/27/23. Repeat labs on 23 showed persistent relative lymphocytosis (50.5%, ALC 3.8). Her total WBC count, hemoglobin and platelet count were within normal range. She doesn't have B symptoms (unexplained fever, drenching night sweat, >10% weight loss), lymphadenopathy or splenomegaly on physical exam.     Since she has persistent relative lymphocytosis, she was referred to me for further evaluation.       Past Medical History:     Past Medical History:   Diagnosis Date    Acquired hypothyroidism 2019    Patient is taking Synthroid    Arthritis     Chronic midline low back pain without sciatica 2019    Dementia without behavioral disturbance ([address])

## 2023-09-27 NOTE — PROGRESS NOTES
MA Rooming Questions  Patient: Danielle Lowe  MRN: [id number]    Date: 9/27/2023      NP Hurtis Litten, CMA

## 2023-09-30 LAB — NUCLEAR IGG SER QL IA: NORMAL

## 2023-10-04 ENCOUNTER — OFFICE VISIT (OUTPATIENT)
Dept: INTERNAL MEDICINE CLINIC | Age: 88
End: 2023-10-04
Payer: MEDICARE

## 2023-10-04 VITALS
WEIGHT: 163 LBS | DIASTOLIC BLOOD PRESSURE: 72 MMHG | BODY MASS INDEX: 24.14 KG/M2 | RESPIRATION RATE: 16 BRPM | SYSTOLIC BLOOD PRESSURE: 114 MMHG | OXYGEN SATURATION: 97 % | HEIGHT: 69 IN | HEART RATE: 66 BPM

## 2023-10-04 DIAGNOSIS — F03.918 DEMENTIA WITH OTHER BEHAVIORAL DISTURBANCE, UNSPECIFIED DEMENTIA SEVERITY, UNSPECIFIED DEMENTIA TYPE (HCC): ICD-10-CM

## 2023-10-04 DIAGNOSIS — J84.9 INTERSTITIAL LUNG DISEASE (HCC): ICD-10-CM

## 2023-10-04 DIAGNOSIS — I48.19 PERSISTENT ATRIAL FIBRILLATION (HCC): ICD-10-CM

## 2023-10-04 DIAGNOSIS — F03.90 DEMENTIA WITHOUT BEHAVIORAL DISTURBANCE (HCC): ICD-10-CM

## 2023-10-04 DIAGNOSIS — G89.29 CHRONIC MIDLINE LOW BACK PAIN WITHOUT SCIATICA: ICD-10-CM

## 2023-10-04 DIAGNOSIS — K22.2 ESOPHAGEAL STRICTURE: ICD-10-CM

## 2023-10-04 DIAGNOSIS — Z01.818 PREOP EXAM FOR INTERNAL MEDICINE: Primary | ICD-10-CM

## 2023-10-04 DIAGNOSIS — D72.820 LYMPHOCYTOSIS: ICD-10-CM

## 2023-10-04 DIAGNOSIS — E78.2 MIXED HYPERLIPIDEMIA: ICD-10-CM

## 2023-10-04 DIAGNOSIS — I65.23 BILATERAL CAROTID ARTERY STENOSIS: ICD-10-CM

## 2023-10-04 DIAGNOSIS — F32.5 MAJOR DEPRESSIVE DISORDER WITH SINGLE EPISODE, IN FULL REMISSION (HCC): ICD-10-CM

## 2023-10-04 DIAGNOSIS — Z86.73 HISTORY OF CVA (CEREBROVASCULAR ACCIDENT): ICD-10-CM

## 2023-10-04 DIAGNOSIS — R26.9 GAIT DISTURBANCE: ICD-10-CM

## 2023-10-04 DIAGNOSIS — M54.50 CHRONIC MIDLINE LOW BACK PAIN WITHOUT SCIATICA: ICD-10-CM

## 2023-10-04 DIAGNOSIS — E03.9 ACQUIRED HYPOTHYROIDISM: ICD-10-CM

## 2023-10-04 PROCEDURE — G8484 FLU IMMUNIZE NO ADMIN: HCPCS | Performed by: INTERNAL MEDICINE

## 2023-10-04 PROCEDURE — G8420 CALC BMI NORM PARAMETERS: HCPCS | Performed by: INTERNAL MEDICINE

## 2023-10-04 PROCEDURE — 99214 OFFICE O/P EST MOD 30 MIN: CPT | Performed by: INTERNAL MEDICINE

## 2023-10-04 PROCEDURE — 1123F ACP DISCUSS/DSCN MKR DOCD: CPT | Performed by: INTERNAL MEDICINE

## 2023-10-04 PROCEDURE — G8427 DOCREV CUR MEDS BY ELIG CLIN: HCPCS | Performed by: INTERNAL MEDICINE

## 2023-10-04 PROCEDURE — 1036F TOBACCO NON-USER: CPT | Performed by: INTERNAL MEDICINE

## 2023-10-04 PROCEDURE — 1090F PRES/ABSN URINE INCON ASSESS: CPT | Performed by: INTERNAL MEDICINE

## 2023-10-04 RX ORDER — NYSTATIN 100000 [USP'U]/G
POWDER TOPICAL
Qty: 1 EACH | Refills: 3 | Status: SHIPPED | OUTPATIENT
Start: 2023-10-04

## 2023-10-04 RX ORDER — VITAMIN E 268 MG
400 CAPSULE ORAL DAILY
Qty: 90 CAPSULE | Refills: 1 | Status: SHIPPED | OUTPATIENT
Start: 2023-10-04

## 2023-10-04 RX ORDER — LEVOTHYROXINE SODIUM 0.15 MG/1
TABLET ORAL
Qty: 90 TABLET | Refills: 1 | Status: SHIPPED | OUTPATIENT
Start: 2023-10-04

## 2023-10-04 RX ORDER — DIGOXIN 125 MCG
125 TABLET ORAL DAILY
Qty: 90 TABLET | Refills: 1 | Status: SHIPPED | OUTPATIENT
Start: 2023-10-04

## 2023-10-04 RX ORDER — RIVASTIGMINE TARTRATE 3 MG/1
3 CAPSULE ORAL DAILY
Qty: 90 CAPSULE | Refills: 1 | Status: CANCELLED | OUTPATIENT
Start: 2023-10-04

## 2023-10-04 RX ORDER — MECLIZINE HCL 12.5 MG/1
12.5 TABLET ORAL DAILY PRN
Qty: 30 TABLET | Refills: 1 | Status: SHIPPED | OUTPATIENT
Start: 2023-10-04

## 2023-10-04 RX ORDER — PANTOPRAZOLE SODIUM 40 MG/1
40 TABLET, DELAYED RELEASE ORAL DAILY
Qty: 30 TABLET | Refills: 2 | Status: SHIPPED | OUTPATIENT
Start: 2023-10-04

## 2023-10-04 RX ORDER — MEMANTINE HYDROCHLORIDE 10 MG/1
10 TABLET ORAL 2 TIMES DAILY
Qty: 180 TABLET | Refills: 1 | Status: SHIPPED | OUTPATIENT
Start: 2023-10-04

## 2023-10-04 RX ORDER — SIMVASTATIN 40 MG
40 TABLET ORAL NIGHTLY
Qty: 90 TABLET | Refills: 1 | Status: SHIPPED | OUTPATIENT
Start: 2023-10-04

## 2023-10-04 RX ORDER — CHOLECALCIFEROL (VITAMIN D3) 125 MCG
CAPSULE ORAL
Qty: 90 TABLET | Refills: 1 | Status: SHIPPED | OUTPATIENT
Start: 2023-10-04

## 2023-10-04 NOTE — PROGRESS NOTES
08:40 AM    K 4.2 07/27/2023 08:40 AM    ALKPHOS 85 03/24/2023 03:00 PM    ALT 20 03/24/2023 03:00 PM    AST 28 03/24/2023 03:00 PM    GLUCOSE 98 07/27/2023 08:40 AM    GLUF 134 07/03/2014 10:20 PM     PT/INR:   Lab Results   Component Value Date/Time    INR 1.61 03/25/2023 04:57 AM     A1C:   Lab Results   Component Value Date    LABA1C 5.8 03/26/2023           Victor Hugo Swenson MD, 10/4/2023 , 3:35 PM

## 2023-10-06 ENCOUNTER — HOSPITAL ENCOUNTER (OUTPATIENT)
Dept: INFUSION THERAPY | Age: 88
Discharge: HOME | End: 2023-10-06
Payer: MEDICARE

## 2023-10-06 ENCOUNTER — OFFICE VISIT (OUTPATIENT)
Dept: ONCOLOGY | Age: 88
End: 2023-10-06
Payer: MEDICARE

## 2023-10-06 VITALS
BODY MASS INDEX: 24.59 KG/M2 | SYSTOLIC BLOOD PRESSURE: 120 MMHG | WEIGHT: 166 LBS | TEMPERATURE: 97.7 F | HEART RATE: 58 BPM | HEIGHT: 69 IN | DIASTOLIC BLOOD PRESSURE: 58 MMHG | OXYGEN SATURATION: 96 %

## 2023-10-06 DIAGNOSIS — D72.820 LYMPHOCYTOSIS: Primary | ICD-10-CM

## 2023-10-06 PROCEDURE — 1123F ACP DISCUSS/DSCN MKR DOCD: CPT | Performed by: INTERNAL MEDICINE

## 2023-10-06 PROCEDURE — G8484 FLU IMMUNIZE NO ADMIN: HCPCS | Performed by: INTERNAL MEDICINE

## 2023-10-06 PROCEDURE — G8427 DOCREV CUR MEDS BY ELIG CLIN: HCPCS | Performed by: INTERNAL MEDICINE

## 2023-10-06 PROCEDURE — 1036F TOBACCO NON-USER: CPT | Performed by: INTERNAL MEDICINE

## 2023-10-06 PROCEDURE — 99213 OFFICE O/P EST LOW 20 MIN: CPT | Performed by: INTERNAL MEDICINE

## 2023-10-06 PROCEDURE — 99211 OFF/OP EST MAY X REQ PHY/QHP: CPT

## 2023-10-06 PROCEDURE — G8420 CALC BMI NORM PARAMETERS: HCPCS | Performed by: INTERNAL MEDICINE

## 2023-10-06 PROCEDURE — 1090F PRES/ABSN URINE INCON ASSESS: CPT | Performed by: INTERNAL MEDICINE

## 2023-10-06 NOTE — PROGRESS NOTES
MA Rooming Questions  Patient: May Jose  MRN: [id number]    Date: 10/6/2023        1. Do you have any new issues?   no         2. Do you need any refills on medications?    no    3. Have you had any imaging done since your last visit?   no    4. Have you been hospitalized or seen in the emergency room since your last visit here?   no    5. Did the patient have a depression screening completed today?  No    No data recorded     PHQ-9 Given to (if applicable):               PHQ-9 Score (if applicable):                     [] Positive     []  Negative              Does question #9 need addressed (if applicable)                     [] Yes    []  No               Yifan Camacho CMA

## 2023-10-06 NOTE — PROGRESS NOTES
Patient Name:  Talib William  Patient :  1935  Patient MRN:  [id number]     Primary Oncologist: Maia Rogers MD  Referring Provider: Supa Bales MD     Date of Service: 10/6/2023     Chief Complaint:    Chief Complaint   Patient presents with    Follow-up     Patient Active Problem List:     Gait disturbance     Persistent atrial fibrillation ([address])     Dementia without behavioral disturbance (HCC)     Chronic midline low back pain without sciatica     Acquired hypothyroidism     Mixed hyperlipidemia     History of TIA (transient ischemic attack)     Major depressive disorder with single episode, in full remission ([address])     PFO (patent foramen ovale)     Pulmonary nodules     Interstitial lung disease ([address])     Lymphocytosis     Syncope and collapse     History of CVA (cerebrovascular accident)     Bilateral carotid artery stenosis     Esophageal stricture    HPI:   Talib William is a 81 y.o. female with medical history significant for hyperlipidemia, hypothyroidism, atrial fibrillation, dementia, h/o TIA, depression, interstitial lung disease, referred to me on 23 for evaluation of her lymphocytosis. She was noted to have relative lymphocytosis (50.7, ALC 3.1) on 3/27/23. Repeat labs on 23 showed persistent relative lymphocytosis (50.5%, ALC 3.8). Her total WBC count, hemoglobin and platelet count were within normal range. She doesn't have B symptoms (unexplained fever, drenching night sweat, >10% weight loss), lymphadenopathy or splenomegaly on physical exam.     Since she has persistent relative lymphocytosis, she was referred to me for further evaluation. Laboratory work ups done on 23 showed normal WBC (9.3), lymphocyte 41.4% and absolute lymphocyte count 3.8. LDH, DAO and flow cytometry were within normal range. It didn't show any monoclonal lymphocytosis. On 2023, she presented to me for follow up. Reviewed with her findings on labs.      She doesn't have

## 2023-10-09 ENCOUNTER — CLINICAL DOCUMENTATION (OUTPATIENT)
Dept: ONCOLOGY | Age: 88
End: 2023-10-09

## 2023-10-09 NOTE — PROGRESS NOTES
Walter Martin called and requesting clearance for cataract surgery schedule tomorrow 10/10/203. Dr. Almeida Certain aware and agreeable per MA. Oncology Clearance letter for procedure faxed to [phone] as instructed.

## 2023-10-20 RX ORDER — VITAMIN E 268 MG
400 CAPSULE ORAL DAILY
Qty: 90 CAPSULE | Refills: 1 | OUTPATIENT
Start: 2023-10-20

## 2023-11-03 PROBLEM — Z01.818 PREOP EXAM FOR INTERNAL MEDICINE: Status: RESOLVED | Noted: 2023-07-26 | Resolved: 2023-11-03

## 2023-11-06 LAB — COMMENT: NORMAL

## 2024-01-10 ENCOUNTER — HOSPITAL ENCOUNTER (OUTPATIENT)
Age: 89
Setting detail: SPECIMEN
Discharge: HOME | End: 2024-01-10
Payer: MEDICARE

## 2024-01-10 LAB
ALBUMIN SERPL-MCNC: 3.7 GM/DL (ref 3.4–5)
ALP BLD-CCNC: 71 IU/L (ref 40–129)
ALT SERPL-CCNC: 11 U/L (ref 10–40)
ANION GAP SERPL CALCULATED.3IONS-SCNC: 10 MMOL/L (ref 7–16)
AST SERPL-CCNC: 18 IU/L (ref 15–37)
BILIRUB SERPL-MCNC: 0.5 MG/DL (ref 0–1)
BUN SERPL-MCNC: 13 MG/DL (ref 6–23)
CALCIUM SERPL-MCNC: 9 MG/DL (ref 8.3–10.6)
CHLORIDE BLD-SCNC: 106 MMOL/L (ref 99–110)
CHOLEST SERPL-MCNC: 139 MG/DL
CO2: 28 MMOL/L (ref 21–32)
CREAT SERPL-MCNC: 0.7 MG/DL (ref 0.6–1.1)
GFR SERPL CREATININE-BSD FRML MDRD: >60 ML/MIN/1.73M2
GLUCOSE SERPL-MCNC: 113 MG/DL (ref 70–99)
HDLC SERPL-MCNC: 34 MG/DL
LDLC SERPL CALC-MCNC: 75 MG/DL
POTASSIUM SERPL-SCNC: 4.4 MMOL/L (ref 3.5–5.1)
SODIUM BLD-SCNC: 144 MMOL/L (ref 135–145)
TOTAL PROTEIN: 6.3 GM/DL (ref 6.4–8.2)
TRIGL SERPL-MCNC: 150 MG/DL
TSH SERPL DL<=0.005 MIU/L-ACNC: 0.2 UIU/ML (ref 0.27–4.2)

## 2024-01-10 PROCEDURE — 80053 COMPREHEN METABOLIC PANEL: CPT

## 2024-01-10 PROCEDURE — 84443 ASSAY THYROID STIM HORMONE: CPT

## 2024-01-10 PROCEDURE — 80061 LIPID PANEL: CPT

## 2024-01-17 ENCOUNTER — TELEPHONE (OUTPATIENT)
Dept: INTERNAL MEDICINE CLINIC | Age: 89
End: 2024-01-17

## 2024-01-17 NOTE — TELEPHONE ENCOUNTER
Patient daughter contacted office wanting to do a vv for appt on 1/18/24. Patient did not have mychart and wanted a telephone appt. I advised them that we no longer did those appts. Patient daughter stated she did not want her out in the cold and ice and they did not want to sign up for mychart. She did state that she would talk to her sister and call back in next few days to figure out when to bring her in. 1/30/24 date was offered but daughter declined.

## 2024-01-26 ENCOUNTER — HOSPITAL ENCOUNTER (EMERGENCY)
Age: 89
Discharge: HOME | End: 2024-01-26
Attending: EMERGENCY MEDICINE
Payer: MEDICARE

## 2024-01-26 ENCOUNTER — APPOINTMENT (OUTPATIENT)
Dept: GENERAL RADIOLOGY | Age: 89
End: 2024-01-26
Attending: EMERGENCY MEDICINE
Payer: MEDICARE

## 2024-01-26 VITALS
WEIGHT: 166 LBS | HEIGHT: 69 IN | RESPIRATION RATE: 16 BRPM | DIASTOLIC BLOOD PRESSURE: 74 MMHG | BODY MASS INDEX: 24.59 KG/M2 | HEART RATE: 72 BPM | OXYGEN SATURATION: 95 % | TEMPERATURE: 97.9 F | SYSTOLIC BLOOD PRESSURE: 124 MMHG

## 2024-01-26 DIAGNOSIS — S22.32XA CLOSED FRACTURE OF ONE RIB OF LEFT SIDE, INITIAL ENCOUNTER: Primary | ICD-10-CM

## 2024-01-26 DIAGNOSIS — S22.42XA CLOSED FRACTURE OF MULTIPLE RIBS OF LEFT SIDE, INITIAL ENCOUNTER: ICD-10-CM

## 2024-01-26 DIAGNOSIS — N30.00 ACUTE CYSTITIS WITHOUT HEMATURIA: ICD-10-CM

## 2024-01-26 LAB
BACTERIA: ABNORMAL /HPF
BILIRUBIN URINE: ABNORMAL MG/DL
BLOOD, URINE: ABNORMAL
CAST TYPE: ABNORMAL /HPF
CLARITY: CLEAR
COLOR: YELLOW
CRYSTAL TYPE: NEGATIVE /HPF
EPITHELIAL CELLS, UA: 10 /HPF
GLUCOSE, URINE: NEGATIVE MG/DL
KETONES, URINE: NEGATIVE MG/DL
LEUKOCYTE ESTERASE, URINE: ABNORMAL
NITRITE URINE, QUANTITATIVE: POSITIVE
PH, URINE: 6 (ref 5–8)
PROTEIN UA: ABNORMAL MG/DL
RBC URINE: NEGATIVE /HPF (ref 0–6)
SPECIFIC GRAVITY UA: 1.02 (ref 1–1.03)
UROBILINOGEN, URINE: 1 MG/DL (ref 0.2–1)
WBC UA: 10 /HPF (ref 0–5)

## 2024-01-26 PROCEDURE — 94150 VITAL CAPACITY TEST: CPT

## 2024-01-26 PROCEDURE — 81001 URINALYSIS AUTO W/SCOPE: CPT

## 2024-01-26 PROCEDURE — 99284 EMERGENCY DEPT VISIT MOD MDM: CPT

## 2024-01-26 PROCEDURE — 6370000000 HC RX 637 (ALT 250 FOR IP): Performed by: EMERGENCY MEDICINE

## 2024-01-26 PROCEDURE — 71101 X-RAY EXAM UNILAT RIBS/CHEST: CPT

## 2024-01-26 RX ORDER — DOCUSATE SODIUM 100 MG/1
100 CAPSULE, LIQUID FILLED ORAL 2 TIMES DAILY PRN
Qty: 20 CAPSULE | Refills: 0 | Status: SHIPPED | OUTPATIENT
Start: 2024-01-26

## 2024-01-26 RX ORDER — IBUPROFEN 600 MG/1
600 TABLET ORAL EVERY 6 HOURS PRN
Qty: 20 TABLET | Refills: 0 | Status: SHIPPED | OUTPATIENT
Start: 2024-01-26 | End: 2024-02-25

## 2024-01-26 RX ORDER — LIDOCAINE 4 G/G
1 PATCH TOPICAL DAILY
Qty: 30 PATCH | Refills: 0 | Status: SHIPPED | OUTPATIENT
Start: 2024-01-26 | End: 2024-02-25

## 2024-01-26 RX ORDER — HYDROCODONE BITARTRATE AND ACETAMINOPHEN 5; 325 MG/1; MG/1
1 TABLET ORAL EVERY 4 HOURS PRN
Qty: 18 TABLET | Refills: 0 | Status: SHIPPED | OUTPATIENT
Start: 2024-01-26 | End: 2024-01-29

## 2024-01-26 RX ORDER — CEPHALEXIN 500 MG/1
500 CAPSULE ORAL ONCE
Status: COMPLETED | OUTPATIENT
Start: 2024-01-26 | End: 2024-01-26

## 2024-01-26 RX ORDER — CEPHALEXIN 500 MG/1
500 CAPSULE ORAL 2 TIMES DAILY
Qty: 14 CAPSULE | Refills: 0 | Status: SHIPPED | OUTPATIENT
Start: 2024-01-26 | End: 2024-02-02

## 2024-01-26 RX ADMIN — CEPHALEXIN 500 MG: 500 CAPSULE ORAL at 16:40

## 2024-01-26 ASSESSMENT — LIFESTYLE VARIABLES
HOW OFTEN DO YOU HAVE A DRINK CONTAINING ALCOHOL: NEVER
HOW MANY STANDARD DRINKS CONTAINING ALCOHOL DO YOU HAVE ON A TYPICAL DAY: PATIENT DOES NOT DRINK

## 2024-01-26 ASSESSMENT — PAIN DESCRIPTION - LOCATION: LOCATION: RIB CAGE

## 2024-01-26 ASSESSMENT — PAIN DESCRIPTION - ORIENTATION: ORIENTATION: LEFT

## 2024-01-26 NOTE — ED PROVIDER NOTES
for UTI  Imaging interpreted and reviewed by myself: CXR showed 2 rib fractures without evidence of effusion or pneumothorax    Patient was given the following medications:  Medications   cephALEXin (KEFLEX) capsule 500 mg (has no administration in time range)       Disposition Discussion:  Patient is not hypoxic or in significant respiratory distress.  Pain is adequately controlled at this time.  Will treat with symptomatic care.  She states that Tylenol at home is not really helping so we will start her on stronger medication for home.  She has been taking Imodium for her diarrhea but I educated her that the pain medication may constipate her so I will give her as needed medication for constipation.  Will also start her on antibiotic for the UTI.  Return precautions were provided.  All questions and concerns answered.    Is this patient to be included in the SEP-1 core measure due to severe sepsis or septic shock? No Exclusion criteria - the patient is NOT to be included for SEP-1 Core Measure due to: 2+ SIRS criteria are not met     Disposition: Discharged     I am the primary physician of record    Clinical Impression:  1. Closed fracture of one rib of left side, initial encounter    2. Closed fracture of multiple ribs of left side, initial encounter    3. Acute cystitis without hematuria      Disposition referral (if applicable):  Xiomy Fowler MD  [address] [facility]  [address]  [place] [address]  [phone]    Schedule an appointment as soon as possible for a visit       [facility]  [address] [place] [place] [address]  [phone]    If symptoms worsen    Disposition medications (if applicable):  New Prescriptions    CEPHALEXIN (KEFLEX) 500 MG CAPSULE    Take 1 capsule by mouth 2 times daily for 7 days    DOCUSATE SODIUM (COLACE) 100 MG CAPSULE    Take 1 capsule by mouth 2 times daily as needed for Constipation (pain meds can cause constipation)    HYDROCODONE-ACETAMINOPHEN (NORCO)

## 2024-02-05 ENCOUNTER — OFFICE VISIT (OUTPATIENT)
Dept: INTERNAL MEDICINE CLINIC | Age: 89
End: 2024-02-05
Payer: MEDICARE

## 2024-02-05 VITALS
RESPIRATION RATE: 18 BRPM | HEART RATE: 70 BPM | SYSTOLIC BLOOD PRESSURE: 130 MMHG | DIASTOLIC BLOOD PRESSURE: 72 MMHG | TEMPERATURE: 97.2 F | WEIGHT: 166.2 LBS | BODY MASS INDEX: 24.54 KG/M2 | OXYGEN SATURATION: 96 %

## 2024-02-05 DIAGNOSIS — F03.918 DEMENTIA WITH OTHER BEHAVIORAL DISTURBANCE, UNSPECIFIED DEMENTIA SEVERITY, UNSPECIFIED DEMENTIA TYPE (HCC): ICD-10-CM

## 2024-02-05 DIAGNOSIS — Z86.73 HISTORY OF CVA (CEREBROVASCULAR ACCIDENT): ICD-10-CM

## 2024-02-05 DIAGNOSIS — R42 DIZZINESS: ICD-10-CM

## 2024-02-05 DIAGNOSIS — D68.69 SECONDARY HYPERCOAGULABLE STATE (HCC): ICD-10-CM

## 2024-02-05 DIAGNOSIS — F32.5 MAJOR DEPRESSIVE DISORDER WITH SINGLE EPISODE, IN FULL REMISSION (HCC): ICD-10-CM

## 2024-02-05 DIAGNOSIS — J84.9 INTERSTITIAL LUNG DISEASE (HCC): ICD-10-CM

## 2024-02-05 DIAGNOSIS — F03.90 DEMENTIA WITHOUT BEHAVIORAL DISTURBANCE (HCC): ICD-10-CM

## 2024-02-05 DIAGNOSIS — I48.19 PERSISTENT ATRIAL FIBRILLATION (HCC): ICD-10-CM

## 2024-02-05 DIAGNOSIS — I48.19 PERSISTENT ATRIAL FIBRILLATION (HCC): Primary | ICD-10-CM

## 2024-02-05 DIAGNOSIS — E03.9 ACQUIRED HYPOTHYROIDISM: ICD-10-CM

## 2024-02-05 DIAGNOSIS — Z86.73 HISTORY OF TIA (TRANSIENT ISCHEMIC ATTACK): ICD-10-CM

## 2024-02-05 DIAGNOSIS — R41.82 ALTERED MENTAL STATUS, UNSPECIFIED ALTERED MENTAL STATUS TYPE: ICD-10-CM

## 2024-02-05 DIAGNOSIS — E78.2 MIXED HYPERLIPIDEMIA: ICD-10-CM

## 2024-02-05 PROBLEM — R55 SYNCOPE AND COLLAPSE: Status: RESOLVED | Noted: 2023-03-24 | Resolved: 2024-02-05

## 2024-02-05 PROCEDURE — G8427 DOCREV CUR MEDS BY ELIG CLIN: HCPCS | Performed by: INTERNAL MEDICINE

## 2024-02-05 PROCEDURE — 1036F TOBACCO NON-USER: CPT | Performed by: INTERNAL MEDICINE

## 2024-02-05 PROCEDURE — 1123F ACP DISCUSS/DSCN MKR DOCD: CPT | Performed by: INTERNAL MEDICINE

## 2024-02-05 PROCEDURE — 99214 OFFICE O/P EST MOD 30 MIN: CPT | Performed by: INTERNAL MEDICINE

## 2024-02-05 PROCEDURE — G8420 CALC BMI NORM PARAMETERS: HCPCS | Performed by: INTERNAL MEDICINE

## 2024-02-05 PROCEDURE — 1090F PRES/ABSN URINE INCON ASSESS: CPT | Performed by: INTERNAL MEDICINE

## 2024-02-05 PROCEDURE — G8484 FLU IMMUNIZE NO ADMIN: HCPCS | Performed by: INTERNAL MEDICINE

## 2024-02-05 RX ORDER — CHOLECALCIFEROL (VITAMIN D3) 125 MCG
CAPSULE ORAL
Qty: 90 TABLET | Refills: 1 | Status: SHIPPED | OUTPATIENT
Start: 2024-02-05

## 2024-02-05 RX ORDER — NYSTATIN 100000 [USP'U]/G
POWDER TOPICAL
Qty: 1 EACH | Refills: 3 | Status: SHIPPED | OUTPATIENT
Start: 2024-02-05

## 2024-02-05 RX ORDER — SIMVASTATIN 40 MG
40 TABLET ORAL NIGHTLY
Qty: 90 TABLET | Refills: 1 | Status: SHIPPED | OUTPATIENT
Start: 2024-02-05

## 2024-02-05 RX ORDER — VITAMIN E 268 MG
400 CAPSULE ORAL DAILY
Qty: 90 CAPSULE | Refills: 1 | Status: SHIPPED | OUTPATIENT
Start: 2024-02-05

## 2024-02-05 RX ORDER — MEMANTINE HYDROCHLORIDE 10 MG/1
10 TABLET ORAL 2 TIMES DAILY
Qty: 180 TABLET | Refills: 1 | Status: SHIPPED | OUTPATIENT
Start: 2024-02-05

## 2024-02-05 RX ORDER — PANTOPRAZOLE SODIUM 40 MG/1
40 TABLET, DELAYED RELEASE ORAL DAILY
Qty: 30 TABLET | Refills: 2 | Status: SHIPPED | OUTPATIENT
Start: 2024-02-05

## 2024-02-05 RX ORDER — LEVOTHYROXINE SODIUM 0.15 MG/1
TABLET ORAL
Qty: 90 TABLET | Refills: 1 | Status: SHIPPED | OUTPATIENT
Start: 2024-02-05

## 2024-02-05 RX ORDER — MECLIZINE HCL 12.5 MG/1
12.5 TABLET ORAL DAILY PRN
Qty: 30 TABLET | Refills: 1 | Status: SHIPPED | OUTPATIENT
Start: 2024-02-05

## 2024-02-05 RX ORDER — DIGOXIN 125 MCG
125 TABLET ORAL DAILY
Qty: 90 TABLET | Refills: 1 | Status: SHIPPED | OUTPATIENT
Start: 2024-02-05

## 2024-02-05 ASSESSMENT — PATIENT HEALTH QUESTIONNAIRE - PHQ9
2. FEELING DOWN, DEPRESSED OR HOPELESS: 1
5. POOR APPETITE OR OVEREATING: 0
9. THOUGHTS THAT YOU WOULD BE BETTER OFF DEAD, OR OF HURTING YOURSELF: 2
SUM OF ALL RESPONSES TO PHQ QUESTIONS 1-9: 5
7. TROUBLE CONCENTRATING ON THINGS, SUCH AS READING THE NEWSPAPER OR WATCHING TELEVISION: 0
SUM OF ALL RESPONSES TO PHQ QUESTIONS 1-9: 7
1. LITTLE INTEREST OR PLEASURE IN DOING THINGS: 1
8. MOVING OR SPEAKING SO SLOWLY THAT OTHER PEOPLE COULD HAVE NOTICED. OR THE OPPOSITE, BEING SO FIGETY OR RESTLESS THAT YOU HAVE BEEN MOVING AROUND A LOT MORE THAN USUAL: 0
6. FEELING BAD ABOUT YOURSELF - OR THAT YOU ARE A FAILURE OR HAVE LET YOURSELF OR YOUR FAMILY DOWN: 2
3. TROUBLE FALLING OR STAYING ASLEEP: 0
4. FEELING TIRED OR HAVING LITTLE ENERGY: 1
SUM OF ALL RESPONSES TO PHQ9 QUESTIONS 1 & 2: 2
10. IF YOU CHECKED OFF ANY PROBLEMS, HOW DIFFICULT HAVE THESE PROBLEMS MADE IT FOR YOU TO DO YOUR WORK, TAKE CARE OF THINGS AT HOME, OR GET ALONG WITH OTHER PEOPLE: 2
SUM OF ALL RESPONSES TO PHQ QUESTIONS 1-9: 7
SUM OF ALL RESPONSES TO PHQ QUESTIONS 1-9: 7

## 2024-02-05 ASSESSMENT — COLUMBIA-SUICIDE SEVERITY RATING SCALE - C-SSRS
5. HAVE YOU STARTED TO WORK OUT OR WORKED OUT THE DETAILS OF HOW TO KILL YOURSELF? DO YOU INTEND TO CARRY OUT THIS PLAN?: NO
2. HAVE YOU ACTUALLY HAD ANY THOUGHTS OF KILLING YOURSELF?: YES
1. WITHIN THE PAST MONTH, HAVE YOU WISHED YOU WERE DEAD OR WISHED YOU COULD GO TO SLEEP AND NOT WAKE UP?: YES
4. HAVE YOU HAD THESE THOUGHTS AND HAD SOME INTENTION OF ACTING ON THEM?: NO
3. HAVE YOU BEEN THINKING ABOUT HOW YOU MIGHT KILL YOURSELF?: YES
6. HAVE YOU EVER DONE ANYTHING, STARTED TO DO ANYTHING, OR PREPARED TO DO ANYTHING TO END YOUR LIFE?: NO

## 2024-02-05 NOTE — PROGRESS NOTES
Rupal Carballo  Patient's  is 1935  Seen in office on 2024      SUBJECTIVE:  Rupal mcgee 88 y.o.year old female presents today   Chief Complaint   Patient presents with    Follow-up     Pt is here with her 2 daughters    Pt fell on 24  Patient fell at home on her left side and developed pain in the left rib cage.  She went to the emergency room on 2024 and the x-ray showed fracture of the left 10th and 11th ribs.  Patient stated that is healing now.  She is not short of breath because of that.    Patient states she is feeling dizzy but she was feeling dizzy before the fall also.  At the time of fall she was not dizzy    Patient has atrial fibrillation and sees cardiologist Dr. Pringle.  She is on metoprolol and Eliquis and digoxin.  Denies any palpitations    Patient has hyperlipidemia. Taking medications. No abdominal pain, no nausea or vomiting. No myalgias.    Patient has depression.  Patient and the family states she is more depressed than usual and is requesting if Zoloft can be increased.  She is on Zoloft 50 mg daily and is taking without any side effects  .    Patient states her breathing is stable.  Patient has interstitial lung disease and sees pulmonologist    History of CVA but that is stable.  Patient is taking Eliquis    She does have some difficulty ambulating but she still does her heart shortness    Denies any more dysphagia    Review of Systems    OBJECTIVE: /72 (Site: Right Upper Arm, Position: Sitting, Cuff Size: Medium Adult)   Pulse 70   Temp 97.2 °F (36.2 °C) (Temporal)   Resp 18   Wt 75.4 kg (166 lb 3.2 oz)   SpO2 96%   BMI 24.54 kg/m²     Wt Readings from Last 3 Encounters:   24 75.4 kg (166 lb 3.2 oz)   24 75.3 kg (166 lb)   10/06/23 75.3 kg (166 lb)      GENERAL: - Alert, oriented, pleasant, in no apparent distress.    HEENT: - Conjunctiva pink, no scleral icterus. ENT clear.  NECK: -Supple.  No jugular venous distention noted. No masses

## 2024-02-05 NOTE — TELEPHONE ENCOUNTER
Medication:   Requested Prescriptions     Pending Prescriptions Disp Refills    apixaban (ELIQUIS) 5 MG TABS tablet 180 tablet 1     Sig: TAKE 1 TABLET BY MOUTH 2 TIMES DAILY    Cholecalciferol (VITAMIN D3) 50 MCG (2000 UT) TABS 90 tablet 1     Sig: TAKE ONE TABLET BY MOUTH EVERY DAY    digoxin (LANOXIN) 125 MCG tablet 90 tablet 1     Sig: Take 1 tablet by mouth daily    levothyroxine (SYNTHROID) 150 MCG tablet 90 tablet 1     Sig: TAKE ONE TABLET BY MOUTH EVERY DAY    meclizine (ANTIVERT) 12.5 MG tablet 30 tablet 1     Sig: Take 1 tablet by mouth daily as needed for Dizziness    memantine (NAMENDA) 10 MG tablet 180 tablet 1     Sig: Take 1 tablet by mouth 2 times daily    metoprolol tartrate (LOPRESSOR) 25 MG tablet 60 tablet 3     Sig: Take 0.5 tablets by mouth 2 times daily    nystatin (MYCOSTATIN) 519440 UNIT/GM powder 1 each 3     Sig: Apply 3 times daily.    pantoprazole (PROTONIX) 40 MG tablet 30 tablet 2     Sig: Take 1 tablet by mouth daily    sertraline (ZOLOFT) 50 MG tablet 135 tablet 1     Sig: Take 1.5 tablets by mouth daily TAKE ONE TABLET BY MOUTH EVERY DAY    simvastatin (ZOCOR) 40 MG tablet 90 tablet 1     Sig: Take 1 tablet by mouth nightly    vitamin E 400 UNIT capsule 90 capsule 1     Sig: Take 1 capsule by mouth daily        Last Filled:      Patient Phone Number: [phone] (home)     Last appt: 2/5/2024   Next appt: 6/6/2024    Last OARRS:        No data to display              [facility] Thank you

## 2024-02-05 NOTE — PROGRESS NOTES
Fell on 1/26/24 and broke 2 ribs   Fell because of dizziness   Needs to change pharmacy but dont which pharmacy yet

## 2024-02-07 DIAGNOSIS — F03.90 DEMENTIA WITHOUT BEHAVIORAL DISTURBANCE (HCC): ICD-10-CM

## 2024-02-07 RX ORDER — RIVASTIGMINE TARTRATE 3 MG/1
3 CAPSULE ORAL DAILY
Qty: 90 CAPSULE | Refills: 1 | Status: SHIPPED | OUTPATIENT
Start: 2024-02-07

## 2024-02-12 ENCOUNTER — HOSPITAL ENCOUNTER (OUTPATIENT)
Dept: MRI IMAGING | Age: 89
Discharge: HOME | End: 2024-02-12
Payer: MEDICARE

## 2024-02-12 DIAGNOSIS — R42 DIZZINESS: ICD-10-CM

## 2024-02-12 DIAGNOSIS — R41.82 ALTERED MENTAL STATUS, UNSPECIFIED ALTERED MENTAL STATUS TYPE: ICD-10-CM

## 2024-02-12 PROCEDURE — 70551 MRI BRAIN STEM W/O DYE: CPT

## 2024-02-13 ENCOUNTER — TELEPHONE (OUTPATIENT)
Dept: INTERNAL MEDICINE CLINIC | Age: 89
End: 2024-02-13

## 2024-02-20 ENCOUNTER — TELEPHONE (OUTPATIENT)
Dept: INTERNAL MEDICINE CLINIC | Age: 89
End: 2024-02-20

## 2024-02-20 NOTE — TELEPHONE ENCOUNTER
Daughter of Rupal Carballo contacted office stating patient is having 13 teeth pulled on 2/27/24 at [facility] . When should she stop taking elliquis? Tiera [phone]

## 2024-02-20 NOTE — TELEPHONE ENCOUNTER
Daughter ruddy notified to stop eliquis on Sun , Mon and Tues and restart Wed if no bleeding. If bleeding notify the dentist. Verbal understanding returned. Per Dr Xiomy Fowler.

## 2024-04-24 ENCOUNTER — TELEPHONE (OUTPATIENT)
Age: 89
End: 2024-04-24

## 2024-04-24 NOTE — TELEPHONE ENCOUNTER
Received a call from [facility] regarding patient medication. Pt has switched to the [facility] and nurse is requesting 90 days supplies for patient medbox. Explained I'd speak with PCP.

## 2024-04-25 DIAGNOSIS — E78.2 MIXED HYPERLIPIDEMIA: ICD-10-CM

## 2024-04-25 DIAGNOSIS — F03.918 DEMENTIA WITH OTHER BEHAVIORAL DISTURBANCE, UNSPECIFIED DEMENTIA SEVERITY, UNSPECIFIED DEMENTIA TYPE (HCC): ICD-10-CM

## 2024-04-25 DIAGNOSIS — I48.19 PERSISTENT ATRIAL FIBRILLATION (HCC): ICD-10-CM

## 2024-04-25 DIAGNOSIS — F03.90 DEMENTIA WITHOUT BEHAVIORAL DISTURBANCE (HCC): ICD-10-CM

## 2024-04-25 DIAGNOSIS — E03.9 ACQUIRED HYPOTHYROIDISM: ICD-10-CM

## 2024-04-25 RX ORDER — DIGOXIN 125 MCG
125 TABLET ORAL DAILY
Qty: 90 TABLET | Refills: 1 | Status: SHIPPED | OUTPATIENT
Start: 2024-04-25

## 2024-04-25 RX ORDER — PANTOPRAZOLE SODIUM 40 MG/1
40 TABLET, DELAYED RELEASE ORAL DAILY
Qty: 30 TABLET | Refills: 2 | Status: SHIPPED | OUTPATIENT
Start: 2024-04-25

## 2024-04-25 RX ORDER — MEMANTINE HYDROCHLORIDE 10 MG/1
10 TABLET ORAL 2 TIMES DAILY
Qty: 180 TABLET | Refills: 1 | Status: SHIPPED | OUTPATIENT
Start: 2024-04-25

## 2024-04-25 RX ORDER — SIMVASTATIN 40 MG
40 TABLET ORAL NIGHTLY
Qty: 90 TABLET | Refills: 1 | Status: SHIPPED | OUTPATIENT
Start: 2024-04-25

## 2024-04-25 RX ORDER — CHOLECALCIFEROL (VITAMIN D3) 125 MCG
CAPSULE ORAL
Qty: 90 TABLET | Refills: 1 | Status: SHIPPED | OUTPATIENT
Start: 2024-04-25

## 2024-04-25 RX ORDER — LEVOTHYROXINE SODIUM 0.15 MG/1
TABLET ORAL
Qty: 90 TABLET | Refills: 1 | Status: SHIPPED | OUTPATIENT
Start: 2024-04-25

## 2024-04-25 RX ORDER — RIVASTIGMINE TARTRATE 3 MG/1
3 CAPSULE ORAL DAILY
Qty: 90 CAPSULE | Refills: 1 | Status: SHIPPED | OUTPATIENT
Start: 2024-04-25

## 2024-04-25 RX ORDER — NYSTATIN 100000 [USP'U]/G
POWDER TOPICAL
Qty: 1 EACH | Refills: 3 | Status: SHIPPED | OUTPATIENT
Start: 2024-04-25

## 2024-04-25 RX ORDER — VITAMIN E 268 MG
400 CAPSULE ORAL DAILY
Qty: 90 CAPSULE | Refills: 1 | Status: SHIPPED | OUTPATIENT
Start: 2024-04-25

## 2024-04-25 NOTE — TELEPHONE ENCOUNTER
Inform patient I sent all the medication to [facility] except Imodium and Antivert as she was taking it as needed

## 2024-04-26 NOTE — TELEPHONE ENCOUNTER
Patient identified by Tiera and she was informed of medication being sent to [facility].

## 2024-04-29 ENCOUNTER — TELEPHONE (OUTPATIENT)
Age: 89
End: 2024-04-29

## 2024-04-30 ENCOUNTER — TELEPHONE (OUTPATIENT)
Age: 89
End: 2024-04-30

## 2024-04-30 DIAGNOSIS — R19.7 DIARRHEA, UNSPECIFIED TYPE: Primary | ICD-10-CM

## 2024-04-30 RX ORDER — CHOLESTYRAMINE 4 G/9G
1 POWDER, FOR SUSPENSION ORAL 2 TIMES DAILY
Qty: 60 PACKET | Refills: 0 | Status: SHIPPED | OUTPATIENT
Start: 2024-04-30

## 2024-04-30 NOTE — TELEPHONE ENCOUNTER
Get stool culture and try questran powder  
LM on daughter Rahel's voicmail of orders for stool culture and inst to go to Hospital lab to get the kit and to  questran powder at any pharmacy and follow instructions on bottle.  
Pts daughter Rahel was in office concerning patients diarrhea. Pts daughter states pt has had excessive diarrhea for the last few weeks and she has been taking imodium 1-2 times daily which has not helped. Pt eats her meal and within 15-20 mins she has to go to the bathroom an more times than nit is unable to make it to the bathroom and is incontinent of stool. Pts daughter is having concerns if this may be c-diff due to the long term diarrhea and imodium not helping. Pts daughter is asking what they can do. Please advise. Pt is currently staying with daughter Rahel.   Rahel   [phone]  
[phone]

## 2024-04-30 NOTE — TELEPHONE ENCOUNTER
LM on daughter Rahel's voicmail of orders for stool culture and inst to go to Hospital lab to get the kit and to  questran powder at any pharmacy and follow instructions on bottle

## 2024-05-07 ENCOUNTER — HOSPITAL ENCOUNTER (OUTPATIENT)
Age: 89
Discharge: HOME | End: 2024-05-07
Payer: MEDICARE

## 2024-05-07 DIAGNOSIS — R19.7 DIARRHEA, UNSPECIFIED TYPE: ICD-10-CM

## 2024-05-07 PROCEDURE — 87507 IADNA-DNA/RNA PROBE TQ 12-25: CPT

## 2024-05-07 PROCEDURE — 83993 ASSAY FOR CALPROTECTIN FECAL: CPT

## 2024-05-07 PROCEDURE — 87324 CLOSTRIDIUM AG IA: CPT

## 2024-05-07 PROCEDURE — 84488 TEST FECES FOR TRYPSIN: CPT

## 2024-05-08 LAB
ASTROVIRUS PCR: NOT DETECTED
C CAYETANENSIS DNA SPEC QL NAA+PROBE: NOT DETECTED
CAMPY SP DNA.DIARRHEA STL QL NAA+PROBE: NOT DETECTED
CRYPTOSP DNA SPEC QL NAA+PROBE: NOT DETECTED
E COLI DNA SPEC QL NAA+PROBE: NOT DETECTED
E COLI ENTEROAGGREGATIVE PCR: NOT DETECTED
E COLI ENTEROPATHOGENIC PCR: NOT DETECTED
E COLI ENTEROTOXIGENIC PCR: NOT DETECTED
E COLI O157H7 DNA SPEC QL NAA+PROBE: NOT DETECTED
E HISTOLYT DNA SPEC QL NAA+PROBE: NOT DETECTED
EC STX1+STX2 + H7 FLIC SPEC NAA+PROBE: NOT DETECTED
G LAMBLIA DNA SPEC QL NAA+PROBE: NOT DETECTED
HADV DNA SPEC QL NAA+PROBE: NOT DETECTED
NOROVIRUS RNA SPEC QL NAA+PROBE: NOT DETECTED
P SHIGELLOIDES DNA STL QL NAA+PROBE: NOT DETECTED
RV RNA SPEC QL NAA+PROBE: NOT DETECTED
SALMONELLA DNA SPEC QL NAA+PROBE: NOT DETECTED
SAPOVIRUS PCR: NOT DETECTED
V CHOLERAE DNA SPEC QL NAA+PROBE: NOT DETECTED
VIBRIO DNA SPEC NAA+PROBE: NOT DETECTED
YERSINIA DNA SPEC NAA+PROBE: NOT DETECTED

## 2024-05-09 ENCOUNTER — HOSPITAL ENCOUNTER (OUTPATIENT)
Age: 89
Setting detail: SPECIMEN
Discharge: HOME | End: 2024-05-09
Payer: MEDICARE

## 2024-05-09 LAB
DIGOXIN LEVEL: 0.9 NG/ML (ref 0.8–2)
DOSE AMOUNT: NORMAL
DOSE TIME: NORMAL
IGA: 180 MG/DL (ref 69–382)

## 2024-05-09 PROCEDURE — 83516 IMMUNOASSAY NONANTIBODY: CPT

## 2024-05-09 PROCEDURE — 82784 ASSAY IGA/IGD/IGG/IGM EACH: CPT

## 2024-05-09 PROCEDURE — 80162 ASSAY OF DIGOXIN TOTAL: CPT

## 2024-05-10 LAB
CALPROTECTIN STL-MCNT: 73 UG/G
TRYPSIN, STL: 398 UG/G
TTG IGA SER IA-ACNC: <1.02 FLU (ref 0–4.99)

## 2024-05-23 ENCOUNTER — HOSPITAL ENCOUNTER (EMERGENCY)
Age: 89
Discharge: ANOTHER HOSPITAL | End: 2024-05-24
Attending: STUDENT IN AN ORGANIZED HEALTH CARE EDUCATION/TRAINING PROGRAM
Payer: MEDICARE

## 2024-05-23 DIAGNOSIS — R53.1 GENERAL WEAKNESS: ICD-10-CM

## 2024-05-23 DIAGNOSIS — R00.1 BRADYCARDIA: Primary | ICD-10-CM

## 2024-05-23 DIAGNOSIS — N39.0 UTI (URINARY TRACT INFECTION), UNCOMPLICATED: ICD-10-CM

## 2024-05-23 DIAGNOSIS — I48.91 ATRIAL FIBRILLATION WITH SLOW VENTRICULAR RESPONSE (HCC): ICD-10-CM

## 2024-05-23 LAB
ALBUMIN SERPL-MCNC: 3.4 GM/DL (ref 3.4–5)
ALP BLD-CCNC: 71 IU/L (ref 40–129)
ALT SERPL-CCNC: 13 U/L (ref 10–40)
ANION GAP SERPL CALCULATED.3IONS-SCNC: 13 MMOL/L (ref 7–16)
AST SERPL-CCNC: 20 IU/L (ref 15–37)
BACTERIA: ABNORMAL /HPF
BASOPHILS ABSOLUTE: 0.1 K/CU MM
BASOPHILS RELATIVE PERCENT: 0.4 % (ref 0–1)
BILIRUB SERPL-MCNC: 0.7 MG/DL (ref 0–1)
BILIRUBIN, URINE: NEGATIVE MG/DL
BLOOD, URINE: NEGATIVE
BUN SERPL-MCNC: 14 MG/DL (ref 6–23)
CALCIUM SERPL-MCNC: 8.8 MG/DL (ref 8.3–10.6)
CAST TYPE: ABNORMAL /HPF
CHLORIDE BLD-SCNC: 103 MMOL/L (ref 99–110)
CLARITY: CLEAR
CO2: 20 MMOL/L (ref 21–32)
COLOR: YELLOW
CREAT SERPL-MCNC: 0.9 MG/DL (ref 0.6–1.1)
CRYSTAL TYPE: NEGATIVE /HPF
DIFFERENTIAL TYPE: ABNORMAL
EKG ATRIAL RATE: 187 BPM
EKG DIAGNOSIS: NORMAL
EKG Q-T INTERVAL: 472 MS
EKG QRS DURATION: 136 MS
EKG QTC CALCULATION (BAZETT): 490 MS
EKG R AXIS: 63 DEGREES
EKG T AXIS: -26 DEGREES
EKG VENTRICULAR RATE: 65 BPM
EOSINOPHILS ABSOLUTE: 0.1 K/CU MM
EOSINOPHILS RELATIVE PERCENT: 0.6 % (ref 0–3)
EPITHELIAL CELLS, UA: 10 /HPF
GFR, ESTIMATED: 62 ML/MIN/1.73M2
GLUCOSE SERPL-MCNC: 165 MG/DL (ref 70–99)
GLUCOSE URINE: NEGATIVE MG/DL
HCT VFR BLD CALC: 40.9 % (ref 37–47)
HEMOGLOBIN: 13.6 GM/DL (ref 12.5–16)
IMMATURE NEUTROPHIL %: 0.3 % (ref 0–0.43)
KETONES, URINE: NEGATIVE MG/DL
LEUKOCYTE ESTERASE, URINE: ABNORMAL
LYMPHOCYTES ABSOLUTE: 2.9 K/CU MM
LYMPHOCYTES RELATIVE PERCENT: 24.3 % (ref 24–44)
MCH RBC QN AUTO: 31.7 PG (ref 27–31)
MCHC RBC AUTO-ENTMCNC: 33.3 % (ref 32–36)
MCV RBC AUTO: 95.3 FL (ref 78–100)
MONOCYTES ABSOLUTE: 0.9 K/CU MM
MONOCYTES RELATIVE PERCENT: 7.6 % (ref 0–4)
NEUTROPHILS ABSOLUTE: 7.9 K/CU MM
NEUTROPHILS RELATIVE PERCENT: 66.8 % (ref 36–66)
NITRITE URINE, QUANTITATIVE: NEGATIVE
PDW BLD-RTO: 13.3 % (ref 11.7–14.9)
PH, URINE: 6 (ref 5–8)
PLATELET # BLD: 177 K/CU MM (ref 140–440)
PMV BLD AUTO: 9.9 FL (ref 7.5–11.1)
POTASSIUM SERPL-SCNC: 4 MMOL/L (ref 3.5–5.1)
PROTEIN UA: NEGATIVE MG/DL
RBC # BLD: 4.29 M/CU MM (ref 4.2–5.4)
RBC URINE: 5 /HPF (ref 0–6)
REASON FOR REJECTION: NORMAL
REJECTED TEST: NORMAL
SODIUM BLD-SCNC: 136 MMOL/L (ref 135–145)
SPECIFIC GRAVITY UA: 1.01 (ref 1–1.03)
TOTAL IMMATURE NEUTOROPHIL: 0.03 K/CU MM
TOTAL PROTEIN: 5.9 GM/DL (ref 6.4–8.2)
TROPONIN, HIGH SENSITIVITY: 18 NG/L (ref 0–14)
TROPONIN, HIGH SENSITIVITY: 18 NG/L (ref 0–14)
UROBILINOGEN, URINE: 0.2 MG/DL (ref 0.2–1)
WBC # BLD: 11.8 K/CU MM (ref 4–10.5)
WBC UA: 10 /HPF (ref 0–5)

## 2024-05-23 PROCEDURE — 96361 HYDRATE IV INFUSION ADD-ON: CPT

## 2024-05-23 PROCEDURE — 6370000000 HC RX 637 (ALT 250 FOR IP): Performed by: STUDENT IN AN ORGANIZED HEALTH CARE EDUCATION/TRAINING PROGRAM

## 2024-05-23 PROCEDURE — 96374 THER/PROPH/DIAG INJ IV PUSH: CPT

## 2024-05-23 PROCEDURE — 81001 URINALYSIS AUTO W/SCOPE: CPT

## 2024-05-23 PROCEDURE — 84484 ASSAY OF TROPONIN QUANT: CPT

## 2024-05-23 PROCEDURE — 80053 COMPREHEN METABOLIC PANEL: CPT

## 2024-05-23 PROCEDURE — 6360000002 HC RX W HCPCS: Performed by: STUDENT IN AN ORGANIZED HEALTH CARE EDUCATION/TRAINING PROGRAM

## 2024-05-23 PROCEDURE — 85025 COMPLETE CBC W/AUTO DIFF WBC: CPT

## 2024-05-23 PROCEDURE — 99285 EMERGENCY DEPT VISIT HI MDM: CPT

## 2024-05-23 PROCEDURE — 2580000003 HC RX 258: Performed by: STUDENT IN AN ORGANIZED HEALTH CARE EDUCATION/TRAINING PROGRAM

## 2024-05-23 RX ORDER — MECLIZINE HCL 12.5 MG/1
12.5 TABLET ORAL ONCE
Status: COMPLETED | OUTPATIENT
Start: 2024-05-23 | End: 2024-05-23

## 2024-05-23 RX ORDER — 0.9 % SODIUM CHLORIDE 0.9 %
1000 INTRAVENOUS SOLUTION INTRAVENOUS ONCE
Status: COMPLETED | OUTPATIENT
Start: 2024-05-23 | End: 2024-05-23

## 2024-05-23 RX ADMIN — SODIUM CHLORIDE 1000 ML: 9 INJECTION, SOLUTION INTRAVENOUS at 20:43

## 2024-05-23 RX ADMIN — MECLIZINE 12.5 MG: 12.5 TABLET ORAL at 22:32

## 2024-05-23 RX ADMIN — CEFTRIAXONE SODIUM 1000 MG: 1 INJECTION, POWDER, FOR SOLUTION INTRAMUSCULAR; INTRAVENOUS at 22:05

## 2024-05-23 ASSESSMENT — PAIN - FUNCTIONAL ASSESSMENT: PAIN_FUNCTIONAL_ASSESSMENT: NONE - DENIES PAIN

## 2024-05-23 ASSESSMENT — LIFESTYLE VARIABLES
HOW MANY STANDARD DRINKS CONTAINING ALCOHOL DO YOU HAVE ON A TYPICAL DAY: PATIENT DOES NOT DRINK
HOW OFTEN DO YOU HAVE A DRINK CONTAINING ALCOHOL: NEVER

## 2024-05-23 ASSESSMENT — ENCOUNTER SYMPTOMS
CHEST TIGHTNESS: 0
ABDOMINAL PAIN: 0

## 2024-05-24 ENCOUNTER — HOSPITAL ENCOUNTER (INPATIENT)
Age: 89
LOS: 4 days | Discharge: HOME | DRG: 309 | End: 2024-05-28
Attending: STUDENT IN AN ORGANIZED HEALTH CARE EDUCATION/TRAINING PROGRAM | Admitting: STUDENT IN AN ORGANIZED HEALTH CARE EDUCATION/TRAINING PROGRAM
Payer: MEDICARE

## 2024-05-24 VITALS
HEIGHT: 69 IN | RESPIRATION RATE: 18 BRPM | TEMPERATURE: 98 F | HEART RATE: 64 BPM | SYSTOLIC BLOOD PRESSURE: 98 MMHG | BODY MASS INDEX: 24.59 KG/M2 | WEIGHT: 166 LBS | DIASTOLIC BLOOD PRESSURE: 53 MMHG | OXYGEN SATURATION: 96 %

## 2024-05-24 PROBLEM — E44.0 MODERATE MALNUTRITION (HCC): Chronic | Status: ACTIVE | Noted: 2024-05-24

## 2024-05-24 PROBLEM — I48.91 ATRIAL FIBRILLATION WITH SLOW VENTRICULAR RESPONSE (HCC): Status: ACTIVE | Noted: 2024-05-24

## 2024-05-24 LAB
ALBUMIN SERPL-MCNC: 3.8 GM/DL (ref 3.4–5)
ALP BLD-CCNC: 76 IU/L (ref 40–128)
ALT SERPL-CCNC: 14 U/L (ref 10–40)
ANION GAP SERPL CALCULATED.3IONS-SCNC: 15 MMOL/L (ref 7–16)
AST SERPL-CCNC: 17 IU/L (ref 15–37)
BASOPHILS ABSOLUTE: 0 K/CU MM
BASOPHILS RELATIVE PERCENT: 0.4 % (ref 0–1)
BILIRUB SERPL-MCNC: 0.7 MG/DL (ref 0–1)
BUN SERPL-MCNC: 9 MG/DL (ref 6–23)
CALCIUM SERPL-MCNC: 8.5 MG/DL (ref 8.3–10.6)
CHLORIDE BLD-SCNC: 106 MMOL/L (ref 99–110)
CO2: 21 MMOL/L (ref 21–32)
CREAT SERPL-MCNC: 0.7 MG/DL (ref 0.6–1.1)
DIFFERENTIAL TYPE: ABNORMAL
DIGOXIN LEVEL: 1 NG/ML (ref 0.8–2)
DOSE AMOUNT: NORMAL
DOSE TIME: NORMAL
EOSINOPHILS ABSOLUTE: 0.1 K/CU MM
EOSINOPHILS RELATIVE PERCENT: 1.2 % (ref 0–3)
GFR, ESTIMATED: 84 ML/MIN/1.73M2
GLUCOSE SERPL-MCNC: 111 MG/DL (ref 70–99)
HCT VFR BLD CALC: 40.8 % (ref 37–47)
HEMOGLOBIN: 13.6 GM/DL (ref 12.5–16)
IMMATURE NEUTROPHIL %: 0.3 % (ref 0–0.43)
INR BLD: 1.4 INDEX
LYMPHOCYTES ABSOLUTE: 2.9 K/CU MM
LYMPHOCYTES RELATIVE PERCENT: 30.4 % (ref 24–44)
MCH RBC QN AUTO: 31.5 PG (ref 27–31)
MCHC RBC AUTO-ENTMCNC: 33.3 % (ref 32–36)
MCV RBC AUTO: 94.4 FL (ref 78–100)
MONOCYTES ABSOLUTE: 0.9 K/CU MM
MONOCYTES RELATIVE PERCENT: 9.8 % (ref 0–4)
NEUTROPHILS ABSOLUTE: 5.5 K/CU MM
NEUTROPHILS RELATIVE PERCENT: 57.9 % (ref 36–66)
NUCLEATED RBC %: 0 %
PDW BLD-RTO: 13.3 % (ref 11.7–14.9)
PLATELET # BLD: 174 K/CU MM (ref 140–440)
PMV BLD AUTO: 10 FL (ref 7.5–11.1)
POTASSIUM SERPL-SCNC: 4 MMOL/L (ref 3.5–5.1)
PROTHROMBIN TIME: 17.9 SECONDS (ref 11.7–14.5)
RBC # BLD: 4.32 M/CU MM (ref 4.2–5.4)
SODIUM BLD-SCNC: 142 MMOL/L (ref 135–145)
TOTAL IMMATURE NEUTOROPHIL: 0.03 K/CU MM
TOTAL NUCLEATED RBC: 0 K/CU MM
TOTAL PROTEIN: 6.4 GM/DL (ref 6.4–8.2)
TSH SERPL DL<=0.005 MIU/L-ACNC: 0.98 UIU/ML (ref 0.27–4.2)
WBC # BLD: 9.5 K/CU MM (ref 4–10.5)

## 2024-05-24 PROCEDURE — 6370000000 HC RX 637 (ALT 250 FOR IP): Performed by: STUDENT IN AN ORGANIZED HEALTH CARE EDUCATION/TRAINING PROGRAM

## 2024-05-24 PROCEDURE — 2580000003 HC RX 258: Performed by: STUDENT IN AN ORGANIZED HEALTH CARE EDUCATION/TRAINING PROGRAM

## 2024-05-24 PROCEDURE — 97116 GAIT TRAINING THERAPY: CPT

## 2024-05-24 PROCEDURE — 93005 ELECTROCARDIOGRAM TRACING: CPT | Performed by: STUDENT IN AN ORGANIZED HEALTH CARE EDUCATION/TRAINING PROGRAM

## 2024-05-24 PROCEDURE — 99223 1ST HOSP IP/OBS HIGH 75: CPT | Performed by: INTERNAL MEDICINE

## 2024-05-24 PROCEDURE — A4216 STERILE WATER/SALINE, 10 ML: HCPCS | Performed by: STUDENT IN AN ORGANIZED HEALTH CARE EDUCATION/TRAINING PROGRAM

## 2024-05-24 PROCEDURE — 36415 COLL VENOUS BLD VENIPUNCTURE: CPT

## 2024-05-24 PROCEDURE — 85025 COMPLETE CBC W/AUTO DIFF WBC: CPT

## 2024-05-24 PROCEDURE — 85610 PROTHROMBIN TIME: CPT

## 2024-05-24 PROCEDURE — 94761 N-INVAS EAR/PLS OXIMETRY MLT: CPT

## 2024-05-24 PROCEDURE — 2140000000 HC CCU INTERMEDIATE R&B

## 2024-05-24 PROCEDURE — 97535 SELF CARE MNGMENT TRAINING: CPT

## 2024-05-24 PROCEDURE — 80162 ASSAY OF DIGOXIN TOTAL: CPT

## 2024-05-24 PROCEDURE — 6360000002 HC RX W HCPCS: Performed by: STUDENT IN AN ORGANIZED HEALTH CARE EDUCATION/TRAINING PROGRAM

## 2024-05-24 PROCEDURE — 97166 OT EVAL MOD COMPLEX 45 MIN: CPT

## 2024-05-24 PROCEDURE — 80053 COMPREHEN METABOLIC PANEL: CPT

## 2024-05-24 PROCEDURE — 84443 ASSAY THYROID STIM HORMONE: CPT

## 2024-05-24 PROCEDURE — 97162 PT EVAL MOD COMPLEX 30 MIN: CPT

## 2024-05-24 RX ORDER — POLYETHYLENE GLYCOL 3350 17 G/17G
17 POWDER, FOR SOLUTION ORAL DAILY PRN
Status: DISCONTINUED | OUTPATIENT
Start: 2024-05-24 | End: 2024-05-28 | Stop reason: HOSPADM

## 2024-05-24 RX ORDER — CHOLESTYRAMINE LIGHT 4 G/5.7G
1 POWDER, FOR SUSPENSION ORAL 2 TIMES DAILY
Status: DISCONTINUED | OUTPATIENT
Start: 2024-05-24 | End: 2024-05-28 | Stop reason: HOSPADM

## 2024-05-24 RX ORDER — ACETAMINOPHEN 650 MG/1
650 SUPPOSITORY RECTAL EVERY 6 HOURS PRN
Status: DISCONTINUED | OUTPATIENT
Start: 2024-05-24 | End: 2024-05-28 | Stop reason: HOSPADM

## 2024-05-24 RX ORDER — PANTOPRAZOLE SODIUM 40 MG/1
40 TABLET, DELAYED RELEASE ORAL DAILY
Status: DISCONTINUED | OUTPATIENT
Start: 2024-05-24 | End: 2024-05-28 | Stop reason: HOSPADM

## 2024-05-24 RX ORDER — MEMANTINE HYDROCHLORIDE 10 MG/1
10 TABLET ORAL 2 TIMES DAILY
Status: DISCONTINUED | OUTPATIENT
Start: 2024-05-24 | End: 2024-05-28 | Stop reason: HOSPADM

## 2024-05-24 RX ORDER — ONDANSETRON 2 MG/ML
4 INJECTION INTRAMUSCULAR; INTRAVENOUS EVERY 6 HOURS PRN
Status: DISCONTINUED | OUTPATIENT
Start: 2024-05-24 | End: 2024-05-28 | Stop reason: HOSPADM

## 2024-05-24 RX ORDER — MAGNESIUM SULFATE IN WATER 40 MG/ML
2000 INJECTION, SOLUTION INTRAVENOUS PRN
Status: DISCONTINUED | OUTPATIENT
Start: 2024-05-24 | End: 2024-05-28 | Stop reason: HOSPADM

## 2024-05-24 RX ORDER — DIGOXIN 125 MCG
125 TABLET ORAL DAILY
Status: DISCONTINUED | OUTPATIENT
Start: 2024-05-24 | End: 2024-05-24

## 2024-05-24 RX ORDER — RIVASTIGMINE TARTRATE 1.5 MG/1
3 CAPSULE ORAL DAILY
Status: DISCONTINUED | OUTPATIENT
Start: 2024-05-24 | End: 2024-05-28 | Stop reason: HOSPADM

## 2024-05-24 RX ORDER — SODIUM CHLORIDE 0.9 % (FLUSH) 0.9 %
5-40 SYRINGE (ML) INJECTION EVERY 12 HOURS SCHEDULED
Status: DISCONTINUED | OUTPATIENT
Start: 2024-05-24 | End: 2024-05-28 | Stop reason: HOSPADM

## 2024-05-24 RX ORDER — POTASSIUM CHLORIDE 20 MEQ/1
40 TABLET, EXTENDED RELEASE ORAL PRN
Status: DISCONTINUED | OUTPATIENT
Start: 2024-05-24 | End: 2024-05-28 | Stop reason: HOSPADM

## 2024-05-24 RX ORDER — POTASSIUM CHLORIDE 7.45 MG/ML
10 INJECTION INTRAVENOUS PRN
Status: DISCONTINUED | OUTPATIENT
Start: 2024-05-24 | End: 2024-05-28 | Stop reason: HOSPADM

## 2024-05-24 RX ORDER — ATORVASTATIN CALCIUM 10 MG/1
20 TABLET, FILM COATED ORAL DAILY
Status: DISCONTINUED | OUTPATIENT
Start: 2024-05-24 | End: 2024-05-28 | Stop reason: HOSPADM

## 2024-05-24 RX ORDER — SODIUM CHLORIDE 9 MG/ML
INJECTION, SOLUTION INTRAVENOUS PRN
Status: DISCONTINUED | OUTPATIENT
Start: 2024-05-24 | End: 2024-05-28 | Stop reason: HOSPADM

## 2024-05-24 RX ORDER — SODIUM CHLORIDE 0.9 % (FLUSH) 0.9 %
5-40 SYRINGE (ML) INJECTION PRN
Status: DISCONTINUED | OUTPATIENT
Start: 2024-05-24 | End: 2024-05-28 | Stop reason: HOSPADM

## 2024-05-24 RX ORDER — SODIUM CHLORIDE, SODIUM LACTATE, POTASSIUM CHLORIDE, CALCIUM CHLORIDE 600; 310; 30; 20 MG/100ML; MG/100ML; MG/100ML; MG/100ML
INJECTION, SOLUTION INTRAVENOUS CONTINUOUS
Status: DISPENSED | OUTPATIENT
Start: 2024-05-24 | End: 2024-05-24

## 2024-05-24 RX ORDER — ACETAMINOPHEN 325 MG/1
650 TABLET ORAL EVERY 6 HOURS PRN
Status: DISCONTINUED | OUTPATIENT
Start: 2024-05-24 | End: 2024-05-28 | Stop reason: HOSPADM

## 2024-05-24 RX ORDER — ONDANSETRON 4 MG/1
4 TABLET, ORALLY DISINTEGRATING ORAL EVERY 8 HOURS PRN
Status: DISCONTINUED | OUTPATIENT
Start: 2024-05-24 | End: 2024-05-28 | Stop reason: HOSPADM

## 2024-05-24 RX ORDER — LEVOTHYROXINE SODIUM 0.15 MG/1
150 TABLET ORAL DAILY
Status: DISCONTINUED | OUTPATIENT
Start: 2024-05-24 | End: 2024-05-28 | Stop reason: HOSPADM

## 2024-05-24 RX ADMIN — SODIUM CHLORIDE, PRESERVATIVE FREE 1000 MG: 5 INJECTION INTRAVENOUS at 21:25

## 2024-05-24 RX ADMIN — PANTOPRAZOLE SODIUM 40 MG: 40 TABLET, DELAYED RELEASE ORAL at 05:20

## 2024-05-24 RX ADMIN — MEMANTINE 10 MG: 10 TABLET ORAL at 02:11

## 2024-05-24 RX ADMIN — SODIUM CHLORIDE, PRESERVATIVE FREE 10 ML: 5 INJECTION INTRAVENOUS at 19:50

## 2024-05-24 RX ADMIN — MEMANTINE 10 MG: 10 TABLET ORAL at 09:50

## 2024-05-24 RX ADMIN — CHOLESTYRAMINE 4 G: 4 POWDER, FOR SUSPENSION ORAL at 09:50

## 2024-05-24 RX ADMIN — SODIUM CHLORIDE, POTASSIUM CHLORIDE, SODIUM LACTATE AND CALCIUM CHLORIDE: 600; 310; 30; 20 INJECTION, SOLUTION INTRAVENOUS at 02:16

## 2024-05-24 RX ADMIN — CHOLESTYRAMINE 4 G: 4 POWDER, FOR SUSPENSION ORAL at 02:11

## 2024-05-24 RX ADMIN — ATORVASTATIN CALCIUM 20 MG: 10 TABLET, FILM COATED ORAL at 09:50

## 2024-05-24 RX ADMIN — APIXABAN 5 MG: 5 TABLET, FILM COATED ORAL at 19:47

## 2024-05-24 RX ADMIN — SERTRALINE HYDROCHLORIDE 75 MG: 50 TABLET ORAL at 09:50

## 2024-05-24 RX ADMIN — LEVOTHYROXINE SODIUM 150 MCG: 0.15 TABLET ORAL at 05:20

## 2024-05-24 RX ADMIN — MEMANTINE 10 MG: 10 TABLET ORAL at 19:47

## 2024-05-24 RX ADMIN — CHOLESTYRAMINE 4 G: 4 POWDER, FOR SUSPENSION ORAL at 19:46

## 2024-05-24 RX ADMIN — APIXABAN 5 MG: 5 TABLET, FILM COATED ORAL at 02:11

## 2024-05-24 RX ADMIN — APIXABAN 5 MG: 5 TABLET, FILM COATED ORAL at 09:50

## 2024-05-24 ASSESSMENT — PAIN SCALES - GENERAL: PAINLEVEL_OUTOF10: 0

## 2024-05-24 NOTE — CARE COORDINATION
Saw patient for Pre Watchman Education per Dr Khoury request.   My role as Watchman Coordinator explained.   Watchman discussed, Brochure provided and [employer] video shared.   Patient sleepy Currently and falls asleep during our talk.  Will follow up and discuss further on Tuesday.  Patient also plans to talk with daughter this evening.  Ok to schedule procedure per Dr Khoury if patient wants to proceed.  Will follow    Talita Hansen RN  Watchman Coordinator

## 2024-05-24 NOTE — H&P
History and Physical      Name:  Rupal Carballo /Age/Sex: 1935  (88 y.o. female)   MRN & CSN:  [id number] & [id number] Encounter Date/Time: 2024 1:13 AM EDT   Location:  [address] [place] [address] PCP: Xiomy Fowler MD       Hospital Day: 1    Assessment and Plan:     Patient is a 88 y.o. female who presented with generalized weakness and dizziness.      Afib with slow ventricular response  -Patient presents due to generalized weakness and dizziness   -EKG shows atrial fibrillation with heart rate 65, troponins mildly elevated and plateaued   -Patient was found to have episodes of bradycardia down to the 30-40s associated with hypotension that were lasting a few seconds and improved     - Continue eliquis   - Hold BB, rivastigmine and digoxin due to episodes of bradycardia   -TSH and digoxin level pending  - Tele  - EP consult     Chronic Dementia   - Continue Namenda, hold rivastigmine due to bradycardia   - Delirium precautions    UTI  - On admit, hemodynamically stable, afebrile, leukocytosis of 11; non-septic  - UA suggestive of infection  - Received IVF and Rocephin in ED    - Continue Rocephin, transition to PO on Dc  - Follow-up cultures  - IVF    HTN  -Hold Lopressor    HLD  -Continue statin     GERD  -Continue PPI    Mood Disorder   -Continue home meds    Hypothyroidism   -Continue Synthroid    Checklist:  Advanced directive: DNRCCA  Diet: NPO  DVT ppx: Eliquis     Disposition: admit to inpatient.  Estimated discharge: 3 day(s).  Current living situation: home.  Expected disposition: home.    Spoke with ED provider who recommended admission for the patient and I agree with that plan.  Personally reviewed lab studies and imaging.  EKG interpreted personally and results as stated above.  Imaging that was interpreted personally and results as stated above.    History of Present Illness:     Chief Complaint:  Fatigue (C/o weakness and feeling dizzy when she stands.Pt denies any pain at this time. Pt states

## 2024-05-24 NOTE — DISCHARGE INSTR - DIET
Good nutrition is important when healing from an illness, injury, or surgery.  Follow any nutrition recommendations given to you during your hospital stay.   If you were given an oral nutrition supplement while in the hospital, continue to take this supplement at home.  You can take it with meals, in-between meals, and/or before bedtime. These supplements can be purchased at most local grocery stores, pharmacies, and chain super-stores.   If you have any questions about your diet or nutrition, call the hospital and ask for the dietitian.    Please read Nutrition handout below:     High Calorie, High Protein Nutrition Therapy from Nutrition Care Manual     A high-calorie, high-protein diet has been recommended to you. Your registered dietitian nutritionist (RDN) may have recommended this diet because you are having difficulty eating enough calories throughout the day, you have lost weight, and/or you need to add protein to your diet.  Sometimes you may not feel like eating, even if you know the importance of good nutrition. The recommendations in this handout can help you with the following:  Regaining your strength and energy  Keeping your body healthy  Healing and recovering from surgery or illness and fighting infection    Tips  Schedule Your Meals and Snacks  Several small meals and snacks are often better tolerated and digested than large meals.  Strategies  Plan to eat 3 meals and 3 snacks daily.  [employer] with timing meals to find out when you have a larger appetite.  Appetite may be greatest in the morning after not eating all night so you may prefer to eat your larger meals and snacks in the morning and at lunch.  Breakfast-type foods are often better tolerated so eat foods such as eggs, pancakes, waffles and cereal for any meal or snack.  Carry snacks with you so you are prepared to eat every 2 to 3 hours.  Determine what works best for you if your body’s cues for feeling hungry or full are not

## 2024-05-24 NOTE — CARE COORDINATION
.CM met with pt for d/c planning.  Introduced self, updated white board and explained role of CM. Pt has dementia per chart.  Pt able to tell CM where she is and year.  Pt lives alone and daughters assist her with ADL's. Family provides her transportation. She has a PCP, has insurance, and is able to afford her medication. Pt uses a rollator for mobility.  She states that she has HHC ,but cannot remember the name of the company. Pt is hoping to return home.  Pt denies any needs at this time. Pt has Dementia so CM called her daughter to verify info and to find out the name of HHC.  CM called daughter/SINGH/Tiera and she verified the info that pt told this CM.  She states that pt has [employer] and has a nurse from [facility] that helps with pt's medications. CM informed daughter that PT/OT has been ordered.  Daughter states that pt has been to [facility] before. CM informed her that the weekend CM will call her if PT/OT recommend SNF.  D/c plan at this time is home alone with family support, [employer] and [facility].  Added to  weekend f/u list to follow for PT/OT rec's.  TE    .Upon discharge pt will be going home with [facility] home care  Please fax H/P, D/S.  AVS, order, and face sheet to [phone]  Please call [phone] and inform of discharge         05/24/24 7383   Service Assessment   Patient Orientation Alert and Oriented;Person;Place  (AND YEAR)   Cognition Alert   History Provided By Patient;Child/Family  (DAUGHTER/TIERA)   Support Systems Children   Patient's Healthcare Decision Maker is: Named in Scanned ACP Document  (MPOA/DAUGHTER/TIERA)   PCP Verified by CM Yes   Last Visit to PCP Within last 6 months   Prior Functional Level Independent in ADLs/IADLs;Assistance with the following:;Housework;Shopping;Cooking   Can patient return to prior living arrangement Unknown at present   Ability to make needs known: Good   Family able to assist with home care needs: Yes   Would you like for me to discuss

## 2024-05-24 NOTE — ED PROVIDER NOTES
Dementia (HCC) 04/11/2022    Hypothyroidism 04/11/2022    Physical debility 04/14/2022    Generalized weakness 04/15/2022    Syncope and collapse 03/24/2023    Preop exam for internal medicine 07/26/2023     Past Medical History:   Diagnosis Date    Arthritis     Obesity     Sepsis (HCC) 04/11/2022       Nursing Notes Reviewed        I have reviewed and interpreted all of the currently available lab results from this visit (if applicable):  Results for orders placed or performed during the hospital encounter of 05/23/24   CMP   Result Value Ref Range    Sodium 136 135 - 145 MMOL/L    Potassium 4.0 3.5 - 5.1 MMOL/L    Chloride 103 99 - 110 mMol/L    CO2 20 (L) 21 - 32 MMOL/L    Anion Gap 13 7 - 16    Glucose 165 (H) 70 - 99 MG/DL    BUN 14 6 - 23 MG/DL    Creatinine 0.9 0.6 - 1.1 MG/DL    Est, Glom Filt Rate 62 >60 mL/min/1.73m2    Calcium 8.8 8.3 - 10.6 MG/DL    Total Protein 5.9 (L) 6.4 - 8.2 GM/DL    Albumin 3.4 3.4 - 5.0 GM/DL    Total Bilirubin 0.7 0.0 - 1.0 MG/DL    Alkaline Phosphatase 71 40 - 129 IU/L    ALT 13 10 - 40 U/L    AST 20 15 - 37 IU/L   CBC with Auto Differential   Result Value Ref Range    WBC 11.8 (H) 4.0 - 10.5 K/CU MM    RBC 4.29 4.2 - 5.4 M/CU MM    Hemoglobin 13.6 12.5 - 16.0 GM/DL    Hematocrit 40.9 37 - 47 %    MCV 95.3 78 - 100 FL    MCH 31.7 (H) 27 - 31 PG    MCHC 33.3 32.0 - 36.0 %    RDW 13.3 11.7 - 14.9 %    Platelets 177 140 - 440 K/CU MM    MPV 9.9 7.5 - 11.1 FL    Differential Type AUTOMATED DIFFERENTIAL     Neutrophils % 66.8 (H) 36 - 66 %    Lymphocytes % 24.3 24 - 44 %    Monocytes % 7.6 (H) 0 - 4 %    Eosinophils % 0.6 0 - 3 %    Basophils % 0.4 0 - 1 %    Neutrophils Absolute 7.9 K/CU MM    Lymphocytes Absolute 2.9 K/CU MM    Monocytes Absolute 0.9 K/CU MM    Eosinophils Absolute 0.1 K/CU MM    Basophils Absolute 0.1 K/CU MM    Immature Neutrophil % 0.3 0 - 0.43 %    Total Immature Neutrophil 0.03 K/CU MM   Urinalysis   Result Value Ref Range    Color, UA YELLOW YELLOW

## 2024-05-24 NOTE — PLAN OF CARE
Problem: Discharge Planning  Goal: Discharge to home or other facility with appropriate resources  5/24/2024 1458 by Rylee Diaz RN  Outcome: Progressing  5/24/2024 0132 by Clarissa Smart RN  Outcome: Progressing     Problem: Pain  Goal: Verbalizes/displays adequate comfort level or baseline comfort level  Outcome: Progressing     Problem: Safety - Adult  Goal: Free from fall injury  5/24/2024 1458 by Rylee Diaz RN  Outcome: Progressing  5/24/2024 0132 by Clarissa Smart RN  Outcome: Progressing     Problem: ABCDS Injury Assessment  Goal: Absence of physical injury  5/24/2024 1458 by Rylee Diaz RN  Outcome: Progressing  5/24/2024 0132 by Clarissa Smart RN  Outcome: Progressing     Problem: Nutrition Deficit:  Goal: Optimize nutritional status  Outcome: Progressing

## 2024-05-25 LAB
ALBUMIN SERPL-MCNC: 3.6 GM/DL (ref 3.4–5)
ALP BLD-CCNC: 68 IU/L (ref 40–129)
ALT SERPL-CCNC: 12 U/L (ref 10–40)
ANION GAP SERPL CALCULATED.3IONS-SCNC: 13 MMOL/L (ref 7–16)
AST SERPL-CCNC: 14 IU/L (ref 15–37)
BASOPHILS ABSOLUTE: 0 K/CU MM
BASOPHILS RELATIVE PERCENT: 0.4 % (ref 0–1)
BILIRUB SERPL-MCNC: 0.6 MG/DL (ref 0–1)
BUN SERPL-MCNC: 9 MG/DL (ref 6–23)
CALCIUM SERPL-MCNC: 8.3 MG/DL (ref 8.3–10.6)
CHLORIDE BLD-SCNC: 105 MMOL/L (ref 99–110)
CO2: 22 MMOL/L (ref 21–32)
CREAT SERPL-MCNC: 0.8 MG/DL (ref 0.6–1.1)
DIFFERENTIAL TYPE: ABNORMAL
EKG ATRIAL RATE: 174 BPM
EKG DIAGNOSIS: NORMAL
EKG Q-T INTERVAL: 462 MS
EKG QRS DURATION: 134 MS
EKG QTC CALCULATION (BAZETT): 491 MS
EKG R AXIS: 75 DEGREES
EKG T AXIS: -28 DEGREES
EKG VENTRICULAR RATE: 68 BPM
EOSINOPHILS ABSOLUTE: 0.2 K/CU MM
EOSINOPHILS RELATIVE PERCENT: 2.1 % (ref 0–3)
GFR, ESTIMATED: 71 ML/MIN/1.73M2
GLUCOSE SERPL-MCNC: 101 MG/DL (ref 70–99)
HCT VFR BLD CALC: 40.1 % (ref 37–47)
HEMOGLOBIN: 13.4 GM/DL (ref 12.5–16)
IMMATURE NEUTROPHIL %: 0.3 % (ref 0–0.43)
LYMPHOCYTES ABSOLUTE: 3.6 K/CU MM
LYMPHOCYTES RELATIVE PERCENT: 34.2 % (ref 24–44)
MCH RBC QN AUTO: 31.7 PG (ref 27–31)
MCHC RBC AUTO-ENTMCNC: 33.4 % (ref 32–36)
MCV RBC AUTO: 94.8 FL (ref 78–100)
MONOCYTES ABSOLUTE: 1.3 K/CU MM
MONOCYTES RELATIVE PERCENT: 12 % (ref 0–4)
NEUTROPHILS ABSOLUTE: 5.3 K/CU MM
NEUTROPHILS RELATIVE PERCENT: 51 % (ref 36–66)
NUCLEATED RBC %: 0 %
PDW BLD-RTO: 13.5 % (ref 11.7–14.9)
PMV BLD AUTO: 9.7 FL (ref 7.5–11.1)
POTASSIUM SERPL-SCNC: 3.8 MMOL/L (ref 3.5–5.1)
RBC # BLD: 4.23 M/CU MM (ref 4.2–5.4)
SODIUM BLD-SCNC: 140 MMOL/L (ref 135–145)
TOTAL IMMATURE NEUTOROPHIL: 0.03 K/CU MM
TOTAL NUCLEATED RBC: 0 K/CU MM
TOTAL PROTEIN: 5.9 GM/DL (ref 6.4–8.2)
WBC # BLD: 10.4 K/CU MM (ref 4–10.5)

## 2024-05-25 PROCEDURE — 2580000003 HC RX 258: Performed by: STUDENT IN AN ORGANIZED HEALTH CARE EDUCATION/TRAINING PROGRAM

## 2024-05-25 PROCEDURE — 36415 COLL VENOUS BLD VENIPUNCTURE: CPT

## 2024-05-25 PROCEDURE — 2140000000 HC CCU INTERMEDIATE R&B

## 2024-05-25 PROCEDURE — 85025 COMPLETE CBC W/AUTO DIFF WBC: CPT

## 2024-05-25 PROCEDURE — 80053 COMPREHEN METABOLIC PANEL: CPT

## 2024-05-25 PROCEDURE — 6370000000 HC RX 637 (ALT 250 FOR IP): Performed by: STUDENT IN AN ORGANIZED HEALTH CARE EDUCATION/TRAINING PROGRAM

## 2024-05-25 PROCEDURE — 6360000002 HC RX W HCPCS: Performed by: STUDENT IN AN ORGANIZED HEALTH CARE EDUCATION/TRAINING PROGRAM

## 2024-05-25 PROCEDURE — A4216 STERILE WATER/SALINE, 10 ML: HCPCS | Performed by: STUDENT IN AN ORGANIZED HEALTH CARE EDUCATION/TRAINING PROGRAM

## 2024-05-25 PROCEDURE — 93010 ELECTROCARDIOGRAM REPORT: CPT | Performed by: INTERNAL MEDICINE

## 2024-05-25 PROCEDURE — 94761 N-INVAS EAR/PLS OXIMETRY MLT: CPT

## 2024-05-25 RX ADMIN — CHOLESTYRAMINE 4 G: 4 POWDER, FOR SUSPENSION ORAL at 20:42

## 2024-05-25 RX ADMIN — CHOLESTYRAMINE 4 G: 4 POWDER, FOR SUSPENSION ORAL at 08:38

## 2024-05-25 RX ADMIN — APIXABAN 5 MG: 5 TABLET, FILM COATED ORAL at 20:42

## 2024-05-25 RX ADMIN — SERTRALINE HYDROCHLORIDE 75 MG: 50 TABLET ORAL at 08:38

## 2024-05-25 RX ADMIN — ATORVASTATIN CALCIUM 20 MG: 10 TABLET, FILM COATED ORAL at 08:38

## 2024-05-25 RX ADMIN — PANTOPRAZOLE SODIUM 40 MG: 40 TABLET, DELAYED RELEASE ORAL at 06:19

## 2024-05-25 RX ADMIN — APIXABAN 5 MG: 5 TABLET, FILM COATED ORAL at 08:38

## 2024-05-25 RX ADMIN — SODIUM CHLORIDE, PRESERVATIVE FREE 10 ML: 5 INJECTION INTRAVENOUS at 20:43

## 2024-05-25 RX ADMIN — MEMANTINE 10 MG: 10 TABLET ORAL at 08:38

## 2024-05-25 RX ADMIN — SODIUM CHLORIDE, PRESERVATIVE FREE 1000 MG: 5 INJECTION INTRAVENOUS at 20:42

## 2024-05-25 RX ADMIN — MEMANTINE 10 MG: 10 TABLET ORAL at 20:42

## 2024-05-25 RX ADMIN — LEVOTHYROXINE SODIUM 150 MCG: 0.15 TABLET ORAL at 06:18

## 2024-05-25 NOTE — PLAN OF CARE
Problem: Discharge Planning  Goal: Discharge to home or other facility with appropriate resources  5/24/2024 2356 by Castillo Balbuena RN  Outcome: Progressing  5/24/2024 1458 by Rylee Diaz RN  Outcome: Progressing     Problem: Pain  Goal: Verbalizes/displays adequate comfort level or baseline comfort level  5/24/2024 2356 by Castillo Balbuena RN  Outcome: Progressing  5/24/2024 1458 by Rylee Diaz RN  Outcome: Progressing     Problem: Safety - Adult  Goal: Free from fall injury  5/24/2024 2356 by Castillo Balbuena RN  Outcome: Progressing  5/24/2024 1458 by Rylee iDaz RN  Outcome: Progressing     Problem: ABCDS Injury Assessment  Goal: Absence of physical injury  5/24/2024 2356 by Castillo Balbuena RN  Outcome: Progressing  5/24/2024 1458 by Rylee Diaz RN  Outcome: Progressing     Problem: Nutrition Deficit:  Goal: Optimize nutritional status  5/24/2024 2356 by Castillo Balbuena RN  Outcome: Progressing  5/24/2024 1458 by Rylee Diaz RN  Outcome: Progressing     Problem: Skin/Tissue Integrity  Goal: Absence of new skin breakdown  Description: 1.  Monitor for areas of redness and/or skin breakdown  2.  Assess vascular access sites hourly  3.  Every 4-6 hours minimum:  Change oxygen saturation probe site  4.  Every 4-6 hours:  If on nasal continuous positive airway pressure, respiratory therapy assess nares and determine need for appliance change or resting period.  Outcome: Progressing

## 2024-05-25 NOTE — CARE COORDINATION
Spoke with pt and 3 dgts in room including Renetta. Pt agreeable to SNF recs. Previous hx at . Would like to return. 2nd choice is [facility] per family.     Referral made to [facility].

## 2024-05-26 LAB
ALBUMIN SERPL-MCNC: 3.5 GM/DL (ref 3.4–5)
ALP BLD-CCNC: 70 IU/L (ref 40–129)
ALT SERPL-CCNC: 10 U/L (ref 10–40)
ANION GAP SERPL CALCULATED.3IONS-SCNC: 13 MMOL/L (ref 7–16)
AST SERPL-CCNC: 14 IU/L (ref 15–37)
BASOPHILS ABSOLUTE: 0.1 K/CU MM
BASOPHILS RELATIVE PERCENT: 0.6 % (ref 0–1)
BILIRUB SERPL-MCNC: 0.4 MG/DL (ref 0–1)
BUN SERPL-MCNC: 9 MG/DL (ref 6–23)
CALCIUM SERPL-MCNC: 8.2 MG/DL (ref 8.3–10.6)
CHLORIDE BLD-SCNC: 106 MMOL/L (ref 99–110)
CO2: 23 MMOL/L (ref 21–32)
CREAT SERPL-MCNC: 0.8 MG/DL (ref 0.6–1.1)
DIFFERENTIAL TYPE: ABNORMAL
EOSINOPHILS ABSOLUTE: 0.3 K/CU MM
EOSINOPHILS RELATIVE PERCENT: 2.7 % (ref 0–3)
GFR, ESTIMATED: 71 ML/MIN/1.73M2
GLUCOSE SERPL-MCNC: 101 MG/DL (ref 70–99)
HCT VFR BLD CALC: 40.2 % (ref 37–47)
HEMOGLOBIN: 13.2 GM/DL (ref 12.5–16)
IMMATURE NEUTROPHIL %: 0.3 % (ref 0–0.43)
LYMPHOCYTES ABSOLUTE: 3.3 K/CU MM
LYMPHOCYTES RELATIVE PERCENT: 33.7 % (ref 24–44)
MCH RBC QN AUTO: 31.8 PG (ref 27–31)
MCHC RBC AUTO-ENTMCNC: 32.8 % (ref 32–36)
MCV RBC AUTO: 96.9 FL (ref 78–100)
MONOCYTES ABSOLUTE: 1.3 K/CU MM
MONOCYTES RELATIVE PERCENT: 13.3 % (ref 0–4)
NEUTROPHILS ABSOLUTE: 4.8 K/CU MM
NEUTROPHILS RELATIVE PERCENT: 49.4 % (ref 36–66)
NUCLEATED RBC %: 0 %
PDW BLD-RTO: 13.2 % (ref 11.7–14.9)
PLATELET # BLD: 184 K/CU MM (ref 140–440)
PMV BLD AUTO: 9.9 FL (ref 7.5–11.1)
POTASSIUM SERPL-SCNC: 3.7 MMOL/L (ref 3.5–5.1)
RBC # BLD: 4.15 M/CU MM (ref 4.2–5.4)
SODIUM BLD-SCNC: 142 MMOL/L (ref 135–145)
TOTAL IMMATURE NEUTOROPHIL: 0.03 K/CU MM
TOTAL NUCLEATED RBC: 0 K/CU MM
TOTAL PROTEIN: 5.7 GM/DL (ref 6.4–8.2)
WBC # BLD: 9.7 K/CU MM (ref 4–10.5)

## 2024-05-26 PROCEDURE — 97530 THERAPEUTIC ACTIVITIES: CPT

## 2024-05-26 PROCEDURE — 6370000000 HC RX 637 (ALT 250 FOR IP): Performed by: STUDENT IN AN ORGANIZED HEALTH CARE EDUCATION/TRAINING PROGRAM

## 2024-05-26 PROCEDURE — 97116 GAIT TRAINING THERAPY: CPT

## 2024-05-26 PROCEDURE — A4216 STERILE WATER/SALINE, 10 ML: HCPCS | Performed by: STUDENT IN AN ORGANIZED HEALTH CARE EDUCATION/TRAINING PROGRAM

## 2024-05-26 PROCEDURE — 97535 SELF CARE MNGMENT TRAINING: CPT

## 2024-05-26 PROCEDURE — 6360000002 HC RX W HCPCS: Performed by: STUDENT IN AN ORGANIZED HEALTH CARE EDUCATION/TRAINING PROGRAM

## 2024-05-26 PROCEDURE — 99223 1ST HOSP IP/OBS HIGH 75: CPT | Performed by: INTERNAL MEDICINE

## 2024-05-26 PROCEDURE — 85025 COMPLETE CBC W/AUTO DIFF WBC: CPT

## 2024-05-26 PROCEDURE — 2140000000 HC CCU INTERMEDIATE R&B

## 2024-05-26 PROCEDURE — 2580000003 HC RX 258: Performed by: STUDENT IN AN ORGANIZED HEALTH CARE EDUCATION/TRAINING PROGRAM

## 2024-05-26 PROCEDURE — 36415 COLL VENOUS BLD VENIPUNCTURE: CPT

## 2024-05-26 PROCEDURE — 80053 COMPREHEN METABOLIC PANEL: CPT

## 2024-05-26 RX ORDER — SULFAMETHOXAZOLE AND TRIMETHOPRIM 800; 160 MG/1; MG/1
1 TABLET ORAL 2 TIMES DAILY
Qty: 10 TABLET | Refills: 0 | Status: SHIPPED | OUTPATIENT
Start: 2024-05-26 | End: 2024-05-31

## 2024-05-26 RX ADMIN — ATORVASTATIN CALCIUM 20 MG: 10 TABLET, FILM COATED ORAL at 09:41

## 2024-05-26 RX ADMIN — SODIUM CHLORIDE, PRESERVATIVE FREE 10 ML: 5 INJECTION INTRAVENOUS at 09:42

## 2024-05-26 RX ADMIN — APIXABAN 5 MG: 5 TABLET, FILM COATED ORAL at 19:58

## 2024-05-26 RX ADMIN — CHOLESTYRAMINE 4 G: 4 POWDER, FOR SUSPENSION ORAL at 09:42

## 2024-05-26 RX ADMIN — SODIUM CHLORIDE, PRESERVATIVE FREE 1000 MG: 5 INJECTION INTRAVENOUS at 20:08

## 2024-05-26 RX ADMIN — SERTRALINE HYDROCHLORIDE 75 MG: 50 TABLET ORAL at 09:41

## 2024-05-26 RX ADMIN — MEMANTINE 10 MG: 10 TABLET ORAL at 19:58

## 2024-05-26 RX ADMIN — SODIUM CHLORIDE, PRESERVATIVE FREE 10 ML: 5 INJECTION INTRAVENOUS at 19:58

## 2024-05-26 RX ADMIN — CHOLESTYRAMINE 4 G: 4 POWDER, FOR SUSPENSION ORAL at 19:58

## 2024-05-26 RX ADMIN — APIXABAN 5 MG: 5 TABLET, FILM COATED ORAL at 09:41

## 2024-05-26 RX ADMIN — PANTOPRAZOLE SODIUM 40 MG: 40 TABLET, DELAYED RELEASE ORAL at 05:42

## 2024-05-26 RX ADMIN — METOPROLOL TARTRATE 12.5 MG: 25 TABLET, FILM COATED ORAL at 19:58

## 2024-05-26 RX ADMIN — MEMANTINE 10 MG: 10 TABLET ORAL at 09:41

## 2024-05-26 RX ADMIN — LEVOTHYROXINE SODIUM 150 MCG: 0.15 TABLET ORAL at 05:42

## 2024-05-26 NOTE — CARE COORDINATION
CM met with Pt daughter/FRANCES Mott, discharge plan remains [facility].    CM call to [facility], they can accept Pt Tuesday 5/28.    Dr. Isaacs updated       Good postoperative appearance.

## 2024-05-26 NOTE — CONSULTS
Electrophysiology Consult Note      Reason for consultation: ATRIAL FIBRILLATION, FALLS    Chief complaint : Atrial fibrillation    Referring physician:       Primary care physician: Xiomy Fowler MD      History of Present Illness:     Rupal Carballo is an 88 year old female with a history of hypothyroidism, dementia, TIA, hyperlipidemia, persistent atrial fibrillation, PFO, hypercoagulable due to persistent atrial fibrillation, high fall risk  She presents with complaints of weakness and dizziness.   She reports that she woke up in the morning and felt well.   She endorses that later in the day she stood up from a seated position to ambulate and felt very weak. She also became very dizzy and nearly passed out. She usually ambulates with a walker.   She was found to have a UTI  Additionally noted to have atrial fibrillation with slow ventricular rate.   She denies chest pain, palpitations, shortness of breath, edema or syncope.   On admission Na 136, K 4.0, Creatinine 0.9, WBC 11.8,   Last ECHO 3/2023 EF 55% with severely dilated left atrium        Pastmedical history:   Past Medical History:   Diagnosis Date    Acquired hypothyroidism 12/09/2019    Patient is taking Synthroid    Arthritis     Chronic midline low back pain without sciatica 11/20/2019    Dementia without behavioral disturbance (HCC) 10/22/2019    Patient has dementia.  She is taking Namenda Patient is seeing urologist Dr. Gallego    Gait disturbance 10/22/2019    Patient uses walker to ambulate    History of TIA (transient ischemic attack) 12/09/2019    Patient has a history of TIA and also recently may have had TIA.  Patient is on Eliquis. Sees neurologist Dr. Gallego    Major depressive disorder with single episode, in full remission (HCC) 12/09/2019    Patient is taking Zoloft and that is helping.    Mixed hyperlipidemia 12/09/2019    Patient is on simvastatin    Obesity     Persistent atrial 
[facility] ACUTE CARE PHYSICAL THERAPY EVALUATION  Rupal Carballo, 1935, 3108/[id number], 5/24/2024    History  [place]:  There were no encounter diagnoses.  Patient  has a past medical history of Acquired hypothyroidism, Arthritis, Chronic midline low back pain without sciatica, Dementia without behavioral disturbance (HCC), Gait disturbance, History of TIA (transient ischemic attack), Major depressive disorder with single episode, in full remission (HCC), Mixed hyperlipidemia, Obesity, Persistent atrial fibrillation (HCC), PFO (patent foramen ovale), Pneumonia due to infectious organism, and Sepsis (HCC).  Patient  has a past surgical history that includes Hysterectomy and Cataract extraction.    Discharge Recommendation: Encourage facility for moderate post-acute rehabilitation, anticipate 1-2 hours per day and 5 days per week.     Equipment: TBD at next level of care    Subjective:    Patient states:  \"Will you hold on to me if I walk?\"      Pain:  denies pain.      Communication with other providers:  Handoff to RN, OT    Restrictions: general precautions, fall risk    Home Setup/Prior level of function  Lives With: Alone  Type of Home: Apartment  Home Layout: One level  Home Access: Level entry  Bathroom Shower/Tub: Tub/Shower unit  Bathroom Toilet: Standard  Bathroom Equipment: Hand-held shower,Grab bars in shower,Tub transfer bench  Home Equipment: Wheelchair-manual,4 wheeled walker,Rolling walker,Alert Button,Hospital bed (pt and daughter report the 4 WW gets away from her and she is not supposed to use it. She reports she uses it to get to the mailbox.)  Receives Help From: Family  ADL Assistance: Independent  Homemaking Assistance: Needs assistance  Homemaking Responsibilities: Yes  Meal Prep Responsibility: Secondary (pt reports she gets meals on wheels and her daughters get her tea and toast and small things)  Laundry Responsibility: No (pt reports she has an aide daily)  Cleaning 
hypertension  Hyperlipidemia  Hypothyroidism    Telemetry reviewed, heart rate around 80 bpm, remains in atrial fibrillation however no significant pauses  Discontinue digoxin upon discharge  Restart patient on metoprolol at a low dose.  12.5 mg p.o. twice daily  Continue with Eliquis 5 mg p.o. twice daily, follow precautions and outpatient follow-up.  Continue with statins  Risk factor modification    Okay for discharge from cardiology standpoint, patient wishes to go back to rehab  Call us with any questions       Prior documentations in EMR were personally reviewed at length  EKGs, labs, imaging were personally reviewed and interpreted     Thank you for the consult       Dr. FIDEL Cheng  5/26/2024 12:19 PM

## 2024-05-26 NOTE — DISCHARGE INSTRUCTIONS
Please make appointment to follow-up with a cardiologist.    Please complete course of antibiotics.

## 2024-05-26 NOTE — PLAN OF CARE
Problem: Discharge Planning  Goal: Discharge to home or other facility with appropriate resources  Outcome: Progressing     Problem: Pain  Goal: Verbalizes/displays adequate comfort level or baseline comfort level  Outcome: Progressing     Problem: ABCDS Injury Assessment  Goal: Absence of physical injury  Outcome: Progressing     Problem: Nutrition Deficit:  Goal: Optimize nutritional status  Outcome: Progressing     Problem: Skin/Tissue Integrity  Goal: Absence of new skin breakdown  Description: 1.  Monitor for areas of redness and/or skin breakdown  2.  Assess vascular access sites hourly  3.  Every 4-6 hours minimum:  Change oxygen saturation probe site  4.  Every 4-6 hours:  If on nasal continuous positive airway pressure, respiratory therapy assess nares and determine need for appliance change or resting period.  Outcome: Progressing

## 2024-05-27 PROCEDURE — 94761 N-INVAS EAR/PLS OXIMETRY MLT: CPT

## 2024-05-27 PROCEDURE — 2580000003 HC RX 258: Performed by: STUDENT IN AN ORGANIZED HEALTH CARE EDUCATION/TRAINING PROGRAM

## 2024-05-27 PROCEDURE — 6370000000 HC RX 637 (ALT 250 FOR IP): Performed by: STUDENT IN AN ORGANIZED HEALTH CARE EDUCATION/TRAINING PROGRAM

## 2024-05-27 PROCEDURE — 6360000002 HC RX W HCPCS: Performed by: STUDENT IN AN ORGANIZED HEALTH CARE EDUCATION/TRAINING PROGRAM

## 2024-05-27 PROCEDURE — 2140000000 HC CCU INTERMEDIATE R&B

## 2024-05-27 RX ADMIN — ATORVASTATIN CALCIUM 20 MG: 10 TABLET, FILM COATED ORAL at 09:49

## 2024-05-27 RX ADMIN — APIXABAN 5 MG: 5 TABLET, FILM COATED ORAL at 20:57

## 2024-05-27 RX ADMIN — MEMANTINE 10 MG: 10 TABLET ORAL at 09:49

## 2024-05-27 RX ADMIN — MEMANTINE 10 MG: 10 TABLET ORAL at 20:57

## 2024-05-27 RX ADMIN — PANTOPRAZOLE SODIUM 40 MG: 40 TABLET, DELAYED RELEASE ORAL at 06:10

## 2024-05-27 RX ADMIN — SODIUM CHLORIDE, PRESERVATIVE FREE 10 ML: 5 INJECTION INTRAVENOUS at 09:49

## 2024-05-27 RX ADMIN — CHOLESTYRAMINE 4 G: 4 POWDER, FOR SUSPENSION ORAL at 20:57

## 2024-05-27 RX ADMIN — SODIUM CHLORIDE, PRESERVATIVE FREE 10 ML: 5 INJECTION INTRAVENOUS at 20:58

## 2024-05-27 RX ADMIN — SODIUM CHLORIDE, PRESERVATIVE FREE 1000 MG: 5 INJECTION INTRAVENOUS at 20:58

## 2024-05-27 RX ADMIN — METOPROLOL TARTRATE 12.5 MG: 25 TABLET, FILM COATED ORAL at 09:50

## 2024-05-27 RX ADMIN — SERTRALINE HYDROCHLORIDE 75 MG: 50 TABLET ORAL at 09:49

## 2024-05-27 RX ADMIN — APIXABAN 5 MG: 5 TABLET, FILM COATED ORAL at 09:49

## 2024-05-27 RX ADMIN — LEVOTHYROXINE SODIUM 150 MCG: 0.15 TABLET ORAL at 06:10

## 2024-05-27 RX ADMIN — CHOLESTYRAMINE 4 G: 4 POWDER, FOR SUSPENSION ORAL at 09:49

## 2024-05-27 RX ADMIN — METOPROLOL TARTRATE 12.5 MG: 25 TABLET, FILM COATED ORAL at 20:57

## 2024-05-27 ASSESSMENT — ENCOUNTER SYMPTOMS
WHEEZING: 0
CONSTIPATION: 0
SINUS PAIN: 0
SHORTNESS OF BREATH: 0
ABDOMINAL PAIN: 0
BACK PAIN: 0
DIARRHEA: 0
NAUSEA: 0
COUGH: 0
COLOR CHANGE: 0
SINUS PRESSURE: 0
SORE THROAT: 0
VOMITING: 0

## 2024-05-27 ASSESSMENT — PAIN SCALES - GENERAL
PAINLEVEL_OUTOF10: 0

## 2024-05-28 ENCOUNTER — TELEPHONE (OUTPATIENT)
Age: 89
End: 2024-05-28

## 2024-05-28 VITALS
HEIGHT: 67 IN | WEIGHT: 177.9 LBS | SYSTOLIC BLOOD PRESSURE: 113 MMHG | HEART RATE: 73 BPM | TEMPERATURE: 99.1 F | BODY MASS INDEX: 27.92 KG/M2 | OXYGEN SATURATION: 95 % | DIASTOLIC BLOOD PRESSURE: 69 MMHG | RESPIRATION RATE: 22 BRPM

## 2024-05-28 PROBLEM — E44.0 MODERATE MALNUTRITION (HCC): Chronic | Status: RESOLVED | Noted: 2024-05-24 | Resolved: 2024-05-28

## 2024-05-28 PROBLEM — I48.91 ATRIAL FIBRILLATION WITH SLOW VENTRICULAR RESPONSE (HCC): Status: RESOLVED | Noted: 2024-05-24 | Resolved: 2024-05-28

## 2024-05-28 PROCEDURE — 94761 N-INVAS EAR/PLS OXIMETRY MLT: CPT

## 2024-05-28 PROCEDURE — 99231 SBSQ HOSP IP/OBS SF/LOW 25: CPT | Performed by: INTERNAL MEDICINE

## 2024-05-28 PROCEDURE — 6370000000 HC RX 637 (ALT 250 FOR IP): Performed by: STUDENT IN AN ORGANIZED HEALTH CARE EDUCATION/TRAINING PROGRAM

## 2024-05-28 PROCEDURE — 2580000003 HC RX 258: Performed by: STUDENT IN AN ORGANIZED HEALTH CARE EDUCATION/TRAINING PROGRAM

## 2024-05-28 RX ADMIN — METOPROLOL TARTRATE 12.5 MG: 25 TABLET, FILM COATED ORAL at 09:04

## 2024-05-28 RX ADMIN — MEMANTINE 10 MG: 10 TABLET ORAL at 09:04

## 2024-05-28 RX ADMIN — ATORVASTATIN CALCIUM 20 MG: 10 TABLET, FILM COATED ORAL at 09:04

## 2024-05-28 RX ADMIN — CHOLESTYRAMINE 4 G: 4 POWDER, FOR SUSPENSION ORAL at 09:03

## 2024-05-28 RX ADMIN — LEVOTHYROXINE SODIUM 150 MCG: 0.15 TABLET ORAL at 05:35

## 2024-05-28 RX ADMIN — SERTRALINE HYDROCHLORIDE 75 MG: 50 TABLET ORAL at 09:04

## 2024-05-28 RX ADMIN — PANTOPRAZOLE SODIUM 40 MG: 40 TABLET, DELAYED RELEASE ORAL at 05:35

## 2024-05-28 RX ADMIN — SODIUM CHLORIDE, PRESERVATIVE FREE 10 ML: 5 INJECTION INTRAVENOUS at 09:04

## 2024-05-28 RX ADMIN — APIXABAN 5 MG: 5 TABLET, FILM COATED ORAL at 09:04

## 2024-05-28 ASSESSMENT — PAIN SCALES - GENERAL: PAINLEVEL_OUTOF10: 0

## 2024-05-28 NOTE — CARE COORDINATION
Saw patient for Watchman education.  Patient not receptive to discussing currently.  Patient states would rather discuss in office.  There is a daughter at bedside but not the POA.  Patient being discharged to rehab possibly today.  Will reach out to Tiera FRANCES and discuss further soon.  Video shared and she has my educational packet.  Will follow.  Electronically signed by Talita Hansen RN on 5/28/2024 at 1:42 PM Watchman Care Coordinator

## 2024-05-28 NOTE — PLAN OF CARE
Problem: Discharge Planning  Goal: Discharge to home or other facility with appropriate resources  Outcome: Progressing  Flowsheets (Taken 5/28/2024 0902)  Discharge to home or other facility with appropriate resources:   Identify barriers to discharge with patient and caregiver   Arrange for needed discharge resources and transportation as appropriate   Identify discharge learning needs (meds, wound care, etc)   Refer to discharge planning if patient needs post-hospital services based on physician order or complex needs related to functional status, cognitive ability or social support system     Problem: Pain  Goal: Verbalizes/displays adequate comfort level or baseline comfort level  Outcome: Progressing  Flowsheets (Taken 5/28/2024 0859)  Verbalizes/displays adequate comfort level or baseline comfort level:   Encourage patient to monitor pain and request assistance   Assess pain using appropriate pain scale   Administer analgesics based on type and severity of pain and evaluate response     Problem: Safety - Adult  Goal: Free from fall injury  Outcome: Progressing  Flowsheets (Taken 5/28/2024 0901)  Free From Fall Injury:   Instruct family/caregiver on patient safety   Based on caregiver fall risk screen, instruct family/caregiver to ask for assistance with transferring infant if caregiver noted to have fall risk factors     Problem: ABCDS Injury Assessment  Goal: Absence of physical injury  Outcome: Progressing  Flowsheets (Taken 5/28/2024 0901)  Absence of Physical Injury: Implement safety measures based on patient assessment     Problem: Nutrition Deficit:  Goal: Optimize nutritional status  Outcome: Progressing  Flowsheets (Taken 5/28/2024 1424 by Ce Zuniga, RD, LD)  Nutrient intake appropriate for improving, restoring, or maintaining nutritional needs: Recommend appropriate diets, oral nutritional supplements, and vitamin/mineral supplements     Problem: Skin/Tissue Integrity  Goal: Absence of new

## 2024-05-28 NOTE — TELEPHONE ENCOUNTER
Inform patient daughter, patient's heart rate was running low therefore they stopped some of the medications including digoxin.  That is the right thing to do.  Dr. Khoury is monitoring that.

## 2024-05-28 NOTE — CARE COORDINATION
Transport arranged with [facility]. The next available time they have is 1715.  Notified pt's nurse, Joaquin/[facility] and pt's daughter/FRANCES/Tiera. AVS faxed and placed in packet.  TE

## 2024-05-28 NOTE — CARE COORDINATION
[facility] is agreeable to accept pt today if medically ready. Notified Dr Agrawal at  desk.  D/C INSTRUCTIONS ARE ON FRONT OF PACKET LOCATED WITH THE SOFT CHART. TE    .Discharging Nurse; Upon discharge pt will be going to [facility], report to be called to [phone], ask for fax number to send AVS when calling report.  Set up transportation with [facility] [phone].

## 2024-05-28 NOTE — DISCHARGE INSTR - COC
Continuity of Care Form    Patient Name: Rupal Carballo   :  1935  MRN:  [id number]    Admit date:  2024  Discharge date:  2024    Code Status Order: DNR-CCA   Advance Directives:     Admitting Physician:  Caro Menendez MD  PCP: Xiomy Fowler MD    Discharging Nurse: Alberto  Discharging Hospital Unit/Room#: 3108/3108-A  Discharging Unit Phone Number: [phone]    Emergency Contact:   Extended Emergency Contact Information  Primary Emergency Contact: FRANCES Galicia  Home Phone: [phone]  Relation: Child  Secondary Emergency Contact: JOSE MELGAR  Home Phone: [phone]  Relation: Child    Past Surgical History:  Past Surgical History:   Procedure Laterality Date    CATARACT EXTRACTION      HYSTERECTOMY (CERVIX STATUS UNKNOWN)         Immunization History:   Immunization History   Administered Date(s) Administered    COVID-19, MODERNA BLUE border, Primary or Immunocompromised, (age 12y+), IM, 100 mcg/0.5mL 2021, 2021    Influenza, FLUAD, (age 65 y+), Adjuvanted, 0.5mL 2022    Influenza, FLUBLOK, (age 18 y+), PF, 0.5mL 2020    TDaP, ADACEL (age 10y-64y), BOOSTRIX (age 10y+), IM, 0.5mL 2023       Active Problems:  Patient Active Problem List   Diagnosis Code    Gait disturbance R26.9    Persistent atrial fibrillation (HCC) I48.19    Dementia without behavioral disturbance ([facility]) F03.90    Chronic midline low back pain without sciatica M54.50, G89.29    Acquired hypothyroidism E03.9    Mixed hyperlipidemia E78.2    History of TIA (transient ischemic attack) Z86.73    Major depressive disorder with single episode, in full remission ([facility]) F32.5    PFO (patent foramen ovale) Q21.12    Pulmonary nodules R91.8    Interstitial lung disease ([facility]) J84.9    Lymphocytosis D72.820    History of CVA (cerebrovascular accident) Z86.73    Bilateral carotid artery stenosis I65.23    Esophageal stricture K22.2    Secondary hypercoagulable state ([facility]) D68.69    Atrial

## 2024-05-28 NOTE — PROGRESS NOTES
V2.0  [facility] Hospitalist Progress Note      Name:  Rupal Carballo /Age/Sex: 1935  (88 y.o. female)   MRN & CSN:  [id number] & [id number] Encounter Date/Time: 2024 12:05 PM EDT    Location:  [facility]/3108-A PCP: Xiomy Fowler MD       Hospital Day: 2    Assessment and Plan:   Rupal Carballo is a 88 y.o. femalewho presents with Atrial fibrillation with slow ventricular response (HCC)      Plan:    Bradycardia  Patient with heart rates down to the 30s.  No significant pauses.  Electrophysiology seen patient  Discontinue digoxin  Holding metoprolol  Monitor on telemetry    Atrial fibrillation  Continue Eliquis  Likely to resume beta-blocker    Urinary tract infection  UA consistent with infection  IV Rocephin  Follow-up urinary culture    Hyperlipidemia  Continue statin    GERD  Continue PPI    Hypothyroidism  Continue Synthroid      Diet ADULT DIET; Regular   DVT Prophylaxis [] Lovenox, []  Heparin, [] SCDs, [] Ambulation,    [x] Eliquis, [] Xarelto  [] Coumadin [] other   Code Status DNR-CCA   Disposition From: Home  Expected Disposition: Home  Estimated Date of Discharge: 1 to 2 days  Patient requires continued admission due to cardiology clearance for discharge   Surrogate Decision Maker/ POA      Personally reviewed Lab Studies and Imaging     Discussed management of the case with electrophysiology who recommended discontinuing digoxin    EKG interpreted personally and results bradycardia      Subjective:     Chief Complaint: No chief complaint on file.       Patient heart rate better today.  She otherwise states she feels well has no acute complaints at this time.    Review of Systems:    Review of Systems   Constitutional:  Negative for activity change, appetite change, chills, fatigue and fever.   HENT:  Negative for congestion, sinus pressure, sinus pain and sore throat.    Eyes:  Negative for visual disturbance.   Respiratory:  Negative for cough, shortness of breath and wheezing.  
  Physical Therapy Treatment Note  Name: Rupal Carballo MRN: [id number] :   1935   Date:  2024   Admission Date: 2024 Room:  [address] [place] [address]   Restrictions/Precautions:          fall risk  Communication with other providers:  OT  Subjective:  Patient states:  agreeable to PT tx, frustrated by inability to care for self  Pain:   Location, Type, Intensity (0/10 to 10/10):  c/o generalized body aches  Objective:    Observation:  in bed upon arrival    Treatment, including education/measures:  Therapeutic Activity Training:   Therapeutic activity training was instructed today.  Cues were given for safety, sequence, UE/LE placement, awareness, and balance.    Activities performed today included bed mobility training, sup-sit, sit-stand, SPT.   Gait Training:  Cues were given for safety, sequence, device management, balance, posture, awareness, path.         Min A bed mobility, cues for sequencing  Min A transfers--unsafe mechanics and impulsive. Needs hand placement cues and physical assist, especially from toilet  CGA A gait with RW 10 ft x 2--slow, kyphotic, downward gaze, stable path, cues for path and safety awareness  Assessment / Impression:      Continues to require some assistance for OOB activity and is functioning below baseline. Stable vitals with activity. Continue to rec SNF and pt agreeable      Patient's tolerance of treatment:  good   Adverse Reaction:   Significant change in status and impact:    Barriers to improvement:    Plan for Next Session:    Per POC                Time in:  906  Time out:  930  Timed treatment minutes:  24  Total treatment time:  24    Previously filed items:  Social/Functional History  Lives With: Alone  Type of Home: House  Home Layout: One level  Bathroom Shower/Tub: Walk-in shower  Bathroom Toilet: Standard  Bathroom Equipment: Grab bars in shower, Shower chair  Bathroom Accessibility: Accessible, Walker accessible  Home Equipment: Cane, Rollator, 
  Physician Progress Note      PATIENT:               YOSELIN MOLINA  CSN #:                  [id number]  :                       1935  ADMIT DATE:       2024 12:47 AM  DISCH DATE:  RESPONDING  PROVIDER #:        Tsering Garcia MD          QUERY TEXT:    Patient admitted with A-fib w slow ventricular response is maintained on   Eliquis. If possible, please document in progress notes and discharge summary   if you are evaluating and/or treating any of the following:    The medical record reflects the following:  Risk Factors: Female, age over 75, HTN  Clinical Indicators: A-fib w slow ventricular response... continue Eliquis.  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    Query created by: Sherley Borrego on 2024 10:09 AM      Electronically signed by:  Tsering Garcia MD 2024 10:11 AM          
4 Eyes Skin Assessment     NAME:  Rupal Carballo  YOB: 1935  MEDICAL RECORD NUMBER:  [id number]    The patient is being assessed for  Admission    I agree that at least one RN has performed a thorough Head to Toe Skin Assessment on the patient. ALL assessment sites listed below have been assessed.      Areas assessed by both nurses:    Head, Face, Ears, Shoulders, Back, Chest, Arms, Elbows, Hands, Sacrum. Buttock, Coccyx, Ischium, Legs. Feet and Heels, and Under Medical Devices         Does the Patient have a Wound?Redness to coccyx ,groin, and under bilateral breasts       Jose Prevention initiated by RN: Yes  Wound Care Orders initiated by RN: No    Pressure Injury (Stage 3,4, Unstageable, DTI, NWPT, and Complex wounds) if present, place Wound referral order by RN under : No    New Ostomies, if present place, Ostomy referral order under : No     Nurse 1 eSignature: Electronically signed by Clarissa Smart RN on 5/24/24 at 1:56 AM EDT    **SHARE this note so that the co-signing nurse can place an eSignature**    Nurse 2 eSignature: Electronically signed by Mary Escobar RN on 5/24/24 at 2:50 AM EDT   
Admit Date:  5/24/2024    Admission diagnosis / Complaint :  Atrial fibrillation      Subjective:  Ms. Carballo is feeling better. No syncope or dizziness    Objective:   /84   Pulse 68   Temp 98.3 °F (36.8 °C) (Oral)   Resp 18   Ht 1.702 m (5' 7\")   Wt 80.7 kg (177 lb 14.4 oz)   SpO2 95%   BMI 27.86 kg/m²     Intake/Output Summary (Last 24 hours) at 5/28/2024 0833  Last data filed at 5/27/2024 2200  Gross per 24 hour   Intake 200 ml   Output 200 ml   Net 0 ml       TELEMETRY: Atrial fibrillation    has a past medical history of Acquired hypothyroidism, Arthritis, Chronic midline low back pain without sciatica, Dementia without behavioral disturbance (HCC), Gait disturbance, History of TIA (transient ischemic attack), Major depressive disorder with single episode, in full remission (HCC), Mixed hyperlipidemia, Obesity, Persistent atrial fibrillation (HCC), PFO (patent foramen ovale), Pneumonia due to infectious organism, and Sepsis (HCC).   has a past surgical history that includes Hysterectomy and Cataract extraction.  Physical Exam:  General:  Awake, alert, NAD  Skin:  Warm and dry  Neck:  JVD none  Chest:  Clear to auscultation, respiration easy  Cardiovascular: irregular rhythm, grade 2/6 systolic murmur  Abdomen:  Soft non tender  Extremities:  no edema    Medications:    sodium chloride flush  5-40 mL IntraVENous 2 times per day    apixaban  5 mg Oral BID    cholestyramine light  1 packet Oral BID    levothyroxine  150 mcg Oral Daily    memantine  10 mg Oral BID    metoprolol tartrate  12.5 mg Oral BID    pantoprazole  40 mg Oral Daily    [Held by provider] rivastigmine  3 mg Oral Daily    atorvastatin  20 mg Oral Daily    sertraline  75 mg Oral Daily      sodium chloride       sodium chloride flush, sodium chloride, potassium chloride **OR** potassium alternative oral replacement **OR** potassium chloride, potassium chloride, magnesium sulfate, ondansetron **OR** ondansetron, polyethylene glycol, 
Atrial fibrillation with slow ventricular response  Chronic dementia but alert and oriented, good insight  UTI  HTN  HLD  Hypothyrodisim  High risk of falls and previous falls    Patient with weakness and dizziness  Patient with no significant pauses (significant pauses more than 5 seconds in atrial fib) but bradycardia into 30's  Could be from digoxin in elderly even though its not in toxic levels  I would rather just be on metoprolol and titrate medication    Patient also has falls and imbalance on feet  She had hit her head before  Discussed about watchman briefly as she wants to come off eliquis and concerned about bleeding    I also discussed with her if after stopping digoxin if she gets tachycardic then we uptitrate her metoprolol and if she cannot tolerate then she will need pacemaker but at this time conservative management    Patient agreeable with plan    Full note to follow    
Comprehensive Nutrition Assessment    Type and Reason for Visit:  Initial (low BMI for age)    Nutrition Recommendations/Plan:   Begin a Regular Diet as timely as able  Begin standard and frozen oral nutrition supplements, once diet advanced     Malnutrition Assessment:  Malnutrition Status:  Moderate malnutrition (05/24/24 1410)    Context:  Social/Environmental Circumstances     Findings of the 6 clinical characteristics of malnutrition:  Energy Intake:  Unable to assess  Weight Loss:  Greater than 7.5% over 3 months     Body Fat Loss:  No significant body fat loss     Muscle Mass Loss:  Mild muscle mass loss Temples (temporalis), Clavicles (pectoralis & deltoids), Hand (interosseous)  Fluid Accumulation:  No significant fluid accumulation Extremities   Strength:  Not Performed    Nutrition Assessment:    Pt admitted with Afib with slow ventricular response, generalized weakness and dizziness. H/O dementia, Afib, HTN, HLD, GERD, hypothyroidism. Pt lives alone, has very attentive family assistance per Pt. She has been NPO since admission. Denies recent decline in po intake, uses Ensure at home, denies recent wt loss, has not noticed a change in clothing PTA. Per Epic history, Pt has lost 17% over the past 3 mos. Pt meets criteria for moderate malnutrition. Will order oral supplement once diet advanced and continue to follow as high nutrition risk.    Nutrition Related Findings:    Glu 165   Wound Type: None (redness)       Current Nutrition Intake & Therapies:    Average Meal Intake: NPO  Average Supplements Intake: NPO  Diet NPO Exceptions are: Sips of Water with Meds    Anthropometric Measures:  Height: 170.2 cm (5' 7\")  Ideal Body Weight (IBW): 135 lbs (61 kg)    Admission Body Weight: 62.9 kg (138 lb 10.7 oz)  Current Body Weight: 62.8 kg (138 lb 7.2 oz),   IBW.    Current BMI (kg/m2): 21.7  Usual Body Weight: 75.4 kg (166 lb 3 oz) (2/5/24)  % Weight Change (Calculated): -16.7                    BMI 
Occupational Therapy    Occupational Therapy Treatment Note    Name: Rupal Carballo MRN: [id number] :   1935   Date:  2024   Admission Date: 2024 Room:  [address] [place] [address]       Restrictions/Precautions:          fall risk, general precautions    Communication with other providers: PT for safety    Subjective:  Patient states:  \"I don't normally complain this much\"  Pain:   Location, Type, Intensity (0/10 to 10/10):  reports pain generalized pain/aches, did not rate    Objective:    Observation: supine in bed, agreeable   Objective Measures:  vitals stable on room air    Treatment, including education:    Self Care Training:   Cues were given for safety, sequence, UE/LE placement, visual cues, and balance.    Activities performed today included UB/LB dressing tasks, toileting, hand hygiene at sink    STS to/from standard toilet w/ min A, Vcs for hand placement and alignment.   SBA hygiene, min A clothing mgmt.   SBA hand hygiene while seated in recliner.        Therapeutic Activity Training:   Therapeutic activity training was instructed today.  Cues were given for safety, sequence, UE/LE placement, awareness, and balance.    Activities performed today included bed mobility training, sup-sit, sit-stand, ambulation.    Supine to sitting EOB w/ min A, Vcs for initiation.   STS from EOB w/ min A, Vcs for hand placement.   Ambulated to/from BR using RW w/ CGA - min A, Vcs for pathway, posture, and RW mgmt.   Stand to sit to recliner w/ min A to control descent.     Left seated in recliner w/ AM meal set up, all needs met.       Assessment / Impression:    Patient's tolerance of treatment: Well  Adverse Reaction: None  Significant change in status and impact: None  Barriers to improvement: weakness, activity tolerance, safety      Plan for Next Session:    Continue w/ OT POC and functional goals, address safety/independence w/ ADLs and transfers/mobility.       Time in:  906  Time out:  930  Timed 
Occupational Therapy  [facility] ACUTE CARE OCCUPATIONAL THERAPY EVALUATION    Rupal Carballo, 1935, 3108/3108-A, 5/24/2024    Discharge Recommendation: Encourage facility for moderate post-acute rehabilitation, anticipate 1-2 hours per day and 5 days per week.      History:  [place]:  There were no encounter diagnoses.  Past Medical History:   Diagnosis Date    Acquired hypothyroidism 12/09/2019    Patient is taking Synthroid    Arthritis     Chronic midline low back pain without sciatica 11/20/2019    Dementia without behavioral disturbance (HCC) 10/22/2019    Patient has dementia.  She is taking Namenda Patient is seeing urologist Dr. Gallego    Gait disturbance 10/22/2019    Patient uses walker to ambulate    History of TIA (transient ischemic attack) 12/09/2019    Patient has a history of TIA and also recently may have had TIA.  Patient is on Eliquis. Sees neurologist Dr. Gallego    Major depressive disorder with single episode, in full remission ([facility]) 12/09/2019    Patient is taking Zoloft and that is helping.    Mixed hyperlipidemia 12/09/2019    Patient is on simvastatin    Obesity     Persistent atrial fibrillation ([facility]) 10/22/2019    A. fib on recently in October 2019 .  Patient is on metoprolol 25 mg twice a day and Eliquis    PFO (patent foramen ovale) 12/09/2019    PFO seen on echocardiogram in 2016    Pneumonia due to infectious organism 10/22/2019    Patient had pneumonia and pleural effusion during admission in October 2019. A repeat CT scan of the chest will be done Patient is also seen pulmonologist    Sepsis ([facility]) 04/11/2022        Subjective:  Patient states: \"I want to do what you guys tell me\"  Pain:  denies at rest, reports intermittent R knee pain w/ ambulation. Did not rate  Communication with other providers: co-eval w/ PT, handoff to RN  Restrictions: General Precautions, Fall Risk    Home Setup/Prior level of function:  Social/Functional History  Lives With: 
Patient declined to be checked and changed and said \"I am fine for now.\" Acknowledged patient's decision. Care continues.     Electronically signed by KELLY HOBBS RN on 5/27/2024 at 6:22 AM      
Patient discharged to [facility] with [facility] to transport. Patient educated on all discharge instructions. All questions answered. Patient denies any needs at this time.     
Report called to JUAN Juárez at [facility]. All questions answered.  
Unable to check Output at 6am as patient said \"I would want to wait it's still time for me to sleep.\" Asked Charge RN to try to check, but then Charge RN said that the patient refused to be checked and said the same thing that patient would want to wait. Care continues.    Electronically signed by KELLY HOBBS RN on 5/26/2024 at 6:29 AM      
of Nutrition Care: Continue to monitor while inpatient, Feeding Assistance/Environment Change  Plan of Care discussed with: Pt/PerfectServed Attending re malnutrition    Goals:  Previous Goal Met: Progressing toward Goal(s)  Goals: Initiate PO diet       Nutrition Monitoring and Evaluation:   Behavioral-Environmental Outcomes: None Identified  Food/Nutrient Intake Outcomes: Food and Nutrient Intake, Supplement Intake  Physical Signs/Symptoms Outcomes: Biochemical Data, Chewing or Swallowing, GI Status, Fluid Status or Edema, Meal Time Behavior, Nutrition Focused Physical Findings, Weight, Skin    Discharge Planning:    Continue Oral Nutrition Supplement     Ce Zuniga RD, LD  Contact: [phone]    
content normal.         Judgment: Judgment normal.         Medications:   Medications:    sodium chloride flush  5-40 mL IntraVENous 2 times per day    cefTRIAXone (ROCEPHIN) IV  1,000 mg IntraVENous Q24H    apixaban  5 mg Oral BID    cholestyramine light  1 packet Oral BID    levothyroxine  150 mcg Oral Daily    memantine  10 mg Oral BID    metoprolol tartrate  12.5 mg Oral BID    pantoprazole  40 mg Oral Daily    [Held by provider] rivastigmine  3 mg Oral Daily    atorvastatin  20 mg Oral Daily    sertraline  75 mg Oral Daily      Infusions:    sodium chloride       PRN Meds: sodium chloride flush, 5-40 mL, PRN  sodium chloride, , PRN  potassium chloride, 40 mEq, PRN   Or  potassium alternative oral replacement, 40 mEq, PRN   Or  potassium chloride, 10 mEq, PRN  potassium chloride, 10 mEq, PRN  magnesium sulfate, 2,000 mg, PRN  ondansetron, 4 mg, Q8H PRN   Or  ondansetron, 4 mg, Q6H PRN  polyethylene glycol, 17 g, Daily PRN  acetaminophen, 650 mg, Q6H PRN   Or  acetaminophen, 650 mg, Q6H PRN        Labs      No results found for this or any previous visit (from the past 24 hour(s)).       Imaging/Diagnostics Last 24 Hours   No results found.    Comment: Please note this report has been produced using speech recognition software and may contain errors related to that system including errors in grammar, punctuation, and spelling, as well as words and phrases that may be inappropriate. If there are any questions or concerns please feel free to contact the dictating provider for clarification.     Electronically signed by Rm Isaacs MD on 5/27/2024 at 12:49 PM

## 2024-05-28 NOTE — TELEPHONE ENCOUNTER
Pt daughter Rahel called wanting to inform that the pt is in the hospital and is not doing well, may be transferred into a nursing home if she does not improve. Daughter also concerned that pt was taken off of Digoxin cold turkey by a hospital physician and wants to know Dr. Fowler's opinion on that. Pt aware it is not a situation Dr. Fowler can likely change but just wants a second opinion, pt daughter states we can either call her or Tiera. Please advise

## 2024-05-28 NOTE — DISCHARGE SUMMARY
Xiomy Fowler MD within 2 weeks  Diet: cardiac diet   Activity: activity as tolerated  Disposition: Discharged to:   []Home, []HHC, [x]SNF, []Acute Rehab, []Hospice   Condition on discharge: Stable  Labs and Tests to be Followed up as an outpatient by PCP or Specialist: Follow-up with electrophysiology and cardiology to see about watchman's procedure    Discharge Medications:        Medication List        START taking these medications      sulfamethoxazole-trimethoprim 800-160 MG per tablet  Commonly known as: BACTRIM DS;SEPTRA DS  Take 1 tablet by mouth 2 times daily for 5 days            CONTINUE taking these medications      ACETAMINOPHEN PO     apixaban 5 MG Tabs tablet  Commonly known as: Eliquis  TAKE 1 TABLET BY MOUTH 2 TIMES DAILY     cholestyramine 4 g packet  Commonly known as: Questran  Take 1 packet by mouth 2 times daily     levothyroxine 150 MCG tablet  Commonly known as: SYNTHROID  TAKE ONE TABLET BY MOUTH EVERY DAY     loperamide 2 MG capsule  Commonly known as: IMODIUM     meclizine 12.5 MG tablet  Commonly known as: ANTIVERT  Take 1 tablet by mouth daily as needed for Dizziness     memantine 10 MG tablet  Commonly known as: NAMENDA  Take 1 tablet by mouth 2 times daily     metoprolol tartrate 25 MG tablet  Commonly known as: LOPRESSOR  Take 0.5 tablets by mouth 2 times daily     nystatin 723822 UNIT/GM powder  Commonly known as: MYCOSTATIN  Apply 3 times daily.     pantoprazole 40 MG tablet  Commonly known as: PROTONIX  Take 1 tablet by mouth daily     rivastigmine 3 MG capsule  Commonly known as: EXELON  Take 1 capsule by mouth daily     sertraline 50 MG tablet  Commonly known as: ZOLOFT  Take 1.5 tablets by mouth daily TAKE ONE TABLET BY MOUTH EVERY DAY     simvastatin 40 MG tablet  Commonly known as: ZOCOR  Take 1 tablet by mouth nightly     Vitamin D3 50 MCG (2000 UT) Tabs  TAKE ONE TABLET BY MOUTH EVERY DAY     vitamin E 400 UNIT capsule  Take 1 capsule by mouth daily            STOP taking

## 2024-05-29 ENCOUNTER — TELEPHONE (OUTPATIENT)
Age: 89
End: 2024-05-29

## 2024-05-29 ENCOUNTER — CARE COORDINATION (OUTPATIENT)
Dept: CARE COORDINATION | Age: 89
End: 2024-05-29

## 2024-05-29 NOTE — TELEPHONE ENCOUNTER
Care Transitions Initial Follow Up Call    Call within 2 business days of discharge: Yes     Patient: Rupal Carballo Patient : 1935 MRN: [id number]    [unfilled]    RARS: Readmission Risk Score: 12.1       Spoke with: daughter    Discharge department/facility:  [facility]  Non-face-to-face services provided:  Patient currently admitted possibly will be transferred to nursing home    Follow Up  Future Appointments   Date Time Provider Department Center   2024  1:00 PM Xiomy Fowler MD urb rhc pc [facility]   10/10/2024  2:00 PM Raheem Soto MD SRMX CC [facility]   10/10/2024  2:15 PM YOSHI, MED ONC NURSE [facility] MED ONC [place]       Michelle Frias MA

## 2024-05-30 ASSESSMENT — ENCOUNTER SYMPTOMS
BACK PAIN: 0
COLOR CHANGE: 0
WHEEZING: 0
EYE PAIN: 0
NAUSEA: 0
BLOOD IN STOOL: 0
PHOTOPHOBIA: 0
CONSTIPATION: 0
DIARRHEA: 0
SHORTNESS OF BREATH: 0
ABDOMINAL PAIN: 0
COUGH: 0
VOMITING: 0
CHEST TIGHTNESS: 0

## 2024-06-04 LAB
BASOPHILS ABSOLUTE: 0.08 /ΜL
BASOPHILS RELATIVE PERCENT: 0.9 %
EOSINOPHILS ABSOLUTE: 0.28 /ΜL
EOSINOPHILS RELATIVE PERCENT: 3 %
HCT VFR BLD CALC: 42.3 % (ref 36–46)
HEMOGLOBIN: 14.4 G/DL (ref 12–16)
LYMPHOCYTES ABSOLUTE: 3.22 /ΜL
LYMPHOCYTES RELATIVE PERCENT: 34.4 %
MCH RBC QN AUTO: 31.8 PG
MCHC RBC AUTO-ENTMCNC: 34 G/DL
MCV RBC AUTO: 93.4 FL
MONOCYTES ABSOLUTE: 0.8 /ΜL
MONOCYTES RELATIVE PERCENT: 8.5 %
NEUTROPHILS ABSOLUTE: 4.97 /ΜL
NEUTROPHILS RELATIVE PERCENT: 53 %
PDW BLD-RTO: 12.9 %
PLATELET # BLD: 382 K/ΜL
PMV BLD AUTO: 9.9 FL
RBC # BLD: 4.53 10^6/ΜL
WBC # BLD: 9.37 10^3/ML

## 2024-06-10 ENCOUNTER — ANESTHESIA (OUTPATIENT)
Dept: ENDOSCOPY | Age: 89
End: 2024-06-10
Payer: MEDICARE

## 2024-06-10 ENCOUNTER — HOSPITAL ENCOUNTER (EMERGENCY)
Age: 89
Discharge: HOME | End: 2024-06-10
Attending: EMERGENCY MEDICINE
Payer: MEDICARE

## 2024-06-10 ENCOUNTER — ANESTHESIA EVENT (OUTPATIENT)
Dept: ENDOSCOPY | Age: 89
End: 2024-06-10
Payer: MEDICARE

## 2024-06-10 VITALS
TEMPERATURE: 97 F | RESPIRATION RATE: 16 BRPM | SYSTOLIC BLOOD PRESSURE: 137 MMHG | BODY MASS INDEX: 25.9 KG/M2 | WEIGHT: 165 LBS | OXYGEN SATURATION: 96 % | HEART RATE: 87 BPM | DIASTOLIC BLOOD PRESSURE: 76 MMHG | HEIGHT: 67 IN

## 2024-06-10 DIAGNOSIS — R13.10 DYSPHAGIA, UNSPECIFIED TYPE: Primary | ICD-10-CM

## 2024-06-10 PROCEDURE — 96374 THER/PROPH/DIAG INJ IV PUSH: CPT

## 2024-06-10 PROCEDURE — 6360000002 HC RX W HCPCS: Performed by: EMERGENCY MEDICINE

## 2024-06-10 PROCEDURE — 6360000002 HC RX W HCPCS: Performed by: NURSE ANESTHETIST, CERTIFIED REGISTERED

## 2024-06-10 PROCEDURE — 7100000001 HC PACU RECOVERY - ADDTL 15 MIN: Performed by: INTERNAL MEDICINE

## 2024-06-10 PROCEDURE — 2709999900 HC NON-CHARGEABLE SUPPLY: Performed by: INTERNAL MEDICINE

## 2024-06-10 PROCEDURE — 3700000000 HC ANESTHESIA ATTENDED CARE: Performed by: INTERNAL MEDICINE

## 2024-06-10 PROCEDURE — 2500000003 HC RX 250 WO HCPCS: Performed by: NURSE ANESTHETIST, CERTIFIED REGISTERED

## 2024-06-10 PROCEDURE — 7100000010 HC PHASE II RECOVERY - FIRST 15 MIN: Performed by: INTERNAL MEDICINE

## 2024-06-10 PROCEDURE — 7100000011 HC PHASE II RECOVERY - ADDTL 15 MIN: Performed by: INTERNAL MEDICINE

## 2024-06-10 PROCEDURE — 99285 EMERGENCY DEPT VISIT HI MDM: CPT

## 2024-06-10 PROCEDURE — 6360000002 HC RX W HCPCS: Performed by: ANESTHESIOLOGY

## 2024-06-10 PROCEDURE — 3700000001 HC ADD 15 MINUTES (ANESTHESIA): Performed by: INTERNAL MEDICINE

## 2024-06-10 PROCEDURE — 3609012900 HC EGD FOREIGN BODY REMOVAL: Performed by: INTERNAL MEDICINE

## 2024-06-10 PROCEDURE — 7100000000 HC PACU RECOVERY - FIRST 15 MIN: Performed by: INTERNAL MEDICINE

## 2024-06-10 PROCEDURE — 2580000003 HC RX 258: Performed by: NURSE ANESTHETIST, CERTIFIED REGISTERED

## 2024-06-10 RX ORDER — SUCCINYLCHOLINE CHLORIDE 20 MG/ML
INJECTION INTRAMUSCULAR; INTRAVENOUS PRN
Status: DISCONTINUED | OUTPATIENT
Start: 2024-06-10 | End: 2024-06-11 | Stop reason: SDUPTHER

## 2024-06-10 RX ORDER — ONDANSETRON 2 MG/ML
INJECTION INTRAMUSCULAR; INTRAVENOUS PRN
Status: DISCONTINUED | OUTPATIENT
Start: 2024-06-10 | End: 2024-06-11 | Stop reason: SDUPTHER

## 2024-06-10 RX ORDER — GLUCAGON 1 MG/ML
1 KIT INJECTION ONCE
Status: COMPLETED | OUTPATIENT
Start: 2024-06-10 | End: 2024-06-10

## 2024-06-10 RX ORDER — PHENYLEPHRINE HCL IN 0.9% NACL 1 MG/10 ML
SYRINGE (ML) INTRAVENOUS PRN
Status: DISCONTINUED | OUTPATIENT
Start: 2024-06-10 | End: 2024-06-11 | Stop reason: SDUPTHER

## 2024-06-10 RX ORDER — FENTANYL CITRATE 50 UG/ML
25 INJECTION, SOLUTION INTRAMUSCULAR; INTRAVENOUS EVERY 5 MIN PRN
Status: DISCONTINUED | OUTPATIENT
Start: 2024-06-10 | End: 2024-06-10 | Stop reason: HOSPADM

## 2024-06-10 RX ORDER — SODIUM CHLORIDE 0.9 % (FLUSH) 0.9 %
5-40 SYRINGE (ML) INJECTION PRN
Status: DISCONTINUED | OUTPATIENT
Start: 2024-06-10 | End: 2024-06-10 | Stop reason: HOSPADM

## 2024-06-10 RX ORDER — LABETALOL HYDROCHLORIDE 5 MG/ML
10 INJECTION, SOLUTION INTRAVENOUS
Status: DISCONTINUED | OUTPATIENT
Start: 2024-06-10 | End: 2024-06-10 | Stop reason: HOSPADM

## 2024-06-10 RX ORDER — SODIUM CHLORIDE 0.9 % (FLUSH) 0.9 %
5-40 SYRINGE (ML) INJECTION EVERY 12 HOURS SCHEDULED
Status: DISCONTINUED | OUTPATIENT
Start: 2024-06-10 | End: 2024-06-10 | Stop reason: HOSPADM

## 2024-06-10 RX ORDER — SODIUM CHLORIDE, SODIUM LACTATE, POTASSIUM CHLORIDE, CALCIUM CHLORIDE 600; 310; 30; 20 MG/100ML; MG/100ML; MG/100ML; MG/100ML
INJECTION, SOLUTION INTRAVENOUS CONTINUOUS PRN
Status: DISCONTINUED | OUTPATIENT
Start: 2024-06-10 | End: 2024-06-11 | Stop reason: SDUPTHER

## 2024-06-10 RX ORDER — HYDRALAZINE HYDROCHLORIDE 20 MG/ML
10 INJECTION INTRAMUSCULAR; INTRAVENOUS
Status: DISCONTINUED | OUTPATIENT
Start: 2024-06-10 | End: 2024-06-10 | Stop reason: HOSPADM

## 2024-06-10 RX ORDER — PROPOFOL 10 MG/ML
INJECTION, EMULSION INTRAVENOUS PRN
Status: DISCONTINUED | OUTPATIENT
Start: 2024-06-10 | End: 2024-06-11 | Stop reason: SDUPTHER

## 2024-06-10 RX ORDER — LIDOCAINE HYDROCHLORIDE 20 MG/ML
INJECTION, SOLUTION INFILTRATION; PERINEURAL PRN
Status: DISCONTINUED | OUTPATIENT
Start: 2024-06-10 | End: 2024-06-11 | Stop reason: SDUPTHER

## 2024-06-10 RX ORDER — SODIUM CHLORIDE 9 MG/ML
INJECTION, SOLUTION INTRAVENOUS CONTINUOUS
Status: DISCONTINUED | OUTPATIENT
Start: 2024-06-10 | End: 2024-06-10

## 2024-06-10 RX ORDER — OXYCODONE HYDROCHLORIDE 5 MG/1
5 TABLET ORAL
Status: DISCONTINUED | OUTPATIENT
Start: 2024-06-10 | End: 2024-06-10 | Stop reason: HOSPADM

## 2024-06-10 RX ORDER — DIPHENHYDRAMINE HYDROCHLORIDE 50 MG/ML
12.5 INJECTION INTRAMUSCULAR; INTRAVENOUS
Status: DISCONTINUED | OUTPATIENT
Start: 2024-06-10 | End: 2024-06-10 | Stop reason: HOSPADM

## 2024-06-10 RX ORDER — DROPERIDOL 2.5 MG/ML
0.62 INJECTION, SOLUTION INTRAMUSCULAR; INTRAVENOUS EVERY 10 MIN PRN
Status: DISCONTINUED | OUTPATIENT
Start: 2024-06-10 | End: 2024-06-10 | Stop reason: HOSPADM

## 2024-06-10 RX ORDER — ONDANSETRON 2 MG/ML
4 INJECTION INTRAMUSCULAR; INTRAVENOUS
Status: DISCONTINUED | OUTPATIENT
Start: 2024-06-10 | End: 2024-06-10 | Stop reason: HOSPADM

## 2024-06-10 RX ORDER — NALOXONE HYDROCHLORIDE 0.4 MG/ML
INJECTION, SOLUTION INTRAMUSCULAR; INTRAVENOUS; SUBCUTANEOUS PRN
Status: DISCONTINUED | OUTPATIENT
Start: 2024-06-10 | End: 2024-06-10 | Stop reason: HOSPADM

## 2024-06-10 RX ORDER — SODIUM CHLORIDE, SODIUM LACTATE, POTASSIUM CHLORIDE, CALCIUM CHLORIDE 600; 310; 30; 20 MG/100ML; MG/100ML; MG/100ML; MG/100ML
INJECTION, SOLUTION INTRAVENOUS ONCE
Status: DISCONTINUED | OUTPATIENT
Start: 2024-06-10 | End: 2024-06-10 | Stop reason: HOSPADM

## 2024-06-10 RX ORDER — DEXAMETHASONE SODIUM PHOSPHATE 4 MG/ML
INJECTION, SOLUTION INTRA-ARTICULAR; INTRALESIONAL; INTRAMUSCULAR; INTRAVENOUS; SOFT TISSUE PRN
Status: DISCONTINUED | OUTPATIENT
Start: 2024-06-10 | End: 2024-06-11 | Stop reason: SDUPTHER

## 2024-06-10 RX ADMIN — SODIUM CHLORIDE, POTASSIUM CHLORIDE, SODIUM LACTATE AND CALCIUM CHLORIDE: 600; 310; 30; 20 INJECTION, SOLUTION INTRAVENOUS at 16:24

## 2024-06-10 RX ADMIN — PROPOFOL 80 MG: 10 INJECTION, EMULSION INTRAVENOUS at 16:25

## 2024-06-10 RX ADMIN — ONDANSETRON 4 MG: 2 INJECTION INTRAMUSCULAR; INTRAVENOUS at 16:20

## 2024-06-10 RX ADMIN — LABETALOL HYDROCHLORIDE 10 MG: 5 INJECTION, SOLUTION INTRAVENOUS at 17:05

## 2024-06-10 RX ADMIN — Medication 100 MCG: at 16:24

## 2024-06-10 RX ADMIN — SUCCINYLCHOLINE CHLORIDE 100 MG: 20 INJECTION, SOLUTION INTRAMUSCULAR; INTRAVENOUS at 16:25

## 2024-06-10 RX ADMIN — DEXAMETHASONE SODIUM PHOSPHATE 4 MG: 4 INJECTION, SOLUTION INTRAMUSCULAR; INTRAVENOUS at 16:29

## 2024-06-10 RX ADMIN — LIDOCAINE HYDROCHLORIDE 60 MG: 20 INJECTION, SOLUTION INFILTRATION; PERINEURAL at 16:25

## 2024-06-10 RX ADMIN — GLUCAGON 1 MG: KIT at 13:00

## 2024-06-10 ASSESSMENT — PAIN SCALES - GENERAL
PAINLEVEL_OUTOF10: 0

## 2024-06-10 ASSESSMENT — PAIN - FUNCTIONAL ASSESSMENT
PAIN_FUNCTIONAL_ASSESSMENT: 0-10
PAIN_FUNCTIONAL_ASSESSMENT: 0-10

## 2024-06-10 NOTE — ED NOTES
Endoscopy ready for pt. Pt family updated. Pt in the lobby bathroom with family member. Endoscopy has pt paperwork and will get pt from bathroom and take her upstairs.

## 2024-06-10 NOTE — ED PROVIDER NOTES
Emergency Department Encounter    Patient: Rupal Carballo  MRN: [id number]  : 1935  Date of Evaluation: 6/10/2024  ED Provider:  Sreekanth Bay MD    Triage Chief Complaint:   Dysphagia (Pt has hx esophageal dilation about 6-7 months ago.)    [place]:  Rupal Carballo is a 88 y.o. female with history of difficult swallowing with 7 atrial dilatation that presents in the past coming into the emergency room with difficulty swallowing including liquids.  Patient states she was having a corn beef/ham for dinner last night and she feels as small food bolus got stuck in her throat.  She said when she tries to swallow liquid she is having increased amount of pooling saliva in her mouth and she keeps spitting and sometimes with gagging.  She does not admit to any chest pain or shortness of breath with this.  Patient denies cough.  She states she has not had anything to eat or drink since this happened last    ROS - see HPI, below listed is current ROS at time of my eval:  General:  No fevers, no chills, no weakness  Eyes:  No recent vison changes, no discharge  ENT: Difficult swallowing  Cardiovascular:  No chest pain, no palpitations  Respiratory:  No shortness of breath, no cough, no wheezing  Gastrointestinal:  No pain, no nausea, no vomiting, no diarrhea  Musculoskeletal:  No muscle pain, no joint pain  Skin:  No rash, no pruritis, no easy bruising  Neurologic:  No speech problems, no headache, no extremity numbness, no extremity tingling, no extremity weakness  Psychiatric:  No anxiety  Genitourinary:  No dysuria, no hematuria  Extremities:  no edema, no pain    Past Medical History:   Diagnosis Date    Acquired hypothyroidism 2019    Patient is taking Synthroid    Arthritis     Chronic midline low back pain without sciatica 2019    Dementia without behavioral disturbance (HCC) 10/22/2019    Patient has dementia.  She is taking Namenda Patient is seeing urologist Dr. Gallego    Gait disturbance

## 2024-06-10 NOTE — ED NOTES
Pt family member approached rn to update on pt status. Rn discussed care with attending SEVERINO Bay md. Charge rn also notified.

## 2024-06-10 NOTE — ANESTHESIA PRE PROCEDURE
Department of Anesthesiology  Preprocedure Note       Name:  Rupal Carballo   Age:  88 y.o.  :  1935                                          MRN:  [id number]         Date:  6/10/2024      Surgeon: Surgeon(s):  Jessica Duarte MD    Procedure: Procedure(s):  ESOPHAGOGASTRODUODENOSCOPY    Medications prior to admission:   Prior to Admission medications    Medication Sig Start Date End Date Taking? Authorizing Provider   mirtazapine (REMERON) 15 MG tablet Take 1 tablet by mouth nightly 24  Tayler Salinas MD   mirtazapine (REMERON) 30 MG tablet Take 1 tablet by mouth nightly  Patient not taking: Reported on 2024   Tayler Salinas MD   cholestyramine (QUESTRAN) 4 g packet Take 1 packet by mouth 2 times daily 24   Xiomy Fowler MD   apixaban (ELIQUIS) 5 MG TABS tablet TAKE 1 TABLET BY MOUTH 2 TIMES DAILY 24   Xiomy Fowler MD   Cholecalciferol (VITAMIN D3) 50 MCG ( UT) TABS TAKE ONE TABLET BY MOUTH EVERY DAY 24   Xiomy Fowler MD   levothyroxine (SYNTHROID) 150 MCG tablet TAKE ONE TABLET BY MOUTH EVERY DAY 24   Xiomy Fowler MD   memantine (NAMENDA) 10 MG tablet Take 1 tablet by mouth 2 times daily 24   Xiomy Fowler MD   metoprolol tartrate (LOPRESSOR) 25 MG tablet Take 0.5 tablets by mouth 2 times daily 24   Xiomy Fowler MD   nystatin (MYCOSTATIN) 649294 UNIT/GM powder Apply 3 times daily. 24   Xiomy Fowler MD   pantoprazole (PROTONIX) 40 MG tablet Take 1 tablet by mouth daily 24   Xiomy Fowler MD   rivastigmine (EXELON) 3 MG capsule Take 1 capsule by mouth daily 24   Xiomy Fowler MD   sertraline (ZOLOFT) 50 MG tablet Take 1.5 tablets by mouth daily TAKE ONE TABLET BY MOUTH EVERY DAY 24   Xiomy Fowler MD   simvastatin (ZOCOR) 40 MG tablet Take 1 tablet by mouth nightly 24   Xiomy Fowler MD   vitamin E 400 UNIT capsule Take 1 capsule by mouth daily 24   Xiomy Fowler MD   meclizine (ANTIVERT) 12.5 MG

## 2024-06-10 NOTE — PROGRESS NOTES
1657: Arrived to PACU from endo. Monitors applied, alarms on. Report obtained from An MALIK and Naa MILLER.  1705: Dr. Ward at bedside and aware of elevated B/P. Order received and medicated with Labetalol (see mar). Patient states she keeps making saliva and spitting it out. Encouraged to swallow.  1720: Taking small ice chips and states that is helping the throat irritation.  1735: Taking small ice chips and able to swallow. States feels better the food is out but throat is sore. Reassurance given. Transported to [facility].

## 2024-06-10 NOTE — CONSULTS
[facility]    [facility]  Gastroenterology Consultation    6/10/2024  4:22 PM    Patient:    Rupal Carballo  : 1935   88 y.o.             MRN: [id number]  Admitted: 6/10/2024 12:07 PM ATT: No att. providers found   ENDO/NONE  AdmitDx: Dysphagia, unspecified type [R13.10]  PCP: Xiomy Fowler MD    Reason for Consult:   Dysphagia Food impaction    IMPRESSION and RECOMMENDATIONS:  History of recurrent food impactions  Appears to have esophageal stricture and may be Schatzki's ring  Had food impaction again  Will plan for emergent EGD with possible foreign body removal  Discussed with family discussed with patient        Patient Active Problem List   Diagnosis Code    Gait disturbance R26.9    Persistent atrial fibrillation (HCC) I48.19    Dementia without behavioral disturbance (HCC) F03.90    Chronic midline low back pain without sciatica M54.50, G89.29    Acquired hypothyroidism E03.9    Mixed hyperlipidemia E78.2    History of TIA (transient ischemic attack) Z86.73    Major depressive disorder with single episode, 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 providers found      History Obtained From:  Patient and review of all records    HISTORY OF PRESENT ILLNESS:                The patient is a 88 y.o. female with significant past medical history as below who presents with above mentioned causes in reason for consult    Was eating corn beef Joselito  Reports choking  Unable to tolerate saliva  Glucagon given not helping  Feels a scratchy feeling in the throat    Past Medical History:        Diagnosis Date    Acquired hypothyroidism 2019

## 2024-06-10 NOTE — ED NOTES
Pt transferred to waiting room per THO protocol following swallow screen pass. Pt became agitated stating that this nurse was treating pt like an animal d/t transfer to waiting room. Attempted to explain THO area/waiting room transfer and pt stated \"Zip it. Stop talking young lady. 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

## 2024-06-10 NOTE — PROGRESS NOTES
1738 Pt. Brought back to unit, bedside report received from KING Phelps. Pt. Is A&O, Fiors, Pt. Provided with ice chips and daughters at bedside.   1740 Daughters at bedside.   1750 DC instructions reviewed with pt and daughters, all parties express understanding.   1755 Report called to JUAN Juárez at [facility].   1801 Pt. Getting dressed with help of daughters.   1805 Pt. Being transported back to [facility] with daughters.

## 2024-06-10 NOTE — ED TRIAGE NOTES
EMS states pt has not been able to swallow drink or food since yesterday 1600. Pt has hx esophageal stretching, last procedure 6-7 months ago.   no

## 2024-06-10 NOTE — OP NOTE
Operative Note      Patient: Rupal Carballo  YOB: 1935  MRN: [id number]    Date of Procedure: 6/10/2024    Pre-Op Diagnosis Codes:     * Food bolus obstruction of intestine (HCC) [K56.699, W44.F3XA]    [facility]      BRIEF OP REPORT:    Impression:    1) large amount of food in the esophagus thick liquid suctioned out  Fibrous material removed with help of excavator  Large solid food material impacted in lower third of GE junction this was removed with excavator  Stricturing seen at GE junction  Somewhat inflamed  Dilation not attempted  Moderate size hiatal hernia   2) stomach normal   3) duodenum normal        Suggest:   1) eat slowly chew carefully drink sips of water before bread and meat   2) advised to get dentures   3) PPI twice daily  Advised to follow-up in our office for repeat EGD with dilation in 4 to 6 weeks     Full EGD/COLONOSCOPY report available by going to \"chart review\" then \"procedures\" then  \"EGD/Colonoscopy\"  then \"View Endoscopy Report\"       Please note that time of note may not reflect time of procedure   Electronically signed by Jessica Duarte MD on 6/10/2024 at 4:40 PM

## 2024-06-10 NOTE — DISCHARGE INSTRUCTIONS
[facility]  [phone]    Do not drive, work around machines or use equipment.  Do not drink any alcoholic beverages.  Do not smoke while alone.  Avoid making important decisions.  Plan to spend a quiet, relaxed evening @ home.  Resume normal activities as you begin to feel better.  Eat lightly for your first meal, then gradually increase your diet to what is normal for you.  In case of nausea, avoid food and drink only clear liquids.  Resume food as nausea ceases.  Notify your surgeon if you experience fever, chills, large amount of bleeding, difficulty breathing, persistent nausea and vomiting or any other disturbing problem.  Call for a follow-up appointment with your surgeon.       EGD        FOLLOW UP APPOINTMENT IN 1-2 /WEEKS OR AS NEEDED.    EGD IN 1-2 WEEKS.         What to Expect at Home  Your Recovery:  The only discomfort after your EGD is generally limited to a mild soreness of the throat, which may last a day or two. Call your physician immediately if you have severe chest pain, shortness of breath or a temperature of 100 degrees or higher if taken orally.    How can you care for yourself at home?  Activity  Rest as much as you need to after you go home.  You should be able to go back to your usual activities the day after the test.  Diet  Follow your doctor's directions for eating after the test.  Drink plenty of fluids (unless your doctor has told you not to).  Medications  If you have a sore throat the day after the test, use an over-the-counter spray to numb your throat.  Your doctor will tell you if and when you can restart your medicines. He or she will also give you instructions about taking any new medicines.  If you take blood thinners, such as warfarin (Coumadin), clopidogrel (Plavix), or aspirin, be sure to talk to your doctor. He or she will tell you if and when to start taking those medicines again. Make sure that you understand exactly what your

## 2024-06-10 NOTE — ED NOTES
Pt provided glucagon and educated on effects of glucagon. Pt and family are in waiting room. Pt's family noted that Dr. Baldomero Marie was the GI provider that stretched pt's esophagus and they have had this procedure multiple times by different providers.

## 2024-06-11 NOTE — ANESTHESIA POSTPROCEDURE EVALUATION
Department of Anesthesiology  Postprocedure Note    Patient: Rupal Carballo  MRN: [id number]  YOB: 1935  Date of evaluation: 6/11/2024    Procedure Summary       Date: 06/10/24 Room / Location: [place] [address] / [facility]    Anesthesia Start: 1622 Anesthesia Stop:     Procedure: ESOPHAGOGASTRODUODENOSCOPY FOREIGN BODY REMOVAL Diagnosis:       Food bolus obstruction of intestine (HCC)      (Food bolus obstruction of intestine (HCC) [K56.699, W44.F3XA])    Surgeons: Jessica Duarte MD Responsible Provider: Ameya Ward MD    Anesthesia Type: general ASA Status: 3 - Emergent            Anesthesia Type: No value filed.    Tangela Phase I: Tangela Score: 10    Tangela Phase II: Tangela Score: 10    Anesthesia Post Evaluation    Patient location during evaluation: PACU  Patient participation: complete - patient participated  Level of consciousness: awake and alert  Pain score: 1  Airway patency: patent  Nausea & Vomiting: no nausea and no vomiting  Cardiovascular status: hemodynamically stable  Respiratory status: acceptable  Hydration status: euvolemic  Multimodal analgesia pain management approach  Pain management: adequate    No notable events documented.

## 2024-06-12 ENCOUNTER — OFFICE VISIT (OUTPATIENT)
Dept: CARDIOLOGY CLINIC | Age: 89
End: 2024-06-12
Payer: MEDICARE

## 2024-06-12 VITALS
HEART RATE: 69 BPM | DIASTOLIC BLOOD PRESSURE: 62 MMHG | SYSTOLIC BLOOD PRESSURE: 118 MMHG | BODY MASS INDEX: 25.71 KG/M2 | WEIGHT: 160 LBS | HEIGHT: 66 IN

## 2024-06-12 DIAGNOSIS — I48.19 PERSISTENT ATRIAL FIBRILLATION (HCC): Primary | ICD-10-CM

## 2024-06-12 PROCEDURE — 1036F TOBACCO NON-USER: CPT | Performed by: INTERNAL MEDICINE

## 2024-06-12 PROCEDURE — G8427 DOCREV CUR MEDS BY ELIG CLIN: HCPCS | Performed by: INTERNAL MEDICINE

## 2024-06-12 PROCEDURE — 93000 ELECTROCARDIOGRAM COMPLETE: CPT | Performed by: INTERNAL MEDICINE

## 2024-06-12 PROCEDURE — 1111F DSCHRG MED/CURRENT MED MERGE: CPT | Performed by: INTERNAL MEDICINE

## 2024-06-12 PROCEDURE — 1123F ACP DISCUSS/DSCN MKR DOCD: CPT | Performed by: INTERNAL MEDICINE

## 2024-06-12 PROCEDURE — 1090F PRES/ABSN URINE INCON ASSESS: CPT | Performed by: INTERNAL MEDICINE

## 2024-06-12 PROCEDURE — G8419 CALC BMI OUT NRM PARAM NOF/U: HCPCS | Performed by: INTERNAL MEDICINE

## 2024-06-12 PROCEDURE — 99214 OFFICE O/P EST MOD 30 MIN: CPT | Performed by: INTERNAL MEDICINE

## 2024-06-12 NOTE — PATIENT INSTRUCTIONS
Please be informed that if you contact our office outside of normal business hours the physician on call cannot help with any scheduling or rescheduling issues, procedure instruction questions or any type of medication issue.    We advise you for any urgent/emergency that you go to the nearest emergency room!    PLEASE CALL OUR OFFICE DURING NORMAL BUSINESS HOURS    Monday - Friday   8 am to 5 pm    [place]: [phone]    [place]: [phone]    [place]:  [phone]  Thank you for allowing us to care for you today!   We want to ensure we can follow your treatment plan and we strive to give you the best outcomes and experience possible.   If you ever have a life threatening emergency and call 911 - for an ambulance (EMS)   Our providers can only care for you at:   [facility] or [facility].   Even if you have someone take you or you drive yourself we can only care for you in a [facility] facility. Our providers are not setup at the other healthcare locations!   We are committed to providing you the best care possible.    If you receive a survey after visiting one of our offices, please take time to share your experience concerning your physician office visit.  These surveys are confidential and no health information about you is shared.    We are eager to improve for you and we are counting on your feedback to help make that happen.      **It is YOUR responsibilty to bring medication bottles and/or updated medication list to EACH APPOINTMENT. This will allow us to better serve you and all your healthcare needs**

## 2024-06-12 NOTE — PROGRESS NOTES
FOREIGN BODY REMOVAL performed by Jessica Duarte MD at [facility] ENDOSCOPY     Family History   Problem Relation Age of Onset    Alcohol Abuse Mother     Obesity Mother     No Known Problems Father      Social History     Tobacco Use    Smoking status: Never    Smokeless tobacco: Never   Substance Use Topics    Alcohol use: No           Objective:      Physical Exam:    /62 (Site: Right Upper Arm, Position: Sitting, Cuff Size: Medium Adult)   Pulse 69   Ht 1.676 m (5' 6\")   Wt 72.6 kg (160 lb)   BMI 25.82 kg/m²   Wt Readings from Last 3 Encounters:   06/12/24 72.6 kg (160 lb)   06/10/24 74.8 kg (165 lb)   05/28/24 80.7 kg (177 lb 14.4 oz)     Body mass index is 25.82 kg/m².  Vitals:    06/12/24 1049   BP: 118/62   Pulse: 69        General Appearance and Constitutional: Conversant, Well developed, Well nourished, No acute distress, Non-toxic appearance.   HEENT:  Normocephalic, Atraumatic, Bilateral external ears normal, Oropharynx moist, No oral exudates,   Nose normal.   Neck- Normal range of motion, No tenderness, Supple  Eyes:  EOMI, Conjunctiva normal, No discharge.   Respiratory:  Normal breath sounds, No respiratory distress, No wheezing, No Rales, No Ronchi.  No chest tenderness.   Cardiovascular: S1-S2, no added heart sounds, No Mumurs appreciated. No gallops, rubs. No Pedal Edema   GI:  Bowel sounds normal, Soft, No tenderness,  :  No costovertebral angle tenderness   Musculoskeletal:  No gross deformities. Back- No tenderness  Integument:  Well hydrated, no rash   Lymphatic:  No lymphadenopathy noted   Neurologic:  Alert & oriented x 3, Normal motor function, normal sensory function, no focal deficits noted   Psychiatric:  Speech and behavior appropriate       Medical decision making and Data review:    DATA:    Lab Results   Component Value Date    TROPONINT <0.010 03/25/2023     BNP:    Lab Results   Component Value Date    PROBNP 4,873 (H) 03/24/2023     PT/INR:  No results found for: \"PTINR\"  Lab

## 2024-09-19 ENCOUNTER — OFFICE VISIT (OUTPATIENT)
Dept: CARDIOLOGY CLINIC | Age: 89
End: 2024-09-19
Payer: MEDICARE

## 2024-09-19 VITALS
BODY MASS INDEX: 27.16 KG/M2 | OXYGEN SATURATION: 97 % | WEIGHT: 169 LBS | DIASTOLIC BLOOD PRESSURE: 78 MMHG | SYSTOLIC BLOOD PRESSURE: 110 MMHG | HEIGHT: 66 IN | HEART RATE: 70 BPM

## 2024-09-19 DIAGNOSIS — E03.9 ACQUIRED HYPOTHYROIDISM: ICD-10-CM

## 2024-09-19 DIAGNOSIS — I48.21 PERMANENT ATRIAL FIBRILLATION (HCC): Primary | ICD-10-CM

## 2024-09-19 DIAGNOSIS — F03.90 DEMENTIA WITHOUT BEHAVIORAL DISTURBANCE (HCC): ICD-10-CM

## 2024-09-19 DIAGNOSIS — E78.5 DYSLIPIDEMIA: ICD-10-CM

## 2024-09-19 DIAGNOSIS — I10 ESSENTIAL HYPERTENSION: ICD-10-CM

## 2024-09-19 PROCEDURE — 1090F PRES/ABSN URINE INCON ASSESS: CPT | Performed by: INTERNAL MEDICINE

## 2024-09-19 PROCEDURE — 99214 OFFICE O/P EST MOD 30 MIN: CPT | Performed by: INTERNAL MEDICINE

## 2024-09-19 PROCEDURE — 1036F TOBACCO NON-USER: CPT | Performed by: INTERNAL MEDICINE

## 2024-09-19 PROCEDURE — 1123F ACP DISCUSS/DSCN MKR DOCD: CPT | Performed by: INTERNAL MEDICINE

## 2024-09-19 PROCEDURE — G8427 DOCREV CUR MEDS BY ELIG CLIN: HCPCS | Performed by: INTERNAL MEDICINE

## 2024-09-19 PROCEDURE — G8419 CALC BMI OUT NRM PARAM NOF/U: HCPCS | Performed by: INTERNAL MEDICINE

## 2025-05-07 NOTE — PROGRESS NOTES
MRN: [id number]  Name: Rupal Carballo  : 1935    Insurance: Payor: MEDICARE /  /  /      Phone #: [phone]  Provider: Lino Cheng MD     Date of Visit: 2025    Reason for visit:  6 MO F/U  Recent Hospitalization Date:    Reason for Hospitalization:    Last EK2024   Type of Device:       Vitals BP HR O2% WT HT ORTHO BP LYING ORTHO BP SITTING ORTHO BP SITTING   Today's Findings           Patients work up- Check List     Testing Last Date Completed Date Expected  ([place]) Additional Notes    MA to document For provider to complete Either MA or Provider    Carotid Duplex  STAT 1 WK 6 MTH       THIS WK 2 WK 1 YEAR     Cardiac CTA  STAT 1 WK 6 MTH       THIS WK 2 WK 1 YEAR     Cardiac CT Calcium scoring  STAT 1 WK 6 MTH       THIS WK 2 WK 1 YEAR     CTA Chest, Abdomen & Pelvis  STAT 1 WK 6 MTH       THIS WK 2 WK 1 YEAR     CT Chest IV w/ Contrast  STAT 1 WK 6 MTH       THIS WK 2 WK 1 YEAR     CT Chest w/o Contrast  STAT 1 WK 6 MTH       THIS WK 2 WK 1 YEAR     CXR  STAT 1 WK 6 MTH       THIS WK 2 WK 1 YEAR     ECHO  Stress Complete Limited     MRI- Cardiac  STAT 1 WK 6 MTH       THIS WK 2 WK 1 YEAR     MUGA Scan  STAT 1 WK 6 MTH       THIS WK 2 WK 1 YEAR     Nuclear Stress  Lexiscan Cardiolite     PFT  STAT 1 WK 6 MTH       THIS WK 2 WK 1 YEAR     Treadmill Stress Test  STAT 1 WK 6 MTH       THIS WK 2 WK 1 YEAR     Vascular Duplex  Lower: Right Left Bilat       Upper: Right Left Bilat     Other Test Not Listed:    Monitors Last Date Completed Day's Request/Ordered     Holter  Short term 24 hours 48 hours      Long term 3 days 7 days 14 days   Event   (1-30 days)      Procedures Last Date Performed Procedure Details Date Expected   Additional Notes    ASD Closure        Carotid Angio        Cardioversion        Heart Cath  R L R&L      Peripheral Angio  R L      PFO Closure        PTCA/PCI        HEBER        HEBER/Cardioversion        Venogram        Tilt Table        Other Type of

## 2025-05-08 ENCOUNTER — OFFICE VISIT (OUTPATIENT)
Dept: CARDIOLOGY CLINIC | Age: 89
End: 2025-05-08
Payer: COMMERCIAL

## 2025-05-08 VITALS
BODY MASS INDEX: 29.57 KG/M2 | SYSTOLIC BLOOD PRESSURE: 108 MMHG | HEART RATE: 100 BPM | WEIGHT: 184 LBS | DIASTOLIC BLOOD PRESSURE: 72 MMHG | HEIGHT: 66 IN

## 2025-05-08 DIAGNOSIS — E78.5 DYSLIPIDEMIA: ICD-10-CM

## 2025-05-08 DIAGNOSIS — I10 ESSENTIAL HYPERTENSION: ICD-10-CM

## 2025-05-08 DIAGNOSIS — R06.02 SHORTNESS OF BREATH: ICD-10-CM

## 2025-05-08 DIAGNOSIS — I48.21 PERMANENT ATRIAL FIBRILLATION (HCC): ICD-10-CM

## 2025-05-08 DIAGNOSIS — R00.2 PALPITATION: Primary | ICD-10-CM

## 2025-05-08 PROCEDURE — 1123F ACP DISCUSS/DSCN MKR DOCD: CPT | Performed by: INTERNAL MEDICINE

## 2025-05-08 PROCEDURE — 1159F MED LIST DOCD IN RCRD: CPT | Performed by: INTERNAL MEDICINE

## 2025-05-08 PROCEDURE — G8419 CALC BMI OUT NRM PARAM NOF/U: HCPCS | Performed by: INTERNAL MEDICINE

## 2025-05-08 PROCEDURE — 99214 OFFICE O/P EST MOD 30 MIN: CPT | Performed by: INTERNAL MEDICINE

## 2025-05-08 PROCEDURE — G8427 DOCREV CUR MEDS BY ELIG CLIN: HCPCS | Performed by: INTERNAL MEDICINE

## 2025-05-08 PROCEDURE — 1090F PRES/ABSN URINE INCON ASSESS: CPT | Performed by: INTERNAL MEDICINE

## 2025-05-08 PROCEDURE — 93000 ELECTROCARDIOGRAM COMPLETE: CPT | Performed by: INTERNAL MEDICINE

## 2025-05-08 PROCEDURE — 1036F TOBACCO NON-USER: CPT | Performed by: INTERNAL MEDICINE

## 2025-05-08 RX ORDER — ALBUTEROL SULFATE 90 UG/1
INHALANT RESPIRATORY (INHALATION)
COMMUNITY
Start: 2025-02-13

## 2025-05-08 NOTE — PROGRESS NOTES
Lino Cheng MD                                  CARDIOLOGY  NOTE          Chief Complaint:    Chief Complaint   Patient presents with    Palpitations    6 Month Follow-Up    Shortness of Breath    Dizziness      + AF  Mild dyspnea  Wheel chair bound  No falls to report since last visit       Prior Hx:     Rupal is a 89 y.o. year old female who presented to the hospital recently with atrial fibrillation with slow ventricular response.  Patient was on digoxin and metoprolol which were withheld during hospitalization.  Patient was also evaluated by EP  No significant pauses were noted during hospitalization.  Due to fall risk, watchman was contemplated.    Patient now presents for follow-up          Current Outpatient Medications   Medication Sig Dispense Refill    albuterol sulfate HFA (PROVENTIL;VENTOLIN;PROAIR) 108 (90 Base) MCG/ACT inhaler       mirtazapine (REMERON) 30 MG tablet Take 1 tablet by mouth nightly      cholestyramine (QUESTRAN) 4 g packet Take 1 packet by mouth 2 times daily 60 packet 0    apixaban (ELIQUIS) 5 MG TABS tablet TAKE 1 TABLET BY MOUTH 2 TIMES DAILY 180 tablet 1    Cholecalciferol (VITAMIN D3) 50 MCG (2000 UT) TABS TAKE ONE TABLET BY MOUTH EVERY DAY 90 tablet 1    levothyroxine (SYNTHROID) 150 MCG tablet TAKE ONE TABLET BY MOUTH EVERY DAY 90 tablet 1    memantine (NAMENDA) 10 MG tablet Take 1 tablet by mouth 2 times daily 180 tablet 1    metoprolol tartrate (LOPRESSOR) 25 MG tablet Take 0.5 tablets by mouth 2 times daily 60 tablet 3    nystatin (MYCOSTATIN) 679698 UNIT/GM powder Apply 3 times daily. 1 each 3    pantoprazole (PROTONIX) 40 MG tablet Take 1 tablet by mouth daily (Patient taking differently: Take 1 tablet by mouth 2 times daily) 30 tablet 2    rivastigmine (EXELON) 3 MG capsule Take 1 capsule by mouth daily 90 capsule 1    sertraline (ZOLOFT) 50 MG tablet Take 1.5 tablets by mouth daily TAKE ONE TABLET BY MOUTH EVERY  tablet 1    simvastatin (ZOCOR) 40 MG tablet

## 2025-05-08 NOTE — PROGRESS NOTES
CLINICAL STAFF DOCUMENTATION         Rupal CYNTHIA SantanaHairs  1935  [id number]    Have you had any Chest Pain recently? - No          Have you had any Shortness of Breath - Yes / COMES AND GOES.   If Yes - When at rest and on exertion  How many flights of stairs can you go up without having SOB? - NONE.   Are you on Oxygen during the day or at night? - No  How many pillows do you sleep with under your head? - 1    Have you had any dizziness - Yes  When do you feel dizzy? PT STATES WHEN GETS UP TOO FAST   Does the room spin? Yes  How long does it last .  seconds       Have you had any palpitations recently? - Yes  If Yes DO EKG - Do you feel your heart racing  How long do they last - .  seconds   Is there anything that aggravates or triggers them? Exertion  Is there anything that relieves them? Rest  Any thyroid issues? - No    Do you have any edema - swelling in No        When did you have your last labs drawn 06/04/2024  What doctor ordered ABSTRACTED INTO CHART   Do we have the labs in their chart Yes    Do you need any prescriptions refilled? - No    Do you have a surgery or procedure scheduled in the near future - No      Caffeine? - Yes  How much caffeine? .1-2  cups

## (undated) PROCEDURE — B24BZZ4 ULTRASONOGRAPHY OF HEART WITH AORTA, TRANSESOPHAGEAL: ICD-10-PCS

## (undated) DEVICE — ENDOSCOPY KIT: Brand: MEDLINE INDUSTRIES, INC.

## (undated) DEVICE — GRASPER OTSG XCAVATOR